# Patient Record
Sex: FEMALE | Race: WHITE | NOT HISPANIC OR LATINO | Employment: OTHER | URBAN - METROPOLITAN AREA
[De-identification: names, ages, dates, MRNs, and addresses within clinical notes are randomized per-mention and may not be internally consistent; named-entity substitution may affect disease eponyms.]

---

## 2017-01-11 ENCOUNTER — TELEPHONE (OUTPATIENT)
Dept: MEDICAL GROUP | Facility: MEDICAL CENTER | Age: 57
End: 2017-01-11

## 2017-01-11 ENCOUNTER — OFFICE VISIT (OUTPATIENT)
Dept: MEDICAL GROUP | Facility: MEDICAL CENTER | Age: 57
End: 2017-01-11
Payer: COMMERCIAL

## 2017-01-11 VITALS
BODY MASS INDEX: 33.53 KG/M2 | HEART RATE: 70 BPM | OXYGEN SATURATION: 98 % | TEMPERATURE: 98.1 F | SYSTOLIC BLOOD PRESSURE: 106 MMHG | WEIGHT: 196.4 LBS | HEIGHT: 64 IN | DIASTOLIC BLOOD PRESSURE: 60 MMHG

## 2017-01-11 DIAGNOSIS — E66.9 OBESITY (BMI 30-39.9): ICD-10-CM

## 2017-01-11 DIAGNOSIS — Z79.01 CHRONIC ANTICOAGULATION: ICD-10-CM

## 2017-01-11 DIAGNOSIS — J06.9 ACUTE URI: ICD-10-CM

## 2017-01-11 DIAGNOSIS — I48.0 PAROXYSMAL ATRIAL FIBRILLATION (HCC): ICD-10-CM

## 2017-01-11 PROCEDURE — 99214 OFFICE O/P EST MOD 30 MIN: CPT | Performed by: NURSE PRACTITIONER

## 2017-01-11 RX ORDER — AMOXICILLIN AND CLAVULANATE POTASSIUM 875; 125 MG/1; MG/1
1 TABLET, FILM COATED ORAL 2 TIMES DAILY
Qty: 20 TAB | Refills: 0 | Status: SHIPPED
Start: 2017-01-11 | End: 2017-02-03

## 2017-01-11 NOTE — PROGRESS NOTES
Subjective:     Chief Complaint   Patient presents with   • Sinus Problem     x6 days/nasal & chest congestion/green mucus & cough     Seda Cagle is a 56 y.o. female established patient of Dr. Stoddard here for evaluation of acute congestion and cough. Approximately 6 days ago she developed sore throat, nasal congestion, rhinitis. In the last 2 days she's also developed a cough which is wet. Cough is infrequent, kept her awake last night. She is using Coricidin with minimal relief.  She does have history of A. fib, rate controlled on current medications. She has a pacer and is on xarelto  No nausea, shortness of breath, fever, focal facial pain, chest pain. No asthma, COPD, tobacco use. No sick contacts    Current medicines (including changes today)  Current Outpatient Prescriptions   Medication Sig Dispense Refill   • amoxicillin-clavulanate (AUGMENTIN) 875-125 MG Tab Take 1 Tab by mouth 2 times a day. 20 Tab 0   • Guaifenesin-Codeine 200-20 MG/5ML Liquid Take 5 mL by mouth every 6 hours as needed. 240 mL 0   • rivaroxaban (XARELTO) 20 MG Tab tablet Take 1 Tab by mouth with dinner. 90 Tab 3   • metoprolol (LOPRESSOR) 25 MG Tab Take 1 Tab by mouth 2 times a day. 180 Tab 3   • disopyramide (NORPACE) 100 MG Cap Take 1 Cap by mouth every 8 hours. 270 Cap 3   • Diclofenac Sodium (VOLTAREN) 1 % Gel Apply 2 g to skin as directed 4 times a day as needed (apply to affected area). 300 g 5   • Calcium-Vitamin D-Vitamin K (CALCIUM FOR WOMEN PO) Take  by mouth.     • cyclobenzaprine (FLEXERIL) 10 MG Tab Take 1 Tab by mouth 3 times a day as needed. 30 Tab 0   • FLUTICASONE FUROATE INH Inhale  by mouth.     • ketotifen (ZADITOR) 0.025 % ophthalmic solution Place 1 Drop in both eyes 2 times a day.     • Lansoprazole (PREVACID PO) Take  by mouth.     • Famotidine (PEPCID PO) Take  by mouth.     • Pantothenic Acid 500 MG TABS Take  by mouth every day.     • Magnesium 200 MG TABS Take 1 Tab by mouth every day.     •  "Multiple Vitamins-Minerals (ALIVE ONCE DAILY WOMENS 50+ PO) Take  by mouth.       No current facility-administered medications for this visit.     She  has a past medical history of Heart murmur; Hypertrophic obstructive cardiomyopathy (1991); Depression; GERD (gastroesophageal reflux disease); Arrhythmia, ventricular (2008); Hypertrophic cardiomyopathy (HCC); Aortic regurgitation; CTS (carpal tunnel syndrome) (2/28/2011); Indigestion; Asthma; Snoring; Anesthesia; Renal disorder; Pain; Breath shortness (12/14); Cervical arthritis (HCC) (2/8/2016); and Chronic kidney disease, stage III (moderate) (2/9/2016). She also has no past medical history of CHF (congestive heart failure) ([facility]), Clotting disorder ([facility]), COPD, Cushings syndrome ([facility]), HIV (human immunodeficiency virus infection), Heart attack ([facility]), Addisons disease ([facility]), Adrenal disorder ([facility]), Parathyroid disorder ([facility]), Pituitary disease ([facility]), Thyroid disease, Stroke ([facility]), Substance abuse, Seizure ([facility]), OSTEOPOROSIS, Anemia, Anxiety, Diabetes, EMPHYSEMA, Glaucoma, ASTHMA, Blood transfusion, Cancer ([facility]), IBD (inflammatory bowel disease), Hypertension, Headache(784.0), Migraine, Tuberculosis, Ulcer ([facility]), CATARACT, Goiter, or Infectious disease.    ROS included above     Objective:     Blood pressure 106/60, pulse 70, temperature 36.7 °C (98.1 °F), height 1.626 m (5' 4\"), weight 89.086 kg (196 lb 6.4 oz), SpO2 98 %. Body mass index is 33.7 kg/(m^2).     Physical Exam:  General: Alert, oriented in no acute distress.  Eye contact is good, speech is normal, affect calm  HEENT: Posterior oropharynx mildly red, no lesions or exudate. TMs gray with good landmarks bilaterally. Nares with erythema, edema, clear mucus. No cervical or supraclavicular lymphadenopathy.  Lungs: Coarse throughout, good aeration, normal effort, no wheeze/ rhonchi/ rales.  CV: regular rate and rhythm, S1, S2, murmur present  Ext: no edema, color normal, vascularity normal, temperature " normal    Assessment and Plan:   The following treatment plan was discussed   1. Acute URI   6 days of illness. Discussed with patient likelihood of viral etiology, reassured that she should begin to improve in the next few days. She is, however, going out of town and will be in a remote area in [place] for the next few weeks. I will provide her with a prescription with a watch and wait approach. For now advised to continue with Coricidin, add Afrin for the next 3-4 days. Cough syrup provided for nighttime use. Advised not to drink alcohol and not to drive when using medication, follow-up for worsening cough, shortness of breath, fever, or other concern  amoxicillin-clavulanate (AUGMENTIN) 875-125 MG Tab    Guaifenesin-Codeine 200-20 MG/5ML Liquid   2. Paroxysmal atrial fibrillation (HCC)   stable on current medications    3. Chronic anticoagulation   doing well on Xarelto        Followup: As needed         Please note that this dictation was created using voice recognition software. I have worked with consultants from the vendor as well as technical experts from [facility] [employer] to optimize the interface. I have made every reasonable attempt to correct obvious errors, but I expect that there are errors of grammar and possibly content that I did not discover before finalizing the note.

## 2017-01-11 NOTE — MR AVS SNAPSHOT
"        Seda Cagle   2017 1:00 PM   Office Visit   MRN: [id number]    Department:  [facility]   Dept Phone:  [phone]    Description:  Female : 1960   Provider:  ROSLYN Allison           Reason for Visit     Sinus Problem x6 days/nasal & chest congestion/green mucus & cough      Allergies as of 2017     No Known Allergies      You were diagnosed with     Acute URI   [640689]       Paroxysmal atrial fibrillation (HCC)   [258534]       Chronic anticoagulation   [572230]         Vital Signs     Blood Pressure Pulse Temperature Height Weight Body Mass Index    106/60 mmHg 70 36.7 °C (98.1 °F) 1.626 m (5' 4\") 89.086 kg (196 lb 6.4 oz) 33.70 kg/m2    Oxygen Saturation Smoking Status                98% Never Smoker           Basic Information     Date Of Birth Sex Race Ethnicity Preferred Language    1960 Female White Non- English      Your appointments     2017  9:20 AM   FOLLOW UP with Khris Pitts M.D.   [facility] (--)    [address], [address]  [place] [address]   [phone]            2017  8:40 AM   FOLLOW UP with Khris Pitts M.D.   [facility] (--)    [address], [address]  [place] [address]   [phone]              Problem List              ICD-10-CM Priority Class Noted - Resolved    S/P colonoscopy Z98.890   2010 - Present    Hypertrophic obstructive cardiomyopathy (HCC) I42.1   Unknown - Present    Cardiac pacemaker in situ Z95.0   2015 - Present    Preventative health care Z00.00   2016 - Present    Gastroesophageal reflux disease without esophagitis K21.9   2016 - Present    Obesity E66.9   2016 - Present    Cervical arthritis (HCC) M46.92   2016 - Present    Mitral regurgitation I34.0   2016 - Present    Chronic kidney disease, stage III (moderate) (Chronic) N18.3   2016 - Present    Dyslipidemia " E78.5   12/5/2016 - Present    Paroxysmal atrial fibrillation (HCC) I48.0   12/5/2016 - Present      Health Maintenance        Date Due Completion Dates    IMM PNEUMOCOCCAL 19-64 (ADULT) MEDIUM RISK SERIES (1 of 1 - PPSV23) 5/2/1979 ---    PAP SMEAR 9/13/2013 9/13/2010    COLONOSCOPY 12/7/2016 12/7/2009    MAMMOGRAM 3/23/2017 3/23/2016, 2/25/2015, 2/23/2015    IMM DTaP/Tdap/Td Vaccine (2 - Td) 12/2/2026 12/2/2016            Current Immunizations     Influenza TIV (IM) 9/15/2014    Influenza Vaccine Quad Inj (Preserved) 11/3/2016  9:51 AM    Tdap Vaccine 12/2/2016 11:07 AM      Below and/or attached are the medications your provider expects you to take. Review all of your home medications and newly ordered medications with your provider and/or pharmacist. Follow medication instructions as directed by your provider and/or pharmacist. Please keep your medication list with you and share with your provider. Update the information when medications are discontinued, doses are changed, or new medications (including over-the-counter products) are added; and carry medication information at all times in the event of emergency situations     Allergies:  No Known Allergies          Medications  Valid as of: January 11, 2017 -  2:26 PM    Generic Name Brand Name Tablet Size Instructions for use    Amoxicillin-Pot Clavulanate (Tab) AUGMENTIN 875-125 MG Take 1 Tab by mouth 2 times a day.        Calcium-Vitamin D-Vitamin K   Take  by mouth.        Cyclobenzaprine HCl (Tab) FLEXERIL 10 MG Take 1 Tab by mouth 3 times a day as needed.        Diclofenac Sodium (Gel) Diclofenac Sodium 1 % Apply 2 g to skin as directed 4 times a day as needed (apply to affected area).        Disopyramide Phosphate (Cap) NORPACE 100 MG Take 1 Cap by mouth every 8 hours.        Famotidine   Take  by mouth.        Fluticasone Furoate   Inhale  by mouth.        Guaifenesin-Codeine (Liquid) Guaifenesin-Codeine 200-20 MG/5ML Take 5 mL by mouth every 6 hours as  needed.        Ketotifen Fumarate (Solution) ZADITOR 0.025 % Place 1 Drop in both eyes 2 times a day.        Lansoprazole   Take  by mouth.        Magnesium (Tab) Magnesium 200 MG Take 1 Tab by mouth every day.        Metoprolol Tartrate (Tab) LOPRESSOR 25 MG Take 1 Tab by mouth 2 times a day.        Multiple Vitamins-Minerals   Take  by mouth.        Pantothenic Acid (Tab) Pantothenic Acid 500 MG Take  by mouth every day.        Rivaroxaban (Tab) XARELTO 20 MG Take 1 Tab by mouth with dinner.        .                 Medicines prescribed today were sent to:     [facility] [address] [facility] ([place] [place] - [address]    [address] [facility] [facility] ([place]) [place] [address]    Phone: [phone] Fax: [phone]    Open 24 Hours?: No    [facility] [address] [facility], [place] - [address] [facility] [address]    [address] [facility] [address]    Phone: [phone] Fax: [phone]    Open 24 Hours?: No    [facility] #[id number] - [place], [place] - [address]    [address] [facility] [address]    Phone: [phone] Fax: [phone]    Open 24 Hours?: No      Medication refill instructions:       If your prescription bottle indicates you have medication refills left, it is not necessary to call your provider’s office. Please contact your pharmacy and they will refill your medication.    If your prescription bottle indicates you do not have any refills left, you may request refills at any time through one of the following ways: The online [employer] system (except Urgent Care), by calling your provider’s office, or by asking your pharmacy to contact your provider’s office with a refill request. Medication refills are processed only during regular business hours and may not be available until the next business day. Your provider may request additional information or to have a follow-up visit with you prior to refilling your medication.   *Please Note: Medication refills are assigned a new Rx number when  refilled electronically. Your pharmacy may indicate that no refills were authorized even though a new prescription for the same medication is available at the pharmacy. Please request the medicine by name with the pharmacy before contacting your provider for a refill.        Other Notes About Your Plan     12/5/16 tsh 4.3  12/2/16 right plantar fasciitis, trial of topical Voltaren, stretching exercises, referral podiatry  3/17 repeat tsh           MyChart Access Code: Activation code not generated  Current MyChart Status: Active

## 2017-01-11 NOTE — TELEPHONE ENCOUNTER
Pharmacy states rx for Guafinesen only comes as 100-10/5ml. Do you want to switch to this strength and double the dose?    Call back number: [phone]

## 2017-01-11 NOTE — TELEPHONE ENCOUNTER
Ila prescribed this for her, I did not.    You could call the pharmacy, and change to the 200-20 dose instead

## 2017-02-03 ENCOUNTER — OFFICE VISIT (OUTPATIENT)
Dept: MEDICAL GROUP | Facility: MEDICAL CENTER | Age: 57
End: 2017-02-03
Payer: COMMERCIAL

## 2017-02-03 VITALS
DIASTOLIC BLOOD PRESSURE: 72 MMHG | HEART RATE: 60 BPM | TEMPERATURE: 98.1 F | SYSTOLIC BLOOD PRESSURE: 128 MMHG | HEIGHT: 64 IN | BODY MASS INDEX: 34.31 KG/M2 | WEIGHT: 201 LBS | OXYGEN SATURATION: 94 %

## 2017-02-03 DIAGNOSIS — Z95.0 CARDIAC PACEMAKER IN SITU: ICD-10-CM

## 2017-02-03 DIAGNOSIS — I42.1 HYPERTROPHIC OBSTRUCTIVE CARDIOMYOPATHY (HCC): Chronic | ICD-10-CM

## 2017-02-03 DIAGNOSIS — R06.83 SNORING: ICD-10-CM

## 2017-02-03 DIAGNOSIS — N18.30 CHRONIC KIDNEY DISEASE, STAGE III (MODERATE) (HCC): Chronic | ICD-10-CM

## 2017-02-03 PROBLEM — E66.9 OBESITY (BMI 30-39.9): Status: RESOLVED | Noted: 2017-01-11 | Resolved: 2017-02-03

## 2017-02-03 PROCEDURE — 99213 OFFICE O/P EST LOW 20 MIN: CPT | Performed by: INTERNAL MEDICINE

## 2017-02-03 ASSESSMENT — PATIENT HEALTH QUESTIONNAIRE - PHQ9: CLINICAL INTERPRETATION OF PHQ2 SCORE: 0

## 2017-02-03 NOTE — MR AVS SNAPSHOT
"        Seda Cagle   2/3/2017 2:00 PM   Office Visit   MRN: [id number]    Department:  [facility]   Dept Phone:  [phone]    Description:  Female : 1960   Provider:  Juve Stoddard M.D.           Allergies as of 2/3/2017     No Known Allergies      You were diagnosed with     Hypertrophic obstructive cardiomyopathy (CMS-HCC)   [7032370]       Cardiac pacemaker in situ   [V45.01.ICD-9-CM]       Snoring   [694426]       Chronic kidney disease, stage III (moderate)   [585.3.ICD-9-CM]         Vital Signs     Blood Pressure Pulse Temperature Height Weight Body Mass Index    128/72 mmHg 60 36.7 °C (98.1 °F) 1.626 m (5' 4\") 91.173 kg (201 lb) 34.48 kg/m2    Oxygen Saturation Smoking Status                94% Never Smoker           Basic Information     Date Of Birth Sex Race Ethnicity Preferred Language    1960 Female White Non- English      Your appointments     2017  9:20 AM   FOLLOW UP with Khris Pitts M.D.   [facility] (--)    [address], [address]  [place] [address]   [phone]            2017  8:40 AM   FOLLOW UP with Khris Pitts M.D.   [facility] (--)    [address], [address]  [place] [address]   [phone]              Problem List              ICD-10-CM Priority Class Noted - Resolved    S/P colonoscopy Z98.890   2010 - Present    Hypertrophic obstructive cardiomyopathy (HCC) (Chronic) I42.1   Unknown - Present    Cardiac pacemaker in situ Z95.0   2015 - Present    Preventative health care Z00.00   2016 - Present    Gastroesophageal reflux disease without esophagitis K21.9   2016 - Present    Obesity E66.9   2016 - Present    Cervical arthritis (CMS-HCC) M46.92   2016 - Present    Mitral regurgitation I34.0   2016 - Present    Chronic kidney disease, stage III (moderate) (Chronic) N18.3   2016 - Present    Dyslipidemia " E78.5   12/5/2016 - Present    Paroxysmal atrial fibrillation (CMS-HCC) (Chronic) I48.0   12/5/2016 - Present      Health Maintenance        Date Due Completion Dates    IMM PNEUMOCOCCAL 19-64 (ADULT) MEDIUM RISK SERIES (1 of 1 - PPSV23) 5/2/1979 ---    PAP SMEAR 9/13/2013 9/13/2010    COLONOSCOPY 12/7/2016 12/7/2009    MAMMOGRAM 3/23/2017 3/23/2016, 2/25/2015, 2/23/2015    IMM DTaP/Tdap/Td Vaccine (2 - Td) 12/2/2026 12/2/2016            Current Immunizations     Influenza TIV (IM) 9/15/2014    Influenza Vaccine Quad Inj (Preserved) 11/3/2016  9:51 AM    Tdap Vaccine 12/2/2016 11:07 AM      Below and/or attached are the medications your provider expects you to take. Review all of your home medications and newly ordered medications with your provider and/or pharmacist. Follow medication instructions as directed by your provider and/or pharmacist. Please keep your medication list with you and share with your provider. Update the information when medications are discontinued, doses are changed, or new medications (including over-the-counter products) are added; and carry medication information at all times in the event of emergency situations     Allergies:  No Known Allergies          Medications  Valid as of: February 03, 2017 -  2:28 PM    Generic Name Brand Name Tablet Size Instructions for use    Calcium-Vitamin D-Vitamin K   Take  by mouth.        Diclofenac Sodium (Gel) Diclofenac Sodium 1 % Apply 2 g to skin as directed 4 times a day as needed (apply to affected area).        Disopyramide Phosphate (Cap) NORPACE 100 MG Take 1 Cap by mouth every 8 hours.        Lansoprazole   Take  by mouth.        Magnesium (Tab) Magnesium 200 MG Take 1 Tab by mouth every day.        Metoprolol Tartrate (Tab) LOPRESSOR 25 MG Take 1 Tab by mouth 2 times a day.        Multiple Vitamins-Minerals   Take  by mouth.        Pantothenic Acid (Tab) Pantothenic Acid 500 MG Take  by mouth every day.        Rivaroxaban (Tab) XARELTO 20 MG  Take 1 Tab by mouth with dinner.        .                 Medicines prescribed today were sent to:     [facility] - [place]), [place] - [address]    [address] [place] [address]    Phone: [phone] Fax: [phone]    Open 24 Hours?: No    [facility] - [place], [place] - [address]    [address] [place] [address]    Phone: [phone] Fax: [phone]    Open 24 Hours?: No    [facility] #[id number] - [facility] [place] - [address]    [address] [facility] [address]    Phone: [phone] Fax: [phone]    Open 24 Hours?: No      Medication refill instructions:       If your prescription bottle indicates you have medication refills left, it is not necessary to call your provider’s office. Please contact your pharmacy and they will refill your medication.    If your prescription bottle indicates you do not have any refills left, you may request refills at any time through one of the following ways: The online [employer] system (except Urgent Care), by calling your provider’s office, or by asking your pharmacy to contact your provider’s office with a refill request. Medication refills are processed only during regular business hours and may not be available until the next business day. Your provider may request additional information or to have a follow-up visit with you prior to refilling your medication.   *Please Note: Medication refills are assigned a new Rx number when refilled electronically. Your pharmacy may indicate that no refills were authorized even though a new prescription for the same medication is available at the pharmacy. Please request the medicine by name with the pharmacy before contacting your provider for a refill.        Referral     A referral request has been sent to our patient care coordination department. Please allow 3-5 business days for us to process this request and contact you either by phone or mail.  If you do not hear from us by the 5th business day, please call us at [phone].        Other Notes About Your Plan     12/5/16 tsh 4.3  12/2/16 right plantar fasciitis, trial of topical voltaren, stretching exercises, referral podiatry  3/17 repeat tsh  2/3/17 sleep apnea referral pending, OPO ordered                      MyChart Access Code: Activation code not generated  Current [facility] [employer] Status: Active

## 2017-02-03 NOTE — PROGRESS NOTES
Subjective:      Seda Cagle is a 56 y.o. female who presents with snoring        HPI      has mentioned that she has been snoring more the last year, patient has had no previous snoring by history, will go to bed at 11 and get up at 7 am, no history of sleep apnea, no insomnia, still feels tired after sleeping 8 hours upon awakening, no naps during the day, energy somewhat decreased during the day, has treadmill at home and will try to get 97082 steps per day.  Status post pacemaker followed by cardiology, paroxysmal atrial fibrillation, on xarelto and metoprolol, no chest pain, palpitations, shortness of breath.  No aspirin, NSAIDs, no bruising or bleeding  No tobacco, limiting alcohol intake      Current Outpatient Prescriptions   Medication Sig Dispense Refill   • Calcium-Vitamin D-Vitamin K (CALCIUM FOR WOMEN PO) Take  by mouth.     • rivaroxaban (XARELTO) 20 MG Tab tablet Take 1 Tab by mouth with dinner. 90 Tab 3   • metoprolol (LOPRESSOR) 25 MG Tab Take 1 Tab by mouth 2 times a day. 180 Tab 3   • disopyramide (NORPACE) 100 MG Cap Take 1 Cap by mouth every 8 hours. 270 Cap 3   • Lansoprazole (PREVACID PO) Take  by mouth.     • Pantothenic Acid 500 MG TABS Take  by mouth every day.     • Magnesium 200 MG TABS Take 1 Tab by mouth every day.     • Multiple Vitamins-Minerals (ALIVE ONCE DAILY WOMENS 50+ PO) Take  by mouth.     • amoxicillin-clavulanate (AUGMENTIN) 875-125 MG Tab Take 1 Tab by mouth 2 times a day. 20 Tab 0   • Guaifenesin-Codeine 200-20 MG/5ML Liquid Take 5 mL by mouth every 6 hours as needed. 240 mL 0   • Diclofenac Sodium (VOLTAREN) 1 % Gel Apply 2 g to skin as directed 4 times a day as needed (apply to affected area). 300 g 5   • cyclobenzaprine (FLEXERIL) 10 MG Tab Take 1 Tab by mouth 3 times a day as needed. 30 Tab 0   • FLUTICASONE FUROATE INH Inhale  by mouth.     • ketotifen (ZADITOR) 0.025 % ophthalmic solution Place 1 Drop in both eyes 2 times a day.     •  Famotidine (PEPCID PO) Take  by mouth.       No current facility-administered medications for this visit.     Patient Active Problem List    Diagnosis Date Noted   • Dyslipidemia 12/05/2016   • Paroxysmal atrial fibrillation (CMS-HCC) 12/05/2016   • Chronic kidney disease, stage III (moderate) 02/09/2016   • Preventative health care 02/08/2016   • Gastroesophageal reflux disease without esophagitis 02/08/2016   • Obesity 02/08/2016   • Cervical arthritis (CMS-HCC) 02/08/2016   • Mitral regurgitation 02/08/2016   • Cardiac pacemaker in situ 01/21/2015   • Hypertrophic obstructive cardiomyopathy ([facility])    • S/P colonoscopy 09/13/2010       ckd  2/19/11 bun 15,creat 1.0,GFR 56  5/14/14 bun 24,creat 1.23,GFR 45  8/2/14 ultrasound renal negative  2/19/15 bun 19,creat 1.1,GFR 50  8/3/15 bun 17,creat 1.2, GFR 43,urine mac<0.5,PTH 27,vit d 37  10/16/15 bun 17,creat 1.1,GFR 47  1/27/16 bun 14,creat 1.2,GFR 45  12/5/16 bun 16,creat 1.0,GFR 52    Dyslipidemia  12/5/16 chol 186,trig 143,hdl 52,ldl 105    Hypertrophic cardiomyopathy  8/14/00 alcohol septal reduction, normal coronaries  4/15/10 echo very small area proximal septal hypertrophy, no significant obstruction  4/16/10 cardiology note [facility] hypertrophic cardiomyopathy on metoprolol 25 mg bid, norpace  mg bid, asa; remains asymptomatic with regards to hypertrophic cardio myopathy  3/18/11 echo normal LV thickness with very focal area localized hypertrophy most basal septum without evidence of obstruction, EF 64%  11/16/14 persantine thallium fixed defect mid and basilar anteroseptal wall and apical septum consistent with scar, hypokinesis septum EF 74%  11/18/14 echo normal LV size and function, grade 2 diastolic dysfunction, EF 60-65%, mild aortic stenosis, mild aortic insufficiency, RVSP 20-25  11/19/14 holter sinus with left bundle branch block, average rate 57, minimum heart rate 44, maximal heart rate 85, PVCs, PACs    12/12/14 ALLEN known  post-ablation for hypertrophic cardiomyopathy without obstruction, small amount of remaining myocardium of lower tract does not cause significant outflow gradient, mild aortic insufficiency, moderate central mitral regurgitation, remnant myocardium in the LVOT with mild obstruction, peak outflow gradient 11 (previously 16-18)  4/16/15  cardiology procedure note, dual-chamber pacemaker implantation  10/24/16  of cardiology, atrial fibrillation and hypertrophic cardiomyopathy, controlled on norpace,metoprolol, xarelto    Mitral regurgitation  11/18/14 echo normal LV size and function, grade 2 diastolic dysfunction, EF 60-65%, mild aortic stenosis, mild aortic insufficiency, RVSP 20-25  11/19/14 holter sinus with left bundle branch block, average rate 57, minimum heart rate 44, maximal heart rate 85, PVCs, PACs    12/12/14 ALLEN known post-ablation for hypertrophic cardiomyopathy without obstruction, small amount of remaining myocardium of lower tract does not cause significant outflow gradient, mild aortic insufficiency, moderate central mitral regurgitation, remnant myocardium in the LVOT with mild obstruction, peak outflow gradient 11 (previously 16-18)  10/24/16  of cardiology, atrial fibrillation and hypertrophic cardiomyopathy, controlled on norpace,metoprolol, xarelto    Paroxysmal atrial fibrillation  11/30/15 cardiology note paroxysmal atrial fibrillation, short atrial fibrillation under 4 minutes  2/22/16 cardiology note minimal atrial fibrillation on dual-chamber pacemaker check  3/24/16 cardiology note minimal atrial fibrillation   10/24/16 cardiology note longer episodes of atrial fibrillation, still at 2.6 hours total on dual-chamber pacemaker review, continues on xarelto    Preventative health  12/7/09 colon per [facility] negative, repeat 10 years  2/15 pap per gyn  3/23/16 mammogram heterogeneously dense breast tissue, declines sonocine    Depression Screening    Little interest or  "pleasure in doing things?  0 - not at all  Feeling down, depressed , or hopeless? 0 - not at all  Patient Health Questionnaire Score: 0     If depressive symptoms identified deferred to follow up visit unless specifically addressed in assesment and plan.        ROS       Objective:     /72 mmHg  Pulse 60  Temp(Src) 36.7 °C (98.1 °F)  Ht 1.626 m (5' 4\")  Wt 91.173 kg (201 lb)  BMI 34.48 kg/m2  SpO2 94%     Physical Exam   Constitutional: She appears well-developed and well-nourished. No distress.   HENT:   Head: Normocephalic and atraumatic.   Eyes: Right eye exhibits no discharge. Left eye exhibits no discharge. No scleral icterus.   Neck: No JVD present. No thyromegaly present.   Cardiovascular: Normal rate and regular rhythm.    No murmur heard.  Abdominal: She exhibits no distension.   Musculoskeletal: She exhibits no edema.   Neurological: She is alert.   Skin: Skin is warm. She is not diaphoretic.   Psychiatric: She has a normal mood and affect. Her behavior is normal.   Nursing note and vitals reviewed.              Assessment/Plan:     Assessment  #!  Snoring question sleep apnea, daytime fatigue, feels tired upon awakening, no narcolepsy    #2 obesity BMI 34.5    #3 status post pacemaker    #4 paroxysmal atrial fibrillation on xarelto and metoprolol stable, sinus on exam today          Plan  #1 sleep apnea referral    #2 overnight oximetry by key    #3 lifestyle modification, diet, exercise discussed    #4 follow-up cardiology      "

## 2017-02-06 ENCOUNTER — OFFICE VISIT (OUTPATIENT)
Dept: CARDIOLOGY | Facility: MEDICAL CENTER | Age: 57
End: 2017-02-06
Payer: COMMERCIAL

## 2017-02-06 VITALS
SYSTOLIC BLOOD PRESSURE: 122 MMHG | HEIGHT: 64 IN | HEART RATE: 60 BPM | OXYGEN SATURATION: 96 % | WEIGHT: 201 LBS | DIASTOLIC BLOOD PRESSURE: 74 MMHG | BODY MASS INDEX: 34.31 KG/M2

## 2017-02-06 DIAGNOSIS — I42.1 HYPERTROPHIC OBSTRUCTIVE CARDIOMYOPATHY (HCC): Chronic | ICD-10-CM

## 2017-02-06 DIAGNOSIS — I48.91 ATRIAL FIBRILLATION, UNSPECIFIED TYPE (HCC): ICD-10-CM

## 2017-02-06 LAB — EKG IMPRESSION: NORMAL

## 2017-02-06 PROCEDURE — 93000 ELECTROCARDIOGRAM COMPLETE: CPT | Performed by: INTERNAL MEDICINE

## 2017-02-06 PROCEDURE — 99213 OFFICE O/P EST LOW 20 MIN: CPT | Performed by: INTERNAL MEDICINE

## 2017-02-06 ASSESSMENT — ENCOUNTER SYMPTOMS: PALPITATIONS: 0

## 2017-02-06 NOTE — MR AVS SNAPSHOT
"Seda Cagle   2017 9:20 AM   Office Visit   MRN: [id number]    Department:  [facility]   Dept Phone:  [phone]    Description:  Female : 1960   Provider:  Khris Pitts M.D.           Reason for Visit     Follow-Up           Allergies as of 2017     No Known Allergies      You were diagnosed with     Atrial fibrillation, unspecified type (CMS-HCC)   [6828553]       Hypertrophic obstructive cardiomyopathy (CMS-HCC)   [2179324]         Vital Signs     Blood Pressure Pulse Height Weight Body Mass Index Oxygen Saturation    122/74 mmHg 60 1.626 m (5' 4.02\") 91.173 kg (201 lb) 34.48 kg/m2 96%    Smoking Status                   Never Smoker            Basic Information     Date Of Birth Sex Race Ethnicity Preferred Language    1960 Female White Non- English      Your appointments     2017  8:40 AM   FOLLOW UP with Khris Pitts M.D.   [facility] (--)    [address], [address]  [facility] [id number] [phone]              Problem List              ICD-10-CM Priority Class Noted - Resolved    S/P colonoscopy Z98.890   2010 - Present    Hypertrophic obstructive cardiomyopathy (HCC) (Chronic) I42.1   Unknown - Present    Cardiac pacemaker in situ Z95.0   2015 - Present    Preventative health care Z00.00   2016 - Present    Gastroesophageal reflux disease without esophagitis K21.9   2016 - Present    Obesity E66.9   2016 - Present    Cervical arthritis (CMS-HCC) M46.92   2016 - Present    Mitral regurgitation I34.0   2016 - Present    Chronic kidney disease, stage III (moderate) (Chronic) N18.3   2016 - Present    Dyslipidemia E78.5   2016 - Present    Paroxysmal atrial fibrillation (CMS-HCC) (Chronic) I48.0   2016 - Present      Health Maintenance        Date Due Completion Dates    IMM PNEUMOCOCCAL 19-64 (ADULT) MEDIUM RISK SERIES (1 of 1 - PPSV23) 1979 ---    PAP " SMEAR 9/13/2013 9/13/2010    COLONOSCOPY 12/7/2016 12/7/2009    MAMMOGRAM 3/23/2017 3/23/2016, 2/25/2015, 2/23/2015    IMM DTaP/Tdap/Td Vaccine (2 - Td) 12/2/2026 12/2/2016            Results       Current Immunizations     Influenza TIV (IM) 9/15/2014    Influenza Vaccine Quad Inj (Preserved) 11/3/2016  9:51 AM    Tdap Vaccine 12/2/2016 11:07 AM      Below and/or attached are the medications your provider expects you to take. Review all of your home medications and newly ordered medications with your provider and/or pharmacist. Follow medication instructions as directed by your provider and/or pharmacist. Please keep your medication list with you and share with your provider. Update the information when medications are discontinued, doses are changed, or new medications (including over-the-counter products) are added; and carry medication information at all times in the event of emergency situations     Allergies:  No Known Allergies          Medications  Valid as of: February 06, 2017 - 10:02 AM    Generic Name Brand Name Tablet Size Instructions for use    Calcium-Vitamin D-Vitamin K   Take  by mouth.        Diclofenac Sodium (Gel) Diclofenac Sodium 1 % Apply 2 g to skin as directed 4 times a day as needed (apply to affected area).        Disopyramide Phosphate (Cap) NORPACE 100 MG Take 1 Cap by mouth every 8 hours.        Lansoprazole   Take  by mouth.        Magnesium (Tab) Magnesium 200 MG Take 1 Tab by mouth every day.        Metoprolol Tartrate (Tab) LOPRESSOR 25 MG Take 1 Tab by mouth 2 times a day.        Multiple Vitamins-Minerals   Take  by mouth.        Pantothenic Acid (Tab) Pantothenic Acid 500 MG Take  by mouth every day.        Rivaroxaban (Tab) XARELTO 20 MG Take 1 Tab by mouth with dinner.        .                 Medicines prescribed today were sent to:     [facility] [place] - [address]    [facility] [place] [address]    Phone: [phone] Fax: [phone]     Open 24 Hours?: No    [id number] - [place], [place] - [address]    [address] [place] [address]    Phone: [phone] Fax: [phone]    Open 24 Hours?: No    [facility] #[id number] - [place], [place] - [address]    [address] [facility] [address]    Phone: [phone] Fax: [phone]    Open 24 Hours?: No      Medication refill instructions:       If your prescription bottle indicates you have medication refills left, it is not necessary to call your provider’s office. Please contact your pharmacy and they will refill your medication.    If your prescription bottle indicates you do not have any refills left, you may request refills at any time through one of the following ways: The online [employer] system (except Urgent Care), by calling your provider’s office, or by asking your pharmacy to contact your provider’s office with a refill request. Medication refills are processed only during regular business hours and may not be available until the next business day. Your provider may request additional information or to have a follow-up visit with you prior to refilling your medication.   *Please Note: Medication refills are assigned a new Rx number when refilled electronically. Your pharmacy may indicate that no refills were authorized even though a new prescription for the same medication is available at the pharmacy. Please request the medicine by name with the pharmacy before contacting your provider for a refill.        Other Notes About Your Plan     12/5/16 tsh 4.3  12/2/16 right plantar fasciitis, trial of topical voltaren, stretching exercises, referral podiatry  3/17 repeat tsh  2/3/17 sleep apnea referral pending, OPO ordered                      MyChart Access Code: Activation code not generated  Current KUBOOt Status: Active

## 2017-02-06 NOTE — PROGRESS NOTES
Subjective:   Seda Cagle is a 56 y.o. female who presents today for follow up of a fib and hocm    Something is different.  Sometimes when she gets up she feels a little dizzy when she gets up quickly.  Not so bad that she gets close to passing out.  She is not feeling at 100%.  This is totally new for her.  Getting up is when it happens the most often.   Past Medical History   Diagnosis Date   • Heart murmur    • Hypertrophic obstructive cardiomyopathy      Dr. Bert Vazquez, [address] [facility], [facility] [address] [address] [phone] fax [phone].  [facility] [phone].  Alchol Septal Reduction , Coronaries normal.   • Depression    • GERD (gastroesophageal reflux disease)    • Arrhythmia, ventricular 2008     Symptomatic PVCs, controlled with medication.   • Hypertrophic cardiomyopathy (CMS-HCC)    • Aortic regurgitation    • CTS (carpal tunnel syndrome) 2011   • Indigestion    • Asthma    • Snoring    • Anesthesia      wakes up during procedure   • Renal disorder      chronic kidney disease stage 3   • Pain      right arm /shoulder   • Breath shortness    • Cervical arthritis (CMS-HCC) 2016   • Chronic kidney disease, stage III (moderate) 2016     Past Surgical History   Procedure Laterality Date   • Primary c section     • Tubal coagulation laparoscopic bilateral     • Recovery  2014     Performed by [facility] at [facility]   • Recovery  4/15/2015     Performed by [facility] at SURGERY PRE-POST PROC [facility]IT [facility]     Family History   Problem Relation Age of Onset   • Heart Disease Mother      CHF   • Hypertension Mother    • Stroke Mother    • Heart Failure Mother    • Other Mother      carotid artery disease,gout   • Diabetes Maternal Grandmother    • Cancer Maternal Grandfather      Prostate   • Heart Disease Paternal Grandfather 52      MI    • Cancer Father      AML     History   Smoking status   • Never  "Smoker    Smokeless tobacco   • Never Used     No Known Allergies  Outpatient Encounter Prescriptions as of 2/6/2017   Medication Sig Dispense Refill   • Calcium-Vitamin D-Vitamin K (CALCIUM FOR WOMEN PO) Take  by mouth.     • rivaroxaban (XARELTO) 20 MG Tab tablet Take 1 Tab by mouth with dinner. 90 Tab 3   • metoprolol (LOPRESSOR) 25 MG Tab Take 1 Tab by mouth 2 times a day. 180 Tab 3   • disopyramide (NORPACE) 100 MG Cap Take 1 Cap by mouth every 8 hours. 270 Cap 3   • Lansoprazole (PREVACID PO) Take  by mouth.     • Pantothenic Acid 500 MG TABS Take  by mouth every day.     • Magnesium 200 MG TABS Take 1 Tab by mouth every day.     • Multiple Vitamins-Minerals (ALIVE ONCE DAILY WOMENS 50+ PO) Take  by mouth.     • Diclofenac Sodium (VOLTAREN) 1 % Gel Apply 2 g to skin as directed 4 times a day as needed (apply to affected area). (Patient not taking: Reported on 2/6/2017) 300 g 5     No facility-administered encounter medications on file as of 2/6/2017.     Review of Systems   Cardiovascular: Negative for palpitations.        Objective:   /74 mmHg  Pulse 60  Ht 1.626 m (5' 4.02\")  Wt 91.173 kg (201 lb)  BMI 34.48 kg/m2  SpO2 96%    Physical Exam   Constitutional: She is oriented to person, place, and time. She appears well-developed and well-nourished. No distress.   HENT:   Head: Normocephalic and atraumatic.   Right Ear: External ear normal.   Left Ear: External ear normal.   Mouth/Throat: Oropharynx is clear and moist.   Eyes: Conjunctivae and EOM are normal. Right eye exhibits no discharge. Left eye exhibits no discharge. No scleral icterus.   Neck: Normal range of motion. Neck supple. No JVD present. No tracheal deviation present. No thyromegaly present.   Cardiovascular: Normal rate, regular rhythm, normal heart sounds and intact distal pulses.  Exam reveals no gallop and no friction rub.    No murmur heard.  Pulmonary/Chest: Effort normal and breath sounds normal. No stridor. No respiratory " distress. She has no wheezes. She has no rales.   Abdominal: Soft. She exhibits no distension.   Musculoskeletal: She exhibits no edema or tenderness.   Neurological: She is alert and oriented to person, place, and time. No cranial nerve deficit. Coordination normal.   Skin: Skin is warm and dry. No rash noted. She is not diaphoretic. No erythema. No pallor.   Psychiatric: She has a normal mood and affect. Her behavior is normal. Judgment and thought content normal.   Vitals reviewed.      Assessment:     1. Atrial fibrillation, unspecified type (CMS-HCC)  EKG   2. Hypertrophic obstructive cardiomyopathy (HCC)         Medical Decision Making:  Today's Assessment / Status / Plan:   A fib with hocm- doing generally well.  Continue oac  Noting some orthostatic hypotension when standing.  Wonders if it is xarelto.  I think it is likely more related to underlying condintion and encouraged better fitness and flexing le muscles before rising and sitting again if gets lightheaded.  counselled on staying hydrated, rising slowly to avoid symptoms

## 2017-02-14 ENCOUNTER — TELEPHONE (OUTPATIENT)
Dept: MEDICAL GROUP | Facility: MEDICAL CENTER | Age: 57
End: 2017-02-14

## 2017-02-15 NOTE — TELEPHONE ENCOUNTER
I called the patient with the apnea link result, it is suggestive of sleep apnea.    She does have an appointment for an evaluation by the sleep doctors with [facility] on April 12.  However she is leaving town in April shortly after that visit and will be gone for 6 months.      Could you please call the sleep group scheduling at [phone] to see if at all possible if she could be seen sooner because after the initial visit by the sleep specialist, she will likely need to be set up for a sleep apnea study which may take a few weeks to arrange, and since she is living in [place], she would like to expedite this matter if at all possible.    Thank you.

## 2017-02-15 NOTE — TELEPHONE ENCOUNTER
please try calling [facility] at [phone] and check if they take [employer] insurance if not check with [facility] at [phone] if they take [employer] insurance.    if either take [employer] ask them the quickest way for the patient to be seen and evaluated.     Please let the patient know that the sleep Center has only one provider (they are down two providers) and she is on the wait list, but we are exploring other options outside of [facility] for her.

## 2017-02-15 NOTE — TELEPHONE ENCOUNTER
I called the patient with the apnea link result, it is suggestive of sleep apnea.    She does have an appointment for an evaluation by the sleep doctors with [facility] on April 12.  However she is leaving town in April shortly after that visit and will be gone for 6 months.      Could you please call the sleep group scheduling at [phone] to see if at all possible if she could be seen sooner because after the initial visit by the sleep specialist, she will likely need to be set up for a sleep apnea study which may take a few weeks to arrange, and since she is living in [place], she would like to expedite this matter if at all possible.    Thank you.

## 2017-02-15 NOTE — TELEPHONE ENCOUNTER
I called the patient with the apnea link result, it is suggestive of sleep apnea.    She does have an appointment for an evaluation by the sleep doctors with [facility] on April 12.  However she is leaving town in April shortly after that visit and will be gone for 6 months.      Could you please call the sleep group scheduling at [phone] to see if at all possible if she could be seen sooner because after the initial visit by the sleep specialist, she will likely need to be set up for a sleep apnea study which may take a few weeks to arrange, and since she is living in [place], she would like to expedite this matter if at all possible.    Thank you.

## 2017-02-15 NOTE — TELEPHONE ENCOUNTER
Checked with the  who spoke to sleep center supervisor. They are down two providers and have no openings. They will keep her name for cancellations and contact her if anything opens up.

## 2017-02-16 ENCOUNTER — SLEEP CENTER VISIT (OUTPATIENT)
Dept: SLEEP MEDICINE | Facility: MEDICAL CENTER | Age: 57
End: 2017-02-16
Payer: COMMERCIAL

## 2017-02-16 VITALS
SYSTOLIC BLOOD PRESSURE: 122 MMHG | TEMPERATURE: 97.9 F | HEART RATE: 60 BPM | RESPIRATION RATE: 16 BRPM | HEIGHT: 64 IN | DIASTOLIC BLOOD PRESSURE: 78 MMHG | BODY MASS INDEX: 34.31 KG/M2 | WEIGHT: 201 LBS | OXYGEN SATURATION: 94 %

## 2017-02-16 DIAGNOSIS — I42.1 HYPERTROPHIC OBSTRUCTIVE CARDIOMYOPATHY (HCC): Chronic | ICD-10-CM

## 2017-02-16 DIAGNOSIS — Z95.0 CARDIAC PACEMAKER IN SITU: ICD-10-CM

## 2017-02-16 DIAGNOSIS — I48.0 PAROXYSMAL ATRIAL FIBRILLATION (HCC): Chronic | ICD-10-CM

## 2017-02-16 DIAGNOSIS — R63.8 INCREASED BMI: ICD-10-CM

## 2017-02-16 DIAGNOSIS — K21.9 GASTROESOPHAGEAL REFLUX DISEASE WITHOUT ESOPHAGITIS: ICD-10-CM

## 2017-02-16 DIAGNOSIS — G47.33 OSA (OBSTRUCTIVE SLEEP APNEA): ICD-10-CM

## 2017-02-16 PROCEDURE — 99244 OFF/OP CNSLTJ NEW/EST MOD 40: CPT | Performed by: INTERNAL MEDICINE

## 2017-02-16 RX ORDER — ZOLPIDEM TARTRATE 5 MG/1
TABLET ORAL
Qty: 3 TAB | Refills: 0 | Status: SHIPPED | OUTPATIENT
Start: 2017-02-16 | End: 2017-11-30

## 2017-02-16 NOTE — MR AVS SNAPSHOT
"        Seda Cagle   2017 2:40 PM   Sleep Center Visit   MRN: [id number]    Department:  [facility]   Dept Phone:  [phone]    Description:  Female : 1960   Provider:  Khris Lopez M.D.           Reason for Visit     New Patient Evaluate GEORGINA      Allergies as of 2017     No Known Allergies      You were diagnosed with     GEORGINA (obstructive sleep apnea)   [096435]       Increased BMI   [294022]       Hypertrophic obstructive cardiomyopathy (CMS-HCC)   [3333048]       Paroxysmal atrial fibrillation (CMS-HCC)   [241871]       Cardiac pacemaker in situ   [V45.01.ICD-9-CM]       Gastroesophageal reflux disease without esophagitis   [393106]         Vital Signs     Blood Pressure Pulse Temperature Respirations Height Weight    122/78 mmHg 60 36.6 °C (97.9 °F) 16 1.626 m (5' 4.02\") 91.173 kg (201 lb)    Body Mass Index Oxygen Saturation Smoking Status             34.48 kg/m2 94% Never Smoker          Basic Information     Date Of Birth Sex Race Ethnicity Preferred Language    1960 Female White Non- English      Your appointments     Mar 26, 2017  8:10 PM   Sleep Study Diagnostic with SLEEP TECH   [facility] (--)    [address]  [facility] [address]  [place] [id number] [phone]            Mar 31, 2017  8:40 AM   Follow UP with ROSLYN Norris   [facility] (--)    [address]  [address]  [place] [address]   [phone]            2017  8:40 AM   FOLLOW UP with Khris Pitts M.D.   [facility] (--)    [address]  [place] [id number] [phone]              Problem List              ICD-10-CM Priority Class Noted - Resolved    S/P colonoscopy Z98.890   2010 - Present    Hypertrophic obstructive cardiomyopathy (HCC) (Chronic) I42.1   Unknown - Present    Cardiac pacemaker in situ Z95.0   2015 - Present    Preventative health " care Z00.00   2/8/2016 - Present    Gastroesophageal reflux disease without esophagitis K21.9   2/8/2016 - Present    Obesity E66.9   2/8/2016 - Present    Cervical arthritis (CMS-HCC) M46.92   2/8/2016 - Present    Mitral regurgitation I34.0   2/8/2016 - Present    Chronic kidney disease, stage III (moderate) (Chronic) N18.3   2/9/2016 - Present    Dyslipidemia E78.5   12/5/2016 - Present    Paroxysmal atrial fibrillation (CMS-HCC) (Chronic) I48.0   12/5/2016 - Present    GEORGINA (obstructive sleep apnea) G47.33   2/16/2017 - Present    Increased BMI R63.8   2/16/2017 - Present      Health Maintenance        Date Due Completion Dates    IMM PNEUMOCOCCAL 19-64 (ADULT) MEDIUM RISK SERIES (1 of 1 - PPSV23) 5/2/1979 ---    PAP SMEAR 9/13/2013 9/13/2010    COLONOSCOPY 12/7/2016 12/7/2009    MAMMOGRAM 3/23/2017 3/23/2016, 2/25/2015, 2/23/2015    IMM DTaP/Tdap/Td Vaccine (2 - Td) 12/2/2026 12/2/2016            Current Immunizations     Influenza TIV (IM) 9/15/2014    Influenza Vaccine Quad Inj (Preserved) 11/3/2016  9:51 AM    Tdap Vaccine 12/2/2016 11:07 AM      Below and/or attached are the medications your provider expects you to take. Review all of your home medications and newly ordered medications with your provider and/or pharmacist. Follow medication instructions as directed by your provider and/or pharmacist. Please keep your medication list with you and share with your provider. Update the information when medications are discontinued, doses are changed, or new medications (including over-the-counter products) are added; and carry medication information at all times in the event of emergency situations     Allergies:  No Known Allergies          Medications  Valid as of: February 16, 2017 -  3:19 PM    Generic Name Brand Name Tablet Size Instructions for use    Calcium-Vitamin D-Vitamin K   Take  by mouth.        Diclofenac Sodium (Gel) Diclofenac Sodium 1 % Apply 2 g to skin as directed 4 times a day as needed  (apply to affected area).        Disopyramide Phosphate (Cap) NORPACE 100 MG Take 1 Cap by mouth every 8 hours.        Lansoprazole   Take  by mouth.        Magnesium (Tab) Magnesium 200 MG Take 1 Tab by mouth every day.        Metoprolol Tartrate (Tab) LOPRESSOR 25 MG Take 1 Tab by mouth 2 times a day.        Multiple Vitamins-Minerals   Take  by mouth.        Pantothenic Acid (Tab) Pantothenic Acid 500 MG Take  by mouth every day.        Rivaroxaban (Tab) XARELTO 20 MG Take 1 Tab by mouth with dinner.        Zolpidem Tartrate (Tab) AMBIEN 5 MG Take 1-2 tablets by mouth every evening as needed for insomnia. Bring to sleep study.        .                 Medicines prescribed today were sent to:     [facility] [address] [place] [address] (S), [place] - [address] [place] [address] [facility] [place] ([place]) [place] [address]    Phone: [phone] Fax: [phone]    Open 24 Hours?: No      Medication refill instructions:       If your prescription bottle indicates you have medication refills left, it is not necessary to call your provider’s office. Please contact your pharmacy and they will refill your medication.    If your prescription bottle indicates you do not have any refills left, you may request refills at any time through one of the following ways: The online [employer] system (except Urgent Care), by calling your provider’s office, or by asking your pharmacy to contact your provider’s office with a refill request. Medication refills are processed only during regular business hours and may not be available until the next business day. Your provider may request additional information or to have a follow-up visit with you prior to refilling your medication.   *Please Note: Medication refills are assigned a new Rx number when refilled electronically. Your pharmacy may indicate that no refills were authorized even though a new prescription for the same medication is available at the pharmacy. Please request the medicine by name with the  pharmacy before contacting your provider for a refill.        Your To Do List     Future Labs/Procedures Complete By Expires    POLYSOMNOGRAPHY, 4 OR MORE  As directed 2/16/2018      Other Notes About Your Plan     12/5/16 tsh 4.3  12/2/16 right plantar fasciitis, trial of topical voltaren, stretching exercises, referral podiatry  3/17 repeat tsh  2/3/17 sleep apnea referral pending, OPO ordered  2/14/17 apnea link per [facility], AHI 19.7, low saturation 83, time<90% saturation 199 minutes, time less than 85% saturation 1 minute                      MyChart Access Code: Activation code not generated  Current [facility] Status: Active

## 2017-02-16 NOTE — PROGRESS NOTES
"CC: Sleep apnea evaluation - \"snoring, sleep apnea?, no restful sleep\"    HPI:  56-year-old woman kindly referred by Dr.Dean Stoddard for evaluation and management of obstructive sleep apnea syndrome.  The patient's symptoms include a long history of being uncomfortable sleeping, snoring, waking up periodically. Her symptoms seem to be getting worse and she is not getting good rest. She complains that her  sometimes sleeps in the other room. Generally goes to bed at regular 11 PM and gets up between 7 and 8 AM she estimates that it takes her 15-20 minutes to fall asleep. She may wake up 3-5 times each night and has nocturia ×1. She estimates that she sleeps 7-8 hours. She complains of eating too little sleep at night and feeling tired during the day she occasionally has difficulty with shortness of breath at night she may have restless legs 3 times a week lasting 15-20 minutes. Her father was a snorer but never received any treatment for this.    She had a recent APNEALINK done showing an AHI of 19.7, an RI of 23.9, and an HI of 16.2. Her lowest saturation was 83%, and she spent 199 minutes with a saturation less than or equal to 90%.    She is a lifelong nonsmoker. She has a history of hypertrophic cardiomyopathy. She had a pacemaker placed in 2015 at [facility]. She had an alcohol septal reduction performed in [facility] and in the year 2000.    Has seasonal allergies.         Patient Active Problem List    Diagnosis Date Noted   • GEORGINA (obstructive sleep apnea) 02/16/2017   • Increased BMI 02/16/2017   • Dyslipidemia 12/05/2016   • Paroxysmal atrial fibrillation (CMS-HCC) 12/05/2016   • Chronic kidney disease, stage III (moderate) 02/09/2016   • Preventative health care 02/08/2016   • Gastroesophageal reflux disease without esophagitis 02/08/2016   • Obesity 02/08/2016   • Cervical arthritis (CMS-HCC) 02/08/2016   • Mitral regurgitation 02/08/2016   • Cardiac pacemaker in situ 01/21/2015   • " Hypertrophic obstructive cardiomyopathy (HCC)    • S/P colonoscopy 2010       Past Medical History   Diagnosis Date   • Heart murmur    • Hypertrophic obstructive cardiomyopathy      Dr. Bert Vazquez, [address] [address] [phone] fax [phone].  [facility] [place] [phone].  Alchol Septal Reduction , Coronaries normal.   • Depression    • GERD (gastroesophageal reflux disease)    • Arrhythmia, ventricular 2008     Symptomatic PVCs, controlled with medication.   • Hypertrophic cardiomyopathy (CMS-HCC)    • Aortic regurgitation    • CTS (carpal tunnel syndrome) 2011   • Indigestion    • Asthma    • Snoring    • Anesthesia      wakes up during procedure   • Renal disorder      chronic kidney disease stage 3   • Pain      right arm /shoulder   • Breath shortness    • Cervical arthritis (CMS-HCC) 2016   • Chronic kidney disease, stage III (moderate) 2016   • Sleep apnea    • Cardiac arrhythmia    • Atrial fibrillation (CMS-HCC)    • Chickenpox    • Influenza         Past Surgical History   Procedure Laterality Date   • Primary c section     • Tubal coagulation laparoscopic bilateral     • Recovery  2014     Performed by [facility] at [facility]   • Recovery  4/15/2015     Performed by [facility] at [facility]   • Pacemaker insertion         Family History   Problem Relation Age of Onset   • Heart Disease Mother      CHF   • Hypertension Mother    • Stroke Mother    • Heart Failure Mother    • Other Mother      carotid artery disease,gout   • Diabetes Maternal Grandmother    • Cancer Maternal Grandfather      Prostate   • Heart Disease Paternal Grandfather 52      MI    • Cancer Father      AML   • Sleep Apnea Neg Hx        Social History     Social History   • Marital Status:      Spouse Name: N/A   • Number of Children: N/A   • Years of Education: N/A     Occupational  "History   • Not on file.     Social History Main Topics   • Smoking status: Never Smoker    • Smokeless tobacco: Never Used   • Alcohol Use: 0.6 oz/week     1 Standard drinks or equivalent per week      Comment: 4 per week   • Drug Use: No   • Sexual Activity: Yes     Birth Control/ Protection: Surgical     Other Topics Concern   • Not on file     Social History Narrative       Current Outpatient Prescriptions   Medication Sig Dispense Refill   • zolpidem (AMBIEN) 5 MG Tab Take 1-2 tablets by mouth every evening as needed for insomnia. Bring to sleep study. 3 Tab 0   • Calcium-Vitamin D-Vitamin K (CALCIUM FOR WOMEN PO) Take  by mouth.     • rivaroxaban (XARELTO) 20 MG Tab tablet Take 1 Tab by mouth with dinner. 90 Tab 3   • metoprolol (LOPRESSOR) 25 MG Tab Take 1 Tab by mouth 2 times a day. 180 Tab 3   • disopyramide (NORPACE) 100 MG Cap Take 1 Cap by mouth every 8 hours. 270 Cap 3   • Lansoprazole (PREVACID PO) Take  by mouth.     • Pantothenic Acid 500 MG TABS Take  by mouth every day.     • Magnesium 200 MG TABS Take 1 Tab by mouth every day.     • Multiple Vitamins-Minerals (ALIVE ONCE DAILY WOMENS 50+ PO) Take  by mouth.     • Diclofenac Sodium (VOLTAREN) 1 % Gel Apply 2 g to skin as directed 4 times a day as needed (apply to affected area). (Patient not taking: Reported on 2/6/2017) 300 g 5     No current facility-administered medications for this visit.    \"CURRENT RX\"    ALLERGIES: Review of patient's allergies indicates no known allergies.    ROS  Constitutional: Denies fever, chills, sweats, fatigue, weight loss.   Eyes: Denies glasses, vision loss, pain, drainage, double vision.   Ears/Nose/Mouth/Throat: Denies earache, difficulty hearing, ringing/buzzing in ears, rhinitis/nasal congestion, injury, recurrent sore throat, persistent hoarseness, decayed teeth/toothaches.   Cardiovascular: She has complaints of chest pain, chest tightness, palpitations, difficulty breathing when lying down but better when " "sitting up sometimes. She denies swelling in legs or feet or fainting  Respiratory: Denies shortness of breath, cough, sputum, wheezing, painful breathing, coughing up blood.   Sleep: See history of present illness   Gastrointestinal: Denies heartburn, difficulty swallowing, nausea, abdominal pain, diarrhea, constipation.   Genitourinary: Denies frequent urination, painful urination, blood in urine, discharge.   Musculoskeletal: Denies painful joints, sore muscles, back pain.   Integumentary: Denies rashes, lumps, color changes.   Neurological: Denies frequent headaches, dizziness, weakness    PHYSICAL EXAM  MSEW  /78 mmHg  Pulse 60  Temp(Src) 36.6 °C (97.9 °F)  Resp 16  Ht 1.626 m (5' 4.02\")  Wt 91.173 kg (201 lb)  BMI 34.48 kg/m2  SpO2 94%  Appearance: Well-nourished, well-developed, no acute distress  Eyes:  PERRLA, EOMI  Hearing:  Grossly intact  Nose:  Normal, no lesions or deformities, turbinates moist  Oropharynx:  Tongue normal, posterior pharynx without erythema or exudate  Mallampati classification: 3-4    Neck: Supple, trachea midline, no masses  Respiratory effort:  No intercostal retractions or use of accessory muscles  Lung auscultation:  No wheezes rhonchi rubs or rales  Cardiac auscultation:  No murmurs, rubs, or gallops, no regular rhythm, normal rate  Abdomen:  No tenderness, no organomegaly  Extremities:  No cyanosis, clubbing, edema  Gait and Station:  Normal  Digits and nails: No clubbing, cyanosis, petechiae, or nodes  Musculoskeletal:  Grossly normal  Skin:  No rashes  Orientation:  Oriented time, place, and person  Mood and affect:  No depression, anxiety, agitation  Judgment:  Intact    PROBLEMS:    1. GEORGINA (obstructive sleep apnea)    - POLYSOMNOGRAPHY, 4 OR MORE; Future  - zolpidem (AMBIEN) 5 MG Tab; Take 1-2 tablets by mouth every evening as needed for insomnia. Bring to sleep study.  Dispense: 3 Tab; Refill: 0  2. Increased BMI  3. Hypertrophic obstructive cardiomyopathy " (HCC)  4. Paroxysmal atrial fibrillation (CMS-HCC)  5. Cardiac pacemaker in situ  6. Gastroesophageal reflux disease without esophagitis    PLAN:   The patient has signs and symptoms consistent with obstructive sleep apnea hypopnea syndrome. Will schedule to have a nocturnal polysomnogram using zolpidem to assist with sleep onset and maintenance should the need arise. Will return after the results are available to determine further diagnostic needs and/or treatment options.    The risks of untreated sleep apnea were discussed with the patient at length. Patients with GEORGINA are at increased risk of cardiovascular disease including coronary artery disease, systemic arterial hypertension, pulmonary arterial hypertension, cardiac arrythmias, and stroke. GEORGINA patients have an increased risk of motor vehicle accidents, type 2 diabetes, chronic kidney disease, and non-alcoholic liver disease. The patient was advised to avoid driving a motor vehicle when drowsy.

## 2017-03-26 ENCOUNTER — SLEEP STUDY (OUTPATIENT)
Dept: SLEEP MEDICINE | Facility: MEDICAL CENTER | Age: 57
End: 2017-03-26
Attending: INTERNAL MEDICINE
Payer: COMMERCIAL

## 2017-03-26 DIAGNOSIS — G47.33 OSA (OBSTRUCTIVE SLEEP APNEA): ICD-10-CM

## 2017-03-26 PROCEDURE — 95810 POLYSOM 6/> YRS 4/> PARAM: CPT | Performed by: INTERNAL MEDICINE

## 2017-03-26 NOTE — MR AVS SNAPSHOT
Seda Cagle   3/26/2017 8:10 PM   Sleep Study   MRN: [id number]    Department:  [facility]   Dept Phone:  [phone]    Description:  Female : 1960   Provider:  Sweta Maloney M.D.           Allergies as of 3/26/2017     No Known Allergies      You were diagnosed with     GEORGINA (obstructive sleep apnea)   [158848]         Vital Signs     Smoking Status                   Never Smoker            Basic Information     Date Of Birth Sex Race Ethnicity Preferred Language    1960 Female White Non- English      Your appointments     Oct 23, 2017  9:00 AM   FOLLOW UP with Khris Pitts M.D.   [facility] (--)    [address]  [place] [id number] [phone]            2017  9:00 AM   Follow UP with ROSLYN Levy   [facility] (--)    [address]  [place] [id number] [phone]              Problem List              ICD-10-CM Priority Class Noted - Resolved    S/P colonoscopy Z98.890   2010 - Present    Hypertrophic obstructive cardiomyopathy (HCC) (Chronic) I42.1   Unknown - Present    Cardiac pacemaker in situ Z95.0   2015 - Present    Preventative health care (Chronic) Z00.00   2016 - Present    Gastroesophageal reflux disease without esophagitis K21.9   2016 - Present    Obesity E66.9   2016 - Present    Cervical arthritis M46.92   2016 - Present    Mitral regurgitation I34.0   2016 - Present    Chronic kidney disease, stage III (moderate) (Chronic) N18.3   2016 - Present    Dyslipidemia E78.5   2016 - Present    Paroxysmal atrial fibrillation (CMS-HCC) (Chronic) I48.0   2016 - Present    Sleep apnea (Chronic) G47.30   2017 - Present    Cardiomyopathy (CMS-HCC) I42.9   2017 - Present      Health Maintenance        Date Due Completion Dates    IMM PNEUMOCOCCAL 19-64 (ADULT) MEDIUM RISK SERIES (1 of 1  - PPSV23) 5/2/1979 ---    PAP SMEAR 9/13/2013 9/13/2010    COLONOSCOPY 12/7/2016 12/7/2009    IMM INFLUENZA (1) 9/1/2017 11/3/2016, 9/15/2014    MAMMOGRAM 4/17/2018 4/17/2017, 3/23/2016, 2/25/2015    IMM DTaP/Tdap/Td Vaccine (2 - Td) 12/2/2026 12/2/2016            Results     POLYSOMNOGRAPHY, 4 OR MORE                   Current Immunizations     Influenza TIV (IM) 9/15/2014    Influenza Vaccine Quad Inj (Preserved) 11/3/2016  9:51 AM    Tdap Vaccine 12/2/2016 11:07 AM      Below and/or attached are the medications your provider expects you to take. Review all of your home medications and newly ordered medications with your provider and/or pharmacist. Follow medication instructions as directed by your provider and/or pharmacist. Please keep your medication list with you and share with your provider. Update the information when medications are discontinued, doses are changed, or new medications (including over-the-counter products) are added; and carry medication information at all times in the event of emergency situations     Allergies:  No Known Allergies          Medications  Valid as of: August 15, 2017 -  1:08 PM    Generic Name Brand Name Tablet Size Instructions for use    Calcium-Vitamin D-Vitamin K   Take  by mouth.        Diclofenac Sodium (Gel) Diclofenac Sodium 1 % Apply 2 g to skin as directed 4 times a day as needed (apply to affected area).        Disopyramide Phosphate (Cap) NORPACE 100 MG Take 1 Cap by mouth every 8 hours.        Lansoprazole   Take  by mouth.        Magnesium (Tab) Magnesium 200 MG Take 1 Tab by mouth every day.        Metoprolol Tartrate (Tab) LOPRESSOR 25 MG Take 1 Tab by mouth 2 times a day.        Multiple Vitamins-Minerals   Take  by mouth.        Pantothenic Acid (Tab) Pantothenic Acid 500 MG Take  by mouth every day.        Rivaroxaban (Tab) XARELTO 20 MG Take 1 Tab by mouth with dinner.        Zolpidem Tartrate (Tab) AMBIEN 5 MG Take 1-2 tablets by mouth every evening as  needed for insomnia. Bring to sleep study.        .                 Medicines prescribed today were sent to:     [facility] [address] [facility] [address] [facility] ([place]), [place] - [address]    [address] [facility] [place] ([place]) [place] [address]    Phone: [phone] Fax: [phone]    Open 24 Hours?: No      Medication refill instructions:       If your prescription bottle indicates you have medication refills left, it is not necessary to call your provider’s office. Please contact your pharmacy and they will refill your medication.    If your prescription bottle indicates you do not have any refills left, you may request refills at any time through one of the following ways: The online [employer] system (except Urgent Care), by calling your provider’s office, or by asking your pharmacy to contact your provider’s office with a refill request. Medication refills are processed only during regular business hours and may not be available until the next business day. Your provider may request additional information or to have a follow-up visit with you prior to refilling your medication.   *Please Note: Medication refills are assigned a new Rx number when refilled electronically. Your pharmacy may indicate that no refills were authorized even though a new prescription for the same medication is available at the pharmacy. Please request the medicine by name with the pharmacy before contacting your provider for a refill.        Other Notes About Your Plan     12/5/16 tsh 4.3  12/2/16 right plantar fasciitis, trial of topical voltaren, stretching exercises, referral podiatry  3/17 repeat tsh                      MyChart Access Code: Activation code not generated  [facility]: Active

## 2017-03-27 NOTE — PROCEDURES
Clinical Comments:  The patient underwent a comprehensive polysomnogram using the standard montage for measurement of parameters of sleep, respiratory events, movement abnormalities, heart rate and rhythm. A microphone was used to monitor snoring.      INTERPRETATION:  The total recording time was 430.4 minutes with a sleep period of 415.4 minutes and the total sleep time was 298.0 minutes with a sleep efficiency of 69.2%.  The sleep latency was 15.0 minutes, and REM latency was 190.0 minutes.  The patient experienced 238 arousals in total, for an arousal index of 47.9    RESPIRATORY: The patient had 17 apneas in total.  Of these, 4 were obstructive apneas, and 13 were central apneas.  This resulted in an apnea index (AI) of 3.4.  The patient had 89 hypopneas, for a hypopnea index of 17.9.  The overall AHI was 21.3, while the AHI during Stage R sleep was 25.5.  AHI while supine was 24.9.    OXIMETRY: Oxygen saturation monitoring showed a mean SpO2 of 95.0%, with a minimum oxygen saturation of 88.0%.  Oxygen saturations were less than or = 89% for 0.1 minutes of sleep time.    CARDIAC: The highest heart rate during the recording was 77.0 beats per minute.  The average heart rate during sleep was 61.9 bpm.    LIMB MOVEMENTS: There were a total of 118 PLMs during sleep, of which 79 were PLMs arousals.  This resulted in a PLMS index of 23      Overnight sleep study, accomplished on March 26, 2017 included 5 hours of sleep time, all stages of sleep observed. Arousal index was elevated at 47.9, significant disruption of sleep continuity. Limb movements occurred, with arousal index of 15.9, could be related to sleep-disordered breathing or independent disorder, clinical correlation and follow-up required. Apnea-hypopnea index was elevated at 21.3, REM index was slightly higher at 25.5. Patient was studied right side and supine. No significant oxygen desaturation occurred. This represents mild-to-moderate  sleep-disordered breathing, with significant disruption of sleep continuity, limb movement disorder could be present as well. CPAP titration is suggested, airway pressures H and trial, additionally or alternatively, weight loss if appropriate, behavioral modification to include avoidance of sedatives and alcohol if warranted, and then reassess limb movements one specific treatment is initiated

## 2017-03-31 ENCOUNTER — SLEEP CENTER VISIT (OUTPATIENT)
Dept: SLEEP MEDICINE | Facility: MEDICAL CENTER | Age: 57
End: 2017-03-31
Payer: COMMERCIAL

## 2017-03-31 VITALS
HEART RATE: 63 BPM | TEMPERATURE: 97.9 F | SYSTOLIC BLOOD PRESSURE: 122 MMHG | WEIGHT: 196 LBS | HEIGHT: 64 IN | OXYGEN SATURATION: 95 % | BODY MASS INDEX: 33.46 KG/M2 | DIASTOLIC BLOOD PRESSURE: 76 MMHG | RESPIRATION RATE: 16 BRPM

## 2017-03-31 DIAGNOSIS — Z95.0 PACEMAKER: ICD-10-CM

## 2017-03-31 DIAGNOSIS — I42.1 HYPERTROPHIC OBSTRUCTIVE CARDIOMYOPATHY (HCC): Chronic | ICD-10-CM

## 2017-03-31 DIAGNOSIS — I48.0 PAROXYSMAL ATRIAL FIBRILLATION (HCC): Chronic | ICD-10-CM

## 2017-03-31 DIAGNOSIS — G47.33 OSA (OBSTRUCTIVE SLEEP APNEA): ICD-10-CM

## 2017-03-31 PROCEDURE — 99213 OFFICE O/P EST LOW 20 MIN: CPT | Performed by: NURSE PRACTITIONER

## 2017-03-31 NOTE — MR AVS SNAPSHOT
"        Seda Cagle   3/31/2017 8:40 AM   Sleep Center Visit   MRN: [id number]    Department:  [facility]   Dept Phone:  [phone]    Description:  Female : 1960   Provider:  ROSLYN Norris           Reason for Visit     Results SS      Allergies as of 3/31/2017     No Known Allergies      You were diagnosed with     GEORGINA (obstructive sleep apnea)   [386431]       BMI 33.0-33.9,adult   [177536]       Hypertrophic obstructive cardiomyopathy (CMS-HCC)   [9274152]       Pacemaker   [758048]       Paroxysmal atrial fibrillation (CMS-HCC)   [316462]         Vital Signs     Blood Pressure Pulse Temperature Respirations Height Weight    122/76 mmHg 63 36.6 °C (97.9 °F) 16 1.626 m (5' 4.02\") 88.905 kg (196 lb)    Body Mass Index Oxygen Saturation Smoking Status             33.63 kg/m2 95% Never Smoker          Basic Information     Date Of Birth Sex Race Ethnicity Preferred Language    1960 Female White Non- English      Your appointments     2017  8:30 AM   MA SCRN10 with [facility] MG 3   [facility] ([address])    [address]  [place] [id number] [phone]           No deodorant, powder, perfume or lotion under the arm or breast area.            2017  8:40 AM   FOLLOW UP with Khris Pitts M.D.   [facility] (--)    [address], [address]  [place] [id number] [phone]            May 18, 2017 11:40 AM   Follow UP with TORIN LevyRROCIO   [facility] (--)    [address]  [address]  [place] [id number] [phone]              Problem List              ICD-10-CM Priority Class Noted - Resolved    S/P colonoscopy Z98.890   2010 - Present    Hypertrophic obstructive cardiomyopathy (HCC) (Chronic) I42.1   Unknown - Present    Cardiac pacemaker in situ Z95.0   2015 - Present    Preventative health care Z00.00   2016 " - Present    Gastroesophageal reflux disease without esophagitis K21.9   2/8/2016 - Present    Obesity E66.9   2/8/2016 - Present    Cervical arthritis (CMS-HCC) M46.92   2/8/2016 - Present    Mitral regurgitation I34.0   2/8/2016 - Present    Chronic kidney disease, stage III (moderate) (Chronic) N18.3   2/9/2016 - Present    Dyslipidemia E78.5   12/5/2016 - Present    Paroxysmal atrial fibrillation (CMS-HCC) (Chronic) I48.0   12/5/2016 - Present    GEORGINA (obstructive sleep apnea) G47.33   2/16/2017 - Present    Increased BMI R63.8   2/16/2017 - Present    BMI 33.0-33.9,adult Z68.33   3/31/2017 - Present    Pacemaker Z95.0   3/31/2017 - Present      Health Maintenance        Date Due Completion Dates    IMM PNEUMOCOCCAL 19-64 (ADULT) MEDIUM RISK SERIES (1 of 1 - PPSV23) 5/2/1979 ---    PAP SMEAR 9/13/2013 9/13/2010    COLONOSCOPY 12/7/2016 12/7/2009    MAMMOGRAM 3/23/2017 3/23/2016, 2/25/2015, 2/23/2015    IMM DTaP/Tdap/Td Vaccine (2 - Td) 12/2/2026 12/2/2016            Current Immunizations     Influenza TIV (IM) 9/15/2014    Influenza Vaccine Quad Inj (Preserved) 11/3/2016  9:51 AM    Tdap Vaccine 12/2/2016 11:07 AM      Below and/or attached are the medications your provider expects you to take. Review all of your home medications and newly ordered medications with your provider and/or pharmacist. Follow medication instructions as directed by your provider and/or pharmacist. Please keep your medication list with you and share with your provider. Update the information when medications are discontinued, doses are changed, or new medications (including over-the-counter products) are added; and carry medication information at all times in the event of emergency situations     Allergies:  No Known Allergies          Medications  Valid as of: March 31, 2017 -  9:19 AM    Generic Name Brand Name Tablet Size Instructions for use    Calcium-Vitamin D-Vitamin K   Take  by mouth.        Diclofenac Sodium (Gel) Diclofenac  Sodium 1 % Apply 2 g to skin as directed 4 times a day as needed (apply to affected area).        Disopyramide Phosphate (Cap) NORPACE 100 MG Take 1 Cap by mouth every 8 hours.        Lansoprazole   Take  by mouth.        Magnesium (Tab) Magnesium 200 MG Take 1 Tab by mouth every day.        Metoprolol Tartrate (Tab) LOPRESSOR 25 MG Take 1 Tab by mouth 2 times a day.        Multiple Vitamins-Minerals   Take  by mouth.        Pantothenic Acid (Tab) Pantothenic Acid 500 MG Take  by mouth every day.        Rivaroxaban (Tab) XARELTO 20 MG Take 1 Tab by mouth with dinner.        Zolpidem Tartrate (Tab) AMBIEN 5 MG Take 1-2 tablets by mouth every evening as needed for insomnia. Bring to sleep study.        .                 Medicines prescribed today were sent to:     [facility] [address] [place] [address] (S), [place] - [address]    [facility] [place] ([place]) [place] [address]    Phone: [phone] Fax: [phone]    Open 24 Hours?: No      Medication refill instructions:       If your prescription bottle indicates you have medication refills left, it is not necessary to call your provider’s office. Please contact your pharmacy and they will refill your medication.    If your prescription bottle indicates you do not have any refills left, you may request refills at any time through one of the following ways: The online [employer] system (except Urgent Care), by calling your provider’s office, or by asking your pharmacy to contact your provider’s office with a refill request. Medication refills are processed only during regular business hours and may not be available until the next business day. Your provider may request additional information or to have a follow-up visit with you prior to refilling your medication.   *Please Note: Medication refills are assigned a new Rx number when refilled electronically. Your pharmacy may indicate that no refills were authorized even though a new prescription for the same medication is available at  the pharmacy. Please request the medicine by name with the pharmacy before contacting your provider for a refill.        Other Notes About Your Plan     12/5/16 tsh 4.3  12/2/16 right plantar fasciitis, trial of topical voltaren, stretching exercises, referral podiatry  3/17 repeat tsh  2/3/17 sleep apnea referral pending, OPO ordered  2/14/17 apnea link per [facility], AHI 19.7, low saturation 83, time<90% saturation 199 minutes, time less than 85% saturation 1 minute                      MyChart Access Code: Activation code not generated  Current MyChart Status: Active

## 2017-03-31 NOTE — PROGRESS NOTES
Chief Complaint   Patient presents with   • Results     SS       HPI:  Seda Cagle is a 56 y.o. year old female here today for follow-up on sleep study results. She was referred for evaluation and management of obstructive sleep apnea syndrome.  The patient's symptoms include a long history of being uncomfortable sleeping, snoring, waking up periodically. Her symptoms seem to be getting worse and she is not getting good rest. She complains that her  sometimes sleeps in the other room. Generally goes to bed at regular 11 PM and gets up between 7 and 8 AM she estimates that it takes her 15-20 minutes to fall asleep. She may wake up 3-5 times each night and has nocturia ×1. She estimates that she sleeps 7-8 hours. She complains of eating too little sleep at night and feeling tired during the day she occasionally has difficulty with shortness of breath at night she may have restless legs 3 times a week lasting 15-20 minutes. Her father was a snorer but never received any treatment for this.    She had a recent APNEALINK done showing an AHI of 19.7, an RI of 23.9, and an HI of 16.2. Her lowest saturation was 83%, and she spent 199 minutes with a saturation less than or equal to 90%.    She is a lifelong nonsmoker. She has a history of hypertrophic cardiomyopathy. She had a pacemaker placed in 2015 at [facility]. She had an alcohol septal reduction performed in [facility] and in the year 2000.    Has seasonal allergies.    PSG 3/26/2017 indicating overall AHI 21.3 and a minimum oxygen saturation of 80%. Patient spent 0.1 minutes less than 89% saturation. A total of 118 PLM's during sleep and 79 were PLM arousals. Unfortunately she was not titrated on CPAP. I reviewed testing with patient. We discussed treatment options including airway pressure in [place] versus auto CPAP, weight loss, UPPP and dental appliance. She would like to start therapy right away due to her heart history - we will order  autocpap. She denies any changes in health since last OV. No cardiac or respiratory symptoms today.      ROS: As per HPI and otherwise negative if not stated.    Past Medical History   Diagnosis Date   • Heart murmur    • Hypertrophic obstructive cardiomyopathy      Dr. Bert Vazquez, [address], [place] [address] [phone] fax [phone].  [facility] [phone].  Alchol Septal Reduction , Coronaries normal.   • Depression    • GERD (gastroesophageal reflux disease)    • Arrhythmia, ventricular 2008     Symptomatic PVCs, controlled with medication.   • Hypertrophic cardiomyopathy (CMS-HCC)    • Aortic regurgitation    • CTS (carpal tunnel syndrome) 2011   • Indigestion    • Asthma    • Snoring    • Anesthesia      wakes up during procedure   • Renal disorder      chronic kidney disease stage 3   • Pain      right arm /shoulder   • Breath shortness    • Cervical arthritis (CMS-HCC) 2016   • Chronic kidney disease, stage III (moderate) 2016   • Sleep apnea    • Cardiac arrhythmia    • Atrial fibrillation (CMS-HCC)    • Chickenpox    • Influenza        Past Surgical History   Procedure Laterality Date   • Primary c section     • Tubal coagulation laparoscopic bilateral     • Recovery  2014     Performed by [facility]   • Recovery  4/15/2015     Performed by [facility] at [facility]   • Pacemaker insertion         Family History   Problem Relation Age of Onset   • Heart Disease Mother      CHF   • Hypertension Mother    • Stroke Mother    • Heart Failure Mother    • Other Mother      carotid artery disease,gout   • Diabetes Maternal Grandmother    • Cancer Maternal Grandfather      Prostate   • Heart Disease Paternal Grandfather 52      MI    • Cancer Father      AML   • Sleep Apnea Neg Hx        Social History     Social History   • Marital Status:      Spouse Name: N/A  "  • Number of Children: N/A   • Years of Education: N/A     Occupational History   • Not on file.     Social History Main Topics   • Smoking status: Never Smoker    • Smokeless tobacco: Never Used   • Alcohol Use: 0.6 oz/week     1 Standard drinks or equivalent per week      Comment: 4 per week   • Drug Use: No   • Sexual Activity: Yes     Birth Control/ Protection: Surgical     Other Topics Concern   • Not on file     Social History Narrative       Allergies as of 03/31/2017   • (No Known Allergies)        @Vital signs for this encounter:  Filed Vitals:    03/31/17 0844   Height: 1.626 m (5' 4.02\")   Weight: 88.905 kg (196 lb)   Weight % change since last entry.: 0 %   BP: 122/76   Pulse: 63   BMI (Calculated): 33.63   Resp: 16   Temp: 36.6 °C (97.9 °F)   O2 sat % room air: 95 %       Current medications as of today   Current Outpatient Prescriptions   Medication Sig Dispense Refill   • Calcium-Vitamin D-Vitamin K (CALCIUM FOR WOMEN PO) Take  by mouth.     • rivaroxaban (XARELTO) 20 MG Tab tablet Take 1 Tab by mouth with dinner. 90 Tab 3   • metoprolol (LOPRESSOR) 25 MG Tab Take 1 Tab by mouth 2 times a day. 180 Tab 3   • disopyramide (NORPACE) 100 MG Cap Take 1 Cap by mouth every 8 hours. 270 Cap 3   • Lansoprazole (PREVACID PO) Take  by mouth.     • Pantothenic Acid 500 MG TABS Take  by mouth every day.     • Magnesium 200 MG TABS Take 1 Tab by mouth every day.     • Multiple Vitamins-Minerals (ALIVE ONCE DAILY WOMENS 50+ PO) Take  by mouth.     • zolpidem (AMBIEN) 5 MG Tab Take 1-2 tablets by mouth every evening as needed for insomnia. Bring to sleep study. (Patient not taking: Reported on 3/31/2017) 3 Tab 0   • Diclofenac Sodium (VOLTAREN) 1 % Gel Apply 2 g to skin as directed 4 times a day as needed (apply to affected area). (Patient not taking: Reported on 2/6/2017) 300 g 5     No current facility-administered medications for this visit.         Physical Exam:   Gen:           Alert and oriented, No apparent " distress. Mood and affect appropriate, normal interaction with examiner.  Eyes:          PERRL, EOM intact, sclere white, conjunctive moist.  Ears:          Not examined.   Hearing:     Grossly intact.  Nose:          Normal, no lesions or deformities.  Dentition:    Good dentition.  Oropharynx:   Tongue normal, posterior pharynx without erythema or exudate.  Mallampati Classification: 3  Neck:        Supple, trachea midline, no masses.  Respiratory Effort: No intercostal retractions or use of accessory muscles.   Lung Auscultation:      Clear to auscultation bilaterally; no rales, rhonchi or wheezing.  CV:            Regular rate and rhythm. No murmurs, rubs or gallops. Hx A. Fib.  Abd:           Not examined.   Lymphadenopathy: Not examined.  Gait and Station: Normal.  Digits and Nails: No clubbing, cyanosis, petechiae, or nodes.   Cranial Nerves: II-XII grossly intact.  Skin:        No rashes, lesions or ulcers noted.               Ext:           No cyanosis or edema.      Assessment:  1. GEORGINA (obstructive sleep apnea)     2. BMI 33.0-33.9,adult     3. Hypertrophic obstructive cardiomyopathy (HCC)     4. Pacemaker         Immunizations:    Flu:2016  Pneumovax 23:not given  Prevnar 13:not given    Plan:  1. DME order autocpap 5-15cm H20 nightly. Patient understands to have mask fit performed in first 30 days if any difficulties.  2. Discussed sleep hygiene.  3. Encouraged weight loss.  4. Follow up in 6-8 weeks with compliance card, sooner if needed. Patient has time constraint issues for f/u due to travel. Advise f/u with any provider in office at that time.

## 2017-04-17 ENCOUNTER — TELEPHONE (OUTPATIENT)
Dept: MEDICAL GROUP | Facility: MEDICAL CENTER | Age: 57
End: 2017-04-17

## 2017-04-17 ENCOUNTER — HOSPITAL ENCOUNTER (OUTPATIENT)
Dept: RADIOLOGY | Facility: MEDICAL CENTER | Age: 57
End: 2017-04-17
Attending: INTERNAL MEDICINE
Payer: COMMERCIAL

## 2017-04-17 DIAGNOSIS — Z13.9 SCREENING: ICD-10-CM

## 2017-04-17 PROBLEM — Z95.0 PACEMAKER: Status: RESOLVED | Noted: 2017-03-31 | Resolved: 2017-04-17

## 2017-04-17 PROBLEM — R63.8 INCREASED BMI: Status: RESOLVED | Noted: 2017-02-16 | Resolved: 2017-04-17

## 2017-04-17 PROBLEM — G47.30 SLEEP APNEA: Chronic | Status: ACTIVE | Noted: 2017-04-17

## 2017-04-17 PROBLEM — G47.33 OSA (OBSTRUCTIVE SLEEP APNEA): Status: RESOLVED | Noted: 2017-02-16 | Resolved: 2017-04-17

## 2017-04-17 PROCEDURE — 77063 BREAST TOMOSYNTHESIS BI: CPT

## 2017-04-18 ENCOUNTER — TELEPHONE (OUTPATIENT)
Dept: MEDICAL GROUP | Facility: MEDICAL CENTER | Age: 57
End: 2017-04-18

## 2017-04-18 ENCOUNTER — OFFICE VISIT (OUTPATIENT)
Dept: CARDIOLOGY | Facility: MEDICAL CENTER | Age: 57
End: 2017-04-18
Payer: COMMERCIAL

## 2017-04-18 VITALS
BODY MASS INDEX: 32.95 KG/M2 | SYSTOLIC BLOOD PRESSURE: 128 MMHG | HEART RATE: 61 BPM | WEIGHT: 193 LBS | DIASTOLIC BLOOD PRESSURE: 70 MMHG | OXYGEN SATURATION: 95 % | HEIGHT: 64 IN

## 2017-04-18 DIAGNOSIS — R92.2 DENSE BREAST: ICD-10-CM

## 2017-04-18 DIAGNOSIS — R92.30 DENSE BREAST: ICD-10-CM

## 2017-04-18 DIAGNOSIS — I48.91 ATRIAL FIBRILLATION, UNSPECIFIED TYPE (HCC): ICD-10-CM

## 2017-04-18 DIAGNOSIS — Z00.00 PREVENTATIVE HEALTH CARE: Chronic | ICD-10-CM

## 2017-04-18 LAB — EKG IMPRESSION: NORMAL

## 2017-04-18 PROCEDURE — 93280 PM DEVICE PROGR EVAL DUAL: CPT | Performed by: INTERNAL MEDICINE

## 2017-04-18 PROCEDURE — 99214 OFFICE O/P EST MOD 30 MIN: CPT | Mod: 25 | Performed by: INTERNAL MEDICINE

## 2017-04-18 PROCEDURE — 93000 ELECTROCARDIOGRAM COMPLETE: CPT | Performed by: INTERNAL MEDICINE

## 2017-04-18 ASSESSMENT — ENCOUNTER SYMPTOMS: PALPITATIONS: 0

## 2017-04-18 NOTE — TELEPHONE ENCOUNTER
----- Message from Juve Stoddard M.D. sent at 4/17/2017  6:25 PM PDT -----  Please notify the patient that:  (1) her mammogram is negative but does show dense breast tissue which may decrease the accuracy of the mammogram  (2) she can get a screening ultrasound of her breast which may provide further detail  (3) her insurance will not cover a screening breast ultrasound, she would have to pay out of pocket, the cost would be approximately $125  (4) please let me know if she is interested

## 2017-04-18 NOTE — TELEPHONE ENCOUNTER
Pt does want to do the US, however, she is leaving town for the summer. Please put in order, she will do in Sept.

## 2017-04-18 NOTE — PROGRESS NOTES
Subjective:   Seda Cagle is a 56 y.o. female who presents today for follow up of a fib and pvc with history of hcm    She was diagnosed with sleep apnea but has not gotten cpap yet.  Waiting on insurance issues.      Hiking 5 days a week.  She is not having a problem with that.  She does get worn out by the end of the day.    No issues with the xarelto    Past Medical History   Diagnosis Date   • Heart murmur    • Hypertrophic obstructive cardiomyopathy 1991     Dr. Bert Vazquez, [address], [place] [address] [phone] fax [phone].  [facility] [phone].  Alchol Septal Reduction 8/00, Coronaries normal.   • Depression    • GERD (gastroesophageal reflux disease)    • Arrhythmia, ventricular 2008     Symptomatic PVCs, controlled with medication.   • Hypertrophic cardiomyopathy (CMS-HCC)    • Aortic regurgitation    • CTS (carpal tunnel syndrome) 2/28/2011   • Indigestion    • Asthma    • Snoring    • Anesthesia      wakes up during procedure   • Renal disorder      chronic kidney disease stage 3   • Pain      right arm /shoulder   • Breath shortness 12/14   • Cervical arthritis (CMS-HCC) 2/8/2016   • Chronic kidney disease, stage III (moderate) 2/9/2016   • Sleep apnea    • Cardiac arrhythmia    • Atrial fibrillation (CMS-HCC)    • Chickenpox    • Influenza      Past Surgical History   Procedure Laterality Date   • Primary c section     • Tubal coagulation laparoscopic bilateral     • Recovery  12/12/2014     Performed by [facility]   • Recovery  4/15/2015     Performed by [facility] at [facility]   • Pacemaker insertion       Family History   Problem Relation Age of Onset   • Heart Disease Mother      CHF   • Hypertension Mother    • Stroke Mother    • Heart Failure Mother    • Other Mother      carotid artery disease,gout   • Diabetes Maternal Grandmother    • Cancer Maternal Grandfather   "    Prostate   • Heart Disease Paternal Grandfather 52      MI    • Cancer Father      AML   • Sleep Apnea Neg Hx      History   Smoking status   • Never Smoker    Smokeless tobacco   • Never Used     No Known Allergies  Outpatient Encounter Prescriptions as of 2017   Medication Sig Dispense Refill   • Calcium-Vitamin D-Vitamin K (CALCIUM FOR WOMEN PO) Take  by mouth.     • rivaroxaban (XARELTO) 20 MG Tab tablet Take 1 Tab by mouth with dinner. 90 Tab 3   • metoprolol (LOPRESSOR) 25 MG Tab Take 1 Tab by mouth 2 times a day. 180 Tab 3   • disopyramide (NORPACE) 100 MG Cap Take 1 Cap by mouth every 8 hours. 270 Cap 3   • Lansoprazole (PREVACID PO) Take  by mouth.     • Pantothenic Acid 500 MG TABS Take  by mouth every day.     • Magnesium 200 MG TABS Take 1 Tab by mouth every day.     • Multiple Vitamins-Minerals (ALIVE ONCE DAILY WOMENS 50+ PO) Take  by mouth.     • zolpidem (AMBIEN) 5 MG Tab Take 1-2 tablets by mouth every evening as needed for insomnia. Bring to sleep study. (Patient not taking: Reported on 3/31/2017) 3 Tab 0   • Diclofenac Sodium (VOLTAREN) 1 % Gel Apply 2 g to skin as directed 4 times a day as needed (apply to affected area). (Patient not taking: Reported on 2017) 300 g 5     No facility-administered encounter medications on file as of 2017.     Review of Systems   Cardiovascular: Negative for palpitations.        Objective:   /70 mmHg  Pulse 61  Ht 1.626 m (5' 4.02\")  Wt 87.544 kg (193 lb)  BMI 33.11 kg/m2  SpO2 95%    Physical Exam   Constitutional: She is oriented to person, place, and time. She appears well-developed and well-nourished. No distress.   HENT:   Head: Normocephalic and atraumatic.   Right Ear: External ear normal.   Left Ear: External ear normal.   Mouth/Throat: Oropharynx is clear and moist.   Eyes: Conjunctivae and EOM are normal. Right eye exhibits no discharge. Left eye exhibits no discharge. No scleral icterus.   Neck: Normal range of motion. " Neck supple. No JVD present. No tracheal deviation present. No thyromegaly present.   Cardiovascular: Normal rate, regular rhythm, normal heart sounds and intact distal pulses.  Exam reveals no gallop and no friction rub.    No murmur heard.  Pulmonary/Chest: Effort normal and breath sounds normal. No stridor. No respiratory distress. She has no wheezes. She has no rales.   Abdominal: Soft. She exhibits no distension.   Musculoskeletal: She exhibits no edema or tenderness.   Neurological: She is alert and oriented to person, place, and time. No cranial nerve deficit. Coordination normal.   Skin: Skin is warm and dry. No rash noted. She is not diaphoretic. No erythema. No pallor.   Psychiatric: She has a normal mood and affect. Her behavior is normal. Judgment and thought content normal.   Vitals reviewed.      Assessment:     1. Atrial fibrillation, unspecified type (CMS-HCC)  EKG       Medical Decision Making:  Today's Assessment / Status / Plan:     A fib with hcm- tolerating oac  Doing generally well.  A fib is not limiting her function    hcm- following symptoms .  No nsvt in [facility].  Would upgrade to icd if any change

## 2017-04-18 NOTE — MR AVS SNAPSHOT
"Seda Cagle   2017 8:40 AM   Office Visit   MRN: [id number]    Department:  [facility]   Dept Phone:  [phone]    Description:  Female : 1960   Provider:  Khris Pitts M.D.           Reason for Visit     Follow-Up           Allergies as of 2017     No Known Allergies      You were diagnosed with     Atrial fibrillation, unspecified type (CMS-HCC)   [7111566]         Vital Signs     Blood Pressure Pulse Height Weight Body Mass Index Oxygen Saturation    128/70 mmHg 61 1.626 m (5' 4.02\") 87.544 kg (193 lb) 33.11 kg/m2 95%    Smoking Status                   Never Smoker            Basic Information     Date Of Birth Sex Race Ethnicity Preferred Language    1960 Female White Non- English      Your appointments     May 18, 2017 11:40 AM   Follow UP with ROSLYN Levy   [facility] (--)    [address]  [place] [address] [phone]            Oct 23, 2017  9:00 AM   FOLLOW UP with Khris Pitts M.D.   [facility] (--)    [address], [facility] [address]  [place] [address] [phone]              Problem List              ICD-10-CM Priority Class Noted - Resolved    S/P colonoscopy Z98.890   2010 - Present    Hypertrophic obstructive cardiomyopathy (HCC) (Chronic) I42.1   Unknown - Present    Cardiac pacemaker in situ Z95.0   2015 - Present    Preventative health care (Chronic) Z00.00   2016 - Present    Gastroesophageal reflux disease without esophagitis K21.9   2016 - Present    Obesity E66.9   2016 - Present    Cervical arthritis (CMS-HCC) M46.92   2016 - Present    Mitral regurgitation I34.0   2016 - Present    Chronic kidney disease, stage III (moderate) (Chronic) N18.3   2016 - Present    Dyslipidemia E78.5   2016 - Present    Paroxysmal atrial fibrillation (CMS-HCC) (Chronic) I48.0   2016 - Present    Sleep apnea " (Chronic) G47.30   4/17/2017 - Present      Health Maintenance        Date Due Completion Dates    IMM PNEUMOCOCCAL 19-64 (ADULT) MEDIUM RISK SERIES (1 of 1 - PPSV23) 5/2/1979 ---    PAP SMEAR 9/13/2013 9/13/2010    COLONOSCOPY 12/7/2016 12/7/2009    MAMMOGRAM 4/17/2018 4/17/2017, 3/23/2016, 2/25/2015    IMM DTaP/Tdap/Td Vaccine (2 - Td) 12/2/2026 12/2/2016            Results       Current Immunizations     Influenza TIV (IM) 9/15/2014    Influenza Vaccine Quad Inj (Preserved) 11/3/2016  9:51 AM    Tdap Vaccine 12/2/2016 11:07 AM      Below and/or attached are the medications your provider expects you to take. Review all of your home medications and newly ordered medications with your provider and/or pharmacist. Follow medication instructions as directed by your provider and/or pharmacist. Please keep your medication list with you and share with your provider. Update the information when medications are discontinued, doses are changed, or new medications (including over-the-counter products) are added; and carry medication information at all times in the event of emergency situations     Allergies:  No Known Allergies          Medications  Valid as of: April 18, 2017 -  9:33 AM    Generic Name Brand Name Tablet Size Instructions for use    Calcium-Vitamin D-Vitamin K   Take  by mouth.        Diclofenac Sodium (Gel) Diclofenac Sodium 1 % Apply 2 g to skin as directed 4 times a day as needed (apply to affected area).        Disopyramide Phosphate (Cap) NORPACE 100 MG Take 1 Cap by mouth every 8 hours.        Lansoprazole   Take  by mouth.        Magnesium (Tab) Magnesium 200 MG Take 1 Tab by mouth every day.        Metoprolol Tartrate (Tab) LOPRESSOR 25 MG Take 1 Tab by mouth 2 times a day.        Multiple Vitamins-Minerals   Take  by mouth.        Pantothenic Acid (Tab) Pantothenic Acid 500 MG Take  by mouth every day.        Rivaroxaban (Tab) XARELTO 20 MG Take 1 Tab by mouth with dinner.        Zolpidem Tartrate  (Tab) AMBIEN 5 MG Take 1-2 tablets by mouth every evening as needed for insomnia. Bring to sleep study.        .                 Medicines prescribed today were sent to:     [facility]  [place] [facility] [address] [facility]    [address] [place] [address]    Phone: [phone] Fax: [phone]    Open 24 Hours?: No      Medication refill instructions:       If your prescription bottle indicates you have medication refills left, it is not necessary to call your provider’s office. Please contact your pharmacy and they will refill your medication.    If your prescription bottle indicates you do not have any refills left, you may request refills at any time through one of the following ways: The online [employer] system (except Urgent Care), by calling your provider’s office, or by asking your pharmacy to contact your provider’s office with a refill request. Medication refills are processed only during regular business hours and may not be available until the next business day. Your provider may request additional information or to have a follow-up visit with you prior to refilling your medication.   *Please Note: Medication refills are assigned a new Rx number when refilled electronically. Your pharmacy may indicate that no refills were authorized even though a new prescription for the same medication is available at the pharmacy. Please request the medicine by name with the pharmacy before contacting your provider for a refill.        Other Notes About Your Plan     12/5/16 tsh 4.3  12/2/16 right plantar fasciitis, trial of topical voltaren, stretching exercises, referral podiatry  3/17 repeat [facility]                      MyChart Access Code: Activation code not generated  Current [employer] Status: Active

## 2017-04-18 NOTE — TELEPHONE ENCOUNTER
Please notify the patient that:  (1) her mammogram is negative but does show dense breast tissue which may decrease the accuracy of the mammogram  (2) she can get a screening ultrasound of her breast which may provide further detail  (3) her insurance will not cover a screening breast ultrasound, she would have to pay out of pocket, the cost would be approximately $125  (4) please let me know if she is interested

## 2017-05-18 ENCOUNTER — SLEEP CENTER VISIT (OUTPATIENT)
Dept: SLEEP MEDICINE | Facility: MEDICAL CENTER | Age: 57
End: 2017-05-18
Payer: COMMERCIAL

## 2017-05-18 ENCOUNTER — HOME STUDY (OUTPATIENT)
Dept: SLEEP MEDICINE | Facility: MEDICAL CENTER | Age: 57
End: 2017-05-18
Attending: NURSE PRACTITIONER
Payer: COMMERCIAL

## 2017-05-18 VITALS
SYSTOLIC BLOOD PRESSURE: 120 MMHG | HEIGHT: 64 IN | DIASTOLIC BLOOD PRESSURE: 80 MMHG | HEART RATE: 70 BPM | OXYGEN SATURATION: 98 % | BODY MASS INDEX: 32.95 KG/M2 | WEIGHT: 193 LBS | RESPIRATION RATE: 14 BRPM

## 2017-05-18 DIAGNOSIS — I48.0 PAROXYSMAL ATRIAL FIBRILLATION (HCC): Chronic | ICD-10-CM

## 2017-05-18 DIAGNOSIS — G47.33 OBSTRUCTIVE SLEEP APNEA SYNDROME: Chronic | ICD-10-CM

## 2017-05-18 DIAGNOSIS — I34.0 MITRAL VALVE INSUFFICIENCY, UNSPECIFIED ETIOLOGY: ICD-10-CM

## 2017-05-18 DIAGNOSIS — Z95.0 CARDIAC PACEMAKER IN SITU: ICD-10-CM

## 2017-05-18 DIAGNOSIS — N18.30 CHRONIC KIDNEY DISEASE, STAGE III (MODERATE) (HCC): Chronic | ICD-10-CM

## 2017-05-18 DIAGNOSIS — I42.9 CARDIOMYOPATHY, UNSPECIFIED TYPE (HCC): ICD-10-CM

## 2017-05-18 DIAGNOSIS — E66.9 OBESITY, UNSPECIFIED OBESITY SEVERITY, UNSPECIFIED OBESITY TYPE: ICD-10-CM

## 2017-05-18 PROCEDURE — 99213 OFFICE O/P EST LOW 20 MIN: CPT | Performed by: NURSE PRACTITIONER

## 2017-05-18 PROCEDURE — 94762 N-INVAS EAR/PLS OXIMTRY CONT: CPT | Performed by: INTERNAL MEDICINE

## 2017-05-18 NOTE — MR AVS SNAPSHOT
"        Seda Cagle   2017 11:40 AM   Sleep Center Visit   MRN: [id number]    Department:  [facility]   Dept Phone:  [phone]    Description:  Female : 1960   Provider:  ROSLYN Levy           Reason for Visit     Follow-Up 6 Week DL       Allergies as of 2017     No Known Allergies      You were diagnosed with     Cardiac pacemaker in situ   [V45.01.ICD-9-CM]       Chronic kidney disease, stage III (moderate)   [585.3.ICD-9-CM]       Cardiomyopathy, unspecified type (CMS-HCC)   [9757455]       Mitral valve insufficiency, unspecified etiology   [1753761]       Obesity, unspecified obesity severity, unspecified obesity type   [5304210]       Paroxysmal atrial fibrillation (CMS-HCC)   [134703]       Obstructive sleep apnea syndrome   [322318]         Vital Signs     Blood Pressure Pulse Respirations Height Weight Body Mass Index    120/80 mmHg 70 14 1.626 m (5' 4\") 87.544 kg (193 lb) 33.11 kg/m2    Oxygen Saturation Smoking Status                98% Never Smoker           Basic Information     Date Of Birth Sex Race Ethnicity Preferred Language    1960 Female White Non- English      Your appointments     Oct 23, 2017  9:00 AM   FOLLOW UP with Khris Pitts M.D.   [facility] (--)    [address] [place] [address] [address]  [place] [address]   [phone]            2017  9:00 AM   Follow UP with ROSLYN Levy   [facility] (--)    [address]  [place] [address] [phone]              Problem List              ICD-10-CM Priority Class Noted - Resolved    S/P colonoscopy Z98.890   2010 - Present    Hypertrophic obstructive cardiomyopathy (HCC) (Chronic) I42.1   Unknown - Present    Cardiac pacemaker in situ Z95.0   2015 - Present    Preventative health care (Chronic) Z00.00   2016 - Present    Gastroesophageal reflux disease without " esophagitis K21.9   2/8/2016 - Present    Obesity E66.9   2/8/2016 - Present    Cervical arthritis M46.92   2/8/2016 - Present    Mitral regurgitation I34.0   2/8/2016 - Present    Chronic kidney disease, stage III (moderate) (Chronic) N18.3   2/9/2016 - Present    Dyslipidemia E78.5   12/5/2016 - Present    Paroxysmal atrial fibrillation (CMS-HCC) (Chronic) I48.0   12/5/2016 - Present    Sleep apnea (Chronic) G47.30   4/17/2017 - Present    Cardiomyopathy (CMS-HCC) I42.9   5/18/2017 - Present      Health Maintenance        Date Due Completion Dates    IMM PNEUMOCOCCAL 19-64 (ADULT) MEDIUM RISK SERIES (1 of 1 - PPSV23) 5/2/1979 ---    PAP SMEAR 9/13/2013 9/13/2010    COLONOSCOPY 12/7/2016 12/7/2009    IMM INFLUENZA (1) 9/1/2017 11/3/2016, 9/15/2014    MAMMOGRAM 4/17/2018 4/17/2017, 3/23/2016, 2/25/2015    IMM DTaP/Tdap/Td Vaccine (2 - Td) 12/2/2026 12/2/2016            Current Immunizations     Influenza TIV (IM) 9/15/2014    Influenza Vaccine Quad Inj (Preserved) 11/3/2016  9:51 AM    Tdap Vaccine 12/2/2016 11:07 AM      Below and/or attached are the medications your provider expects you to take. Review all of your home medications and newly ordered medications with your provider and/or pharmacist. Follow medication instructions as directed by your provider and/or pharmacist. Please keep your medication list with you and share with your provider. Update the information when medications are discontinued, doses are changed, or new medications (including over-the-counter products) are added; and carry medication information at all times in the event of emergency situations     Allergies:  No Known Allergies          Medications  Valid as of: August 15, 2017 -  1:14 PM    Generic Name Brand Name Tablet Size Instructions for use    Calcium-Vitamin D-Vitamin K   Take  by mouth.        Diclofenac Sodium (Gel) Diclofenac Sodium 1 % Apply 2 g to skin as directed 4 times a day as needed (apply to affected area).         Disopyramide Phosphate (Cap) NORPACE 100 MG Take 1 Cap by mouth every 8 hours.        Lansoprazole   Take  by mouth.        Magnesium (Tab) Magnesium 200 MG Take 1 Tab by mouth every day.        Metoprolol Tartrate (Tab) LOPRESSOR 25 MG Take 1 Tab by mouth 2 times a day.        Multiple Vitamins-Minerals   Take  by mouth.        Pantothenic Acid (Tab) Pantothenic Acid 500 MG Take  by mouth every day.        Rivaroxaban (Tab) XARELTO 20 MG Take 1 Tab by mouth with dinner.        Zolpidem Tartrate (Tab) AMBIEN 5 MG Take 1-2 tablets by mouth every evening as needed for insomnia. Bring to sleep study.        .                 Medicines prescribed today were sent to:     [address] ([place]), [place] - [address]    [facility] [place] [address]    Phone: [phone] Fax: [phone]    Open 24 Hours?: No      Medication refill instructions:       If your prescription bottle indicates you have medication refills left, it is not necessary to call your provider’s office. Please contact your pharmacy and they will refill your medication.    If your prescription bottle indicates you do not have any refills left, you may request refills at any time through one of the following ways: The online [employer] system (except Urgent Care), by calling your provider’s office, or by asking your pharmacy to contact your provider’s office with a refill request. Medication refills are processed only during regular business hours and may not be available until the next business day. Your provider may request additional information or to have a follow-up visit with you prior to refilling your medication.   *Please Note: Medication refills are assigned a new Rx number when refilled electronically. Your pharmacy may indicate that no refills were authorized even though a new prescription for the same medication is available at the pharmacy. Please request the medicine by name with the pharmacy before contacting your  provider for a refill.        Your To Do List     Future Labs/Procedures Complete By Expires    OVERNIGHT OXIMETRY  As directed 5/18/2018      Instructions    1. Continue CPAP nightly  2. Clean mask and tubing weekly  3. Verify nocturnal oxygen saturations, okay to call for results  4. Follow-up in 6 months, sooner if needed         Other Notes About Your Plan     12/5/16 tsh 4.3  12/2/16 right plantar fasciitis, trial of topical voltaren, stretching exercises, referral podiatry  3/17 repeat tsh                      MyChart Access Code: Activation code not generated  Current MyChart Status: Active

## 2017-05-18 NOTE — PATIENT INSTRUCTIONS
1. Continue CPAP nightly  2. Clean mask and tubing weekly  3. Verify nocturnal oxygen saturations, okay to call for results  4. Follow-up in 6 months, sooner if needed

## 2017-05-18 NOTE — MR AVS SNAPSHOT
Seda Cagle   2017 2:40 PM   Home Study   MRN: [id number]    Department:  [facility]   Dept Phone:  [phone]    Description:  Female : 1960   Provider:  Justin HASTINGS M.D.           Allergies as of 2017     No Known Allergies      You were diagnosed with     Obstructive sleep apnea syndrome   [515005]         Vital Signs     Smoking Status                   Never Smoker            Basic Information     Date Of Birth Sex Race Ethnicity Preferred Language    1960 Female White Non- English      Your appointments     Oct 23, 2017  9:00 AM   FOLLOW UP with Khris Pitts M.D.   [facility] (--)    [address], [address]  [place] [address] [phone]            2017  9:00 AM   Follow UP with ROSLYN Levy   [facility] (--)    [address] [facility] [address]  [place] [address] [phone]              Problem List              ICD-10-CM Priority Class Noted - Resolved    S/P colonoscopy Z98.890   2010 - Present    Hypertrophic obstructive cardiomyopathy (HCC) (Chronic) I42.1   Unknown - Present    Cardiac pacemaker in situ Z95.0   2015 - Present    Preventative health care (Chronic) Z00.00   2016 - Present    Gastroesophageal reflux disease without esophagitis K21.9   2016 - Present    Obesity E66.9   2016 - Present    Cervical arthritis M46.92   2016 - Present    Mitral regurgitation I34.0   2016 - Present    Chronic kidney disease, stage III (moderate) (Chronic) N18.3   2016 - Present    Dyslipidemia E78.5   2016 - Present    Paroxysmal atrial fibrillation (CMS-HCC) (Chronic) I48.0   2016 - Present    Sleep apnea (Chronic) G47.30   2017 - Present    Cardiomyopathy (CMS-HCC) I42.9   2017 - Present      Health Maintenance        Date Due Completion Dates    IMM PNEUMOCOCCAL 19-64 (ADULT) MEDIUM RISK SERIES (1  of 1 - PPSV23) 5/2/1979 ---    PAP SMEAR 9/13/2013 9/13/2010    COLONOSCOPY 12/7/2016 12/7/2009    IMM INFLUENZA (1) 9/1/2017 11/3/2016, 9/15/2014    MAMMOGRAM 4/17/2018 4/17/2017, 3/23/2016, 2/25/2015    IMM DTaP/Tdap/Td Vaccine (2 - Td) 12/2/2026 12/2/2016            Results     OVERNIGHT OXIMETRY                   Current Immunizations     Influenza TIV (IM) 9/15/2014    Influenza Vaccine Quad Inj (Preserved) 11/3/2016  9:51 AM    Tdap Vaccine 12/2/2016 11:07 AM      Below and/or attached are the medications your provider expects you to take. Review all of your home medications and newly ordered medications with your provider and/or pharmacist. Follow medication instructions as directed by your provider and/or pharmacist. Please keep your medication list with you and share with your provider. Update the information when medications are discontinued, doses are changed, or new medications (including over-the-counter products) are added; and carry medication information at all times in the event of emergency situations     Allergies:  No Known Allergies          Medications  Valid as of: August 15, 2017 -  1:16 PM    Generic Name Brand Name Tablet Size Instructions for use    Calcium-Vitamin D-Vitamin K   Take  by mouth.        Diclofenac Sodium (Gel) Diclofenac Sodium 1 % Apply 2 g to skin as directed 4 times a day as needed (apply to affected area).        Disopyramide Phosphate (Cap) NORPACE 100 MG Take 1 Cap by mouth every 8 hours.        Lansoprazole   Take  by mouth.        Magnesium (Tab) Magnesium 200 MG Take 1 Tab by mouth every day.        Metoprolol Tartrate (Tab) LOPRESSOR 25 MG Take 1 Tab by mouth 2 times a day.        Multiple Vitamins-Minerals   Take  by mouth.        Pantothenic Acid (Tab) Pantothenic Acid 500 MG Take  by mouth every day.        Rivaroxaban (Tab) XARELTO 20 MG Take 1 Tab by mouth with dinner.        Zolpidem Tartrate (Tab) AMBIEN 5 MG Take 1-2 tablets by mouth every evening as needed  for insomnia. Bring to sleep study.        .                 Medicines prescribed today were sent to:     [facility] [address] ([place]), [place] - [address]    [address] [facility] [place]) [place] [address]    Phone: [phone] Fax: [phone]    Open 24 Hours?: No      Medication refill instructions:       If your prescription bottle indicates you have medication refills left, it is not necessary to call your provider’s office. Please contact your pharmacy and they will refill your medication.    If your prescription bottle indicates you do not have any refills left, you may request refills at any time through one of the following ways: The online [employer] system (except Urgent Care), by calling your provider’s office, or by asking your pharmacy to contact your provider’s office with a refill request. Medication refills are processed only during regular business hours and may not be available until the next business day. Your provider may request additional information or to have a follow-up visit with you prior to refilling your medication.   *Please Note: Medication refills are assigned a new Rx number when refilled electronically. Your pharmacy may indicate that no refills were authorized even though a new prescription for the same medication is available at the pharmacy. Please request the medicine by name with the pharmacy before contacting your provider for a refill.        Other Notes About Your Plan     12/5/16 tsh 4.3  12/2/16 right plantar fasciitis, trial of topical voltaren, stretching exercises, referral podiatry  3/17 repeat tsh                      MyChart Access Code: Activation code not generated  [facility]

## 2017-05-18 NOTE — PROGRESS NOTES
Chief Complaint   Patient presents with   • Follow-Up     6 Week DL          HPI: This patient is a 57 y.o. female, who presents for 6 week follow-up of GEORGINA. She has a history of hypertrophic cardiomyopathy, pacemaker placement in 2015, atrial septal reduction performed [place] in 2000. PSG March 2017 indicates an AHI of 21.3, minimum saturation 83%. Events were primarily hypopneas, but did have 12% central events. She was initiated on auto CPAP 5-15 cm H2O. She returns today for download. Her new  are leaving town Saturnday and will not be back until October. Compliance report today shows 21 days of use, average use of 5 hours 35 minutes per night, AHI of 7.1. She feels improved sleep quality and energy during the day. She denies a.m. headaches. Given her heart history we will verify nocturnal oxygen saturation.     Past Medical History   Diagnosis Date   • Heart murmur    • Hypertrophic obstructive cardiomyopathy 1991     Dr. Bert Vazquez, [facility] [address] [address] [phone] fax [phone].  [place] office [phone].  Alchol Septal Reduction 8/00, Coronaries normal.   • Depression    • GERD (gastroesophageal reflux disease)    • Arrhythmia, ventricular 2008     Symptomatic PVCs, controlled with medication.   • Hypertrophic cardiomyopathy (CMS-HCC)    • Aortic regurgitation    • CTS (carpal tunnel syndrome) 2/28/2011   • Indigestion    • Asthma    • Snoring    • Anesthesia      wakes up during procedure   • Renal disorder      chronic kidney disease stage 3   • Pain      right arm /shoulder   • Breath shortness 12/14   • Cervical arthritis 2/8/2016   • Chronic kidney disease, stage III (moderate) 2/9/2016   • Sleep apnea    • Cardiac arrhythmia    • Atrial fibrillation (CMS-HCC)    • Chickenpox    • Influenza        Social History   Substance Use Topics   • Smoking status: Never Smoker    • Smokeless tobacco: Never Used   • Alcohol Use: 0.6 oz/week     1 Standard  "drinks or equivalent per week      Comment: 4 per week       Family History   Problem Relation Age of Onset   • Heart Disease Mother      CHF   • Hypertension Mother    • Stroke Mother    • Heart Failure Mother    • Other Mother      carotid artery disease,gout   • Diabetes Maternal Grandmother    • Cancer Maternal Grandfather      Prostate   • Heart Disease Paternal Grandfather 52      MI    • Cancer Father      AML   • Sleep Apnea Neg Hx        Current medications as of today   Current Outpatient Prescriptions   Medication Sig Dispense Refill   • Calcium-Vitamin D-Vitamin K (CALCIUM FOR WOMEN PO) Take  by mouth.     • rivaroxaban (XARELTO) 20 MG Tab tablet Take 1 Tab by mouth with dinner. 90 Tab 3   • metoprolol (LOPRESSOR) 25 MG Tab Take 1 Tab by mouth 2 times a day. 180 Tab 3   • disopyramide (NORPACE) 100 MG Cap Take 1 Cap by mouth every 8 hours. 270 Cap 3   • Lansoprazole (PREVACID PO) Take  by mouth.     • Pantothenic Acid 500 MG TABS Take  by mouth every day.     • Magnesium 200 MG TABS Take 1 Tab by mouth every day.     • Multiple Vitamins-Minerals (ALIVE ONCE DAILY WOMENS 50+ PO) Take  by mouth.     • zolpidem (AMBIEN) 5 MG Tab Take 1-2 tablets by mouth every evening as needed for insomnia. Bring to sleep study. (Patient not taking: Reported on 3/31/2017) 3 Tab 0   • Diclofenac Sodium (VOLTAREN) 1 % Gel Apply 2 g to skin as directed 4 times a day as needed (apply to affected area). (Patient not taking: Reported on 2017) 300 g 5     No current facility-administered medications for this visit.       Allergies: Review of patient's allergies indicates no known allergies.    Blood pressure 120/80, pulse 70, resp. rate 14, height 1.626 m (5' 4\"), weight 87.544 kg (193 lb), SpO2 98 %.      ROS:   Constitutional: Denies fevers, chills, night sweats, weight loss or fatigue  HEENT: Denies earache, difficulty hearing, tinnitus, nasal congestion, hoarseness  Cardiovascular: Denies chest pain, tightness, " palpitations, orthopnea or edema  Respiratory: Denies cough, wheeze, dyspnea, hemoptysis  Sleep: See HPI  GI: Denies heartburn, dysphagia, nausea, abdominal pain, diarrhea or constipation  : Denies frequent urination, hematuria, discharge or painful urination  Musculoskeletal: Denies back pain, painful joints, sore muscles  Neurological: Denies weakness or headaches  Skin: No rashes    Physical exam:   Appearance: Obese, in no acute distress  HEENT: Normocephalic, atraumatic, white sclera, PERRLA  Respiratory: no intercostal retractions or accessory muscle use   Lungs auscultation: Clear to auscultation bilaterally  Cardiovascular: Regular rate rhythm no murmurs, rubs or gallops  Gait: Normal  Digits: No clubbing, cyanosis  Motor: No focal deficits  Orientation: Oriented to time, person and place    Diagnosis:  1. Cardiac pacemaker in situ     2. Chronic kidney disease, stage III (moderate)     3. Cardiomyopathy, unspecified type (CMS-HCC)     4. Mitral valve insufficiency, unspecified etiology     5. Obesity, unspecified obesity severity, unspecified obesity type     6. Paroxysmal atrial fibrillation (CMS-HCC)     7. Obstructive sleep apnea syndrome  OVERNIGHT OXIMETRY       Plan:  1. Continue CPAP nightly  2. Clean mask and tubing weekly  3. Verify nocturnal oxygen saturations, okay to call for results. If desaturations, she will likely require in lab sleep study. Query central apneas. However since they are leaving town and will not be back until October she may need an order for O2 bleed in until she can have in lab study.  4. Follow-up in 6 months, sooner if needed

## 2017-05-19 NOTE — PROCEDURES
This study was done on auto titrating CPAP with a pressure range of 5-15 cm water and on room air.  4 hours and 57 minutes of data are available for review and the information appears to be of good quality for analysis.    The basal arterial oxygen saturation is 93%.  Saturation is well maintained throughout the night.  In the first hour of the study there are occasional drops to a saturation of about 87% but saturation is well maintained at 90% or above throughout the rest of the night.  Overall she spends less than a minute with a saturation below 88%.  The average heart rate is 61 bpm.    Assessment:   Preservation of arterial oxygen saturation on auto titrating CPAP and room air.

## 2017-06-05 ENCOUNTER — TELEPHONE (OUTPATIENT)
Dept: SLEEP MEDICINE | Facility: MEDICAL CENTER | Age: 57
End: 2017-06-05

## 2017-06-05 NOTE — TELEPHONE ENCOUNTER
Overnight oximetry completed 05/18/2017 on Auto CPAP @ 5-15cm.  Per Dr. Weiner - test results normal, no change in treatment needed at this time.    Procedures      Expand All Collapse All    This study was done on auto titrating CPAP with a pressure range of 5-15 cm water and on room air.  4 hours and 57 minutes of data are available for review and the information appears to be of good quality for analysis.    The basal arterial oxygen saturation is 93%.  Saturation is well maintained throughout the night.  In the first hour of the study there are occasional drops to a saturation of about 87% but saturation is well maintained at 90% or above throughout the rest of the night.  Overall she spends less than a minute with a saturation below 88%.  The average heart rate is 61 bpm.    Assessment:   Preservation of arterial oxygen saturation on auto titrating CPAP and room air.

## 2017-10-23 ENCOUNTER — OFFICE VISIT (OUTPATIENT)
Dept: CARDIOLOGY | Facility: MEDICAL CENTER | Age: 57
End: 2017-10-23
Payer: COMMERCIAL

## 2017-10-23 VITALS
HEART RATE: 71 BPM | HEIGHT: 64 IN | OXYGEN SATURATION: 96 % | DIASTOLIC BLOOD PRESSURE: 74 MMHG | BODY MASS INDEX: 32.61 KG/M2 | SYSTOLIC BLOOD PRESSURE: 118 MMHG | WEIGHT: 191 LBS

## 2017-10-23 DIAGNOSIS — I48.0 PAROXYSMAL ATRIAL FIBRILLATION (HCC): ICD-10-CM

## 2017-10-23 DIAGNOSIS — I49.3 PVC (PREMATURE VENTRICULAR CONTRACTION): ICD-10-CM

## 2017-10-23 DIAGNOSIS — I48.0 PAF (PAROXYSMAL ATRIAL FIBRILLATION) (HCC): ICD-10-CM

## 2017-10-23 DIAGNOSIS — R07.89 OTHER CHEST PAIN: ICD-10-CM

## 2017-10-23 DIAGNOSIS — I42.2 HYPERTROPHIC CARDIOMYOPATHY (HCC): ICD-10-CM

## 2017-10-23 LAB — EKG IMPRESSION: NORMAL

## 2017-10-23 PROCEDURE — 93000 ELECTROCARDIOGRAM COMPLETE: CPT | Mod: 59 | Performed by: INTERNAL MEDICINE

## 2017-10-23 PROCEDURE — 93280 PM DEVICE PROGR EVAL DUAL: CPT | Performed by: INTERNAL MEDICINE

## 2017-10-23 PROCEDURE — 99214 OFFICE O/P EST MOD 30 MIN: CPT | Mod: 25 | Performed by: INTERNAL MEDICINE

## 2017-10-23 RX ORDER — DISOPYRAMIDE PHOSPHATE 100 MG/1
100 CAPSULE ORAL EVERY 8 HOURS
Qty: 270 CAP | Refills: 3 | Status: SHIPPED | OUTPATIENT
Start: 2017-10-23 | End: 2018-11-06 | Stop reason: SDUPTHER

## 2017-10-23 ASSESSMENT — ENCOUNTER SYMPTOMS
WEIGHT LOSS: 1
PALPITATIONS: 1

## 2017-10-23 NOTE — PROGRESS NOTES
Subjective:   Seda Cagle is a 57 y.o. female who presents today for follow up of icd and a fib        7.9 hours a fib in [place] dual chamber device.    Sometimes wakes her up at night.  Rushing of the heart and feels really fast.    It makes her worry when it happens.       The severity seems to be noticing it more and more.      Using cpap and she hates it.      Past Medical History:   Diagnosis Date   • Anesthesia     wakes up during procedure   • Aortic regurgitation    • Arrhythmia, ventricular 2008    Symptomatic PVCs, controlled with medication.   • Asthma    • Atrial fibrillation (CMS-HCC)    • Breath shortness 12/14   • Cardiac arrhythmia    • Cervical arthritis (CMS-HCC) 2/8/2016   • Chickenpox    • Chronic kidney disease, stage III (moderate) 2/9/2016   • CTS (carpal tunnel syndrome) 2/28/2011   • Depression    • GERD (gastroesophageal reflux disease)    • Heart murmur    • Hypertr obst cardiomyop 1991    Dr. Bert Vazquez, [address] [place] [address] [phone] fax [phone].  [facility] [phone].  Alchol Septal Reduction 8/00, Coronaries normal.   • Hypertrophic cardiomyopathy (CMS-HCC)    • Indigestion    • Influenza    • Pain     right arm /shoulder   • Renal disorder     chronic kidney disease stage 3   • Sleep apnea    • Snoring      Past Surgical History:   Procedure Laterality Date   • RECOVERY  4/15/2015    Performed by [facility] at SURGERY PRE-POST PROC UNIT [facility]   • RECOVERY  12/12/2014    Performed by [facility] at SURGERY SAME DAY [facility] OR[facility]   • PACEMAKER INSERTION     • PRIMARY C SECTION     • TUBAL COAGULATION LAPAROSCOPIC BILATERAL       Family History   Problem Relation Age of Onset   • Heart Disease Mother      CHF   • Hypertension Mother    • Stroke Mother    • Heart Failure Mother    • Other Mother      carotid artery disease,gout   • Cancer Father      AML   • Diabetes Maternal Grandmother    • Cancer Maternal  "Grandfather      Prostate   • Heart Disease Paternal Grandfather 52      MI    • Sleep Apnea Neg Hx      History   Smoking Status   • Never Smoker   Smokeless Tobacco   • Never Used     No Known Allergies  Outpatient Encounter Prescriptions as of 10/23/2017   Medication Sig Dispense Refill   • Calcium-Vitamin D-Vitamin K (CALCIUM FOR WOMEN PO) Take  by mouth.     • rivaroxaban (XARELTO) 20 MG Tab tablet Take 1 Tab by mouth with dinner. 90 Tab 3   • metoprolol (LOPRESSOR) 25 MG Tab Take 1 Tab by mouth 2 times a day. 180 Tab 3   • disopyramide (NORPACE) 100 MG Cap Take 1 Cap by mouth every 8 hours. 270 Cap 3   • Lansoprazole (PREVACID PO) Take  by mouth.     • Pantothenic Acid 500 MG TABS Take  by mouth every day.     • Magnesium 200 MG TABS Take 1 Tab by mouth every day.     • Multiple Vitamins-Minerals (ALIVE ONCE DAILY WOMENS 50+ PO) Take  by mouth.     • zolpidem (AMBIEN) 5 MG Tab Take 1-2 tablets by mouth every evening as needed for insomnia. Bring to sleep study. (Patient not taking: Reported on 3/31/2017) 3 Tab 0   • Diclofenac Sodium (VOLTAREN) 1 % Gel Apply 2 g to skin as directed 4 times a day as needed (apply to affected area). (Patient not taking: Reported on 2017) 300 g 5     No facility-administered encounter medications on file as of 10/23/2017.      Review of Systems   Constitutional: Positive for weight loss.   Cardiovascular: Positive for palpitations.        Objective:   /74   Pulse 71   Ht 1.626 m (5' 4.02\")   Wt 86.6 kg (191 lb)   SpO2 96%   BMI 32.77 kg/m²     Physical Exam   Constitutional: She is oriented to person, place, and time. She appears well-developed and well-nourished. No distress.   HENT:   Head: Normocephalic and atraumatic.   Right Ear: External ear normal.   Left Ear: External ear normal.   Mouth/Throat: Oropharynx is clear and moist.   Eyes: Conjunctivae and EOM are normal. Right eye exhibits no discharge. Left eye exhibits no discharge. No scleral icterus. "   Neck: Normal range of motion. Neck supple. No JVD present. No tracheal deviation present. No thyromegaly present.   Cardiovascular: Normal rate, regular rhythm, normal heart sounds and intact distal pulses.  Exam reveals no gallop and no friction rub.    No murmur heard.  Pulmonary/Chest: Effort normal and breath sounds normal. No stridor. No respiratory distress. She has no wheezes. She has no rales.   Abdominal: Soft. She exhibits no distension.   Musculoskeletal: She exhibits no edema or tenderness.   Neurological: She is alert and oriented to person, place, and time. No cranial nerve deficit. Coordination normal.   Skin: Skin is warm and dry. No rash noted. She is not diaphoretic. No erythema. No pallor.   Psychiatric: She has a normal mood and affect. Her behavior is normal. Judgment and thought content normal.   Vitals reviewed.      Assessment:     1. PAF (paroxysmal atrial fibrillation) (CMS-HCC)  EKG     ecg with sinus and lbbb today  Medical Decision Making:  Today's Assessment / Status / Plan:   paf- device shows a stable.  She is uncomfortable but wants to try to seeing if she does better getting in better shape.  Continue current regimen for now but consdering ablation if she is not feeling better at follow up    She has some chest pressure and It has been 3 years since stress so will get again.

## 2017-10-28 ENCOUNTER — HOSPITAL ENCOUNTER (OUTPATIENT)
Dept: RADIOLOGY | Facility: MEDICAL CENTER | Age: 57
End: 2017-10-28
Attending: INTERNAL MEDICINE
Payer: COMMERCIAL

## 2017-10-28 DIAGNOSIS — I48.0 PAROXYSMAL ATRIAL FIBRILLATION (HCC): ICD-10-CM

## 2017-10-28 DIAGNOSIS — R07.89 OTHER CHEST PAIN: ICD-10-CM

## 2017-10-28 PROCEDURE — 700111 HCHG RX REV CODE 636 W/ 250 OVERRIDE (IP)

## 2017-10-28 PROCEDURE — A9502 TC99M TETROFOSMIN: HCPCS

## 2017-10-28 RX ORDER — REGADENOSON 0.08 MG/ML
INJECTION, SOLUTION INTRAVENOUS
Status: COMPLETED
Start: 2017-10-28 | End: 2017-10-28

## 2017-10-28 RX ADMIN — REGADENOSON 0.4 MG: 0.08 INJECTION, SOLUTION INTRAVENOUS at 12:23

## 2017-10-28 NOTE — PROGRESS NOTES
Nursing care plan includes knowledge deficit, potential for discomfort, potential for compromised cardiac output. POC includes teaching, comfort measures and reassurance, and access to code cart, cardiology stand by and availability of rapid response team. Pt verbalizes good understanding of benefits and risks of pharmacological cardiac stress test. Informed consent obtained. Lexiscan given, pt developed the following symptoms SOB, light headedness and abdominal discomfort. VS stable, major symptoms resolved. To waiting room, caffeinated fluids and/or snacks given, awaiting second scan. Nursing goals met.

## 2017-10-28 NOTE — LETTER
November 21, 2017         Seda Cagle  [id number]  [place] [address]        Dear Seda:      Below are the results from your recent stress test per Dr Pitts:   Your test is consistent with previous septal ablation.  Otherwise appears within normal limits despite labelled as non diagnostic. This is good news and ok for you. Your chest pressure that you get does not appear to be a concern based on these results. Please continue yourr current regimen.         Sincerely,      Leilani BRIONES    Electronically Signed

## 2017-11-30 ENCOUNTER — SLEEP CENTER VISIT (OUTPATIENT)
Dept: SLEEP MEDICINE | Facility: MEDICAL CENTER | Age: 57
End: 2017-11-30
Payer: COMMERCIAL

## 2017-11-30 VITALS
HEIGHT: 64 IN | BODY MASS INDEX: 32.1 KG/M2 | HEART RATE: 74 BPM | DIASTOLIC BLOOD PRESSURE: 70 MMHG | SYSTOLIC BLOOD PRESSURE: 118 MMHG | WEIGHT: 188 LBS | OXYGEN SATURATION: 96 %

## 2017-11-30 DIAGNOSIS — I42.1 HYPERTROPHIC OBSTRUCTIVE CARDIOMYOPATHY (HCC): Chronic | ICD-10-CM

## 2017-11-30 DIAGNOSIS — I48.0 PAROXYSMAL ATRIAL FIBRILLATION (HCC): Chronic | ICD-10-CM

## 2017-11-30 DIAGNOSIS — G47.33 OBSTRUCTIVE SLEEP APNEA SYNDROME: Chronic | ICD-10-CM

## 2017-11-30 PROCEDURE — 99213 OFFICE O/P EST LOW 20 MIN: CPT | Performed by: NURSE PRACTITIONER

## 2017-11-30 NOTE — PATIENT INSTRUCTIONS
1. Continue CPAP nightly  2. Clean mask and tubing weekly  3. Replace supplies as his insurance will allow  4. Follow up annually, sooner if needed

## 2017-11-30 NOTE — PROGRESS NOTES
Chief Complaint   Patient presents with   • Follow-Up     6M         HPI: This patient is a 57 y.o. female, who presents for six-month follow-up GEORGINA. She has a history of hypertrophic cardiomyopathy, pacemaker placement in 2015, atrial septal reduction performed [place] in 2000.     PSG March 2017 indicates an AHI of 21.3, minimum saturation 83%. Events were primarily hypopneas, but did have 12% central events. She was initiated on auto CPAP 5-15 cm H2O.  overnight oximetry on these pressure settings were excellent with a basal SPO2 of 92.7%. No significant time spent less than 88%. Compliance download indicates 76% compliance over the past 30 days, average use 5 hours per night, AHI of 5.2. Patient reports she went camping recently which accounts for break in therapy. Otherwise she uses her machine every night. She does feel she gets a better quality sleep. Mask and pressures are comfortable.     Past Medical History:   Diagnosis Date   • Anesthesia     wakes up during procedure   • Aortic regurgitation    • Arrhythmia, ventricular 2008    Symptomatic PVCs, controlled with medication.   • Asthma    • Atrial fibrillation (CMS-[facility])    • Breath shortness 12/14   • Cardiac arrhythmia    • Cervical arthritis (CMS-HCC) 2/8/2016   • Chickenpox    • Chronic kidney disease, stage III (moderate) 2/9/2016   • CTS (carpal tunnel syndrome) 2/28/2011   • Depression    • GERD (gastroesophageal reflux disease)    • Heart murmur    • Hypertr obst cardiomyop 1991    Dr. Bert Vazquez, [address] [place] [address] [phone] fax [phone].  [place] office [phone].  Alchol Septal Reduction 8/00, Coronaries normal.   • Hypertrophic cardiomyopathy (CMS-HCC)    • Indigestion    • Influenza    • Pain     right arm /shoulder   • Renal disorder     chronic kidney disease stage 3   • Sleep apnea    • Snoring        Social History   Substance Use Topics   • Smoking status: Never Smoker   • Smokeless  "tobacco: Never Used   • Alcohol use 0.6 oz/week     1 Standard drinks or equivalent per week      Comment: 4 per week       Family History   Problem Relation Age of Onset   • Heart Disease Mother      CHF   • Hypertension Mother    • Stroke Mother    • Heart Failure Mother    • Other Mother      carotid artery disease,gout   • Cancer Father      AML   • Diabetes Maternal Grandmother    • Cancer Maternal Grandfather      Prostate   • Heart Disease Paternal Grandfather 52      MI    • Sleep Apnea Neg Hx        Current medications as of today   Current Outpatient Prescriptions   Medication Sig Dispense Refill   • rivaroxaban (XARELTO) 20 MG Tab tablet Take 1 Tab by mouth with dinner. 90 Tab 3   • metoprolol (LOPRESSOR) 25 MG Tab Take 1 Tab by mouth 2 times a day. 180 Tab 3   • disopyramide (NORPACE) 100 MG Cap Take 1 Cap by mouth every 8 hours. 270 Cap 3   • Calcium-Vitamin D-Vitamin K (CALCIUM FOR WOMEN PO) Take  by mouth.     • Lansoprazole (PREVACID PO) Take  by mouth.     • Pantothenic Acid 500 MG TABS Take  by mouth every day.     • Magnesium 200 MG TABS Take 1 Tab by mouth every day.     • Multiple Vitamins-Minerals (ALIVE ONCE DAILY WOMENS 50+ PO) Take  by mouth.       No current facility-administered medications for this visit.        Allergies: Patient has no known allergies.    Blood pressure 118/70, pulse 74, height 1.626 m (5' 4\"), weight 85.3 kg (188 lb), SpO2 96 %.      ROS:   Constitutional: Denies fevers, chills, night sweats, weight loss or fatigue  Eyes: Denies pain, discharge/drainage  ENT: Denies tinnitus, hearing loss, sinusitis, hoarseness, epistaxis. Some nasal congestion.  Allergic: Denies Allergic rhinitis or hayfever  Respiratory: Denies cough, wheeze, dyspnea, hemoptysis  Cardiovascular: Denies chest pain, tightness, palpitations, orthopnea or edema  Sleep: See HPI  GI/: Denies heartburn, nausea, vomiting, urinary incontinence, hematuria  Musculoskeletal: Denies back pain, painful " joints, sore muscles  Neurological: Denies vertigo or headaches  Skin: Denies rashes, lesions  Psychiatric: Denies depression or anxiety    Physical exam:   Constitutional: Well-nourished, well-developed, in no acute distress  Eyes: PERRL  Mouth/Throat: Oropharynx is moist, clear, no lesions, Mallampati 4  Neck: supple, no masses  Respiratory: no intercostal retractions or accessory muscle use   Musculoskeletal: Normal gait, no clubbing or cyanosis  Skin: No rashes or lesions noted on exposed skin  Neuro: No focal deficits noted  Psychiatric: Oriented to time, person and place.     Diagnosis:  1. Hypertrophic obstructive cardiomyopathy (HCC)     2. Paroxysmal atrial fibrillation (CMS-HCC)     3. Obstructive sleep apnea syndrome         Plan:  She is actively working on weight loss and hopes to discontinue CPAP. She does have a very narrow airway and complains of nasal congestion/blockage. We discussed ENT referral to discuss surgical options. She may consider this in the future. She understands if she loses a significant amount of weight she will require repeat sleep study to confirm continued presence of GEORGINA. If AHI improved with weight loss she may be a candidate for dental appliance. Although certainly with her cardiac history CPAP is preferred.    1. Continue CPAP nightly  2. Clean mask and tubing weekly  3. Replace supplies as his insurance will allow  4. Follow up annually, sooner if needed  5. Continued weight loss efforts and encouraged

## 2017-12-06 ENCOUNTER — TELEPHONE (OUTPATIENT)
Dept: RADIOLOGY | Facility: MEDICAL CENTER | Age: 57
End: 2017-12-06

## 2017-12-06 NOTE — TELEPHONE ENCOUNTER
LM to conf apt @ [facility] on 12/7 @ 8:00 check in @ 7:45, reviewed prep and location

## 2017-12-07 ENCOUNTER — TELEPHONE (OUTPATIENT)
Dept: MEDICAL GROUP | Facility: MEDICAL CENTER | Age: 57
End: 2017-12-07

## 2017-12-07 ENCOUNTER — HOSPITAL ENCOUNTER (OUTPATIENT)
Dept: RADIOLOGY | Facility: MEDICAL CENTER | Age: 57
End: 2017-12-07
Attending: INTERNAL MEDICINE
Payer: COMMERCIAL

## 2017-12-07 DIAGNOSIS — R92.8 ABNORMAL MAGNETIC RESONANCE IMAGING OF LEFT BREAST: ICD-10-CM

## 2017-12-07 DIAGNOSIS — R92.30 DENSE BREAST TISSUE: ICD-10-CM

## 2017-12-07 PROCEDURE — 76641 ULTRASOUND BREAST COMPLETE: CPT

## 2017-12-07 NOTE — TELEPHONE ENCOUNTER
Please notify patient that the ultrasound did show a possible left breast cyst, the radiologist did recommend a diagnostic ultrasound for further detail, the insurance will cover this test, I will place that order for the diagnostic left breast ultrasound in the computer system, she can call [phone] to schedule that test

## 2017-12-08 NOTE — TELEPHONE ENCOUNTER
Spoke to Pt, notified of these results as well as further testing. Pt notified imaging should be contacting her to schedule and provided the number to imaging.

## 2017-12-08 NOTE — TELEPHONE ENCOUNTER
----- Message from Juve Stoddard M.D. sent at 12/7/2017  2:40 PM PST -----  Please notify patient that the ultrasound did show a possible left breast cyst, the radiologist did recommend a diagnostic ultrasound for further detail, the insurance will cover this test, I will place that order for the diagnostic left breast ultrasound in the computer system, she can call [phone] to schedule that test

## 2017-12-13 ENCOUNTER — TELEPHONE (OUTPATIENT)
Dept: RADIOLOGY | Facility: MEDICAL CENTER | Age: 57
End: 2017-12-13

## 2017-12-13 NOTE — TELEPHONE ENCOUNTER
LM to conf apt @ [facility] on 12/14 @ 8:30 check in @ 8:15, reviewed prep and location

## 2017-12-14 ENCOUNTER — HOSPITAL ENCOUNTER (OUTPATIENT)
Dept: RADIOLOGY | Facility: MEDICAL CENTER | Age: 57
End: 2017-12-14
Attending: INTERNAL MEDICINE
Payer: COMMERCIAL

## 2017-12-14 ENCOUNTER — TELEPHONE (OUTPATIENT)
Dept: MEDICAL GROUP | Facility: MEDICAL CENTER | Age: 57
End: 2017-12-14

## 2017-12-14 DIAGNOSIS — R92.8 ABNORMAL FINDING ON BREAST IMAGING: ICD-10-CM

## 2017-12-14 DIAGNOSIS — N63.20 LEFT BREAST MASS: ICD-10-CM

## 2017-12-14 PROCEDURE — 76642 ULTRASOUND BREAST LIMITED: CPT | Mod: LT

## 2017-12-15 ENCOUNTER — TELEPHONE (OUTPATIENT)
Dept: MEDICAL GROUP | Facility: MEDICAL CENTER | Age: 57
End: 2017-12-15

## 2017-12-15 DIAGNOSIS — N64.9 BREAST LESION: ICD-10-CM

## 2017-12-15 DIAGNOSIS — R92.8 ABNORMAL MAMMOGRAM: ICD-10-CM

## 2017-12-15 NOTE — TELEPHONE ENCOUNTER
Called patient and left message  Please notify her that:  (1) I did review the ultrasound results, and reviewed the radiologist's recommendation for options of follow-up ultrasound in 6 months versus an ultrasound-guided biopsy of the left breast lesion  (2) I agree with the ultrasound-guided biopsy of the left breast lesion  (3) I will place an order in the computer and she can call imaging scheduling at [phone] to schedule that procedure

## 2017-12-15 NOTE — TELEPHONE ENCOUNTER
Spoke to Pt, she states she is on board with the biopsy but would also like to discuss having this removed due to being painful. Pt and her spouse have been discussing this and would like to just have this removed. Pt gave a cell number and states you can call her on this number at any time and also leave a detailed message. [phone]

## 2017-12-15 NOTE — TELEPHONE ENCOUNTER
----- Message from Juve Stoddard M.D. sent at 12/14/2017  7:58 PM PST -----  Called patient and left message  Please notify her that:  (1) I did review the ultrasound results, and reviewed the radiologist's recommendation for options of follow-up ultrasound in 6 months versus an ultrasound-guided biopsy of the left breast lesion  (2) I agree with the ultrasound-guided biopsy of the left breast lesion  (3) I will place an order in the computer and she can call imaging scheduling at [phone] to schedule that procedure

## 2017-12-18 ENCOUNTER — TELEPHONE (OUTPATIENT)
Dept: CARDIOLOGY | Facility: MEDICAL CENTER | Age: 57
End: 2017-12-18

## 2017-12-18 ENCOUNTER — TELEPHONE (OUTPATIENT)
Dept: VASCULAR LAB | Facility: MEDICAL CENTER | Age: 57
End: 2017-12-18

## 2017-12-18 ENCOUNTER — TELEPHONE (OUTPATIENT)
Dept: RADIOLOGY | Facility: MEDICAL CENTER | Age: 57
End: 2017-12-18

## 2017-12-18 DIAGNOSIS — I48.0 PAF (PAROXYSMAL ATRIAL FIBRILLATION) (HCC): ICD-10-CM

## 2017-12-18 NOTE — TELEPHONE ENCOUNTER
----- Message from Tosha Leija sent at 12/18/2017  8:03 AM PST -----  Regarding: xarelto hold advice  Contact: [phone]  CECILE/mel Ross from [facility] calling to ask for xarelto hold advice in preparation for breast biopsy on 12/22 to be done by radiologist Dr Alanis Snow, please call Vandana on x [phone]

## 2017-12-18 NOTE — TELEPHONE ENCOUNTER
Salvador's office called stating he would like us to call [facility] for advice on holding xeralto/jls

## 2017-12-18 NOTE — TELEPHONE ENCOUNTER
Stephan to verify patient is able to hold xeralto for 48 hours before breast biopsy which is scheduled for 12/22/17/helene

## 2017-12-18 NOTE — TELEPHONE ENCOUNTER
[facility]     Pt is undergoing breast biopsy.  Low Chads, therefore low risk of stroke.  Renal function as of 1 year ago >50 mL/min.  Pt denies any obvious signs of renal decline or dysfunction in the past year.  She is not able to get new renal indices prior to her procedure.  Very likely CrCL >50 ml/min.  Therefore given pt risks and likely current renal funciton, pt to hold Xarelto 48 hours prior to procedure per 2017 ACC con census , then resume anticoagulation at discretion of operating physician.      Rashid Ly, PharmD

## 2017-12-19 ENCOUNTER — TELEPHONE (OUTPATIENT)
Dept: VASCULAR LAB | Facility: MEDICAL CENTER | Age: 57
End: 2017-12-19

## 2017-12-19 NOTE — TELEPHONE ENCOUNTER
Left voicemail message for patient to HOLD Xarelto 12-20 &12- 21 for breast biopsy scheduled for 12-22  Pt will resume regular Xarelto dosing 12-22 AFTER procedure  Lindsay McdonaldD

## 2017-12-21 ENCOUNTER — TELEPHONE (OUTPATIENT)
Dept: RADIOLOGY | Facility: MEDICAL CENTER | Age: 57
End: 2017-12-21

## 2017-12-22 ENCOUNTER — HOSPITAL ENCOUNTER (OUTPATIENT)
Dept: RADIOLOGY | Facility: MEDICAL CENTER | Age: 57
End: 2017-12-22
Attending: INTERNAL MEDICINE
Payer: COMMERCIAL

## 2017-12-22 DIAGNOSIS — N63.20 LEFT BREAST MASS: ICD-10-CM

## 2017-12-22 PROCEDURE — 88305 TISSUE EXAM BY PATHOLOGIST: CPT

## 2017-12-22 PROCEDURE — 19083 BX BREAST 1ST LESION US IMAG: CPT

## 2017-12-26 ENCOUNTER — TELEPHONE (OUTPATIENT)
Dept: MEDICAL GROUP | Facility: MEDICAL CENTER | Age: 57
End: 2017-12-26

## 2017-12-26 PROBLEM — I42.9 CARDIOMYOPATHY (HCC): Status: RESOLVED | Noted: 2017-05-18 | Resolved: 2017-12-26

## 2017-12-26 NOTE — TELEPHONE ENCOUNTER
Please notify patient that the breast biopsy result is negative, the biopsy showed benign breast tissue with no abnormalities  We will fax the results to her surgical consultant

## 2018-01-24 ENCOUNTER — TELEPHONE (OUTPATIENT)
Dept: VASCULAR LAB | Facility: MEDICAL CENTER | Age: 58
End: 2018-01-24

## 2018-01-24 ENCOUNTER — TELEPHONE (OUTPATIENT)
Dept: CARDIOLOGY | Facility: MEDICAL CENTER | Age: 58
End: 2018-01-24

## 2018-01-24 NOTE — TELEPHONE ENCOUNTER
The advice on xarelto needs to be given by the coagulation clinic. Please call them at [phone]. They manage all the patients on this medication. A referral has been placed for you so they are aware.       ===View-only below this line===      ----- Message -----     From: Seda Cagle     Sent: 1/24/2018  2:43 PM PST       To: Khris Pitts M.D.  Subject: Procedure Question    Hello, I'm having surgery on Jan. 30, at [facility] to remove a lump in my breast. (Benign) And it's suggested I quit taking my Xarelto two full days before. I didn't have any bleeding problems when I had the biopsy done, (in which I stopped the med for the same amount of time) but I thought I'd just run it by you in case you have any different precautions in mind.   Thank you, Seda Cagle [birth year] [phone]

## 2018-01-25 NOTE — TELEPHONE ENCOUNTER
[facility]     Received message pt is having a lump removed from her breast.  This would be an intermediate or high risk of bleeding.  Given pt's renal function (>50 mL/min) without hx of stroke, suggest holding Xarelto for 48 hours prior to procedure per 2017 ACC expert consensus.   Resume at discretion of operating physician.  Pt was able to repeat these directions back to me.    Rashid Ly, LindsayD

## 2018-01-25 NOTE — TELEPHONE ENCOUNTER
Message   Received: Today   Message Contents   Rashid Ly, PharmD  Leilani Kemp R.N.   Caller: Unspecified (Today,  2:50 PM)             Pt was given instructions for holding [employer].  Thanks!

## 2018-01-26 DIAGNOSIS — Z01.812 PRE-OPERATIVE LABORATORY EXAMINATION: ICD-10-CM

## 2018-01-26 LAB
ANION GAP SERPL CALC-SCNC: 4 MMOL/L (ref 0–11.9)
BUN SERPL-MCNC: 18 MG/DL (ref 8–22)
CALCIUM SERPL-MCNC: 9.7 MG/DL (ref 8.5–10.5)
CHLORIDE SERPL-SCNC: 108 MMOL/L (ref 96–112)
CO2 SERPL-SCNC: 27 MMOL/L (ref 20–33)
CREAT SERPL-MCNC: 0.98 MG/DL (ref 0.5–1.4)
ERYTHROCYTE [DISTWIDTH] IN BLOOD BY AUTOMATED COUNT: 39.3 FL (ref 35.9–50)
GLUCOSE SERPL-MCNC: 97 MG/DL (ref 65–99)
HCT VFR BLD AUTO: 44.2 % (ref 37–47)
HGB BLD-MCNC: 14.5 G/DL (ref 12–16)
MCH RBC QN AUTO: 29.2 PG (ref 27–33)
MCHC RBC AUTO-ENTMCNC: 32.8 G/DL (ref 33.6–35)
MCV RBC AUTO: 89.1 FL (ref 81.4–97.8)
PLATELET # BLD AUTO: 191 K/UL (ref 164–446)
PMV BLD AUTO: 10.5 FL (ref 9–12.9)
POTASSIUM SERPL-SCNC: 4.4 MMOL/L (ref 3.6–5.5)
RBC # BLD AUTO: 4.96 M/UL (ref 4.2–5.4)
SODIUM SERPL-SCNC: 139 MMOL/L (ref 135–145)
WBC # BLD AUTO: 5 K/UL (ref 4.8–10.8)

## 2018-01-26 PROCEDURE — 36415 COLL VENOUS BLD VENIPUNCTURE: CPT

## 2018-01-26 PROCEDURE — 85027 COMPLETE CBC AUTOMATED: CPT

## 2018-01-26 PROCEDURE — 80048 BASIC METABOLIC PNL TOTAL CA: CPT

## 2018-01-30 ENCOUNTER — APPOINTMENT (OUTPATIENT)
Dept: RADIOLOGY | Facility: MEDICAL CENTER | Age: 58
End: 2018-01-30
Attending: SURGERY
Payer: COMMERCIAL

## 2018-01-30 ENCOUNTER — HOSPITAL ENCOUNTER (OUTPATIENT)
Facility: MEDICAL CENTER | Age: 58
End: 2018-01-30
Attending: SURGERY | Admitting: SURGERY
Payer: COMMERCIAL

## 2018-01-30 VITALS
WEIGHT: 187.39 LBS | SYSTOLIC BLOOD PRESSURE: 143 MMHG | OXYGEN SATURATION: 90 % | RESPIRATION RATE: 20 BRPM | HEART RATE: 65 BPM | TEMPERATURE: 97.1 F | HEIGHT: 64 IN | DIASTOLIC BLOOD PRESSURE: 81 MMHG | BODY MASS INDEX: 31.99 KG/M2

## 2018-01-30 DIAGNOSIS — D48.62 NEOPLASM OF UNCERTAIN BEHAVIOR OF LEFT BREAST: ICD-10-CM

## 2018-01-30 PROCEDURE — 19285 PERQ DEV BREAST 1ST US IMAG: CPT | Mod: LT

## 2018-01-30 PROCEDURE — 76098 X-RAY EXAM SURGICAL SPECIMEN: CPT | Mod: LT

## 2018-01-30 PROCEDURE — 160009 HCHG ANES TIME/MIN: Performed by: SURGERY

## 2018-01-30 PROCEDURE — 501838 HCHG SUTURE GENERAL: Performed by: SURGERY

## 2018-01-30 PROCEDURE — A6402 STERILE GAUZE <= 16 SQ IN: HCPCS | Performed by: SURGERY

## 2018-01-30 PROCEDURE — 700101 HCHG RX REV CODE 250

## 2018-01-30 PROCEDURE — 500122 HCHG BOVIE, BLADE: Performed by: SURGERY

## 2018-01-30 PROCEDURE — 700102 HCHG RX REV CODE 250 W/ 637 OVERRIDE(OP)

## 2018-01-30 PROCEDURE — 700111 HCHG RX REV CODE 636 W/ 250 OVERRIDE (IP)

## 2018-01-30 PROCEDURE — A9270 NON-COVERED ITEM OR SERVICE: HCPCS

## 2018-01-30 PROCEDURE — 160041 HCHG SURGERY MINUTES - EA ADDL 1 MIN LEVEL 4: Performed by: SURGERY

## 2018-01-30 PROCEDURE — 160029 HCHG SURGERY MINUTES - 1ST 30 MINS LEVEL 4: Performed by: SURGERY

## 2018-01-30 PROCEDURE — 160035 HCHG PACU - 1ST 60 MINS PHASE I: Performed by: SURGERY

## 2018-01-30 PROCEDURE — 160002 HCHG RECOVERY MINUTES (STAT): Performed by: SURGERY

## 2018-01-30 PROCEDURE — 160048 HCHG OR STATISTICAL LEVEL 1-5: Performed by: SURGERY

## 2018-01-30 PROCEDURE — 88307 TISSUE EXAM BY PATHOLOGIST: CPT

## 2018-01-30 PROCEDURE — 160036 HCHG PACU - EA ADDL 30 MINS PHASE I: Performed by: SURGERY

## 2018-01-30 RX ORDER — ALPRAZOLAM 0.25 MG/1
TABLET ORAL
Status: COMPLETED
Start: 2018-01-30 | End: 2018-01-30

## 2018-01-30 RX ORDER — BUPIVACAINE HYDROCHLORIDE AND EPINEPHRINE 5; 5 MG/ML; UG/ML
INJECTION, SOLUTION EPIDURAL; INTRACAUDAL; PERINEURAL
Status: DISCONTINUED | OUTPATIENT
Start: 2018-01-30 | End: 2018-01-30 | Stop reason: HOSPADM

## 2018-01-30 RX ORDER — SODIUM CHLORIDE, SODIUM LACTATE, POTASSIUM CHLORIDE, CALCIUM CHLORIDE 600; 310; 30; 20 MG/100ML; MG/100ML; MG/100ML; MG/100ML
INJECTION, SOLUTION INTRAVENOUS CONTINUOUS
Status: DISCONTINUED | OUTPATIENT
Start: 2018-01-30 | End: 2018-01-30 | Stop reason: HOSPADM

## 2018-01-30 RX ADMIN — ALPRAZOLAM 0.25 MG: 0.25 TABLET ORAL at 10:15

## 2018-01-30 RX ADMIN — SODIUM CHLORIDE, SODIUM LACTATE, POTASSIUM CHLORIDE, CALCIUM CHLORIDE: 600; 310; 30; 20 INJECTION, SOLUTION INTRAVENOUS at 10:20

## 2018-01-30 ASSESSMENT — PAIN SCALES - GENERAL
PAINLEVEL_OUTOF10: 0

## 2018-01-31 NOTE — OR NURSING
1720 Pt received to pacu, asleep with spontaneous respirations noted.  Vitals signs taken and stable.  No distress. Report received from Dr. Barrett. Left breast with gauze dressing intact covered by tegaderm, no drainage noted.     1745  Pt awake taking sips of water, and eating pop sickle.  at bedside. Pt denies pain and nausea.    1800 Handoff report to Molly BRIONES.

## 2018-01-31 NOTE — DISCHARGE INSTRUCTIONS
ACTIVITY: Rest and take it easy for the first 24 hours.  A responsible adult is recommended to remain with you during that time.  It is normal to feel sleepy.  We encourage you to not do anything that requires balance, judgment or coordination.    MILD FLU-LIKE SYMPTOMS ARE NORMAL. YOU MAY EXPERIENCE GENERALIZED MUSCLE ACHES, THROAT IRRITATION, HEADACHE AND/OR SOME NAUSEA.    FOR 24 HOURS DO NOT:  Drive, operate machinery or run household appliances.  Drink beer or alcoholic beverages.   Make important decisions or sign legal documents.    SPECIAL INSTRUCTIONS: *FOLLOW INSTRUCTIONS FROM DR. HORNE. NO HEAVY LIFTING.**    DIET: To avoid nausea, slowly advance diet as tolerated, avoiding spicy or greasy foods for the first day.  Add more substantial food to your diet according to your physician's instructions.  Babies can be fed formula or breast milk as soon as they are hungry.  INCREASE FLUIDS AND FIBER TO AVOID CONSTIPATION.    SURGICAL DRESSING/BATHING: *MAY SHOWER AND REMOVE DRESSING PER DR. HORNE'S INSTRUCTIONS.**    FOLLOW-UP APPOINTMENT:  A follow-up appointment should be arranged with your doctor in *CALL TO SCHEDULE FOLLOW UP APPOINTMENT.**    You should CALL YOUR PHYSICIAN if you develop:  Fever greater than 101 degrees F.  Pain not relieved by medication, or persistent nausea or vomiting.  Excessive bleeding (blood soaking through dressing) or unexpected drainage from the wound.  Extreme redness or swelling around the incision site, drainage of pus or foul smelling drainage.  Inability to urinate or empty your bladder within 8 hours.  Problems with breathing or chest pain.    You should call 911 if you develop problems with breathing or chest pain.  If you are unable to contact your doctor or surgical center, you should go to the nearest emergency room or urgent care center.  Physician's telephone #: *[phone]**    If any questions arise, call your doctor.  If your doctor is not available, please feel free to  call the Surgical Center at [phone].  The Center is open Monday through Friday from 7AM to 7PM.  You can also call the HEALTH HOTLINE open 24 hours/day, 7 days/week and speak to a nurse at [phone], or toll free at [phone].    A registered nurse may call you a few days after your surgery to see how you are doing after your procedure.    MEDICATIONS: Resume taking daily medication.  Take prescribed pain medication with food.  If no medication is prescribed, you may take non-aspirin pain medication if needed.  PAIN MEDICATION CAN BE VERY CONSTIPATING.  Take a stool softener or laxative such as senokot, pericolace, or milk of magnesia if needed.    Prescription given for *PERCOCET (PAIN MEDICINE), ZOFRAN (NAUSEA MEDICINE)**.  Last pain medication given at *NONE GIVEN**.    If your physician has prescribed pain medication that includes Acetaminophen (Tylenol), do not take additional Acetaminophen (Tylenol) while taking the prescribed medication.    Depression / Suicide Risk    As you are discharged from this [facility] facility, it is important to learn how to keep safe from harming yourself.    Recognize the warning signs:  · Abrupt changes in personality, positive or negative- including increase in energy   · Giving away possessions  · Change in eating patterns- significant weight changes-  positive or negative  · Change in sleeping patterns- unable to sleep or sleeping all the time   · Unwillingness or inability to communicate  · Depression  · Unusual sadness, discouragement and loneliness  · Talk of wanting to die  · Neglect of personal appearance   · Rebelliousness- reckless behavior  · Withdrawal from people/activities they love  · Confusion- inability to concentrate     If you or a loved one observes any of these behaviors or has concerns about self-harm, here's what you can do:  · Talk about it- your feelings and reasons for harming yourself  · Remove any means that you might use to hurt  yourself (examples: pills, rope, extension cords, firearm)  · Get professional help from the community (Mental Health, Substance Abuse, psychological counseling)  · Do not be alone:Call your Safe Contact- someone whom you trust who will be there for you.  · Call your local CRISIS HOTLINE 000-5582 or 846-920-8316  · Call your local Children's Mobile Crisis Response Team [place] (647) 801-7153 or www.[employer]  · Call the toll free National Suicide Prevention Hotlines   · National Suicide Prevention Lifeline 195-917-YYWJ (3734)  · National Hope Line Network 800-SUICIDE (536-6916)

## 2018-01-31 NOTE — OP REPORT
Pre op diagnosis:  Neoplasm uncertain behavior left breast  Post op diagnosis:  Same    Procedure:  Wire localized excisional biopsy left breast    Surgeon:  Vijaya Britt MD  Anesthesiologist:  Nitin Barrett MD    Anesthesia:  General  Pre op meds:  Ancef  ASA 2    Indications:  This is a 57 year old woman with a left breast mass.  Percutaneous biopsy showed benign fibroglandular tissue and ongoing FU was advised, but after a thorough discussion of risks and benefits of her different options, she strongly desired excisional biopsy.      Findings:  Clip in specimen.  Palpable nodule.  Medial tissue reapproximated with white sutures, marking sutures are black.      Summary:  The patient underwent wire localization by Radiology, then was taken to the operating room and anesthetized in a supine position.  She was prepped with betadine, then draped in sterile fashion.  Time out was confirmed.  Local anesthetic was injected prior to the incision.  The area of interest is far lateral and posterior, confirmed with US, and I did not feel an areolar incision would give us adequate access.  I therefore created a curvilinear incision in the lateral breast and dissected the area of interest with electrocautery.  There was excellent hemostasis.  After confirming that the specimen contained the clip, I irrigated and ensured hemostasis, then a 2-0 PDS was used to create a two layer lattice.  3-0 vicryls were used to close the superficial parenchyma and deep dermal layer.  4-0 monocryl was used to close the skin.  Dressings were applied and I was informed all counts were correct.      CC: MD Juve Velazquez MD

## 2018-01-31 NOTE — OR NURSING
1800 assumed care  1830 pt expressing readiness to go home & that she has had very good care today, instructions given, iv d/c'd, home via w/c

## 2018-02-07 NOTE — ADDENDUM NOTE
Encounter addended by: Molly Lazaro R.N. on: 2/7/2018  1:52 PM<BR>    Actions taken: Visit Navigator Flowsheet section accepted

## 2018-02-22 PROBLEM — R92.8 ABNORMAL MAMMOGRAM: Status: ACTIVE | Noted: 2018-02-22

## 2018-02-23 ENCOUNTER — OFFICE VISIT (OUTPATIENT)
Dept: CARDIOLOGY | Facility: MEDICAL CENTER | Age: 58
End: 2018-02-23
Payer: COMMERCIAL

## 2018-02-23 VITALS
BODY MASS INDEX: 33.29 KG/M2 | OXYGEN SATURATION: 97 % | DIASTOLIC BLOOD PRESSURE: 84 MMHG | SYSTOLIC BLOOD PRESSURE: 122 MMHG | HEART RATE: 60 BPM | HEIGHT: 64 IN | WEIGHT: 195 LBS

## 2018-02-23 DIAGNOSIS — I48.0 PAF (PAROXYSMAL ATRIAL FIBRILLATION) (HCC): ICD-10-CM

## 2018-02-23 LAB — EKG IMPRESSION: NORMAL

## 2018-02-23 PROCEDURE — 93000 ELECTROCARDIOGRAM COMPLETE: CPT | Mod: 59 | Performed by: INTERNAL MEDICINE

## 2018-02-23 PROCEDURE — 99214 OFFICE O/P EST MOD 30 MIN: CPT | Mod: 25 | Performed by: INTERNAL MEDICINE

## 2018-02-23 PROCEDURE — 93280 PM DEVICE PROGR EVAL DUAL: CPT | Performed by: INTERNAL MEDICINE

## 2018-02-23 ASSESSMENT — ENCOUNTER SYMPTOMS: PALPITATIONS: 1

## 2018-02-23 NOTE — PROGRESS NOTES
Subjective:   Seda Cagle is a 57 y.o. female who presents today for follow up of device and hcm and a fib    Chief Complaint: <1% a fib    [place] device with rare a fib.    fpr the first time in a long time she feels good.  They are hiking.  She is good      Past Medical History:   Diagnosis Date   • Anesthesia     wakes up during procedure (x2)   • Aortic regurgitation    • Arrhythmia, ventricular 2008    Symptomatic PVCs, controlled with medication.   • Asthma     inhaler PRN, only uses when sick    • Atrial fibrillation (CMS-HCC)    • Cardiac arrhythmia    • Cervical arthritis (CMS-HCC) 2/8/2016   • Chickenpox    • Chronic kidney disease, stage III (moderate) 2/9/2016   • CTS (carpal tunnel syndrome) 2/28/2011   • Depression    • GERD (gastroesophageal reflux disease)    • Heart murmur    • Hemorrhagic disorder (CMS-HCC)     on Xarelto    • Hypertr obst cardiomyop 1991    Dr. Bert Vazquez, [facility] [address] [address] [phone] fax [phone].  [facility] [phone].  Alchol Septal Reduction 8/00, Coronaries normal.   • Hypertrophic cardiomyopathy (CMS-HCC)    • Indigestion    • Influenza    • Pacemaker    • Renal disorder     chronic kidney disease stage 3-  **pt states currently no issue, numbers are stable   • Sleep apnea     uses CPAP   • Snoring      Past Surgical History:   Procedure Laterality Date   • BREAST BIOPSY Left 1/30/2018    Procedure: BREAST BIOPSY- WIRE LOCALIZED;  Surgeon: Vijaya Britt M.D.;  Location: SURGERY SAME DAY [facility];  Service: General   • RECOVERY  4/15/2015    Performed by [facility] at SURGERY PRE-POST PROC UNIT [facility]   • PACEMAKER INSERTION  2015   • RECOVERY  12/12/2014    Performed by [facility] at SURGERY SAME DAY [facility] ORS   • SEPTAL RECONSTRUCTION  2000   • ENDOSCOPY     • PRIMARY C SECTION     • TUBAL COAGULATION LAPAROSCOPIC BILATERAL       Family History   Problem Relation Age of Onset   •  "Heart Disease Mother      CHF   • Hypertension Mother    • Stroke Mother    • Heart Failure Mother    • Other Mother      carotid artery disease,gout   • Cancer Father      AML   • Diabetes Maternal Grandmother    • Cancer Maternal Grandfather      Prostate   • Heart Disease Paternal Grandfather 52      MI    • Sleep Apnea Neg Hx      History   Smoking Status   • Never Smoker   Smokeless Tobacco   • Never Used     No Known Allergies  Outpatient Encounter Prescriptions as of 2018   Medication Sig Dispense Refill   • rivaroxaban (XARELTO) 20 MG Tab tablet      • metoprolol (LOPRESSOR) 25 MG Tab Take 1 Tab by mouth 2 times a day. 180 Tab 3   • disopyramide (NORPACE) 100 MG Cap Take 1 Cap by mouth every 8 hours. 270 Cap 3   • Calcium-Vitamin D-Vitamin K (CALCIUM FOR WOMEN PO) Take  by mouth.     • Lansoprazole (PREVACID PO) Take  by mouth.     • Magnesium 200 MG TABS Take 1 Tab by mouth every day.     • Multiple Vitamins-Minerals (ALIVE ONCE DAILY WOMENS 50+ PO) Take  by mouth.       No facility-administered encounter medications on file as of 2018.      Review of Systems   Cardiovascular: Positive for palpitations.        Objective:   /84   Pulse 60   Ht 1.626 m (5' 4.02\")   Wt 88.5 kg (195 lb)   LMP 2012   SpO2 97%   BMI 33.45 kg/m²     Physical Exam   Constitutional: She is oriented to person, place, and time. She appears well-developed and well-nourished. No distress.   HENT:   Head: Normocephalic and atraumatic.   Right Ear: External ear normal.   Left Ear: External ear normal.   Mouth/Throat: Oropharynx is clear and moist.   Eyes: Conjunctivae and EOM are normal. Right eye exhibits no discharge. Left eye exhibits no discharge. No scleral icterus.   Neck: Normal range of motion. Neck supple. No JVD present. No tracheal deviation present. No thyromegaly present.   Cardiovascular: Normal rate, regular rhythm, normal heart sounds and intact distal pulses.  Exam reveals no gallop and no " friction rub.    No murmur heard.  Pocket well healed   Pulmonary/Chest: Effort normal and breath sounds normal. No stridor. No respiratory distress. She has no wheezes. She has no rales.   Abdominal: Soft. She exhibits no distension.   Musculoskeletal: She exhibits no edema or tenderness.   Neurological: She is alert and oriented to person, place, and time. No cranial nerve deficit. Coordination normal.   Skin: Skin is warm and dry. No rash noted. She is not diaphoretic. No erythema. No pallor.   Psychiatric: She has a normal mood and affect. Her behavior is normal. Judgment and thought content normal.   Vitals reviewed.      Assessment:     1. PAF (paroxysmal atrial fibrillation) (CMS-[facility])  EKG       Medical Decision Making:  Today's Assessment / Status / Plan:   Doing very well on current regimen.  Continue and follow closely on potentially high risk aad.  ecg stable currently and doing well.  Using norpace with her hx of hcm

## 2018-03-07 ENCOUNTER — HOSPITAL ENCOUNTER (OUTPATIENT)
Dept: RADIOLOGY | Facility: MEDICAL CENTER | Age: 58
End: 2018-03-07
Attending: OBSTETRICS & GYNECOLOGY
Payer: COMMERCIAL

## 2018-03-07 DIAGNOSIS — R87.618 NON-ATYPICAL ENDOMETRIAL CELLS ON CERVICAL PAP SMEAR: ICD-10-CM

## 2018-03-07 PROCEDURE — 76830 TRANSVAGINAL US NON-OB: CPT

## 2018-04-10 ENCOUNTER — OFFICE VISIT (OUTPATIENT)
Dept: MEDICAL GROUP | Facility: MEDICAL CENTER | Age: 58
End: 2018-04-10
Payer: COMMERCIAL

## 2018-04-10 VITALS
BODY MASS INDEX: 33.12 KG/M2 | DIASTOLIC BLOOD PRESSURE: 78 MMHG | RESPIRATION RATE: 20 BRPM | HEART RATE: 60 BPM | TEMPERATURE: 98.4 F | OXYGEN SATURATION: 99 % | SYSTOLIC BLOOD PRESSURE: 122 MMHG | HEIGHT: 64 IN | WEIGHT: 194 LBS

## 2018-04-10 DIAGNOSIS — J30.1 CHRONIC SEASONAL ALLERGIC RHINITIS DUE TO POLLEN: ICD-10-CM

## 2018-04-10 PROCEDURE — 99214 OFFICE O/P EST MOD 30 MIN: CPT | Performed by: NURSE PRACTITIONER

## 2018-04-10 RX ORDER — TRIAMCINOLONE ACETONIDE 40 MG/ML
60 INJECTION, SUSPENSION INTRA-ARTICULAR; INTRAMUSCULAR ONCE
Status: COMPLETED | OUTPATIENT
Start: 2018-04-10 | End: 2018-04-10

## 2018-04-10 RX ADMIN — TRIAMCINOLONE ACETONIDE 60 MG: 40 INJECTION, SUSPENSION INTRA-ARTICULAR; INTRAMUSCULAR at 16:11

## 2018-04-10 ASSESSMENT — PATIENT HEALTH QUESTIONNAIRE - PHQ9: CLINICAL INTERPRETATION OF PHQ2 SCORE: 0

## 2018-04-10 NOTE — LETTER
Critical access hospital  Juve Stoddard M.D.  [address]  [facility] [phone]  Fax: [phone]   Authorization for Release/Disclosure of   Protected Health Information   Name: SEDA UREÑA : 1960 SSN: [id number]   Address: [address] [place] [address] [address]  [facility] [address] Phone:    [phone] (home)    I authorize the entity listed below to release/disclose the PHI below to:   Critical access hospital/Juve Stoddard M.D. and ROSLYN Allison   Provider or Entity Name:  [facility] - Michelle Trevino   [address] [place] [id number] Phone:      Fax:     Reason for request: continuity of care   Information to be released:    [  ] LAST COLONOSCOPY,  including any PATH REPORT and follow-up  [  ] LAST FIT/COLOGUARD RESULT [  ] LAST DEXA  [  ] LAST MAMMOGRAM  [xx] LAST PAP  [  ] LAST LABS [  ] RETINA EXAM REPORT  [  ] IMMUNIZATION RECORDS  [  ] Release all info      [  ] Check here and initial the line next to each item to release ALL health information INCLUDING  _____ Care and treatment for drug and / or alcohol abuse  _____ HIV testing, infection status, or AIDS  _____ Genetic Testing    DATES OF SERVICE OR TIME PERIOD TO BE DISCLOSED: _____________  I understand and acknowledge that:  * This Authorization may be revoked at any time by you in writing, except if your health information has already been used or disclosed.  * Your health information that will be used or disclosed as a result of you signing this authorization could be re-disclosed by the recipient. If this occurs, your re-disclosed health information may no longer be protected by State or Federal laws.  * You may refuse to sign this Authorization. Your refusal will not affect your ability to obtain treatment.  * This Authorization becomes effective upon signing and will  on (date) __________.      If no date is indicated, this Authorization will  one (1) year from the signature date.    Name: Seda Ma  Gurjit    Signature:   Date:     4/10/2018       PLEASE FAX REQUESTED RECORDS BACK TO: [phone]

## 2018-04-10 NOTE — ASSESSMENT & PLAN NOTE
Typically has difficulty in the spring  Last month her symptoms have began to flare up. She is experiencing persistently itchy and watery eyes, nasal congestion and sinus pressure, rhinitis and postnasal drainage.  She typically enjoys hiking and doing yard work but is having difficulty because of symptoms.  Using flonase consistently without benefit. She has used nasal irrigation the past but not consistently. Prefers to avoid oral medications  She is requesting Kenalog injection today, has received this in the past for similar issues and it worked well. Last dose in 2016  No history of CHF, recent illness, immunodeficiency

## 2018-04-11 NOTE — PROGRESS NOTES
Subjective:     Chief Complaint   Patient presents with   • Allergic Rhinitis     watering eyes, redness, nasal drainage, headache later in the day x 1 month; flonase not providing any relief       Seda Cagle is a 57 y.o. female here today to follow up on:    Chronic seasonal allergic rhinitis due to pollen  Typically has difficulty in the spring  Last month her symptoms have began to flare up. She is experiencing persistently itchy and watery eyes, nasal congestion and sinus pressure, rhinitis and postnasal drainage.  She typically enjoys hiking and doing yard work but is having difficulty because of symptoms.  Using flonase consistently without benefit. She has used nasal irrigation the past but not consistently. Prefers to avoid oral medications  She is requesting Kenalog injection today, has received this in the past for similar issues and it worked well. Last dose in 2016  No history of CHF, recent illness, immunodeficiency       Current medicines (including changes today)  Current Outpatient Prescriptions   Medication Sig Dispense Refill   • rivaroxaban (XARELTO) 20 MG Tab tablet      • metoprolol (LOPRESSOR) 25 MG Tab Take 1 Tab by mouth 2 times a day. 180 Tab 3   • disopyramide (NORPACE) 100 MG Cap Take 1 Cap by mouth every 8 hours. 270 Cap 3   • Calcium-Vitamin D-Vitamin K (CALCIUM FOR WOMEN PO) Take  by mouth.     • Lansoprazole (PREVACID PO) Take  by mouth.     • Magnesium 200 MG TABS Take 1 Tab by mouth every day.     • Multiple Vitamins-Minerals (ALIVE ONCE DAILY WOMENS 50+ PO) Take  by mouth.       No current facility-administered medications for this visit.      She  has a past medical history of Anesthesia; Aortic regurgitation; Arrhythmia, ventricular (2008); Asthma; Atrial fibrillation (CMS-HCC); Cardiac arrhythmia; Cervical arthritis (CMS-[facility]) (2/8/2016); Chickenpox; Chronic kidney disease, stage III (moderate) (2/9/2016); CTS (carpal tunnel syndrome) (2/28/2011); Depression; GERD  "(gastroesophageal reflux disease); Heart murmur; Hemorrhagic disorder (CMS-HCC); Hypertr obst cardiomyop (1991); Hypertrophic cardiomyopathy (CMS-HCC); Indigestion; Influenza; Pacemaker; Renal disorder; Sleep apnea; and Snoring. She also has no past medical history of Addisons disease (CMS-HCC); Adrenal disorder (CMS-HCC); Anemia; Anxiety; ASTHMA; Blood transfusion; Clotting disorder (CMS-HCC); COPD; Cushings syndrome (CMS-HCC); Goiter; Headache(784.0); HIV (human immunodeficiency virus infection); IBD (inflammatory bowel disease); Infectious disease; Migraine; OSTEOPOROSIS; Parathyroid disorder (CMS-HCC); Pituitary disease (CMS-HCC); Substance abuse; or Ulcer (CMS-HCC).    ROS included above     Objective:     Blood pressure 122/78, pulse 60, temperature 36.9 °C (98.4 °F), resp. rate 20, height 1.626 m (5' 4\"), weight 88 kg (194 lb), last menstrual period 01/31/2012, SpO2 99 %. Body mass index is 33.3 kg/m².     Physical Exam:  General: Alert, oriented in no acute distress.  Eye contact is good, speech is normal, affect calm  HEENT: Bilateral Sclera mildly injected, no drainage. Oral mucosa pink moist, no lesions. Bilateral nares with swollen turbinates, clear mucus. TMs gray with good landmarks bilaterally. No lymphadenopathy.  Lungs: clear to auscultation bilaterally, normal effort, no wheeze/ rhonchi/ rales.  CV: regular rate and rhythm, S1, S2  Ext: no edema, color normal, vascularity normal, temperature normal    Assessment and Plan:   The following treatment plan was discussed   1. Chronic seasonal allergic rhinitis due to pollen  Significant issues over the last few weeks, not improving with Flonase. She has benefited in the past from Kenalog injection and requests dose today. Risks benefits and side effects discussed. Will give:  triamcinolone acetonide (KENALOG-40) injection 60 mg  Advised to continue with Flonase, do nasal irrigation daily. May also add oral Zyrtec if needed. Follow-up if not resolving  "       Followup: As needed         Please note that this dictation was created using voice recognition software. I have worked with consultants from the vendor as well as technical experts from [facility] to optimize the interface. I have made every reasonable attempt to correct obvious errors, but I expect that there are errors of grammar and possibly content that I did not discover before finalizing the note.

## 2018-04-21 ENCOUNTER — HOSPITAL ENCOUNTER (OUTPATIENT)
Dept: RADIOLOGY | Facility: MEDICAL CENTER | Age: 58
End: 2018-04-21
Attending: OBSTETRICS & GYNECOLOGY
Payer: COMMERCIAL

## 2018-04-21 DIAGNOSIS — Z12.31 SCREENING MAMMOGRAM, ENCOUNTER FOR: ICD-10-CM

## 2018-04-23 ENCOUNTER — HOSPITAL ENCOUNTER (OUTPATIENT)
Dept: RADIOLOGY | Facility: MEDICAL CENTER | Age: 58
End: 2018-04-23
Attending: OBSTETRICS & GYNECOLOGY | Admitting: OBSTETRICS & GYNECOLOGY
Payer: COMMERCIAL

## 2018-04-23 DIAGNOSIS — Z01.812 PRE-OPERATIVE LABORATORY EXAMINATION: ICD-10-CM

## 2018-04-23 DIAGNOSIS — Z01.811 PRE-OPERATIVE RESPIRATORY EXAMINATION: ICD-10-CM

## 2018-04-23 LAB
ANION GAP SERPL CALC-SCNC: 6 MMOL/L (ref 0–11.9)
BASOPHILS # BLD AUTO: 0.6 % (ref 0–1.8)
BASOPHILS # BLD: 0.04 K/UL (ref 0–0.12)
BUN SERPL-MCNC: 18 MG/DL (ref 8–22)
CALCIUM SERPL-MCNC: 9.5 MG/DL (ref 8.5–10.5)
CHLORIDE SERPL-SCNC: 105 MMOL/L (ref 96–112)
CO2 SERPL-SCNC: 26 MMOL/L (ref 20–33)
CREAT SERPL-MCNC: 1.08 MG/DL (ref 0.5–1.4)
EOSINOPHIL # BLD AUTO: 0.12 K/UL (ref 0–0.51)
EOSINOPHIL NFR BLD: 1.9 % (ref 0–6.9)
ERYTHROCYTE [DISTWIDTH] IN BLOOD BY AUTOMATED COUNT: 42.1 FL (ref 35.9–50)
GLUCOSE SERPL-MCNC: 89 MG/DL (ref 65–99)
HCT VFR BLD AUTO: 44.1 % (ref 37–47)
HGB BLD-MCNC: 14.6 G/DL (ref 12–16)
IMM GRANULOCYTES # BLD AUTO: 0.02 K/UL (ref 0–0.11)
IMM GRANULOCYTES NFR BLD AUTO: 0.3 % (ref 0–0.9)
LYMPHOCYTES # BLD AUTO: 1.88 K/UL (ref 1–4.8)
LYMPHOCYTES NFR BLD: 29.9 % (ref 22–41)
MCH RBC QN AUTO: 29.9 PG (ref 27–33)
MCHC RBC AUTO-ENTMCNC: 33.1 G/DL (ref 33.6–35)
MCV RBC AUTO: 90.2 FL (ref 81.4–97.8)
MONOCYTES # BLD AUTO: 0.49 K/UL (ref 0–0.85)
MONOCYTES NFR BLD AUTO: 7.8 % (ref 0–13.4)
NEUTROPHILS # BLD AUTO: 3.73 K/UL (ref 2–7.15)
NEUTROPHILS NFR BLD: 59.5 % (ref 44–72)
NRBC # BLD AUTO: 0 K/UL
NRBC BLD-RTO: 0 /100 WBC
PLATELET # BLD AUTO: 184 K/UL (ref 164–446)
PMV BLD AUTO: 10.2 FL (ref 9–12.9)
POTASSIUM SERPL-SCNC: 4.3 MMOL/L (ref 3.6–5.5)
RBC # BLD AUTO: 4.89 M/UL (ref 4.2–5.4)
SODIUM SERPL-SCNC: 137 MMOL/L (ref 135–145)
WBC # BLD AUTO: 6.3 K/UL (ref 4.8–10.8)

## 2018-04-23 PROCEDURE — 80048 BASIC METABOLIC PNL TOTAL CA: CPT

## 2018-04-23 PROCEDURE — 71046 X-RAY EXAM CHEST 2 VIEWS: CPT

## 2018-04-23 PROCEDURE — 85025 COMPLETE CBC W/AUTO DIFF WBC: CPT

## 2018-04-23 PROCEDURE — 36415 COLL VENOUS BLD VENIPUNCTURE: CPT

## 2018-04-30 ENCOUNTER — HOSPITAL ENCOUNTER (OUTPATIENT)
Facility: MEDICAL CENTER | Age: 58
End: 2018-04-30
Attending: OBSTETRICS & GYNECOLOGY | Admitting: OBSTETRICS & GYNECOLOGY
Payer: COMMERCIAL

## 2018-04-30 VITALS
HEIGHT: 64 IN | RESPIRATION RATE: 16 BRPM | HEART RATE: 58 BPM | OXYGEN SATURATION: 99 % | BODY MASS INDEX: 32.52 KG/M2 | WEIGHT: 190.48 LBS | TEMPERATURE: 97 F

## 2018-04-30 PROCEDURE — 700101 HCHG RX REV CODE 250

## 2018-04-30 PROCEDURE — 160048 HCHG OR STATISTICAL LEVEL 1-5: Performed by: OBSTETRICS & GYNECOLOGY

## 2018-04-30 PROCEDURE — 501394 HCHG SOLUTION SORBITOL 3% 3000ML BAG: Performed by: OBSTETRICS & GYNECOLOGY

## 2018-04-30 PROCEDURE — 160002 HCHG RECOVERY MINUTES (STAT): Performed by: OBSTETRICS & GYNECOLOGY

## 2018-04-30 PROCEDURE — 160029 HCHG SURGERY MINUTES - 1ST 30 MINS LEVEL 4: Performed by: OBSTETRICS & GYNECOLOGY

## 2018-04-30 PROCEDURE — 500448 HCHG DRESSING, TELFA 3X4: Performed by: OBSTETRICS & GYNECOLOGY

## 2018-04-30 PROCEDURE — 160041 HCHG SURGERY MINUTES - EA ADDL 1 MIN LEVEL 4: Performed by: OBSTETRICS & GYNECOLOGY

## 2018-04-30 PROCEDURE — 88305 TISSUE EXAM BY PATHOLOGIST: CPT | Mod: 59

## 2018-04-30 PROCEDURE — 502240 HCHG MISC OR SUPPLY RC 0272: Performed by: OBSTETRICS & GYNECOLOGY

## 2018-04-30 PROCEDURE — 160035 HCHG PACU - 1ST 60 MINS PHASE I: Performed by: OBSTETRICS & GYNECOLOGY

## 2018-04-30 PROCEDURE — 700111 HCHG RX REV CODE 636 W/ 250 OVERRIDE (IP)

## 2018-04-30 PROCEDURE — 502587 HCHG PACK, D&C: Performed by: OBSTETRICS & GYNECOLOGY

## 2018-04-30 PROCEDURE — 160009 HCHG ANES TIME/MIN: Performed by: OBSTETRICS & GYNECOLOGY

## 2018-04-30 PROCEDURE — 160036 HCHG PACU - EA ADDL 30 MINS PHASE I: Performed by: OBSTETRICS & GYNECOLOGY

## 2018-04-30 RX ORDER — EPINEPHRINE 1 MG/ML
INJECTION INTRAMUSCULAR; INTRAVENOUS; SUBCUTANEOUS
Status: DISCONTINUED
Start: 2018-04-30 | End: 2018-04-30 | Stop reason: HOSPADM

## 2018-04-30 RX ORDER — ONDANSETRON 2 MG/ML
4 INJECTION INTRAMUSCULAR; INTRAVENOUS EVERY 6 HOURS PRN
Status: DISCONTINUED | OUTPATIENT
Start: 2018-04-30 | End: 2018-04-30 | Stop reason: HOSPADM

## 2018-04-30 RX ORDER — HYDROCODONE BITARTRATE AND ACETAMINOPHEN 5; 325 MG/1; MG/1
1 TABLET ORAL EVERY 4 HOURS PRN
Status: DISCONTINUED | OUTPATIENT
Start: 2018-04-30 | End: 2018-04-30 | Stop reason: HOSPADM

## 2018-04-30 RX ORDER — BUPIVACAINE HYDROCHLORIDE AND EPINEPHRINE 2.5; 5 MG/ML; UG/ML
INJECTION, SOLUTION EPIDURAL; INFILTRATION; INTRACAUDAL; PERINEURAL
Status: DISCONTINUED | OUTPATIENT
Start: 2018-04-30 | End: 2018-04-30 | Stop reason: HOSPADM

## 2018-04-30 RX ORDER — BUPIVACAINE HYDROCHLORIDE 2.5 MG/ML
INJECTION, SOLUTION EPIDURAL; INFILTRATION; INTRACAUDAL
Status: DISCONTINUED
Start: 2018-04-30 | End: 2018-04-30 | Stop reason: HOSPADM

## 2018-04-30 RX ORDER — IBUPROFEN 600 MG/1
600 TABLET ORAL EVERY 6 HOURS PRN
Status: DISCONTINUED | OUTPATIENT
Start: 2018-04-30 | End: 2018-04-30 | Stop reason: HOSPADM

## 2018-04-30 RX ORDER — SODIUM CHLORIDE, SODIUM LACTATE, POTASSIUM CHLORIDE, CALCIUM CHLORIDE 600; 310; 30; 20 MG/100ML; MG/100ML; MG/100ML; MG/100ML
1000 INJECTION, SOLUTION INTRAVENOUS
Status: COMPLETED | OUTPATIENT
Start: 2018-04-30 | End: 2018-04-30

## 2018-04-30 RX ORDER — SODIUM CHLORIDE, SODIUM LACTATE, POTASSIUM CHLORIDE, CALCIUM CHLORIDE 600; 310; 30; 20 MG/100ML; MG/100ML; MG/100ML; MG/100ML
INJECTION, SOLUTION INTRAVENOUS CONTINUOUS
Status: DISCONTINUED | OUTPATIENT
Start: 2018-04-30 | End: 2018-04-30 | Stop reason: HOSPADM

## 2018-04-30 RX ORDER — PROMETHAZINE HYDROCHLORIDE 25 MG/1
12.5 SUPPOSITORY RECTAL EVERY 4 HOURS PRN
Status: DISCONTINUED | OUTPATIENT
Start: 2018-04-30 | End: 2018-04-30 | Stop reason: HOSPADM

## 2018-04-30 RX ADMIN — SODIUM CHLORIDE, SODIUM LACTATE, POTASSIUM CHLORIDE, CALCIUM CHLORIDE 1000 ML: 600; 310; 30; 20 INJECTION, SOLUTION INTRAVENOUS at 11:00

## 2018-04-30 RX ADMIN — SODIUM CHLORIDE, SODIUM LACTATE, POTASSIUM CHLORIDE, CALCIUM CHLORIDE: 600; 310; 30; 20 INJECTION, SOLUTION INTRAVENOUS at 07:55

## 2018-04-30 ASSESSMENT — PAIN SCALES - GENERAL
PAINLEVEL_OUTOF10: 2
PAINLEVEL_OUTOF10: 2
PAINLEVEL_OUTOF10: 0
PAINLEVEL_OUTOF10: 2
PAINLEVEL_OUTOF10: 0

## 2018-04-30 NOTE — OP REPORT
DATE OF SERVICE:  04/30/2018    PREOPERATIVE DIAGNOSES:  Endometrial cells on Pap smear, status post   endometrial ablation in 2000; thickened endometrial lining on ultrasound, 0.93   cm.    POSTOPERATIVE DIAGNOSES:  Endometrial cells on Pap smear, status post   endometrial ablation in 2000; thickened endometrial lining on ultrasound, 0.93   cm with a scarred endometrium.    PROCEDURE:  Dilation and curettage with hysteroscopy.    SURGEON:  Noemi Liu MD    ASSISTANT:  None.    ANESTHESIOLOGIST:  Christian Brito MD    ANESTHESIA:  General LMA.    ESTIMATED BLOOD LOSS:  Minimal.    SPECIMEN:  ECC and endometrial curettings, but uterine cavity appeared   scarred.    FINDINGS AT THE TIME OF SURGERY:  Exam under anesthesia reveals normal sized   and positioned uterus, which is smooth in contour and there are no adnexal   masses.  Cervix is normal, but endometrium appears scarred on hysteroscopic   findings.  I did dilate through the scarred area and looked again.  There was   one area on the right side that appeared to have some atrophic lining, but the   rest of the cavity appeared to be just  or scarred.  No tubal ostia   were seen on either side.  Scant tissue was obtained.    COMPLICATIONS:  None.    TECHNIQUE:  The patient was taken to the operating room where general   anesthesia was placed.  She was then placed in the [facility] with   excellent positioning, prepped and draped in the normal sterile fashion.  A   Graves speculum was then used to view the cervix.  The anterior lip of the   cervix was then grasped with a single tooth tenaculum and 0.25% Marcaine with   epinephrine was then injected into this area and at the 3 and 9 o'clock   position bilaterally.  The cervix was then dilated to 8 mm, but I was unable   to get up into the cavity initially, so then I looked with the hysteroscope to   see if I could see the opening to the endometrial cavity and I could not, so   this looked  consistent with a person that had been previously ablated and then   scarred.  I then pulled up the hysteroscope and used the dilators to push   through that scarring and in the plane of where I thought the uterus would be,   I was able to push through and dilate this area up to 8 mm.  I then looked   with the hysteroscope and there was one area on the right that looked like   endometrial tissue was atrophic and this area was about a 2 cm area on the   right, but all of the rest of the cavity looked as if I just tunneled through   that scar tissue.  I did go ahead and do a fractional dilation and curettage   using a Kevorkian curette for the cervix and the small endometrial curette for   the endometrium.  Once this was complete, the biopsies were sent off to   pathology separately.  I did one additional look with the hysteroscope and   then went ahead and removed the instruments from the vagina after placing   silver nitrate at the tenaculum site and the ectocervix.  The patient   tolerated the procedure very well and was brought to recovery room in stable   condition.       ____________________________________     Noemi Liu MD EAH / NTS    DD:  04/30/2018 09:51:13  DT:  04/30/2018 11:18:17    D#:  [id number]  Job#:  [id number]

## 2018-04-30 NOTE — DISCHARGE INSTRUCTIONS
ACTIVITY: Rest and take it easy for the first 24 hours.  A responsible adult is recommended to remain with you during that time.  It is normal to feel sleepy.  We encourage you to not do anything that requires balance, judgment or coordination.    MILD FLU-LIKE SYMPTOMS ARE NORMAL. YOU MAY EXPERIENCE GENERALIZED MUSCLE ACHES, THROAT IRRITATION, HEADACHE AND/OR SOME NAUSEA.    FOR 24 HOURS DO NOT:  Drive, operate machinery or run household appliances.  Drink beer or alcoholic beverages.   Make important decisions or sign legal documents.    SPECIAL INSTRUCTIONS: *FOLLOW INSTRUCTIONS FROM DR. SEGURA.**    DIET: To avoid nausea, slowly advance diet as tolerated, avoiding spicy or greasy foods for the first day.  Add more substantial food to your diet according to your physician's instructions.  Babies can be fed formula or breast milk as soon as they are hungry.  INCREASE FLUIDS AND FIBER TO AVOID CONSTIPATION.    SURGICAL DRESSING/BATHING: *MAY TAKE SHOWER OR BATH.**    FOLLOW-UP APPOINTMENT:  A follow-up appointment should be arranged with your doctor in *CALL TO SCHEDULE FOLLOW UP APPOINTMENT.**    You should CALL YOUR PHYSICIAN if you develop:  Fever greater than 101 degrees F.  Pain not relieved by medication, or persistent nausea or vomiting.  Excessive bleeding (blood soaking through dressing) or unexpected drainage from the wound.  Extreme redness or swelling around the incision site, drainage of pus or foul smelling drainage.  Inability to urinate or empty your bladder within 8 hours.  Problems with breathing or chest pain.    You should call 911 if you develop problems with breathing or chest pain.  If you are unable to contact your doctor or surgical center, you should go to the nearest emergency room or urgent care center.  Physician's telephone #: *[phone]**    If any questions arise, call your doctor.  If your doctor is not available, please feel free to call the Surgical Center at [phone].  The  Center is open Monday through Friday from 7AM to 7PM.  You can also call the HEALTH HOTLINE open 24 hours/day, 7 days/week and speak to a nurse at [phone], or toll free at [phone].    A registered nurse may call you a few days after your surgery to see how you are doing after your procedure.    MEDICATIONS: Resume taking daily medication.  Take prescribed pain medication with food.  If no medication is prescribed, you may take non-aspirin pain medication if needed.  PAIN MEDICATION CAN BE VERY CONSTIPATING.  Take a stool softener or laxative such as senokot, pericolace, or milk of magnesia if needed.    Prescription given for *NONE**.  Last pain medication given at *NONE GIVEN**.    If your physician has prescribed pain medication that includes Acetaminophen (Tylenol), do not take additional Acetaminophen (Tylenol) while taking the prescribed medication.    Depression / Suicide Risk    As you are discharged from this [facility] facility, it is important to learn how to keep safe from harming yourself.    Recognize the warning signs:  · Abrupt changes in personality, positive or negative- including increase in energy   · Giving away possessions  · Change in eating patterns- significant weight changes-  positive or negative  · Change in sleeping patterns- unable to sleep or sleeping all the time   · Unwillingness or inability to communicate  · Depression  · Unusual sadness, discouragement and loneliness  · Talk of wanting to die  · Neglect of personal appearance   · Rebelliousness- reckless behavior  · Withdrawal from people/activities they love  · Confusion- inability to concentrate     If you or a loved one observes any of these behaviors or has concerns about self-harm, here's what you can do:  · Talk about it- your feelings and reasons for harming yourself  · Remove any means that you might use to hurt yourself (examples: pills, rope, extension cords, firearm)  · Get professional help from the  community (Mental Health, Substance Abuse, psychological counseling)  · Do not be alone:Call your Safe Contact- someone whom you trust who will be there for you.  · Call your local CRISIS HOTLINE 492-9054 or 616-064-3175  · Call your local Children's Mobile Crisis Response Team [place] (177) 963-4972 or www.[employer]  · Call the toll free National Suicide Prevention Hotlines   · National Suicide Prevention Lifeline 315-198-BXOO (6131)  · National Hope Line Network 800-SUICIDE (121-2061)

## 2018-04-30 NOTE — H&P
DATE OF OPERATION:  2018    CHIEF COMPLAINT:  Endometrial cells seen on Pap smear, postmenopausal,   thickened endometrial lining, history of atrial fibrillation, on Xarelto, has   a pacemaker.    HISTORY OF PRESENT ILLNESS:  Patient is a 57-year-old G2, P2, postmenopausal   female, not on hormone replacement therapy, who presented to my office   initially for annual exam.  On the Pap smear it was noted that she had some   endometrial cells.  A pelvic ultrasound was performed, which showed the uterus   to measure 7x2.4x4 cm and endometrial stripe was noted be 0.92 cm.  The   patient had an endometrial ablation by  ____ and this slowed her period,   and then Dr. Fu in the past put her on HRT.  She had problems with her   heart, had a pacemaker placement, and at that time went off the HRT.  The   patient is currently on Xarelto for her atrial fibrillation and it was   recommended that the patient undergo a hysteroscopy, D and C as she had a   thickened endometrial lining and endometrial cells on Pap smear.  She agrees.    Risks, benefits, alternatives and procedure were discussed with patient in   detail and she agrees to proceed.    PAST MEDICAL HISTORY:  Heart problems, has atrial fibrillation and pacemaker.    PAST SURGICAL HISTORY:  Pacemaker in 2015 and breast surgery in  and    section in .    MENSTRUAL HISTORY:  The patient underwent menarche at age 12 and menopause at   age 53.    OBSTETRICAL HISTORY:  She has had 2 pregnancies, 1 by  section and 1   vaginal delivery.  Last delivery was in .    MEDICATIONS:  Include metoprolol, disopyramide, Xarelto.    FAMILY HISTORY:  Father  at age 64 from AML and mother  at age 65 from   congestive heart failure and stroke.    SOCIAL HISTORY:  The patient does not smoke.  Drinks 4 drinks a week.  Does   not do recreational drugs.    ALLERGIES:  No known drug allergies.    PHYSICAL EXAMINATION:  VITAL SIGNS:  The  patient's blood pressure is 124/80, her height is 5 feet 6   inches, weight 191.2.  Please see Epic vitals.  HEENT:  Within normal limits.  NECK:  Supple, without lymphadenopathy or thyromegaly.  CARDIOVASCULAR:  Regular rate and rhythm.  LUNGS:  Clear to auscultation.  BACK:  No CVA tenderness.  ABDOMEN:  Soft and nontender, nondistended.  No masses are palpable.  PELVIC:  EGBUS appears normal.  Vagina appears normal.  Cervix appears normal.    Bimanual exam reveals a normal sized and positioned uterus, which is smooth   in contour and there are no adnexal masses.  EXTREMITIES:  Benign.    ASSESSMENT:  1.  A 57-year-old G2. P2, postmenopausal female, not on hormone replacement   therapy.  2.  Past medical history significant for atrial fibrillation, has pacemaker,   is on Xarelto.  3.  Thickened endometrial lining seen on ultrasound and endometrial cells seen   on Pap smear, history of endometrial ablation.    PLAN:  The patient is scheduled for a hysteroscopy, dilation and curettage.    Risks, benefits, alternatives and procedure were discussed with the patient in   detail and she agrees to proceed.  Preoperative labs will be obtained.    Preoperative antibiotics will be administered.  She was told that she could   stop her Xarelto 3 days prior to surgery, but would check with her   cardiologist.       ____________________________________     MD HALI Amezcua / MOMO    DD:  04/29/2018 21:21:54  DT:  04/29/2018 22:17:54    D#:  [id number]  Job#:  [id number]

## 2018-04-30 NOTE — H&P
DATE OF OPERATION:  2018    CHIEF COMPLAINT:  Endometrial cells seen on Pap smear, postmenopausal,   thickened endometrial lining, history of atrial fibrillation, on Xarelto, has   a pacemaker.    HISTORY OF PRESENT ILLNESS:  Patient is a 57-year-old G2, P2, postmenopausal   female, not on hormone replacement therapy, who presented to my office   initially for annual exam.  On the Pap smear it was noted that she had some   endometrial cells.  A pelvic ultrasound was performed, which showed the uterus   to measure 7x2.4x4 cm and endometrial stripe was noted be 0.92 cm.  The   patient had an endometrial ablation by  ____ and then Dr. Fu in the   past put her on HRT.  She had problems with her heart, had a pacemaker   placement, and at that time went off the HRT.  The patient is currently on   Xarelto for her atrial fibrillation and it was recommended that the patient   undergo a hysteroscopy, D and C as she had a thickened endometrial lining and   endometrial cells on Pap smear.  She agrees.  Risks, benefits, alternatives   and procedure were discussed with patient in detail and she agrees to proceed.    PAST MEDICAL HISTORY:  Heart problems, has atrial fibrillation and pacemaker.    PAST SURGICAL HISTORY:  Pacemaker in 2015 and breast surgery in  and    section in .    MENSTRUAL HISTORY:  The patient underwent menarche at age 12 and menopause at   age 53.    OBSTETRICAL HISTORY:  She has had 2 pregnancies, 1 by  section and 1   vaginal delivery.  Last delivery was in .    MEDICATIONS:  Include metoprolol, disopyramide, Xarelto.    FAMILY HISTORY:  Father  at age 64 from AML and mother  at age 65 from   congestive heart failure and stroke.    SOCIAL HISTORY:  The patient does not smoke.  Drinks 4 drinks a week.  Does   not do recreational drugs.    ALLERGIES:  No known drug allergies.    PHYSICAL EXAMINATION:  VITAL SIGNS:  The patient's blood pressure is  124/80, her height is 5 feet 6   inches, weight 191.2.  Please see Epic vitals.  HEENT:  Within normal limits.  NECK:  Supple, without lymphadenopathy or thyromegaly.  CARDIOVASCULAR:  Regular rate and rhythm.  LUNGS:  Clear to auscultation.  BACK:  No CVA tenderness.  ABDOMEN:  Soft and nontender, nondistended.  No masses are palpable.  PELVIC:  EGBUS appears normal.  Vagina appears normal.  Cervix appears normal.    Bimanual exam reveals a normal sized and positioned uterus, which is smooth   in contour and there are no adnexal masses.  EXTREMITIES:  Benign.    ASSESSMENT:  1.  A 57-year-old G2. P2, postmenopausal female, not on hormone replacement   therapy.  2.  Past medical history significant for atrial fibrillation, has pacemaker,   is on Xarelto.  3.  Thickened endometrial lining seen on ultrasound and endometrial cells seen   on Pap smear, history of endometrial ablation.    PLAN:  The patient is scheduled for a hysteroscopy, dilation and curettage.    Risks, benefits, alternatives and procedure were discussed with the patient in   detail and she agrees to proceed.  Preoperative labs will be obtained.    Preoperative antibiotics will be administered.  She was told that she could   stop her Xarelto 3 days prior to surgery, but would check with her   cardiologist.       ____________________________________     MD HALI Amezcua / MOMO    DD:  04/29/2018 21:21:54  DT:  04/29/2018 22:17:54    D#:  [id number]  Job#:  [id number]

## 2018-04-30 NOTE — OR SURGEON
Immediate Post OP Note    PreOp Diagnosis: Endometrial cells on PAP, s/p endometrial ablation, thickened endometrial lining on U/S    PostOp Diagnosis: same    Procedure(s):  HYSTEROSCOPY WITH VIDEO OPERATIVE - Wound Class: Clean Contaminated  DILATION AND CURETTAGE - Wound Class: Clean Contaminated    Surgeon(s):  Noemi Liu M.D.    Anesthesiologist/Type of Anesthesia:  Anesthesiologist: Fay Proctor M.D./General    Surgical Staff:  Circulator: Tiana Nicole R.N.; Jeanine Gaines R.N.  Scrub Person: Shanika Cotter    Specimens removed if any:  ECC, EMC (scarred)    Estimated Blood Loss: Min    Findings: Uterus is normal size and position, no adnexal masses, cervix is normal but endometrium appears scarred, dilated through this and there was one area on the right side that appeared to be atrophic lining but the rest was scarred, no tubal ostia was seen    Complications: None        4/30/2018 9:43 AM Noemi Liu M.D.

## 2018-04-30 NOTE — OR NURSING
0945  Pt sleeping with oral LMA in place, vital signs stable. Report received from Phill BRIONES. Abdomen soft and flat, with carols pad intact to perineum, small amount bloody vaginal bleeding noted.     1000 Pt awake briefly, denied pain, took few sips of water then returned to sleep.     1030 Pt remains asleep, vitals stable.    1130 Pt more awake now. Vaginal bleeding minimal.  here at bedside. Pt up to bathroom, dressed.  Discharge instructions given with  here, all questions answered.     1200 Pt discharged to home with .

## 2018-05-29 ENCOUNTER — OFFICE VISIT (OUTPATIENT)
Dept: CARDIOLOGY | Facility: MEDICAL CENTER | Age: 58
End: 2018-05-29
Payer: COMMERCIAL

## 2018-05-29 VITALS
DIASTOLIC BLOOD PRESSURE: 78 MMHG | BODY MASS INDEX: 32.95 KG/M2 | OXYGEN SATURATION: 96 % | HEIGHT: 64 IN | RESPIRATION RATE: 14 BRPM | SYSTOLIC BLOOD PRESSURE: 138 MMHG | WEIGHT: 193 LBS | HEART RATE: 60 BPM

## 2018-05-29 DIAGNOSIS — Z95.0 CARDIAC PACEMAKER IN SITU: ICD-10-CM

## 2018-05-29 DIAGNOSIS — N18.30 CHRONIC KIDNEY DISEASE, STAGE III (MODERATE) (HCC): Chronic | ICD-10-CM

## 2018-05-29 DIAGNOSIS — I48.0 PAROXYSMAL ATRIAL FIBRILLATION (HCC): Chronic | ICD-10-CM

## 2018-05-29 DIAGNOSIS — I42.1 HYPERTROPHIC OBSTRUCTIVE CARDIOMYOPATHY (HCC): Chronic | ICD-10-CM

## 2018-05-29 PROCEDURE — 93000 ELECTROCARDIOGRAM COMPLETE: CPT | Performed by: INTERNAL MEDICINE

## 2018-05-29 PROCEDURE — 93280 PM DEVICE PROGR EVAL DUAL: CPT | Performed by: NURSE PRACTITIONER

## 2018-05-29 PROCEDURE — 99214 OFFICE O/P EST MOD 30 MIN: CPT | Mod: 25 | Performed by: NURSE PRACTITIONER

## 2018-05-29 ASSESSMENT — ENCOUNTER SYMPTOMS
NAUSEA: 0
PSYCHIATRIC NEGATIVE: 1
SHORTNESS OF BREATH: 0
SPEECH CHANGE: 0
CLAUDICATION: 0
BLURRED VISION: 0
PALPITATIONS: 0
COUGH: 0
SORE THROAT: 0
HEADACHES: 0
PND: 0
DIZZINESS: 0
WHEEZING: 0
ORTHOPNEA: 0
DOUBLE VISION: 0
FOCAL WEAKNESS: 0
HEARTBURN: 0
BRUISES/BLEEDS EASILY: 0
VOMITING: 0
FEVER: 0
LOSS OF CONSCIOUSNESS: 0
MYALGIAS: 0
CHILLS: 0
BLOOD IN STOOL: 0
ABDOMINAL PAIN: 0

## 2018-05-29 NOTE — LETTER
[facility]   [address]  [place] [address]  Phone: [phone]  Fax: [phone]              Seda Lighthleen Gurjit  1960    Encounter Date: 5/29/2018    MARK Christianson          PROGRESS NOTE:  No notes on file      Juve Stoddard M.D.  [address]  [address]  [facility] [address]  VIA In Basket

## 2018-05-29 NOTE — PROGRESS NOTES
"Chief Complaint   Patient presents with   • Atrial Fibrillation     PAF.       Subjective:   Seda Cagle is a 58 y.o. female who presents today for follow up for her BSI pacemaker, Hocm , AF and VPC's.  She was originally seen by Dr Pitts on 1/21/15 for VPC's.      The extra beats started in September 2014.  It was barely there but once in a while.  She came back in October 2014 where it was much worse.     She had etoh septal ablation with Dr. Torre in 2000 (done for symptoms of cp and dizzy episodes).  She has been fine.  She stays active.  Starting in October she has not felt as well \"felt like crap\". Heart would beat hard.  She would get dizzy and nauseated.  She did not have the same stamina.  She just wanted to sleep.  She had to hang on to cart to do her [place] shopping.  She did not want to go to the mall.   She has had extensive workup.  She is suspicious that she had some sort of event.    With holter she had numerous ectopic beats   She had been on norpace since the ablation.  I suspect that pvc's are coming from the midst of septal scar given their morphology- so we could try ablation but may be very difficult to reach the focus if in the middle of thick septal scar.  BB was reduced and eventually a pacemaker was implanted on 4/15/15  A BSI pacemaker was then implanted 3 years ago and the patient has felt much better since that time. She has much more energy and does not tire as easily.  Past Medical History:   Diagnosis Date   • Anesthesia     wakes up during procedure (x2)   • Aortic regurgitation    • Arrhythmia, ventricular 2008    Symptomatic PVCs, controlled with medication.   • Asthma     inhaler PRN, only uses when sick    • Atrial fibrillation (HCC)    • Cardiac arrhythmia    • Cervical arthritis (HCC) 2/8/2016   • Chickenpox    • Chronic kidney disease, stage III (moderate) 2/9/2016   • CTS (carpal tunnel syndrome) 2/28/2011   • Depression    • GERD (gastroesophageal " reflux disease)    • Gynecological disorder    • Heart burn    • Heart murmur    • Hemorrhagic disorder (HCC)     on Xarelto    • Hypertr obst cardiomyop     Dr. Bert Vazquez, [address] [address] [phone] fax [phone].  [facility] [phone].  Alchol Septal Reduction , Coronaries normal.   • Hypertrophic cardiomyopathy (HCC)    • Indigestion    • Influenza    • Pacemaker    • Renal disorder     chronic kidney disease stage 3-  **pt states currently no issue, numbers are stable   • Sleep apnea     uses CPAP   • Snoring      Past Surgical History:   Procedure Laterality Date   • HYSTEROSCOPY WITH VIDEO OPERATIVE  2018    Procedure: HYSTEROSCOPY WITH VIDEO OPERATIVE;  Surgeon: Noemi Liu M.D.;  Location: [facility]ERY SAME DAY [facility];  Service: Labor and Delivery   • DILATION AND CURETTAGE  2018    Procedure: DILATION AND CURETTAGE;  Surgeon: Noemi Liu M.D.;  Location: [facility]GERY SAME DAY [facility];  Service: Labor and Delivery   • BREAST BIOPSY Left 2018    Procedure: BREAST BIOPSY- WIRE LOCALIZED;  Surgeon: Vijaya Britt M.D.;  Location: SURGERY SAME DAY [facility];  Service: General   • RECOVERY  4/15/2015    Performed by [facility] at SURGERY PRE-POST PROC UNIT [facility] [place]   • PACEMAKER INSERTION     • RECOVERY  2014    Performed by [facility] at [facility]GERY SAME DAY [facility]   • SEPTAL RECONSTRUCTION     • ENDOSCOPY     • PRIMARY C SECTION     • TUBAL COAGULATION LAPAROSCOPIC BILATERAL       Family History   Problem Relation Age of Onset   • Heart Disease Mother      CHF   • Hypertension Mother    • Stroke Mother    • Heart Failure Mother    • Other Mother      carotid artery disease,gout   • Cancer Father      AML   • Diabetes Maternal Grandmother    • Cancer Maternal Grandfather      Prostate   • Heart Disease Paternal Grandfather 52      MI    • Sleep Apnea Neg Hx      Social History      Social History   • Marital status:      Spouse name: N/A   • Number of children: N/A   • Years of education: N/A     Occupational History   • Not on file.     Social History Main Topics   • Smoking status: Never Smoker   • Smokeless tobacco: Never Used   • Alcohol use 0.6 oz/week     1 Standard drinks or equivalent per week      Comment: 3-4 per week   • Drug use: No   • Sexual activity: Yes     Birth control/ protection: Surgical     Other Topics Concern   • Not on file     Social History Narrative   • No narrative on file     No Known Allergies  Outpatient Encounter Prescriptions as of 5/29/2018   Medication Sig Dispense Refill   • rivaroxaban (XARELTO) 20 MG Tab tablet Take 20 mg by mouth every day at 6 PM.     • metoprolol (LOPRESSOR) 25 MG Tab Take 1 Tab by mouth 2 times a day. 180 Tab 3   • disopyramide (NORPACE) 100 MG Cap Take 1 Cap by mouth every 8 hours. 270 Cap 3   • Calcium-Vitamin D-Vitamin K (CALCIUM FOR WOMEN PO) Take 1 Each by mouth every day at 6 PM.     • Lansoprazole (PREVACID PO) Take 1 Each by mouth 1 time daily as needed.     • Magnesium 200 MG TABS Take 1 Tab by mouth every day.     • Multiple Vitamins-Minerals (ALIVE ONCE DAILY WOMENS 50+ PO) Take 1 Each by mouth every day at 6 PM.       No facility-administered encounter medications on file as of 5/29/2018.      Review of Systems   Constitutional: Negative for chills and fever.   HENT: Negative for sore throat.         No difficulty swallowing   Eyes: Negative for blurred vision and double vision.   Respiratory: Negative for cough, shortness of breath and wheezing.    Cardiovascular: Negative for chest pain, palpitations, orthopnea, claudication, leg swelling and PND.   Gastrointestinal: Negative for abdominal pain, blood in stool, heartburn, nausea and vomiting.   Genitourinary: Negative for dysuria, frequency, hematuria and urgency.   Musculoskeletal: Negative for myalgias.   Skin: Negative.    Neurological: Negative for  "dizziness, speech change, focal weakness, loss of consciousness and headaches.   Endo/Heme/Allergies: Does not bruise/bleed easily.   Psychiatric/Behavioral: Negative.         Objective:   /78   Pulse 60   Resp 14   Ht 1.626 m (5' 4\")   Wt 87.5 kg (193 lb)   LMP 01/31/2012   SpO2 96%   BMI 33.13 kg/m²     Physical Exam   Constitutional: She is oriented to person, place, and time. She appears well-developed and well-nourished.   HENT:   Head: Normocephalic and atraumatic.   Eyes: Pupils are equal, round, and reactive to light.   Neck: Normal range of motion. Neck supple.   Cardiovascular: Normal rate and regular rhythm.    Pulmonary/Chest: Effort normal and breath sounds normal.   Device site is uncomplicated.   Abdominal: Soft. Bowel sounds are normal.   Musculoskeletal: Normal range of motion.   Neurological: She is alert and oriented to person, place, and time.   Skin: Skin is warm and dry.   Psychiatric: She has a normal mood and affect.   Device function intact see flow sheet.    Assessment:     1. Hypertrophic obstructive cardiomyopathy (HCC)     2. Paroxysmal atrial fibrillation (HCC)     3. Cardiac pacemaker in situ     4. Chronic kidney disease, stage III (moderate)         Medical Decision Making:  Today's Assessment / Status / Plan:     1. PAF < 1% she on OAC.  2. HOCM on Norpace EKG stable. Feels well exercise tolerance unchanged over last 6 months.   S/P alcohol ablation in 2000/  3. PPM demonstrating stable function with BV 5 years.  4. CKD stable followed by PCP.  RTc 4 months to see Dr Witt new patient.    Collaborating MD Dr colón    "

## 2018-05-30 LAB — EKG IMPRESSION: NORMAL

## 2018-06-22 ENCOUNTER — HOSPITAL ENCOUNTER (OUTPATIENT)
Dept: RADIOLOGY | Facility: MEDICAL CENTER | Age: 58
End: 2018-06-22
Attending: OBSTETRICS & GYNECOLOGY
Payer: COMMERCIAL

## 2018-06-22 DIAGNOSIS — N64.4 MASTODYNIA: ICD-10-CM

## 2018-06-22 PROCEDURE — G0279 TOMOSYNTHESIS, MAMMO: HCPCS

## 2018-07-17 ENCOUNTER — OFFICE VISIT (OUTPATIENT)
Dept: MEDICAL GROUP | Facility: MEDICAL CENTER | Age: 58
End: 2018-07-17
Payer: COMMERCIAL

## 2018-07-17 VITALS
OXYGEN SATURATION: 96 % | WEIGHT: 195 LBS | DIASTOLIC BLOOD PRESSURE: 70 MMHG | BODY MASS INDEX: 33.29 KG/M2 | RESPIRATION RATE: 16 BRPM | TEMPERATURE: 97.1 F | SYSTOLIC BLOOD PRESSURE: 124 MMHG | HEART RATE: 60 BPM | HEIGHT: 64 IN

## 2018-07-17 DIAGNOSIS — J04.0 LARYNGITIS, ACUTE: ICD-10-CM

## 2018-07-17 DIAGNOSIS — E66.9 OBESITY (BMI 30-39.9): ICD-10-CM

## 2018-07-17 PROCEDURE — 99214 OFFICE O/P EST MOD 30 MIN: CPT | Performed by: PHYSICIAN ASSISTANT

## 2018-07-17 NOTE — ASSESSMENT & PLAN NOTE
This is a 58-year-old female who complains of an approximate 10 day history of a soreness on the right side of her neck. Not a sore throat. She states when she woke up never swollen in that area. The swelling has improved. Still complains of a soreness in that spot as well as having a hoarse voice. His history of allergies. She is an avid hiker. She takes Flonase once a day. Denies any fevers. No difficulty swallowing. No swollen lymph nodes noticed since the initial onset. She is concerned about possible infection sinus or otherwise. No known sick contacts. No coughing. No chest pain or shortness of breath.

## 2018-07-17 NOTE — PROGRESS NOTES
Subjective:   Seda Cagle is a 58 y.o. female here today for soreness on the right side of her neck and laryngitis for 10 days.    Laryngitis, acute  This is a 58-year-old female who complains of an approximate 10 day history of a soreness on the right side of her neck. Not a sore throat. She states when she woke up never swollen in that area. The swelling has improved. Still complains of a soreness in that spot as well as having a hoarse voice. His history of allergies. She is an avid hiker. She takes Flonase once a day. Denies any fevers. No difficulty swallowing. No swollen lymph nodes noticed since the initial onset. She is concerned about possible infection sinus or otherwise. No known sick contacts. No coughing. No chest pain or shortness of breath.       Current medicines (including changes today)  Current Outpatient Prescriptions   Medication Sig Dispense Refill   • Fluticasone Propionate (FLONASE NA) Spray  in nose.     • rivaroxaban (XARELTO) 20 MG Tab tablet Take 20 mg by mouth every day at 6 PM.     • metoprolol (LOPRESSOR) 25 MG Tab Take 1 Tab by mouth 2 times a day. 180 Tab 3   • disopyramide (NORPACE) 100 MG Cap Take 1 Cap by mouth every 8 hours. 270 Cap 3   • Calcium-Vitamin D-Vitamin K (CALCIUM FOR WOMEN PO) Take 1 Each by mouth every day at 6 PM.     • Lansoprazole (PREVACID PO) Take 1 Each by mouth 1 time daily as needed.     • Magnesium 200 MG TABS Take 1 Tab by mouth every day.     • Multiple Vitamins-Minerals (ALIVE ONCE DAILY WOMENS 50+ PO) Take 1 Each by mouth every day at 6 PM.       No current facility-administered medications for this visit.      She  has a past medical history of Anesthesia; Aortic regurgitation; Arrhythmia, ventricular (2008); Asthma; Atrial fibrillation (HCC); Cardiac arrhythmia; Cervical arthritis (HCC) (2/8/2016); Chickenpox; Chronic kidney disease, stage III (moderate) (2/9/2016); CTS (carpal tunnel syndrome) (2/28/2011); Depression; GERD  "(gastroesophageal reflux disease); Gynecological disorder; Heart burn; Heart murmur; Hemorrhagic disorder (HCC); Hypertr obst cardiomyop (1991); Hypertrophic cardiomyopathy (HCC); Indigestion; Influenza; Pacemaker (2014); Renal disorder; Sleep apnea; and Snoring. She also has no past medical history of Addisons disease (HCC); Adrenal disorder (HCC); Anemia; Anxiety; ASTHMA; Blood transfusion; Clotting disorder (HCC); COPD; Cushings syndrome (HCC); Goiter; Headache(784.0); HIV (human immunodeficiency virus infection); IBD (inflammatory bowel disease); Infectious disease; Migraine; OSTEOPOROSIS; Parathyroid disorder (HCC); Pituitary disease (HCC); Substance abuse; or Ulcer.    Social History and Family History were reviewed and updated.    ROS   No chest pain, no shortness of breath, no abdominal pain and all other systems were reviewed and are negative.       Objective:     Blood pressure 124/70, pulse 60, temperature 36.2 °C (97.1 °F), resp. rate 16, height 1.626 m (5' 4\"), weight 88.5 kg (195 lb), last menstrual period 01/31/2012, SpO2 96 %, unknown if currently breastfeeding. Body mass index is 33.47 kg/m².   Physical Exam:  Constitutional: Alert, no distress.  Skin: Warm, dry, good turgor, no rashes in visible areas.  Eye: Equal, round and reactive, conjunctiva clear, lids normal.  ENMT: Lips without lesions, good dentition, oropharynx clear. TMs bilaterally are clear.  Neck: Trachea midline, no masses.   Lymph: No cervical or supraclavicular lymphadenopathy  Respiratory: Unlabored respiratory effort, lungs clear to auscultation, no wheezes, no ronchi.  Cardiovascular: Normal S1, S2, no murmur, no edema.  Abdomen: Soft, non-tender, no masses.  Psych: Alert and oriented x3, normal affect and mood.        Assessment and Plan:   The following treatment plan was discussed    1. Laryngitis, acute  Acute, new onset condition. Physical exam within normal limits. Suggested that symptoms may pass with time as it may be " related to a viral cause. He also may be secondary to postnasal drainage although her throat looks good. We'll continue Flonase as directed. Consider ibuprofen as directed for pain. Possibly add an antihistamine such as Claritin. If she develops any worsening symptoms such as fevers or worsening swelling on the right side of the neck she may contact me through my chart with any concerns. Do expect symptoms to resolve in time.    2. Obesity (BMI 30-39.9)  Chronic condition. Continue to exercise routinely. We'll monitor.  - Patient identified as having weight management issue.  Appropriate orders and counseling given.      Followup: Return if symptoms worsen or fail to improve.    Please note that this dictation was created using voice recognition software. I have made every reasonable attempt to correct obvious errors, but I expect that there are errors of grammar and possibly content that I did not discover before finalizing the note.

## 2018-09-26 ENCOUNTER — HOSPITAL ENCOUNTER (OUTPATIENT)
Dept: CARDIOLOGY | Facility: MEDICAL CENTER | Age: 58
End: 2018-09-26
Attending: NURSE PRACTITIONER
Payer: COMMERCIAL

## 2018-09-26 DIAGNOSIS — I42.2 HYPERTROPHIC CARDIOMYOPATHY (HCC): ICD-10-CM

## 2018-09-26 LAB
LV EJECT FRACT  99904: 55
LV EJECT FRACT MOD 2C 99903: 62.87
LV EJECT FRACT MOD 4C 99902: 63.42
LV EJECT FRACT MOD BP 99901: 63.28

## 2018-09-26 PROCEDURE — 93306 TTE W/DOPPLER COMPLETE: CPT | Mod: 26 | Performed by: INTERNAL MEDICINE

## 2018-09-26 PROCEDURE — 93306 TTE W/DOPPLER COMPLETE: CPT

## 2018-09-28 ENCOUNTER — TELEPHONE (OUTPATIENT)
Dept: CARDIOLOGY | Facility: MEDICAL CENTER | Age: 58
End: 2018-09-28

## 2018-09-28 NOTE — TELEPHONE ENCOUNTER
----- Message from MARK Christianson sent at 9/27/2018  5:42 PM PDT -----  Let patient know that Dr Witt will be reviewing the echo himself. Echo appreciably the same.  An area adjacent to the valve is what he will be reviewing and will discuss the findings at her apptl.

## 2018-10-08 ENCOUNTER — OFFICE VISIT (OUTPATIENT)
Dept: CARDIOLOGY | Facility: MEDICAL CENTER | Age: 58
End: 2018-10-08
Payer: COMMERCIAL

## 2018-10-08 VITALS
SYSTOLIC BLOOD PRESSURE: 126 MMHG | HEART RATE: 67 BPM | OXYGEN SATURATION: 94 % | DIASTOLIC BLOOD PRESSURE: 74 MMHG | HEIGHT: 64 IN | WEIGHT: 194 LBS | BODY MASS INDEX: 33.12 KG/M2

## 2018-10-08 DIAGNOSIS — Z95.0 CARDIAC PACEMAKER IN SITU: ICD-10-CM

## 2018-10-08 DIAGNOSIS — I42.1 HYPERTROPHIC OBSTRUCTIVE CARDIOMYOPATHY (HCC): Primary | Chronic | ICD-10-CM

## 2018-10-08 DIAGNOSIS — I48.0 PAROXYSMAL ATRIAL FIBRILLATION (HCC): Chronic | ICD-10-CM

## 2018-10-08 DIAGNOSIS — Q24.4 SUBAORTIC MEMBRANE: ICD-10-CM

## 2018-10-08 PROCEDURE — 93280 PM DEVICE PROGR EVAL DUAL: CPT | Performed by: INTERNAL MEDICINE

## 2018-10-08 PROCEDURE — 99214 OFFICE O/P EST MOD 30 MIN: CPT | Mod: 25 | Performed by: INTERNAL MEDICINE

## 2018-10-08 RX ORDER — MECOBALAMIN 5000 MCG
TABLET,DISINTEGRATING ORAL NIGHTLY PRN
COMMUNITY
End: 2022-01-20

## 2018-10-08 NOTE — PROGRESS NOTES
Arrhythmia Clinic Note (Established patient)    DOS: 10/8/2018    Chief complaint/Reason for consult: F/u HCM    Interval History:  Patient is a 57 yo F. History of HCM s/p alcohol septal ablation. Has AF and history of symptomatic PVCs. Maintained on Norpace and is s/p PPM. Prior patient of [facility]. No complaints today other than some changes of venostasis on her ankles. Taking and tolerating OAC.    ROS (+ highlighted in red):  Constitutional: Fevers/chills/fatigue/weightloss  Respiratory: Shortness of breath/cough  Cardiovascular: Chest pain/palpitations/edema/orthopnea/syncope    Past Medical History:   Diagnosis Date   • Anesthesia     wakes up during procedure (x2)   • Aortic regurgitation    • Arrhythmia, ventricular 2008    Symptomatic PVCs, controlled with medication.   • Asthma     inhaler PRN, only uses when sick    • Atrial fibrillation (HCC)    • Cardiac arrhythmia    • Cervical arthritis 2/8/2016   • Chickenpox    • Chronic kidney disease, stage III (moderate) (HCC) 2/9/2016   • CTS (carpal tunnel syndrome) 2/28/2011   • Depression    • GERD (gastroesophageal reflux disease)    • Gynecological disorder    • Heart burn    • Heart murmur    • Hemorrhagic disorder (HCC)     on Xarelto    • Hypertr obst cardiomyop 1991    Dr. Bert Vazquez, [facility] [address] [address] [phone] fax [phone].  [facility] [phone].  Alchol Septal Reduction 8/00, Coronaries normal.   • Hypertrophic cardiomyopathy (HCC)    • Indigestion    • Influenza    • Pacemaker 2014   • Renal disorder     chronic kidney disease stage 3-  **pt states currently no issue, numbers are stable   • Sleep apnea     uses CPAP   • Snoring        No Known Allergies    Current Outpatient Prescriptions   Medication Sig Dispense Refill   • Melatonin 2.5 MG Cap Take  by mouth.     • Fluticasone Propionate (FLONASE NA) Spray  in nose.     • rivaroxaban (XARELTO) 20 MG Tab tablet Take 20 mg by mouth every day at 6 PM.  "    • metoprolol (LOPRESSOR) 25 MG Tab Take 1 Tab by mouth 2 times a day. 180 Tab 3   • disopyramide (NORPACE) 100 MG Cap Take 1 Cap by mouth every 8 hours. 270 Cap 3   • Calcium-Vitamin D-Vitamin K (CALCIUM FOR WOMEN PO) Take 1 Each by mouth every day at 6 PM.     • Lansoprazole (PREVACID PO) Take 1 Each by mouth 1 time daily as needed.     • Magnesium 200 MG TABS Take 1 Tab by mouth every day.     • Multiple Vitamins-Minerals (ALIVE ONCE DAILY WOMENS 50+ PO) Take 1 Each by mouth every day at 6 PM.       No current facility-administered medications for this visit.        Physical Exam:  Vitals:    10/08/18 0834   BP: 126/74   BP Location: Left arm   Patient Position: Sitting   BP Cuff Size: Adult   Pulse: 67   SpO2: 94%   Weight: 88 kg (194 lb)   Height: 1.626 m (5' 4\")     General appearance: NAD, conversant   Eyes: anicteric sclerae, moist conjunctivae; no lid-lag; PERRLA  HENT: Atraumatic; oropharynx clear with moist mucous membranes and no mucosal ulcerations; normal hard and soft palate  Neck: Trachea midline; FROM, supple, no thyromegaly or lymphadenopathy  Lungs: CTA, with normal respiratory effort and no intercostal retractions  CV: RRR, 2/6 systolic murmur, no JVD  Abdomen: Soft, non-tender; no masses or HSM  Extremities: No peripheral edema or extremity lymphadenopathy  Skin: Normal temperature, turgor and texture; no rash, ulcers or subcutaneous nodules  Psych: Appropriate affect, alert and oriented to person, place and time    Data:  Labs reviewed    Prior echo/stress reviewed:  Echo images reviewed, ? Subaortic membrane but no significant gradient    Device interrogation reviewed    Impression/Plan:  1. Hypertrophic obstructive cardiomyopathy (HCC)     2. Paroxysmal atrial fibrillation (HCC)     3. Cardiac pacemaker in situ     4. Subaortic membrane       -Recent echo reviewed, there does appear to be a subaortic membrane and perhaps this was her issue was a subaortic thickening   -In either case, " after he septal ablation her gradient is low and she is asymptomatic from this standpoint  -I think yearly echo to monitor her outflow gradient for recurrence may be appropriate  -She had some bouts of AF in August (2% burden), sounds like some medication non-compliance with Norpace, but none in the last 6 weeks  -No changes to her regimen  -Continue OAC  -F/u 6 mo    Solitario Witt MD

## 2018-10-16 PROBLEM — E66.9 OBESITY (BMI 30-39.9): Status: RESOLVED | Noted: 2018-07-17 | Resolved: 2018-10-16

## 2018-10-16 PROBLEM — J30.1 CHRONIC SEASONAL ALLERGIC RHINITIS DUE TO POLLEN: Status: RESOLVED | Noted: 2018-04-10 | Resolved: 2018-10-16

## 2018-10-16 PROBLEM — J04.0 LARYNGITIS, ACUTE: Status: RESOLVED | Noted: 2018-07-17 | Resolved: 2018-10-16

## 2018-10-25 ENCOUNTER — TELEPHONE (OUTPATIENT)
Dept: MEDICAL GROUP | Facility: MEDICAL CENTER | Age: 58
End: 2018-10-25

## 2018-10-25 NOTE — TELEPHONE ENCOUNTER
ESTABLISHED PATIENT PRE-VISIT PLANNING     Note: Patient will not be contacted if there is no indication to call.     1.  Reviewed notes from the last few office visits within the medical group: Yes  07/17/2018  2.  If any orders were placed at last visit or intended to be done for this visit (i.e. 6 mos follow-up), do we have Results/Consult Notes?        •  Labs - Labs were not ordered at last office visit.   Note: If patient appointment is for lab review and patient did not complete labs, check with provider if OK to reschedule patient until labs completed.       •  Imaging - Imaging was not ordered at last office visit.       •  Referrals - No referrals were ordered at last office visit.    3. Is this appointment scheduled as a Hospital Follow-Up? No    4.  Immunizations were updated in [facility] using [employer]?: Yes       •  [employer] Recommendations: MMR , TD and ZOSTAVAX (Shingles)    5.  Patient is due for the following Health Maintenance Topics:   Health Maintenance Due   Topic Date Due   • IMM PNEUMOCOCCAL 19-64 (ADULT) MEDIUM RISK SERIES (1 of 1 - PPSV23) 05/02/1979   • IMM ZOSTER VACCINES (1 of 2) 05/02/2010         6.  MDX printed for Provider? NO    7.  Patient was NOT informed to arrive 15 min prior to their scheduled appointment and bring in their medication bottles.

## 2018-11-01 ENCOUNTER — OFFICE VISIT (OUTPATIENT)
Dept: MEDICAL GROUP | Facility: MEDICAL CENTER | Age: 58
End: 2018-11-01
Payer: COMMERCIAL

## 2018-11-01 VITALS
TEMPERATURE: 97.8 F | WEIGHT: 196.8 LBS | HEIGHT: 64 IN | BODY MASS INDEX: 33.6 KG/M2 | SYSTOLIC BLOOD PRESSURE: 128 MMHG | HEART RATE: 60 BPM | DIASTOLIC BLOOD PRESSURE: 78 MMHG | OXYGEN SATURATION: 97 %

## 2018-11-01 DIAGNOSIS — E66.9 OBESITY (BMI 30-39.9): ICD-10-CM

## 2018-11-01 DIAGNOSIS — G47.33 OBSTRUCTIVE SLEEP APNEA SYNDROME: Chronic | ICD-10-CM

## 2018-11-01 DIAGNOSIS — H93.8X1 SENSATION OF FULLNESS IN RIGHT EAR: ICD-10-CM

## 2018-11-01 DIAGNOSIS — I87.2 CHRONIC VENOUS STASIS DERMATITIS: ICD-10-CM

## 2018-11-01 PROCEDURE — 99214 OFFICE O/P EST MOD 30 MIN: CPT | Performed by: PHYSICIAN ASSISTANT

## 2018-11-01 NOTE — ASSESSMENT & PLAN NOTE
Concerned that she cannot lose weight.  Exercises routinely.  Has greater than 10,000 steps daily.  No changes in her weight.

## 2018-11-01 NOTE — ASSESSMENT & PLAN NOTE
This is a 58-year-old female complains of an approximate 1 month history of a fullness in the right ear.  Decreased hearing.  Denies any pain.  No pain that radiates down the neck.  No significant nasal congestion but does have a history of sinus problems related to allergies.  Taking no medications for the ear particularly but does use Flonase as needed.  Not change in the course of the ear problems.

## 2018-11-01 NOTE — PROGRESS NOTES
Subjective:   Seda Cagle is a 58 y.o. female here today for sensation of the right ear fullness for approximately 1 month and skin changes of lower extremities for greater than 3 months.    Sensation of fullness in right ear  This is a 58-year-old female complains of an approximate 1 month history of a fullness in the right ear.  Decreased hearing.  Denies any pain.  No pain that radiates down the neck.  No significant nasal congestion but does have a history of sinus problems related to allergies.  Taking no medications for the ear particularly but does use Flonase as needed.  Not change in the course of the ear problems.    Chronic venous stasis dermatitis  She also complains of lower extremity skin changes that have occurred over the past 6 months or so.  Denies any pain.  No significant itching.  Has a significant cardiac history.  Also has kidney concerns with chronic kidney disease.  She is followed by a new cardiologist Dr. porras.  He told her that she likely had chronic venous insufficiency.  At times she will have pain on the lower extremities when she has socks on.  Even loose socks will cause a tenderness.    Sleep apnea  Chronic history of sleep apnea.  Wears a CPAP machine.  Does not like to wear it.    Obesity (BMI 30-39.9)  Concerned that she cannot lose weight.  Exercises routinely.  Has greater than 10,000 steps daily.  No changes in her weight.       Current medicines (including changes today)  Current Outpatient Prescriptions   Medication Sig Dispense Refill   • Melatonin 5 MG TABLET DISPERSIBLE Take  by mouth.     • Fluticasone Propionate (FLONASE NA) Spray  in nose.     • rivaroxaban (XARELTO) 20 MG Tab tablet Take 20 mg by mouth every day at 6 PM.     • metoprolol (LOPRESSOR) 25 MG Tab Take 1 Tab by mouth 2 times a day. 180 Tab 3   • disopyramide (NORPACE) 100 MG Cap Take 1 Cap by mouth every 8 hours. 270 Cap 3   • Calcium-Vitamin D-Vitamin K (CALCIUM FOR WOMEN PO) Take 1 Each by  "mouth every day at 6 PM.     • Lansoprazole (PREVACID PO) Take 1 Each by mouth 1 time daily as needed.     • Magnesium 200 MG TABS Take 1 Tab by mouth every day.     • Multiple Vitamins-Minerals (ALIVE ONCE DAILY WOMENS 50+ PO) Take 1 Each by mouth every day at 6 PM.       No current facility-administered medications for this visit.      She  has a past medical history of Anesthesia; Aortic regurgitation; Arrhythmia, ventricular (2008); Asthma; Atrial fibrillation (HCC); Cardiac arrhythmia; Cervical arthritis (2/8/2016); Chickenpox; Chronic kidney disease, stage III (moderate) ([facility]) (2/9/2016); CTS (carpal tunnel syndrome) (2/28/2011); Depression; GERD (gastroesophageal reflux disease); Gynecological disorder; Heart burn; Heart murmur; Hemorrhagic disorder ([facility]); Hypertr obst cardiomyop (1991); Hypertrophic cardiomyopathy ([facility]); Indigestion; Influenza; Pacemaker (2014); Renal disorder; Sleep apnea; and Snoring. She also has no past medical history of Addisons disease ([facility]); Adrenal disorder ([facility]); Anemia; Anxiety; ASTHMA; Blood transfusion; Clotting disorder ([facility]); COPD; Cushings syndrome ([facility]); Goiter; Headache(784.0); HIV (human immunodeficiency virus infection); IBD (inflammatory bowel disease); Infectious disease; Migraine; OSTEOPOROSIS; Parathyroid disorder ([facility]); Pituitary disease ([facility]); Substance abuse ([facility]); or Ulcer.    Social History and Family History were reviewed and updated.    ROS   No chest pain, no shortness of breath, no abdominal pain and all other systems were reviewed and are negative.       Objective:     Blood pressure 128/78, pulse 60, temperature 36.6 °C (97.8 °F), height 1.626 m (5' 4.02\"), weight 89.3 kg (196 lb 12.8 oz), last menstrual period 01/31/2012, SpO2 97 %, not currently breastfeeding. Body mass index is 33.76 kg/m².   Physical Exam:  Constitutional: Alert, no distress.  Skin: Warm, dry, good turgor.  Bilateral lower extremity changes noted.  No petechia.  Appears to have " hyperpigmented lesions that do tammy.  No significant rash noted.  No edema.  Eye: Equal, round and reactive, conjunctiva clear, lids normal.  ENMT: Lips without lesions, good dentition, oropharynx clear.  Right TM is clear.  Neck: Trachea midline, no masses.   Lymph: No cervical or supraclavicular lymphadenopathy  Respiratory: Unlabored respiratory effort, lungs clear to auscultation, no wheezes, no ronchi.  Cardiovascular: Normal S1, S2, no murmur, no edema.  Abdomen: Soft, non-tender, no masses.  Psych: Alert and oriented x3, normal affect and mood.        Assessment and Plan:   The following treatment plan was discussed    1. Sensation of fullness in right ear  Acute, new onset condition.  Unknown etiology.  Physical exam unremarkable.  Advised to take Claritin for 1-2 weeks.  Continue nasal spray if needed.  We will continue to monitor if symptoms are not improving may order audiology referral versus ENT referral.    2. Chronic venous stasis dermatitis  New condition.  Continue exercise routinely.  Refer to vascular to discuss and evaluate.  Advised to use cream on a regular basis.  - REFERRAL TO VASCULAR MEDICINE Reason for Referral? Established Vascular Disease    3. Obstructive sleep apnea syndrome  Chronic condition.  Stable.  Continue CPAP use.    4.  Obesity  Chronic condition.  Continue to exercise routinely.  Offered referral to [employer] program but she declined today.  She may contact me through my chart if she would like to reconsider.    Followup: Return if symptoms worsen or fail to improve, for or with PCP.    Please note that this dictation was created using voice recognition software. I have made every reasonable attempt to correct obvious errors, but I expect that there are errors of grammar and possibly content that I did not discover before finalizing the note.

## 2018-11-06 DIAGNOSIS — I49.3 PVC (PREMATURE VENTRICULAR CONTRACTION): ICD-10-CM

## 2018-11-06 DIAGNOSIS — I42.2 HYPERTROPHIC CARDIOMYOPATHY (HCC): ICD-10-CM

## 2018-11-06 DIAGNOSIS — I48.0 PAROXYSMAL ATRIAL FIBRILLATION (HCC): ICD-10-CM

## 2018-11-06 RX ORDER — DISOPYRAMIDE PHOSPHATE 100 MG/1
100 CAPSULE ORAL EVERY 8 HOURS
Qty: 270 CAP | Refills: 3 | Status: SHIPPED | OUTPATIENT
Start: 2018-11-06 | End: 2019-10-23 | Stop reason: SDUPTHER

## 2018-11-26 ENCOUNTER — OFFICE VISIT (OUTPATIENT)
Dept: VASCULAR LAB | Facility: MEDICAL CENTER | Age: 58
End: 2018-11-26
Attending: INTERNAL MEDICINE
Payer: COMMERCIAL

## 2018-11-26 VITALS
WEIGHT: 189.3 LBS | HEIGHT: 64 IN | SYSTOLIC BLOOD PRESSURE: 135 MMHG | HEART RATE: 60 BPM | BODY MASS INDEX: 32.32 KG/M2 | DIASTOLIC BLOOD PRESSURE: 76 MMHG

## 2018-11-26 DIAGNOSIS — I48.0 PAROXYSMAL ATRIAL FIBRILLATION (HCC): Chronic | ICD-10-CM

## 2018-11-26 DIAGNOSIS — I87.2 CHRONIC VENOUS STASIS DERMATITIS: ICD-10-CM

## 2018-11-26 DIAGNOSIS — I42.1 HYPERTROPHIC OBSTRUCTIVE CARDIOMYOPATHY (HCC): Chronic | ICD-10-CM

## 2018-11-26 DIAGNOSIS — G47.33 OBSTRUCTIVE SLEEP APNEA SYNDROME: Chronic | ICD-10-CM

## 2018-11-26 DIAGNOSIS — E66.9 OBESITY (BMI 30-39.9): ICD-10-CM

## 2018-11-26 DIAGNOSIS — N18.31 CKD STAGE G3A/A1, GFR 45-59 AND ALBUMIN CREATININE RATIO <30 MG/G (HCC): ICD-10-CM

## 2018-11-26 DIAGNOSIS — Z95.0 CARDIAC PACEMAKER IN SITU: ICD-10-CM

## 2018-11-26 DIAGNOSIS — I34.0 MITRAL VALVE INSUFFICIENCY, UNSPECIFIED ETIOLOGY: ICD-10-CM

## 2018-11-26 PROCEDURE — 99212 OFFICE O/P EST SF 10 MIN: CPT | Performed by: FAMILY MEDICINE

## 2018-11-26 PROCEDURE — 99204 OFFICE O/P NEW MOD 45 MIN: CPT | Performed by: FAMILY MEDICINE

## 2018-11-26 ASSESSMENT — ENCOUNTER SYMPTOMS
NAUSEA: 0
MYALGIAS: 0
FEVER: 0
DOUBLE VISION: 0
VOMITING: 0
SHORTNESS OF BREATH: 0
CHILLS: 0
ORTHOPNEA: 0
NERVOUS/ANXIOUS: 0
SORE THROAT: 0
PALPITATIONS: 0
ABDOMINAL PAIN: 0
SEIZURES: 0
TREMORS: 0
WHEEZING: 0
WEAKNESS: 0
BLURRED VISION: 0
HEADACHES: 0
HEMOPTYSIS: 0
BRUISES/BLEEDS EASILY: 0
BLOOD IN STOOL: 0
DIZZINESS: 0
FOCAL WEAKNESS: 0
DIARRHEA: 0
COUGH: 0
INSOMNIA: 0
DEPRESSION: 0

## 2018-11-27 NOTE — PATIENT INSTRUCTIONS
1) compression stockings, elevated feet nightly   2) horse chestnut seed extract around 300mg 2 times daily   3) staying active, not smoking   4) follow-up in about 3 months, if not improving consider reflux ultrasound

## 2018-11-30 ENCOUNTER — SLEEP CENTER VISIT (OUTPATIENT)
Dept: SLEEP MEDICINE | Facility: MEDICAL CENTER | Age: 58
End: 2018-11-30
Payer: COMMERCIAL

## 2018-11-30 VITALS
HEART RATE: 60 BPM | WEIGHT: 188 LBS | DIASTOLIC BLOOD PRESSURE: 78 MMHG | RESPIRATION RATE: 16 BRPM | BODY MASS INDEX: 32.1 KG/M2 | SYSTOLIC BLOOD PRESSURE: 118 MMHG | HEIGHT: 64 IN | OXYGEN SATURATION: 95 %

## 2018-11-30 DIAGNOSIS — G47.33 OBSTRUCTIVE SLEEP APNEA SYNDROME: Chronic | ICD-10-CM

## 2018-11-30 DIAGNOSIS — F51.01 PRIMARY INSOMNIA: ICD-10-CM

## 2018-11-30 DIAGNOSIS — I48.0 PAROXYSMAL ATRIAL FIBRILLATION (HCC): Chronic | ICD-10-CM

## 2018-11-30 PROCEDURE — 99214 OFFICE O/P EST MOD 30 MIN: CPT | Performed by: NURSE PRACTITIONER

## 2018-11-30 RX ORDER — PANTOTHENIC ACID (VIT B5) 500 MG
500 TABLET ORAL DAILY
COMMUNITY

## 2018-11-30 ASSESSMENT — ENCOUNTER SYMPTOMS
GASTROINTESTINAL NEGATIVE: 1
RESPIRATORY NEGATIVE: 1
CONSTITUTIONAL NEGATIVE: 1
NEUROLOGICAL NEGATIVE: 1
EYE PAIN: 0
BRUISES/BLEEDS EASILY: 0
PSYCHIATRIC NEGATIVE: 1
CARDIOVASCULAR NEGATIVE: 1
MUSCULOSKELETAL NEGATIVE: 1
EYE DISCHARGE: 0

## 2018-11-30 NOTE — PROGRESS NOTES
Chief Complaint   Patient presents with   • Follow-Up     GEORGINA, annual         HPI: This patient is a 58 y.o. female, who presents for annual follow-up of obstructive sleep.     She has significant cardiac history including hypertrophic cardiomyopathy, S/P pacemaker 2015, atrial septal reduction performed at [place] in 2000, atrial fibrillation compliant with Xarelto. She follows with cardiology routinely.    PSG March 2017 indicates moderate obstructive sleep apnea with an AHI of 21.3, minimum oxygen saturation of 83 %. she is compliant with auto CPAP 5-15 cm H2O. Compliance download over the past 30 days indicates 86.7 % compliance, average use of 5 hours per night, AHI of 8.4. Patient reports benefiting from therapy.  Her  reports she is still snoring occasionally.  She is using a nasal mask.  She denies excessive fatigue or a.m. headache.  She reports sleeping well.  She uses melatonin for insomnia with subjective benefit.      Past Medical History:   Diagnosis Date   • Anesthesia     wakes up during procedure (x2)   • Aortic regurgitation    • Arrhythmia, ventricular 2008    Symptomatic PVCs, controlled with medication.   • Asthma     inhaler PRN, only uses when sick    • Atrial fibrillation (HCC)    • Cardiac arrhythmia    • Cervical arthritis 2/8/2016   • Chickenpox    • Chronic kidney disease, stage III (moderate) (HCC) 2/9/2016   • CTS (carpal tunnel syndrome) 2/28/2011   • Depression    • GERD (gastroesophageal reflux disease)    • Gynecological disorder    • Heart burn    • Heart murmur    • Hemorrhagic disorder (HCC)     on Xarelto    • Hypertr obst cardiomyop 1991    Dr. Bert Vazquez, [address] [address] [phone] fax [phone].  [place] office [phone].  Alchol Septal Reduction 8/00, Coronaries normal.   • Hypertrophic cardiomyopathy (HCC)    • Indigestion    • Influenza    • Pacemaker 2014   • Renal disorder     chronic kidney disease stage 3-  **pt  states currently no issue, numbers are stable   • Sleep apnea     uses CPAP   • Snoring        Social History   Substance Use Topics   • Smoking status: Never Smoker   • Smokeless tobacco: Never Used   • Alcohol use 0.6 oz/week     1 Standard drinks or equivalent per week      Comment: 3-4 per week       Family History   Problem Relation Age of Onset   • Heart Disease Mother         CHF   • Hypertension Mother    • Stroke Mother    • Heart Failure Mother    • Other Mother         carotid artery disease,gout   • Cancer Father         AML   • Diabetes Maternal Grandmother    • Cancer Maternal Grandfather         Prostate   • Heart Disease Paternal Grandfather 52         MI    • Sleep Apnea Neg Hx        Immunization History   Administered Date(s) Administered   • Influenza (IM) Preservative Free 2018   • Influenza Seasonal Injectable 10/24/2013, 09/15/2014, 10/29/2015   • Influenza TIV (IM) 09/15/2014   • Influenza Vac Subunit Quad Inj (Pf) 2018   • Influenza Vaccine H1N1 2009   • Influenza Vaccine Quad Inj (Preserved) 2016   • Influenza Vaccine Quad Recombinant 2018   • Tdap Vaccine 2016       Current medications as of today   Current Outpatient Prescriptions   Medication Sig Dispense Refill   • Pantothenic Acid 500 MG Tab Take 500 mg by mouth.     • metoprolol (LOPRESSOR) 25 MG Tab Take 1 Tab by mouth 2 times a day. 180 Tab 3   • rivaroxaban (XARELTO) 20 MG Tab tablet Take 1 Tab by mouth every day at 6 PM. 90 Tab 3   • disopyramide (NORPACE) 100 MG Cap Take 1 Cap by mouth every 8 hours. 270 Cap 3   • Melatonin 5 MG TABLET DISPERSIBLE Take  by mouth.     • Fluticasone Propionate (FLONASE NA) Spray  in nose.     • Calcium-Vitamin D-Vitamin K (CALCIUM FOR WOMEN PO) Take 1 Each by mouth every day at 6 PM.     • Lansoprazole (PREVACID PO) Take 1 Each by mouth 1 time daily as needed.     • Magnesium 200 MG TABS Take 1 Tab by mouth every day.     • Multiple Vitamins-Minerals (ALIVE  "ONCE DAILY WOMENS 50+ PO) Take 1 Each by mouth every day at 6 PM.       No current facility-administered medications for this visit.        Allergies: Patient has no known allergies.    Blood pressure 118/78, pulse 60, resp. rate 16, height 1.626 m (5' 4\"), weight 85.3 kg (188 lb), last menstrual period 01/31/2012, SpO2 95 %, not currently breastfeeding.      Review of Systems   Constitutional: Negative.    HENT: Negative.    Eyes: Negative for pain and discharge.   Respiratory: Negative.    Cardiovascular: Negative.    Gastrointestinal: Negative.    Musculoskeletal: Negative.    Skin: Negative.    Neurological: Negative.    Endo/Heme/Allergies: Negative for environmental allergies. Does not bruise/bleed easily.   Psychiatric/Behavioral: Negative.        Physical Exam   Constitutional: She is oriented to person, place, and time and well-developed, well-nourished, and in no distress.   HENT:   Head: Normocephalic and atraumatic.   Eyes: Pupils are equal, round, and reactive to light.   Neck: Normal range of motion. Neck supple. No tracheal deviation present.   Cardiovascular: Normal rate, regular rhythm and normal heart sounds.    Pulmonary/Chest: Effort normal and breath sounds normal.   Musculoskeletal: Normal range of motion.   Neurological: She is alert and oriented to person, place, and time. Gait normal.   Skin: Skin is warm and dry.   Psychiatric: Mood, memory, affect and judgment normal.   Vitals reviewed.      Diagnoses/Plan:    1. Obstructive sleep apnea syndrome  She has been receiving supplies online, ordering them through [employer].  She does not require a prescription.  She will continue auto CPAP nightly, clean mask and tubing weekly, replace supplies as insurance will allow.  I recommended that she try a chinstrap with nasal mask.  Her AHI is mildly elevated at 8, I suspect that she is breathing through her mouth.  If snoring does not resolve with a chinstrap she is recommended to get a full facemask.  " She will follow-up annually for compliance check, sooner if necessary or if symptoms are unresolving.    2. Paroxysmal atrial fibrillation (HCC)  Symptoms are stable, she denies palpitations, she remains compliant with anticoagulation on Xarelto.  She follows with cardiology routinely.    3. Primary insomnia  Continue melatonin OTC as needed          This dictation was created using voice recognition software. The accuracy of the dictation is limited to the abilities of the software. I expect there may be some errors of grammar and possibly content.

## 2019-01-18 ENCOUNTER — APPOINTMENT (OUTPATIENT)
Dept: RADIOLOGY | Facility: MEDICAL CENTER | Age: 59
End: 2019-01-18
Attending: EMERGENCY MEDICINE
Payer: COMMERCIAL

## 2019-01-18 ENCOUNTER — HOSPITAL ENCOUNTER (OUTPATIENT)
Facility: MEDICAL CENTER | Age: 59
End: 2019-01-19
Attending: EMERGENCY MEDICINE | Admitting: HOSPITALIST
Payer: COMMERCIAL

## 2019-01-18 DIAGNOSIS — G45.4 TRANSIENT GLOBAL AMNESIA: ICD-10-CM

## 2019-01-18 PROBLEM — G56.01 CARPAL TUNNEL SYNDROME OF RIGHT WRIST: Status: ACTIVE | Noted: 2019-01-18

## 2019-01-18 LAB
ALBUMIN SERPL BCP-MCNC: 4.7 G/DL (ref 3.2–4.9)
ALBUMIN/GLOB SERPL: 1.7 G/DL
ALP SERPL-CCNC: 81 U/L (ref 30–99)
ALT SERPL-CCNC: 22 U/L (ref 2–50)
ANION GAP SERPL CALC-SCNC: 12 MMOL/L (ref 0–11.9)
APPEARANCE UR: CLEAR
AST SERPL-CCNC: 25 U/L (ref 12–45)
BACTERIA #/AREA URNS HPF: NEGATIVE /HPF
BASOPHILS # BLD AUTO: 0.5 % (ref 0–1.8)
BASOPHILS # BLD: 0.04 K/UL (ref 0–0.12)
BILIRUB SERPL-MCNC: 0.5 MG/DL (ref 0.1–1.5)
BILIRUB UR QL STRIP.AUTO: NEGATIVE
BUN SERPL-MCNC: 15 MG/DL (ref 8–22)
CALCIUM SERPL-MCNC: 9.7 MG/DL (ref 8.5–10.5)
CHLORIDE SERPL-SCNC: 105 MMOL/L (ref 96–112)
CO2 SERPL-SCNC: 24 MMOL/L (ref 20–33)
COLOR UR: YELLOW
CREAT SERPL-MCNC: 1.08 MG/DL (ref 0.5–1.4)
EOSINOPHIL # BLD AUTO: 0.14 K/UL (ref 0–0.51)
EOSINOPHIL NFR BLD: 1.9 % (ref 0–6.9)
EPI CELLS #/AREA URNS HPF: NEGATIVE /HPF
ERYTHROCYTE [DISTWIDTH] IN BLOOD BY AUTOMATED COUNT: 39.8 FL (ref 35.9–50)
GLOBULIN SER CALC-MCNC: 2.8 G/DL (ref 1.9–3.5)
GLUCOSE BLD-MCNC: 91 MG/DL (ref 65–99)
GLUCOSE SERPL-MCNC: 101 MG/DL (ref 65–99)
GLUCOSE UR STRIP.AUTO-MCNC: NEGATIVE MG/DL
HCT VFR BLD AUTO: 46.7 % (ref 37–47)
HGB BLD-MCNC: 15.9 G/DL (ref 12–16)
HYALINE CASTS #/AREA URNS LPF: NORMAL /LPF
IMM GRANULOCYTES # BLD AUTO: 0.01 K/UL (ref 0–0.11)
IMM GRANULOCYTES NFR BLD AUTO: 0.1 % (ref 0–0.9)
INR PPP: 1.29 (ref 0.87–1.13)
KETONES UR STRIP.AUTO-MCNC: NEGATIVE MG/DL
LEUKOCYTE ESTERASE UR QL STRIP.AUTO: ABNORMAL
LYMPHOCYTES # BLD AUTO: 2.43 K/UL (ref 1–4.8)
LYMPHOCYTES NFR BLD: 32.7 % (ref 22–41)
MCH RBC QN AUTO: 29.6 PG (ref 27–33)
MCHC RBC AUTO-ENTMCNC: 34 G/DL (ref 33.6–35)
MCV RBC AUTO: 87 FL (ref 81.4–97.8)
MICRO URNS: ABNORMAL
MONOCYTES # BLD AUTO: 0.45 K/UL (ref 0–0.85)
MONOCYTES NFR BLD AUTO: 6 % (ref 0–13.4)
NEUTROPHILS # BLD AUTO: 4.37 K/UL (ref 2–7.15)
NEUTROPHILS NFR BLD: 58.8 % (ref 44–72)
NITRITE UR QL STRIP.AUTO: NEGATIVE
NRBC # BLD AUTO: 0 K/UL
NRBC BLD-RTO: 0 /100 WBC
PH UR STRIP.AUTO: 6.5 [PH]
PLATELET # BLD AUTO: 231 K/UL (ref 164–446)
PMV BLD AUTO: 10.2 FL (ref 9–12.9)
POTASSIUM SERPL-SCNC: 4.2 MMOL/L (ref 3.6–5.5)
PROT SERPL-MCNC: 7.5 G/DL (ref 6–8.2)
PROT UR QL STRIP: NEGATIVE MG/DL
PROTHROMBIN TIME: 16.2 SEC (ref 12–14.6)
RBC # BLD AUTO: 5.37 M/UL (ref 4.2–5.4)
RBC # URNS HPF: NORMAL /HPF
RBC UR QL AUTO: NEGATIVE
SODIUM SERPL-SCNC: 141 MMOL/L (ref 135–145)
SP GR UR STRIP.AUTO: 1
TROPONIN I SERPL-MCNC: <0.01 NG/ML (ref 0–0.04)
TSH SERPL DL<=0.005 MIU/L-ACNC: 2.55 UIU/ML (ref 0.38–5.33)
UROBILINOGEN UR STRIP.AUTO-MCNC: 0.2 MG/DL
WBC # BLD AUTO: 7.4 K/UL (ref 4.8–10.8)
WBC #/AREA URNS HPF: NORMAL /HPF

## 2019-01-18 PROCEDURE — 81001 URINALYSIS AUTO W/SCOPE: CPT

## 2019-01-18 PROCEDURE — 82962 GLUCOSE BLOOD TEST: CPT

## 2019-01-18 PROCEDURE — 83036 HEMOGLOBIN GLYCOSYLATED A1C: CPT

## 2019-01-18 PROCEDURE — G0378 HOSPITAL OBSERVATION PER HR: HCPCS

## 2019-01-18 PROCEDURE — 70450 CT HEAD/BRAIN W/O DYE: CPT

## 2019-01-18 PROCEDURE — 85610 PROTHROMBIN TIME: CPT

## 2019-01-18 PROCEDURE — 71045 X-RAY EXAM CHEST 1 VIEW: CPT

## 2019-01-18 PROCEDURE — 80053 COMPREHEN METABOLIC PANEL: CPT

## 2019-01-18 PROCEDURE — 700102 HCHG RX REV CODE 250 W/ 637 OVERRIDE(OP): Performed by: HOSPITALIST

## 2019-01-18 PROCEDURE — 84443 ASSAY THYROID STIM HORMONE: CPT

## 2019-01-18 PROCEDURE — 99220 PR INITIAL OBSERVATION CARE,LEVL III: CPT | Performed by: HOSPITALIST

## 2019-01-18 PROCEDURE — 99285 EMERGENCY DEPT VISIT HI MDM: CPT

## 2019-01-18 PROCEDURE — A9270 NON-COVERED ITEM OR SERVICE: HCPCS | Performed by: HOSPITALIST

## 2019-01-18 PROCEDURE — 85025 COMPLETE CBC W/AUTO DIFF WBC: CPT

## 2019-01-18 PROCEDURE — 84484 ASSAY OF TROPONIN QUANT: CPT

## 2019-01-18 RX ORDER — PROMETHAZINE HYDROCHLORIDE 25 MG/1
12.5-25 SUPPOSITORY RECTAL EVERY 4 HOURS PRN
Status: DISCONTINUED | OUTPATIENT
Start: 2019-01-18 | End: 2019-01-19 | Stop reason: HOSPADM

## 2019-01-18 RX ORDER — MECOBALAMIN 5000 MCG
15 TABLET,DISINTEGRATING ORAL DAILY
Status: DISCONTINUED | OUTPATIENT
Start: 2019-01-19 | End: 2019-01-18

## 2019-01-18 RX ORDER — PROMETHAZINE HYDROCHLORIDE 25 MG/1
12.5-25 TABLET ORAL EVERY 4 HOURS PRN
Status: DISCONTINUED | OUTPATIENT
Start: 2019-01-18 | End: 2019-01-19 | Stop reason: HOSPADM

## 2019-01-18 RX ORDER — DISOPYRAMIDE PHOSPHATE 100 MG/1
100 CAPSULE ORAL EVERY 8 HOURS
Status: DISCONTINUED | OUTPATIENT
Start: 2019-01-18 | End: 2019-01-19 | Stop reason: HOSPADM

## 2019-01-18 RX ORDER — SODIUM CHLORIDE 9 MG/ML
INJECTION, SOLUTION INTRAVENOUS CONTINUOUS
Status: DISCONTINUED | OUTPATIENT
Start: 2019-01-18 | End: 2019-01-18

## 2019-01-18 RX ORDER — BISACODYL 10 MG
10 SUPPOSITORY, RECTAL RECTAL
Status: DISCONTINUED | OUTPATIENT
Start: 2019-01-18 | End: 2019-01-19 | Stop reason: HOSPADM

## 2019-01-18 RX ORDER — ONDANSETRON 2 MG/ML
4 INJECTION INTRAMUSCULAR; INTRAVENOUS EVERY 4 HOURS PRN
Status: DISCONTINUED | OUTPATIENT
Start: 2019-01-18 | End: 2019-01-19 | Stop reason: HOSPADM

## 2019-01-18 RX ORDER — OMEPRAZOLE 20 MG/1
20 CAPSULE, DELAYED RELEASE ORAL DAILY
Status: DISCONTINUED | OUTPATIENT
Start: 2019-01-19 | End: 2019-01-19 | Stop reason: HOSPADM

## 2019-01-18 RX ORDER — ATORVASTATIN CALCIUM 40 MG/1
40 TABLET, FILM COATED ORAL EVERY EVENING
Status: DISCONTINUED | OUTPATIENT
Start: 2019-01-18 | End: 2019-01-19 | Stop reason: HOSPADM

## 2019-01-18 RX ORDER — POLYETHYLENE GLYCOL 3350 17 G/17G
1 POWDER, FOR SOLUTION ORAL
Status: DISCONTINUED | OUTPATIENT
Start: 2019-01-18 | End: 2019-01-19 | Stop reason: HOSPADM

## 2019-01-18 RX ORDER — ONDANSETRON 4 MG/1
4 TABLET, ORALLY DISINTEGRATING ORAL EVERY 4 HOURS PRN
Status: DISCONTINUED | OUTPATIENT
Start: 2019-01-18 | End: 2019-01-19 | Stop reason: HOSPADM

## 2019-01-18 RX ORDER — AMOXICILLIN 250 MG
2 CAPSULE ORAL 2 TIMES DAILY
Status: DISCONTINUED | OUTPATIENT
Start: 2019-01-18 | End: 2019-01-19 | Stop reason: HOSPADM

## 2019-01-18 RX ORDER — ACETAMINOPHEN 325 MG/1
650 TABLET ORAL EVERY 6 HOURS PRN
Status: DISCONTINUED | OUTPATIENT
Start: 2019-01-18 | End: 2019-01-19 | Stop reason: HOSPADM

## 2019-01-18 RX ADMIN — ACETAMINOPHEN 650 MG: 325 TABLET, FILM COATED ORAL at 21:23

## 2019-01-18 RX ADMIN — DISOPYRAMIDE PHOSPHATE 100 MG: 100 CAPSULE ORAL at 21:23

## 2019-01-18 RX ADMIN — STANDARDIZED SENNA CONCENTRATE AND DOCUSATE SODIUM 2 TABLET: 8.6; 5 TABLET, FILM COATED ORAL at 20:03

## 2019-01-18 RX ADMIN — ATORVASTATIN CALCIUM 40 MG: 40 TABLET, FILM COATED ORAL at 20:03

## 2019-01-18 RX ADMIN — RIVAROXABAN 20 MG: 20 TABLET, FILM COATED ORAL at 20:03

## 2019-01-18 ASSESSMENT — COGNITIVE AND FUNCTIONAL STATUS - GENERAL
SUGGESTED CMS G CODE MODIFIER DAILY ACTIVITY: CH
DAILY ACTIVITIY SCORE: 24
SUGGESTED CMS G CODE MODIFIER MOBILITY: CH
MOBILITY SCORE: 24

## 2019-01-18 ASSESSMENT — ENCOUNTER SYMPTOMS
TINGLING: 1
DIZZINESS: 0
LOSS OF CONSCIOUSNESS: 0
PSYCHIATRIC NEGATIVE: 1
WEAKNESS: 0
FOCAL WEAKNESS: 0
FEVER: 0
FALLS: 0
VOMITING: 0
HALLUCINATIONS: 0
SEIZURES: 0
EYE PAIN: 0
NAUSEA: 0
HEADACHES: 0
ABDOMINAL PAIN: 0
PALPITATIONS: 0
CHILLS: 0
SHORTNESS OF BREATH: 0

## 2019-01-18 ASSESSMENT — PAIN SCALES - GENERAL
PAINLEVEL_OUTOF10: 0
PAINLEVEL_OUTOF10: 2
PAINLEVEL_OUTOF10: 0

## 2019-01-18 ASSESSMENT — LIFESTYLE VARIABLES
EVER HAD A DRINK FIRST THING IN THE MORNING TO STEADY YOUR NERVES TO GET RID OF A HANGOVER: NO
HOW MANY TIMES IN THE PAST YEAR HAVE YOU HAD 5 OR MORE DRINKS IN A DAY: 0
TOTAL SCORE: 0
HAVE PEOPLE ANNOYED YOU BY CRITICIZING YOUR DRINKING: NO
SUBSTANCE_ABUSE: 0
ALCOHOL_USE: YES
HAVE YOU EVER FELT YOU SHOULD CUT DOWN ON YOUR DRINKING: NO
EVER FELT BAD OR GUILTY ABOUT YOUR DRINKING: NO
ON A TYPICAL DAY WHEN YOU DRINK ALCOHOL HOW MANY DRINKS DO YOU HAVE: 1
AVERAGE NUMBER OF DAYS PER WEEK YOU HAVE A DRINK CONTAINING ALCOHOL: 1
TOTAL SCORE: 0
TOTAL SCORE: 0
CONSUMPTION TOTAL: NEGATIVE

## 2019-01-18 ASSESSMENT — PATIENT HEALTH QUESTIONNAIRE - PHQ9
2. FEELING DOWN, DEPRESSED, IRRITABLE, OR HOPELESS: NOT AT ALL
1. LITTLE INTEREST OR PLEASURE IN DOING THINGS: NOT AT ALL
SUM OF ALL RESPONSES TO PHQ9 QUESTIONS 1 AND 2: 0

## 2019-01-18 NOTE — ED PROVIDER NOTES
ED Provider Note    CHIEF COMPLAINT  Chief Complaint   Patient presents with   • Altered Mental Status     at 11 am this morning; not remembering events, doesnt remember getting dress this morning or talking with son or any other events, frequent reoientation needed per ,       HPI  Seda Cagle is a 58 y.o. female who presents for evaluation of altered mental status.  Patient is here with her .  She woke this morning and felt absolutely fine.  She does have a history of atrial fibrillation and is on Xarelto.  She also has a pacemaker in place.  At 11 AM this morning she had acute onset of confusion.  She started having repetitive questioning.  She denies headache.  She has had some recent cold symptoms but states that today she felt as if she was completely better.  She denies head trauma.  She has had no fevers or chills.  She has a period of approximately 2 hours where she has no recollection of what transpired.  Her  states that she was having repetitive questioning.  On the drive to the emergency department she started having memories but still has a complete blackout of approximately 2 hours.  She denies any numbness or motor weakness.  She states that over the past week or so she has had some intermittent numbness in her right hand.    REVIEW OF SYSTEMS  See HPI for further details. All other systems negative.    PAST MEDICAL HISTORY  Past Medical History:   Diagnosis Date   • Anesthesia     wakes up during procedure (x2)   • Aortic regurgitation    • Arrhythmia, ventricular 2008    Symptomatic PVCs, controlled with medication.   • Asthma     inhaler PRN, only uses when sick    • Atrial fibrillation (HCC)    • Cardiac arrhythmia    • Cervical arthritis 2/8/2016   • Chickenpox    • Chronic kidney disease, stage III (moderate) (HCC) 2/9/2016   • CTS (carpal tunnel syndrome) 2/28/2011   • Depression    • GERD (gastroesophageal reflux disease)    • Gynecological disorder    •  Heart burn    • Heart murmur    • Hemorrhagic disorder (HCC)     on Xarelto    • Hypertr obst cardiomyop     Dr. Bert Vazquez, [address] [facility] [address], [place] [address] [phone] fax [phone].  [facility] [phone].  Alchol Septal Reduction 8, Coronaries normal.   • Hypertrophic cardiomyopathy (HCC)    • Indigestion    • Influenza    • Pacemaker    • Renal disorder     chronic kidney disease stage 3-  **pt states currently no issue, numbers are stable   • Sleep apnea     uses CPAP   • Snoring        FAMILY HISTORY  Family History   Problem Relation Age of Onset   • Heart Disease Mother         CHF   • Hypertension Mother    • Stroke Mother    • Heart Failure Mother    • Other Mother         carotid artery disease,gout   • Cancer Father         AML   • Diabetes Maternal Grandmother    • Cancer Maternal Grandfather         Prostate   • Heart Disease Paternal Grandfather 52         MI    • Sleep Apnea Neg Hx        SOCIAL HISTORY  Social History     Social History   • Marital status:      Spouse name: N/A   • Number of children: N/A   • Years of education: N/A     Social History Main Topics   • Smoking status: Never Smoker   • Smokeless tobacco: Never Used   • Alcohol use 0.6 oz/week     1 Standard drinks or equivalent per week      Comment: 3-4 per week   • Drug use: No   • Sexual activity: Yes     Partners: Male     Birth control/ protection: Surgical      Comment:      Other Topics Concern   • Not on file     Social History Narrative   • No narrative on file       SURGICAL HISTORY  Past Surgical History:   Procedure Laterality Date   • HYSTEROSCOPY WITH VIDEO OPERATIVE  2018    Procedure: HYSTEROSCOPY WITH VIDEO OPERATIVE;  Surgeon: Noemi Liu M.D.;  Location: SURGERY SAME DAY [facility];  Service: Labor and Delivery   • DILATION AND CURETTAGE  2018    Procedure: DILATION AND CURETTAGE;  Surgeon: Noemi Liu M.D.;  Location: SURGERY  "SAME DAY [facility];  Service: Labor and Delivery   • BREAST BIOPSY Left 1/30/2018    Procedure: BREAST BIOPSY- WIRE LOCALIZED;  Surgeon: Vijaya Britt M.D.;  Location: SURGERY SAME DAY [facility];  Service: General   • RECOVERY  4/15/2015    Performed by [facility] at SURGERY PRE-POST PROC UNIT [facility]   • PACEMAKER INSERTION  2015   • RECOVERY  12/12/2014    Performed by [facility] at SURGERY SAME DAY [facility]   • SEPTAL RECONSTRUCTION  2000   • ENDOSCOPY     • PRIMARY C SECTION     • TUBAL COAGULATION LAPAROSCOPIC BILATERAL         CURRENT MEDICATIONS  Home Medications     Reviewed by Moriah Garnica R.N. (Registered Nurse) on 01/18/19 at 1349  Med List Status: Partial   Medication Last Dose Status   Calcium-Vitamin D-Vitamin K (CALCIUM FOR WOMEN PO) 1/17/2019 Active   disopyramide (NORPACE) 100 MG Cap 1/17/2019 Active   Fluticasone Propionate (FLONASE NA) 1/17/2019 Active   Lansoprazole (PREVACID PO) 1/17/2019 Active   Magnesium 200 MG TABS 1/17/2019 Active   Melatonin 5 MG TABLET DISPERSIBLE 1/17/2019 Active   metoprolol (LOPRESSOR) 25 MG Tab 1/17/2019 Active   Multiple Vitamins-Minerals (ALIVE ONCE DAILY WOMENS 50+ PO) 1/17/2019 Active   Pantothenic Acid 500 MG Tab 1/17/2019 Active   rivaroxaban (XARELTO) 20 MG Tab tablet 1/17/2019 Active                ALLERGIES  No Known Allergies    PHYSICAL EXAM  VITAL SIGNS: BP (!) 184/84   Pulse 60   Temp 36.6 °C (97.9 °F) (Temporal)   Resp 15   Ht 1.626 m (5' 4\")   Wt 86.8 kg (191 lb 5.8 oz)   LMP 01/31/2012   SpO2 91%   BMI 32.85 kg/m²   Constitutional: Well developed, Well nourished, No acute distress, Non-toxic appearance.   HENT: Normocephalic, Atraumatic.  Eyes: PERRL, EOMI, Conjunctiva normal, No discharge.   Cardiovascular: Normal heart rate, Normal rhythm, No murmurs, No rubs, No gallops.   Thorax & Lungs: Clear to auscultation without wheezes, rales, or rhonchi.    Abdomen: Soft and nontender.   Skin: Warm, " Dry.  Musculoskeletal: Good range of motion in all major joints. No tenderness to palpation or major deformities noted.   Neurologic: Alert & oriented x 3, Normal motor function, Normal sensory function, No focal deficits noted.  No facial asymmetry.  No slurring of her speech.  No upper or lower extremity ataxia.  She has an NIH stroke scale of 0.          RADIOLOGY/PROCEDURES  CT-HEAD W/O   Final Result      1.  Head CT without contrast within normal limits. No evidence of acute cerebral infarction, hemorrhage or mass lesion.   2.  BILATERAL maxillary sinus disease      DX-CHEST-PORTABLE (1 VIEW)   Final Result      No radiographic evidence of acute cardiopulmonary process.            COURSE & MEDICAL DECISION MAKING  Pertinent Labs & Imaging studies reviewed. (See chart for details)  Is a 58-year-old here for evaluation of confusion.  Based on her history of 2 hours of amnesia and resolution at the time of my evaluation I was concerned about the possibility of TIA and transient global amnesia.  On my evaluation she has an NIH stroke scale of 0.  Laboratories are obtained to include chemistries which are normal.  Troponin I is negative.  Urinalysis is positive only for trace leukocyte esterase with a negative microscopic.  INR is 1.29.  CBC is normal.  Chest x-ray shows no acute cardiopulmonary process.  CT scan of the head without contrast is within normal limits with the exception of bilateral maxillary sinus disease.  I discussed the results of all the studies with the patient.  Upon repeat evaluation she states she is feeling fine at this time and her memory seems to be back to normal.  She will require hospitalization for further evaluation.  Unfortunately she states that her pacemaker is not MRI compatible.  I have discussed the case with Dr. Romano who is the hospitalist and she will be the primary admitting physician.    FINAL IMPRESSION  1.  TIA versus transient global amnesia  2.   3.          Electronically signed by: Harrison Sales, 1/18/2019 2:59 PM

## 2019-01-18 NOTE — ED TRIAGE NOTES
Chief Complaint   Patient presents with   • Altered Mental Status     at 11 am this morning; not remembering events, doesnt remember getting dress this morning or talking with son or any other events, frequent reoientation needed per ,     Pt ambulated to triage with .  No neuro deficits.  Charge RN informed of pt.  Pt to Red 5.

## 2019-01-18 NOTE — ED NOTES
Pt remains resting in bed at this time.  No needs verbalized.  Will continue to monitor.  Family present.

## 2019-01-19 ENCOUNTER — APPOINTMENT (OUTPATIENT)
Dept: RADIOLOGY | Facility: MEDICAL CENTER | Age: 59
End: 2019-01-19
Attending: HOSPITALIST
Payer: COMMERCIAL

## 2019-01-19 VITALS
WEIGHT: 200.62 LBS | RESPIRATION RATE: 17 BRPM | HEART RATE: 65 BPM | SYSTOLIC BLOOD PRESSURE: 129 MMHG | BODY MASS INDEX: 34.25 KG/M2 | DIASTOLIC BLOOD PRESSURE: 62 MMHG | HEIGHT: 64 IN | TEMPERATURE: 97.5 F | OXYGEN SATURATION: 94 %

## 2019-01-19 LAB
ANION GAP SERPL CALC-SCNC: 10 MMOL/L (ref 0–11.9)
BUN SERPL-MCNC: 19 MG/DL (ref 8–22)
CALCIUM SERPL-MCNC: 9.3 MG/DL (ref 8.5–10.5)
CHLORIDE SERPL-SCNC: 106 MMOL/L (ref 96–112)
CHOLEST SERPL-MCNC: 183 MG/DL (ref 100–199)
CO2 SERPL-SCNC: 22 MMOL/L (ref 20–33)
CREAT SERPL-MCNC: 1.02 MG/DL (ref 0.5–1.4)
EST. AVERAGE GLUCOSE BLD GHB EST-MCNC: 105 MG/DL
GLUCOSE SERPL-MCNC: 105 MG/DL (ref 65–99)
HBA1C MFR BLD: 5.3 % (ref 0–5.6)
HDLC SERPL-MCNC: 45 MG/DL
LDLC SERPL CALC-MCNC: 106 MG/DL
POTASSIUM SERPL-SCNC: 3.8 MMOL/L (ref 3.6–5.5)
SODIUM SERPL-SCNC: 138 MMOL/L (ref 135–145)
TRIGL SERPL-MCNC: 160 MG/DL (ref 0–149)

## 2019-01-19 PROCEDURE — G0378 HOSPITAL OBSERVATION PER HR: HCPCS

## 2019-01-19 PROCEDURE — 80048 BASIC METABOLIC PNL TOTAL CA: CPT

## 2019-01-19 PROCEDURE — 36415 COLL VENOUS BLD VENIPUNCTURE: CPT

## 2019-01-19 PROCEDURE — 700117 HCHG RX CONTRAST REV CODE 255: Performed by: HOSPITALIST

## 2019-01-19 PROCEDURE — 80061 LIPID PANEL: CPT

## 2019-01-19 PROCEDURE — 97535 SELF CARE MNGMENT TRAINING: CPT

## 2019-01-19 PROCEDURE — A9270 NON-COVERED ITEM OR SERVICE: HCPCS | Performed by: HOSPITALIST

## 2019-01-19 PROCEDURE — 93880 EXTRACRANIAL BILAT STUDY: CPT

## 2019-01-19 PROCEDURE — 99217 PR OBSERVATION CARE DISCHARGE: CPT | Performed by: INTERNAL MEDICINE

## 2019-01-19 PROCEDURE — 97161 PT EVAL LOW COMPLEX 20 MIN: CPT

## 2019-01-19 PROCEDURE — 70496 CT ANGIOGRAPHY HEAD: CPT

## 2019-01-19 PROCEDURE — 700102 HCHG RX REV CODE 250 W/ 637 OVERRIDE(OP): Performed by: HOSPITALIST

## 2019-01-19 RX ORDER — ASPIRIN 81 MG/1
81 TABLET ORAL DAILY
Qty: 30 TAB | Refills: 0 | Status: SHIPPED | OUTPATIENT
Start: 2019-01-20 | End: 2019-01-19

## 2019-01-19 RX ORDER — ATORVASTATIN CALCIUM 40 MG/1
40 TABLET, FILM COATED ORAL EVERY EVENING
Qty: 30 TAB | Refills: 0 | Status: SHIPPED | OUTPATIENT
Start: 2019-01-19 | End: 2019-02-14 | Stop reason: SDUPTHER

## 2019-01-19 RX ADMIN — ACETAMINOPHEN 650 MG: 325 TABLET, FILM COATED ORAL at 07:56

## 2019-01-19 RX ADMIN — DISOPYRAMIDE PHOSPHATE 100 MG: 100 CAPSULE ORAL at 06:06

## 2019-01-19 RX ADMIN — ASPIRIN 81 MG: 81 TABLET, COATED ORAL at 06:06

## 2019-01-19 RX ADMIN — STANDARDIZED SENNA CONCENTRATE AND DOCUSATE SODIUM 2 TABLET: 8.6; 5 TABLET, FILM COATED ORAL at 06:09

## 2019-01-19 RX ADMIN — IOHEXOL 80 ML: 350 INJECTION, SOLUTION INTRAVENOUS at 00:20

## 2019-01-19 ASSESSMENT — COGNITIVE AND FUNCTIONAL STATUS - GENERAL
SUGGESTED CMS G CODE MODIFIER MOBILITY: CH
MOBILITY SCORE: 24
DAILY ACTIVITIY SCORE: 24
SUGGESTED CMS G CODE MODIFIER DAILY ACTIVITY: CH

## 2019-01-19 ASSESSMENT — PAIN SCALES - GENERAL: PAINLEVEL_OUTOF10: 2

## 2019-01-19 ASSESSMENT — GAIT ASSESSMENTS: GAIT LEVEL OF ASSIST: SUPERVISED

## 2019-01-19 NOTE — ED NOTES
Pt remains resting in bed at this time.  Pt provided with additional fluids per request.  Will continue to monitor.

## 2019-01-19 NOTE — H&P
"Hospital Medicine History & Physical Note    Date of Service  1/18/2019    Primary Care Physician  uJve Stoddard M.D.    Consultants  None    Code Status  Full    Chief Complaint  Amnesia     History of Presenting Illness  58 y.o. female with Afib on Xarelto who presented 1/18/2019 with amnesia for ~2 hours.  Normal state of health, recently got over a \"head cold\" and was attempting to exercise again.  She had just gotten off the phone with her son in [place] as it was her grandchild's birthday, she remembers hanging up and then nothing for roughly 2 hours afterwards.   came into the room, saw her with her phone in her hand, sitting on the medicine ball and was completely confused.  She was repetitive in her statements, for example \"did I talk to Abilio?\" (her son) over and over for 20-30 times.  She was able to dress herself and walk around the house without issue.   became concerned and drove her to the ER.  In route she started to act like her normal self again but still did not remember the last 2 hours.    On evaluation her NIHSS was 0.    Review of Systems  Review of Systems   Constitutional: Positive for malaise/fatigue. Negative for chills and fever.   HENT: Negative for congestion.    Eyes: Negative for pain.   Respiratory: Negative for shortness of breath.    Cardiovascular: Negative for chest pain, palpitations and leg swelling.   Gastrointestinal: Negative for abdominal pain, nausea and vomiting.   Genitourinary: Negative for dysuria.   Musculoskeletal: Negative for falls.   Neurological: Positive for tingling (Intermittent, right hand). Negative for dizziness, focal weakness, seizures, loss of consciousness, weakness and headaches.   Psychiatric/Behavioral: Negative.  Negative for hallucinations and substance abuse.   All other systems reviewed and are negative.      Past Medical History   has a past medical history of Anesthesia; Aortic regurgitation; Arrhythmia, ventricular (2008); " Asthma; Atrial fibrillation (HCC); Cardiac arrhythmia; Cervical arthritis (2/8/2016); Chickenpox; Chronic kidney disease, stage III (moderate) (HCC) (2/9/2016); CTS (carpal tunnel syndrome) (2/28/2011); Depression; GERD (gastroesophageal reflux disease); Gynecological disorder; Heart burn; Heart murmur; Hemorrhagic disorder (HCC); Hypertr obst cardiomyop (1991); Hypertrophic cardiomyopathy (HCC); Indigestion; Influenza; Pacemaker (2014); Renal disorder; Sleep apnea; and Snoring. She also has no past medical history of Addisons disease (HCC); Adrenal disorder (HCC); Anemia; Anxiety; ASTHMA; Blood transfusion; Clotting disorder (HCC); COPD; Cushings syndrome (HCC); Goiter; Headache(784.0); HIV (human immunodeficiency virus infection); IBD (inflammatory bowel disease); Infectious disease; Migraine; OSTEOPOROSIS; Parathyroid disorder (HCC); Pituitary disease (HCC); Substance abuse (HCC); or Ulcer.    Surgical History   has a past surgical history that includes primary c section; tubal coagulation laparoscopic bilateral; recovery (12/12/2014); recovery (4/15/2015); pacemaker insertion (2015); septal reconstruction (2000); endoscopy; breast biopsy (Left, 1/30/2018); hysteroscopy with video operative (4/30/2018); and dilation and curettage (4/30/2018).     Family History  family history includes Cancer in her father and maternal grandfather; Diabetes in her maternal grandmother; Heart Disease in her mother; Heart Disease (age of onset: 52) in her paternal grandfather; Heart Failure in her mother; Hypertension in her mother; Other in her mother; Stroke in her mother.   Mom with CVA.  No history of seizures.  Father with leukemia.    Social History   reports that she has never smoked. She has never used smokeless tobacco. She reports that she drinks about 0.6 oz of alcohol per week . She reports that she does not use drugs.  Denies tobacco or illicit drug use.  Social, rare alcohol.    Allergies  No Known  Allergies    Medications  Prior to Admission Medications   Prescriptions Last Dose Informant Patient Reported? Taking?   Calcium-Vitamin D-Vitamin K (CALCIUM FOR WOMEN PO) 1/17/2019 at am Patient Yes No   Sig: Take 1 Each by mouth every day at 6 PM.   Fluticasone Propionate (FLONASE NA) 1/17/2019 at Unknown time  Yes No   Sig: Spray  in nose.   Lansoprazole (PREVACID PO) 1/17/2019 at Unknown time Patient Yes No   Sig: Take 1 Each by mouth 1 time daily as needed.   Magnesium 200 MG TABS 1/17/2019 at Unknown time Patient Yes No   Sig: Take 1 Tab by mouth every day.   Melatonin 5 MG TABLET DISPERSIBLE 1/17/2019 at Unknown time  Yes No   Sig: Take  by mouth.   Multiple Vitamins-Minerals (ALIVE ONCE DAILY WOMENS 50+ PO) 1/17/2019 at Unknown time Patient Yes No   Sig: Take 1 Each by mouth every day at 6 PM.   Pantothenic Acid 500 MG Tab 1/17/2019 at Unknown time  Yes No   Sig: Take 500 mg by mouth.   disopyramide (NORPACE) 100 MG Cap 1/17/2019 at am  No No   Sig: Take 1 Cap by mouth every 8 hours.   metoprolol (LOPRESSOR) 25 MG Tab 1/17/2019 at Unknown time  No No   Sig: Take 1 Tab by mouth 2 times a day.   rivaroxaban (XARELTO) 20 MG Tab tablet 1/17/2019 at Unknown time  No No   Sig: Take 1 Tab by mouth every day at 6 PM.      Facility-Administered Medications: None       Physical Exam  Temp:  [36.6 °C (97.9 °F)] 36.6 °C (97.9 °F)  Pulse:  [57-61] 57  Resp:  [15-21] 18  BP: (184)/(84) 184/84  SpO2:  [91 %-99 %] 94 %    Physical Exam   Constitutional: She is oriented to person, place, and time. She appears well-developed. No distress.   HENT:   Head: Normocephalic.   Mouth/Throat: Oropharynx is clear and moist.   Eyes: Pupils are equal, round, and reactive to light. EOM are normal. No scleral icterus.   Neck: Normal range of motion. No tracheal deviation present.   Cardiovascular: Normal rate and intact distal pulses.    Pulmonary/Chest: Effort normal and breath sounds normal. No stridor. No respiratory distress. She has  no rales.   Abdominal: Soft. Bowel sounds are normal. She exhibits no distension. There is no tenderness.   Musculoskeletal: She exhibits no edema.   Neurological: She is alert and oriented to person, place, and time. She has normal strength. She is not disoriented. No cranial nerve deficit or sensory deficit.   No word finding difficulties   Skin: Skin is warm and dry. She is not diaphoretic.   Psychiatric: She has a normal mood and affect. Her behavior is normal.   Vitals reviewed.      Laboratory:  Recent Labs      01/18/19   1409   WBC  7.4   RBC  5.37   HEMOGLOBIN  15.9   HEMATOCRIT  46.7   MCV  87.0   MCH  29.6   MCHC  34.0   RDW  39.8   PLATELETCT  231   MPV  10.2     Recent Labs      01/18/19   1409   SODIUM  141   POTASSIUM  4.2   CHLORIDE  105   CO2  24   GLUCOSE  101*   BUN  15   CREATININE  1.08   CALCIUM  9.7     Recent Labs      01/18/19   1409   ALTSGPT  22   ASTSGOT  25   ALKPHOSPHAT  81   TBILIRUBIN  0.5   GLUCOSE  101*     Recent Labs      01/18/19   1409   INR  1.29*             Recent Labs      01/18/19   1409   TROPONINI  <0.01       Urinalysis:    Recent Labs      01/18/19   1409   SPECGRAVITY  1.005   GLUCOSEUR  Negative   KETONES  Negative   NITRITE  Negative   LEUKESTERAS  Trace*   WBCURINE  0-2   RBCURINE  0-2   BACTERIA  Negative   EPITHELCELL  Negative        Imaging:  CT-HEAD W/O   Final Result      1.  Head CT without contrast within normal limits. No evidence of acute cerebral infarction, hemorrhage or mass lesion.   2.  BILATERAL maxillary sinus disease      DX-CHEST-PORTABLE (1 VIEW)   Final Result      No radiographic evidence of acute cardiopulmonary process.      US-CAROTID DOPPLER BILAT    (Results Pending)   CT-CTA HEAD WITH & W/O-POST PROCESS    (Results Pending)         Assessment/Plan:  I anticipate this patient is appropriate for observation status at this time.    * Transient global amnesia- (present on admission)   Assessment & Plan    TIA versus transient global amnesia.   Will further risk stratify.  CT head normal, unfortunately pacemaker is not compatible with MRI.  Urinalysis negative  -Permissive hypertension, can resume home medications tomorrow  -CTA head and neck pending  -Carotid Doppler  -TSH, lipid panel, A1c pending  -Last echo performed in September 2018, will not repeat at this time  -Neurochecks, continuous telemetry  -PT/OT evaluation pending  -We will not need rehab consultation or referral as her symptoms have resolved and she has returned to baseline     Paroxysmal atrial fibrillation (HCC)- (present on admission)   Assessment & Plan    Follows with electrophysiology, last in Feb 2018.  Rate is currently controlled.  Has pacemaker in place  -Resume Norpace, Lopressor after permissive hypertension  -Continue Xarelto     Obesity (BMI 30-39.9)- (present on admission)   Assessment & Plan    Body mass index is 34.44 kg/m².     Sleep apnea- (present on admission)   Assessment & Plan    Sleep apnea diagnosed in 2017, has CPAP machine at home  -CPAP at night as tolerated     Dyslipidemia- (present on admission)   Assessment & Plan    Currently taking a statin at home.  -Updated lipid panel pending  -Start atorvastatin 40 mg     Carpal tunnel syndrome of right wrist- (present on admission)   Assessment & Plan    Reports long-standing history of carpal tunnel syndrome, mostly affecting her right hand.  States that she has not had symptoms in quite some time as she is not doing any repetitive motions.  Unfortunately she states that over the last month she has had intermittent tingling of her right hand.     Chronic kidney disease, stage III (moderate) (HCC)- (present on admission)   Assessment & Plan    Stable, at baseline.  Creatinine 1.08 with GFR 52  -Renally dose medications  -Avoid nephrotoxic agents     Gastroesophageal reflux disease without esophagitis- (present on admission)   Assessment & Plan    Stable, no issues at this time  -Continue substitute for home  Prevacid         VTE prophylaxis: Xarelto

## 2019-01-19 NOTE — ASSESSMENT & PLAN NOTE
TIA versus transient global amnesia.  Will further risk stratify.  CT head normal, unfortunately pacemaker is not compatible with MRI.  Urinalysis negative  -Permissive hypertension, can resume home medications tomorrow  -CTA head and neck pending  -Carotid Doppler  -TSH, lipid panel, A1c pending  -Last echo performed in September 2018, will not repeat at this time  -Neurochecks, continuous telemetry  -PT/OT evaluation pending  -We will not need rehab consultation or referral as her symptoms have resolved and she has returned to baseline

## 2019-01-19 NOTE — CARE PLAN
Problem: Communication  Goal: The ability to communicate needs accurately and effectively will improve    Intervention: [place] patient and significant other/support system to call light to alert staff of needs  Able to make needs known, instructed to call for assistance      Problem: Safety  Goal: Will remain free from injury    Intervention: Provide assistance with mobility  Pt up ad khai, steady gait

## 2019-01-19 NOTE — ASSESSMENT & PLAN NOTE
Reports long-standing history of carpal tunnel syndrome, mostly affecting her right hand.  States that she has not had symptoms in quite some time as she is not doing any repetitive motions.  Unfortunately she states that over the last month she has had intermittent tingling of her right hand.

## 2019-01-19 NOTE — THERAPY
OT referral received. Per pt she had no motor changes with symptoms and all initial mentation issues have resolved. Per RN and pt she is up self and completing ADL without assist. Pt is A+Ox4. Spouse available to supv as needed. Appears back to baseline from OT standpoint. Formal eval not indicated. Education only.

## 2019-01-19 NOTE — CARE PLAN
Problem: Safety  Goal: Will remain free from injury  Outcome: PROGRESSING AS EXPECTED  Treaded socks on, up to self, 2 side rails up, clutter free environment     Problem: Knowledge Deficit  Goal: Knowledge of disease process/condition, treatment plan, diagnostic tests, and medications will improve  Outcome: PROGRESSING AS EXPECTED  Educating on condition and tests, verbalizes understanding

## 2019-01-19 NOTE — PROGRESS NOTES
Assumed care of pt. A/Ox4, up self. No deficits noted, pt states no issues with memory recently. Regular diet. PIV in right AC, saline locked. POC home today pending carotid results. Call light in reach, bed in low position, will continue hourly rounding.

## 2019-01-19 NOTE — PROGRESS NOTES
Monitor Summary: Paced/SR 61-79, with a-fib conversion @2200,with rare PVC,occasional PAC per strip from monitor room

## 2019-01-19 NOTE — THERAPY
"Physical Therapy Evaluation completed.   Bed Mobility:  Supine to Sit: Modified Independent  Transfers: Sit to Stand: Supervised  Gait: Level Of Assist: Supervised with No Equipment Needed       Plan of Care: Patient with no further skilled PT needs in the acute care setting at this time  Discharge Recommendations: Equipment: No Equipment Needed.     Pt presents with c/c of amensia. Negative imagining; did have significant HTN with minimal activity during session, reaching 182/82 with standing marching; pt usually exercises prior to BP medications at home and had not taken them prior to event; also reports starting a keto diet with recent 15lb weight loss; encouraged pt to exercise after BP medication administration and to utilize talk test rather than HR elevation as a monitor unless otherwise instructed by MD; encouraged discussion with cardiologist re: managing lopressor in setting of weight loss and advancing age (self reports hasn't been adjusted since 2000); otherwise discussed tertiary prevention of TIA as it relates to physical activity; no further acute PT needs at this time, no PT needs identified at [place].    See \"Rehab Therapy-Acute\" Patient Summary Report for complete documentation.     "

## 2019-01-19 NOTE — ASSESSMENT & PLAN NOTE
Stable, at baseline.  Creatinine 1.08 with GFR 52  -Renally dose medications  -Avoid nephrotoxic agents

## 2019-01-19 NOTE — ASSESSMENT & PLAN NOTE
Follows with electrophysiology, last in Feb 2018.  Rate is currently controlled.  Has pacemaker in place  -Resume Norpace, Lopressor after permissive hypertension  -Continue Xarelto

## 2019-01-20 PROBLEM — H93.8X1 SENSATION OF FULLNESS IN RIGHT EAR: Status: RESOLVED | Noted: 2018-11-01 | Resolved: 2019-01-20

## 2019-01-20 PROBLEM — I87.2 CHRONIC VENOUS STASIS DERMATITIS: Status: RESOLVED | Noted: 2018-11-01 | Resolved: 2019-01-20

## 2019-01-20 PROBLEM — E66.9 OBESITY (BMI 30-39.9): Status: RESOLVED | Noted: 2018-07-17 | Resolved: 2019-01-20

## 2019-01-28 NOTE — ADDENDUM NOTE
Encounter addended by: Caterina Daniel R.N. on: 1/27/2019  4:32 PM<BR>    Actions taken: Utilization Review saved, Flowsheet accepted

## 2019-01-29 ENCOUNTER — TELEPHONE (OUTPATIENT)
Dept: CARDIOLOGY | Facility: MEDICAL CENTER | Age: 59
End: 2019-01-29

## 2019-01-29 ENCOUNTER — OFFICE VISIT (OUTPATIENT)
Dept: CARDIOLOGY | Facility: MEDICAL CENTER | Age: 59
End: 2019-01-29
Payer: COMMERCIAL

## 2019-01-29 VITALS
OXYGEN SATURATION: 96 % | BODY MASS INDEX: 31.07 KG/M2 | DIASTOLIC BLOOD PRESSURE: 80 MMHG | WEIGHT: 182 LBS | HEIGHT: 64 IN | HEART RATE: 60 BPM | SYSTOLIC BLOOD PRESSURE: 110 MMHG

## 2019-01-29 DIAGNOSIS — G45.9 TIA (TRANSIENT ISCHEMIC ATTACK): ICD-10-CM

## 2019-01-29 DIAGNOSIS — Z95.0 CARDIAC PACEMAKER IN SITU: ICD-10-CM

## 2019-01-29 DIAGNOSIS — I42.1 HYPERTROPHIC OBSTRUCTIVE CARDIOMYOPATHY (HCC): ICD-10-CM

## 2019-01-29 DIAGNOSIS — I48.0 PAROXYSMAL ATRIAL FIBRILLATION (HCC): Chronic | ICD-10-CM

## 2019-01-29 DIAGNOSIS — Z79.01 CHRONIC ANTICOAGULATION: ICD-10-CM

## 2019-01-29 PROCEDURE — 99214 OFFICE O/P EST MOD 30 MIN: CPT | Mod: 25 | Performed by: NURSE PRACTITIONER

## 2019-01-29 PROCEDURE — 93280 PM DEVICE PROGR EVAL DUAL: CPT | Performed by: NURSE PRACTITIONER

## 2019-01-29 RX ORDER — ALBUTEROL SULFATE 90 UG/1
2 AEROSOL, METERED RESPIRATORY (INHALATION)
COMMUNITY
Start: 2015-02-05 | End: 2019-01-29

## 2019-01-29 RX ORDER — ATORVASTATIN CALCIUM 40 MG/1
TABLET, FILM COATED ORAL
COMMUNITY
Start: 2019-01-19 | End: 2019-01-29

## 2019-01-29 RX ORDER — WARFARIN SODIUM 4 MG/1
4 TABLET ORAL DAILY
Qty: 30 TAB | Refills: 3 | Status: SHIPPED | OUTPATIENT
Start: 2019-01-29 | End: 2019-02-14

## 2019-01-29 RX ORDER — DISOPYRAMIDE PHOSPHATE 100 MG/1
100 CAPSULE ORAL
COMMUNITY
Start: 2016-03-11 | End: 2019-01-29

## 2019-01-29 RX ORDER — CITALOPRAM HYDROBROMIDE 10 MG/1
5 TABLET ORAL
COMMUNITY
End: 2019-01-29

## 2019-01-29 RX ORDER — ASCORBIC ACID 125 MG
TABLET,CHEWABLE ORAL
COMMUNITY
End: 2019-02-28

## 2019-01-29 RX ORDER — DISOPYRAMIDE PHOSPHATE 100 MG/1
200 CAPSULE ORAL
COMMUNITY
End: 2019-01-29

## 2019-01-29 RX ORDER — TIOTROPIUM BROMIDE 18 UG/1
1 CAPSULE ORAL; RESPIRATORY (INHALATION)
COMMUNITY
Start: 2015-02-05 | End: 2019-01-29

## 2019-01-29 NOTE — PROGRESS NOTES
Chief Complaint   Patient presents with   • Transient Ischemic Attack     follow up       Subjective:   Seda Cagle is a 58 y.o. female who presents today for review of her pacemaker, AF, HOCM, oral anticoagulation.  The patient has undergone alcohol septal ablation and has a dual chamber BSI pacemaker. She has been on Norpace for her Hocm and Xarelto for Anticoagulation.   She was recently seen by Dr Witt on 10/8/18 and echo was done with following results:  CONCLUSIONS  Normal left ventricular systolic function.  Left ventricular ejection fraction is visually estimated to be 55%.  Septal wall thickness 1.2cm   Mild  left ventricular hypertrophy.  Grade I diastolic dysfunction.  There is evidence of LVOT obstuction which is not fully characterized   in this study. Consider subaortic membrane.  The max gradient that is measured is 4mmHg.  Normal right ventricular size.  Normal right ventricular systolic function.  Mildly dilated left atrium.  Mild to moderate mitral regurgitation.  Aortic sclerosis without stenosis.  Mild aortic insufficiency.  Right heart pressures are normal.     Interim events.  Patient was admitted on 1/18/19 with an episode of transient global amnesia questionable TIA.  Her imaging contained the following:  Vascular Laboratory   CONCLUSIONS   Mild stenosis of the right internal carotid (< 50%).    Normal left carotid exam.   CT of head:  Impression       1.  Head CT without contrast within normal limits. No evidence of acute cerebral infarction, hemorrhage or mass lesion.  2.  BILATERAL maxillary sinus disease     Impression       CT angiogram of the [place] within normal limits.     She continues to have some difficulty with short term memory.  I have reviewed her OAC with Dr Witt . Will switch her to warfarin with careful cross-over to avoid a drop in her oral anticoagulants.  Will refer her to Neurology as well.  Patient verbalized understanding of plan. She will start  the Warfarin tonight. Referral to OAC clinic for tomorrow.  The monitoring and potential drug interactions were reviewed with the patient as well.  Past Medical History:   Diagnosis Date   • Anesthesia     wakes up during procedure (x2)   • Aortic regurgitation    • Arrhythmia, ventricular 2008    Symptomatic PVCs, controlled with medication.   • Asthma     inhaler PRN, only uses when sick    • Atrial fibrillation (HCC)    • Cardiac arrhythmia    • Cervical arthritis 2/8/2016   • Chickenpox    • Chronic kidney disease, stage III (moderate) (HCC) 2/9/2016   • CTS (carpal tunnel syndrome) 2/28/2011   • Depression    • GERD (gastroesophageal reflux disease)    • Gynecological disorder    • Heart burn    • Heart murmur    • Hemorrhagic disorder (HCC)     on Xarelto    • Hypertr obst cardiomyop 1991    Dr. Bert Vazquez, [facility] [place] [address] [phone] fax [phone].  [facility] [phone].  Alchol Septal Reduction 8/00, Coronaries normal.   • Hypertrophic cardiomyopathy (HCC)    • Indigestion    • Influenza    • Pacemaker 2014   • Renal disorder     chronic kidney disease stage 3-  **pt states currently no issue, numbers are stable   • Sleep apnea     uses CPAP   • Snoring      Past Surgical History:   Procedure Laterality Date   • HYSTEROSCOPY WITH VIDEO OPERATIVE  4/30/2018    Procedure: HYSTEROSCOPY WITH VIDEO OPERATIVE;  Surgeon: Noemi Liu M.D.;  Location: SURGERY SAME DAY [facility];  Service: Labor and Delivery   • DILATION AND CURETTAGE  4/30/2018    Procedure: DILATION AND CURETTAGE;  Surgeon: Noemi Liu M.D.;  Location: SURGERY SAME DAY [facility] ORS;  Service: Labor and Delivery   • BREAST BIOPSY Left 1/30/2018    Procedure: BREAST BIOPSY- WIRE LOCALIZED;  Surgeon: Vijaya Britt M.D.;  Location: SURGERY SAME DAY [facility] ORS;  Service: General   • RECOVERY  4/15/2015    Performed by [facility] [place] [facility]   •  PACEMAKER INSERTION     • RECOVERY  2014    Performed by [facility] at SURGERY SAME DAY [facility]S   • SEPTAL RECONSTRUCTION     • ENDOSCOPY     • PRIMARY C SECTION     • TUBAL COAGULATION LAPAROSCOPIC BILATERAL       Family History   Problem Relation Age of Onset   • Heart Disease Mother         CHF   • Hypertension Mother    • Stroke Mother    • Heart Failure Mother    • Other Mother         carotid artery disease,gout   • Cancer Father         AML   • Diabetes Maternal Grandmother    • Cancer Maternal Grandfather         Prostate   • Heart Disease Paternal Grandfather 52         MI    • Sleep Apnea Neg Hx      Social History     Social History   • Marital status:      Spouse name: N/A   • Number of children: N/A   • Years of education: N/A     Occupational History   • Not on file.     Social History Main Topics   • Smoking status: Never Smoker   • Smokeless tobacco: Never Used   • Alcohol use 0.6 oz/week     1 Standard drinks or equivalent per week      Comment: 3-4 per week   • Drug use: No   • Sexual activity: Yes     Partners: Male     Birth control/ protection: Surgical      Comment:      Other Topics Concern   • Not on file     Social History Narrative   • No narrative on file     No Known Allergies  Outpatient Encounter Prescriptions as of 2019   Medication Sig Dispense Refill   • CALCIUM CARBONATE-VIT D-MIN PO Take  by mouth.     • MAGNESIUM PO Take 200 mg by mouth.     • Multiple Vitamins-Minerals (ALIVE WOMENS ENERGY) Tab Take  by mouth.     • metoprolol (LOPRESSOR) 25 MG Tab Take 25 mg by mouth 2 times a day.     • HORSE CHESTNUT PO Take 400 mg by mouth every day.     • warfarin (COUMADIN) 4 MG Tab Take 1 Tab by mouth every day. 30 Tab 3   • atorvastatin (LIPITOR) 40 MG Tab Take 1 Tab by mouth every evening. 30 Tab 0   • Pantothenic Acid 500 MG Tab Take 500 mg by mouth.     • metoprolol (LOPRESSOR) 25 MG Tab Take 1 Tab by mouth 2 times a day. 180 Tab 3   •  "rivaroxaban (XARELTO) 20 MG Tab tablet Take 1 Tab by mouth every day at 6 PM. 90 Tab 3   • disopyramide (NORPACE) 100 MG Cap Take 1 Cap by mouth every 8 hours. 270 Cap 3   • Melatonin 5 MG TABLET DISPERSIBLE Take  by mouth.     • Fluticasone Propionate (FLONASE NA) Spray  in nose.     • Calcium-Vitamin D-Vitamin K (CALCIUM FOR WOMEN PO) Take 1 Each by mouth every day at 6 PM.     • Lansoprazole (PREVACID PO) Take 1 Each by mouth 1 time daily as needed.     • Magnesium 200 MG TABS Take 1 Tab by mouth every day.     • Multiple Vitamins-Minerals (ALIVE ONCE DAILY WOMENS 50+ PO) Take 1 Each by mouth every day at 6 PM.     • [DISCONTINUED] albuterol (VENTOLIN HFA) 108 (90 Base) MCG/ACT Aero Soln inhalation aerosol 2 Puffs by Nebulization route.     • [DISCONTINUED] aspirin 81 MG tablet Take 81 mg by mouth.     • [DISCONTINUED] citalopram (CELEXA) 10 MG tablet Take 5 mg by mouth.     • [DISCONTINUED] tiotropium (SPIRIVA HANDIHALER) 18 MCG Cap 1 Capsule by Nebulization route.     • [DISCONTINUED] disopyramide (NORPACE) 100 MG Cap Take 200 mg by mouth.     • [DISCONTINUED] metoprolol (LOPRESSOR) 25 MG Tab Take 12.5 mg by mouth.     • [DISCONTINUED] disopyramide (NORPACE) 100 MG Cap 100 mg.     • [DISCONTINUED] atorvastatin (LIPITOR) 40 MG Tab        No facility-administered encounter medications on file as of 1/29/2019.      ROS     Objective:   /80 (BP Location: Left arm, Patient Position: Sitting, BP Cuff Size: Adult)   Pulse 60   Ht 1.626 m (5' 4\")   Wt 82.6 kg (182 lb)   LMP 01/31/2012   SpO2 96%   BMI 31.24 kg/m²     Physical Exam   Constitutional: She is oriented to person, place, and time. She appears well-developed and well-nourished.   HENT:   Head: Normocephalic and atraumatic.   Eyes: Pupils are equal, round, and reactive to light.   Neck: Normal range of motion. Neck supple.   Cardiovascular: Normal rate and regular rhythm.    Pulmonary/Chest: Effort normal and breath sounds normal.   Device site is " uncomplicated.   Abdominal: Soft. Bowel sounds are normal.   Musculoskeletal: Normal range of motion.   Neurological: She is alert and oriented to person, place, and time.   Patient reports issues with short term memory since TIA event on 1/18/19.   Skin: Skin is warm and dry.   Psychiatric: She has a normal mood and affect.       Assessment:     1. TIA (transient ischemic attack)  REFERRAL TO NEUROLOGY    REFERRAL TO ANTICOAGULATION MONITORING   2. Hypertrophic obstructive cardiomyopathy (HCC)  REFERRAL TO ANTICOAGULATION MONITORING   3. Cardiac pacemaker in situ     4. Paroxysmal atrial fibrillation (HCC)     5. Chronic anticoagulation         Medical Decision Making:  Today's Assessment / Status / Plan:     1. TIA  She continues to have some difficulty with short term memory.  I have reviewed her OAC with Dr Witt . Will switch her to warfarin with careful cross-over to avoid a drop in her oral anticoagulants.  Will refer her to Neurology as well.  2. HOCM recent echo stable.  3. BSI dual chamber pacemaker . BV 4 years . Testing intact.  4. PAF <1%  5. OAC currently on Xarelto with recent neurological event will switch over to Warfarin. Will need to be cautious in regard to transiting with recent neurological event.  RTC as planned in february and April with Dr Witt.  Referrals placed to Neurology and anticoagulation clinic.    Collaborating MD Romero

## 2019-01-29 NOTE — LETTER
[facility]   [address], [address]  [place] [address]  Phone: [phone]  Fax: [phone]              Seda Cagle  1960    Encounter Date: 1/29/2019    MARK Christianson          PROGRESS NOTE:  Chief Complaint   Patient presents with   • Transient Ischemic Attack     follow up       Subjective:   Seda Cagle is a 58 y.o. female who presents today for review of her pacemaker, AF, HOCM, oral anticoagulation.  The patient has undergone alcohol septal ablation and has a dual chamber BSI pacemaker. She has been on Norpace for her Hocm and Xarelto for Anticoagulation.   She was recently seen by Dr Witt on 10/8/18 and echo was done with following results:  CONCLUSIONS  Normal left ventricular systolic function.  Left ventricular ejection fraction is visually estimated to be 55%.  Septal wall thickness 1.2cm   Mild  left ventricular hypertrophy.  Grade I diastolic dysfunction.  There is evidence of LVOT obstuction which is not fully characterized   in this study. Consider subaortic membrane.  The max gradient that is measured is 4mmHg.  Normal right ventricular size.  Normal right ventricular systolic function.  Mildly dilated left atrium.  Mild to moderate mitral regurgitation.  Aortic sclerosis without stenosis.  Mild aortic insufficiency.  Right heart pressures are normal.     Interim events.  Patient was admitted on 1/18/19 with an episode of transient global amnesia questionable TIA.  Her imaging contained the following:  Vascular Laboratory   CONCLUSIONS   Mild stenosis of the right internal carotid (< 50%).    Normal left carotid exam.   CT of head:  Impression       1.  Head CT without contrast within normal limits. No evidence of acute cerebral infarction, hemorrhage or mass lesion.  2.  BILATERAL maxillary sinus disease     Impression       CT angiogram of the [place] within normal limits.     She continues to  have some difficulty with short term memory.  I have reviewed her OAC with Dr Witt . Will switch her to warfarin with careful cross-over to avoid a drop in her oral anticoagulants.  Will refer her to Neurology as well.  Patient verbalized understanding of plan. She will start the Warfarin tonight. Referral to OAC clinic for tomorrow.  The monitoring and potential drug interactions were reviewed with the patient as well.  Past Medical History:   Diagnosis Date   • Anesthesia     wakes up during procedure (x2)   • Aortic regurgitation    • Arrhythmia, ventricular 2008    Symptomatic PVCs, controlled with medication.   • Asthma     inhaler PRN, only uses when sick    • Atrial fibrillation (HCC)    • Cardiac arrhythmia    • Cervical arthritis 2/8/2016   • Chickenpox    • Chronic kidney disease, stage III (moderate) (HCC) 2/9/2016   • CTS (carpal tunnel syndrome) 2/28/2011   • Depression    • GERD (gastroesophageal reflux disease)    • Gynecological disorder    • Heart burn    • Heart murmur    • Hemorrhagic disorder (HCC)     on Xarelto    • Hypertr obst cardiomyop 1991    Dr. Bert Vazquez, [facility] [place] [address] [phone] fax [phone].  [facility] [phone].  Alchol Septal Reduction 8/00, Coronaries normal.   • Hypertrophic cardiomyopathy (HCC)    • Indigestion    • Influenza    • Pacemaker 2014   • Renal disorder     chronic kidney disease stage 3-  **pt states currently no issue, numbers are stable   • Sleep apnea     uses CPAP   • Snoring      Past Surgical History:   Procedure Laterality Date   • HYSTEROSCOPY WITH VIDEO OPERATIVE  4/30/2018    Procedure: HYSTEROSCOPY WITH VIDEO OPERATIVE;  Surgeon: Noemi Liu M.D.;  Location: SURGERY SAME DAY [facility] ORS;  Service: Labor and Delivery   • DILATION AND CURETTAGE  4/30/2018    Procedure: DILATION AND CURETTAGE;  Surgeon: Noemi Liu M.D.;  Location: SURGERY SAME DAY [facility] ORS;  Service: Labor and  Delivery   • BREAST BIOPSY Left 2018    Procedure: BREAST BIOPSY- WIRE LOCALIZED;  Surgeon: Vijaya Britt M.D.;  Location: SURGERY SAME DAY [facility] ORS;  Service: General   • RECOVERY  4/15/2015    Performed by [facility] at SURGERY PRE-POST PROC UNIT [facility]   • PACEMAKER INSERTION     • RECOVERY  2014    Performed by [facility] at SURGERY SAME DAY [facility]S   • SEPTAL RECONSTRUCTION     • ENDOSCOPY     • PRIMARY C SECTION     • TUBAL COAGULATION LAPAROSCOPIC BILATERAL       Family History   Problem Relation Age of Onset   • Heart Disease Mother         CHF   • Hypertension Mother    • Stroke Mother    • Heart Failure Mother    • Other Mother         carotid artery disease,gout   • Cancer Father         AML   • Diabetes Maternal Grandmother    • Cancer Maternal Grandfather         Prostate   • Heart Disease Paternal Grandfather 52         MI    • Sleep Apnea Neg Hx      Social History     Social History   • Marital status:      Spouse name: N/A   • Number of children: N/A   • Years of education: N/A     Occupational History   • Not on file.     Social History Main Topics   • Smoking status: Never Smoker   • Smokeless tobacco: Never Used   • Alcohol use 0.6 oz/week     1 Standard drinks or equivalent per week      Comment: 3-4 per week   • Drug use: No   • Sexual activity: Yes     Partners: Male     Birth control/ protection: Surgical      Comment:      Other Topics Concern   • Not on file     Social History Narrative   • No narrative on file     No Known Allergies  Outpatient Encounter Prescriptions as of 2019   Medication Sig Dispense Refill   • CALCIUM CARBONATE-VIT D-MIN PO Take  by mouth.     • MAGNESIUM PO Take 200 mg by mouth.     • Multiple Vitamins-Minerals (ALIVE WOMENS ENERGY) Tab Take  by mouth.     • metoprolol (LOPRESSOR) 25 MG Tab Take 25 mg by mouth 2 times a day.     • HORSE CHESTNUT PO Take 400 mg by mouth every day.     • warfarin  "(COUMADIN) 4 MG Tab Take 1 Tab by mouth every day. 30 Tab 3   • atorvastatin (LIPITOR) 40 MG Tab Take 1 Tab by mouth every evening. 30 Tab 0   • Pantothenic Acid 500 MG Tab Take 500 mg by mouth.     • metoprolol (LOPRESSOR) 25 MG Tab Take 1 Tab by mouth 2 times a day. 180 Tab 3   • rivaroxaban (XARELTO) 20 MG Tab tablet Take 1 Tab by mouth every day at 6 PM. 90 Tab 3   • disopyramide (NORPACE) 100 MG Cap Take 1 Cap by mouth every 8 hours. 270 Cap 3   • Melatonin 5 MG TABLET DISPERSIBLE Take  by mouth.     • Fluticasone Propionate (FLONASE NA) Spray  in nose.     • Calcium-Vitamin D-Vitamin K (CALCIUM FOR WOMEN PO) Take 1 Each by mouth every day at 6 PM.     • Lansoprazole (PREVACID PO) Take 1 Each by mouth 1 time daily as needed.     • Magnesium 200 MG TABS Take 1 Tab by mouth every day.     • Multiple Vitamins-Minerals (ALIVE ONCE DAILY WOMENS 50+ PO) Take 1 Each by mouth every day at 6 PM.     • [DISCONTINUED] albuterol (VENTOLIN HFA) 108 (90 Base) MCG/ACT Aero Soln inhalation aerosol 2 Puffs by Nebulization route.     • [DISCONTINUED] aspirin 81 MG tablet Take 81 mg by mouth.     • [DISCONTINUED] citalopram (CELEXA) 10 MG tablet Take 5 mg by mouth.     • [DISCONTINUED] tiotropium (SPIRIVA HANDIHALER) 18 MCG Cap 1 Capsule by Nebulization route.     • [DISCONTINUED] disopyramide (NORPACE) 100 MG Cap Take 200 mg by mouth.     • [DISCONTINUED] metoprolol (LOPRESSOR) 25 MG Tab Take 12.5 mg by mouth.     • [DISCONTINUED] disopyramide (NORPACE) 100 MG Cap 100 mg.     • [DISCONTINUED] atorvastatin (LIPITOR) 40 MG Tab        No facility-administered encounter medications on file as of 1/29/2019.      ROS     Objective:   /80 (BP Location: Left arm, Patient Position: Sitting, BP Cuff Size: Adult)   Pulse 60   Ht 1.626 m (5' 4\")   Wt 82.6 kg (182 lb)   LMP 01/31/2012   SpO2 96%   BMI 31.24 kg/m²      Physical Exam   Constitutional: She is oriented to person, place, and time. She appears well-developed and " well-nourished.   HENT:   Head: Normocephalic and atraumatic.   Eyes: Pupils are equal, round, and reactive to light.   Neck: Normal range of motion. Neck supple.   Cardiovascular: Normal rate and regular rhythm.    Pulmonary/Chest: Effort normal and breath sounds normal.   Device site is uncomplicated.   Abdominal: Soft. Bowel sounds are normal.   Musculoskeletal: Normal range of motion.   Neurological: She is alert and oriented to person, place, and time.   Patient reports issues with short term memory since TIA event on 1/18/19.   Skin: Skin is warm and dry.   Psychiatric: She has a normal mood and affect.       Assessment:     1. TIA (transient ischemic attack)  REFERRAL TO NEUROLOGY    REFERRAL TO ANTICOAGULATION MONITORING   2. Hypertrophic obstructive cardiomyopathy (HCC)  REFERRAL TO ANTICOAGULATION MONITORING   3. Cardiac pacemaker in situ     4. Paroxysmal atrial fibrillation (HCC)     5. Chronic anticoagulation         Medical Decision Making:  Today's Assessment / Status / Plan:     1. TIA  She continues to have some difficulty with short term memory.  I have reviewed her OAC with Dr Witt . Will switch her to warfarin with careful cross-over to avoid a drop in her oral anticoagulants.  Will refer her to Neurology as well.  2. HOCM recent echo stable.  3. BSI dual chamber pacemaker . BV 4 years . Testing intact.  4. PAF <1%  5. OAC currently on Xarelto with recent neurological event will switch over to Warfarin. Will need to be cautious in regard to transiting with recent neurological event.  RTC as planned in february and April with Dr Witt.  Referrals placed to Neurology and anticoagulation clinic.    Collaborating MD Heather Stoddard M.D.  [address]  [address]  [facility] [address]  VIA In Basket     [facility]  [address] [address]  VIA Facsimile: [phone]     Nighat Ashley L.P.N.  VIA In Basket

## 2019-01-29 NOTE — TELEPHONE ENCOUNTER
Called Caleb at [facility] to schedule urgent appointment at clinic tomorrow at 1:15pm.

## 2019-01-30 ENCOUNTER — TELEPHONE (OUTPATIENT)
Dept: VASCULAR LAB | Facility: MEDICAL CENTER | Age: 59
End: 2019-01-30

## 2019-01-30 ENCOUNTER — ANTICOAGULATION VISIT (OUTPATIENT)
Dept: VASCULAR LAB | Facility: MEDICAL CENTER | Age: 59
End: 2019-01-30
Attending: INTERNAL MEDICINE
Payer: COMMERCIAL

## 2019-01-30 VITALS — DIASTOLIC BLOOD PRESSURE: 61 MMHG | HEART RATE: 60 BPM | SYSTOLIC BLOOD PRESSURE: 129 MMHG

## 2019-01-30 DIAGNOSIS — G45.4 TRANSIENT GLOBAL AMNESIA: ICD-10-CM

## 2019-01-30 DIAGNOSIS — Z79.01 CHRONIC ANTICOAGULATION: ICD-10-CM

## 2019-01-30 DIAGNOSIS — I48.0 PAROXYSMAL ATRIAL FIBRILLATION (HCC): ICD-10-CM

## 2019-01-30 LAB — INR PPP: 1.3 (ref 2–3.5)

## 2019-01-30 PROCEDURE — 99213 OFFICE O/P EST LOW 20 MIN: CPT | Performed by: NURSE PRACTITIONER

## 2019-01-30 PROCEDURE — 85610 PROTHROMBIN TIME: CPT

## 2019-01-30 NOTE — PROGRESS NOTES
Anticoagulation Summary  As of 2019    INR goal:   2.0-3.0   TTR:   --   INR used for dosin.3! (2019)   Warfarin maintenance plan:   4 mg (4 mg x 1) every day   Weekly warfarin total:   28 mg   Plan last modified:   DANIEL FinnPROCIO (2019)   Next INR check:   2019   Target end date:   Indefinite    Indications    Chronic anticoagulation [Z79.01]  Paroxysmal atrial fibrillation (HCC) [I48.0]  Transient global amnesia [G45.4]             Anticoagulation Episode Summary     INR check location:       Preferred lab:       Send INR reminders to:       Comments:         Anticoagulation Care Providers     Provider Role Specialty Phone number    SonyaMARK Isaac Referring Cardiology [phone]    [facility] Responsible  [phone]        Anticoagulation Patient Findings      HPI:  Seda Cagle seen in clinic today for follow up on anticoagulation therapy in the presence of AF and an episode of transient global amnesia questionable TIA on 19. Head CT scan neg. Carotid us showed mild stenosis of the right internal carotid.    Pt was on Xarelto 20 mg daily. Denied missing any doses prior to possible TIA. Never had a CVA/TIA in the past.    Reviewed her medications  Transitioning to warfarin per cardiology  Started warfarin 4 mg daily - 1st dose last night  CHADSvasc score = 3 (sex, TIA)  HAS-BLED = 0    No current symptoms of bleeding or thrombosis/CVA reported.    Education given to patient regarding instructions for taking warfarin, food/drug interactions (keep vit k consistent), drug/drug interactions (avoid NSAIDs, ASA) and signs/symptoms of bleeding or thrombosis/stroke. Discussed anticoagulation in detail including dosing, INR levels and safety. Discussed avoidance of HRTs. She doesn't smoke.    She has a referral to neuro.      A/P:   INR subtherapeutic. Continue Xarelto 20 mg daily AND warfarin 4 mg daily. BP recorded in vitals.  She knows  to seek medical attention immediately for any s/sx of CVA/TIA.    Follow up appointment in 5 days.    Next Appointment: Monday, February 4, 2019 at 1:45 pm.     Alanis ALANIS

## 2019-01-30 NOTE — TELEPHONE ENCOUNTER
Pt has appt today with Anticoagulation clinic to establish care for Warfarin.    Hamida Serrato, PharmD

## 2019-01-31 LAB — INR BLD: 1.3 (ref 0.9–1.2)

## 2019-02-01 ENCOUNTER — TELEPHONE (OUTPATIENT)
Dept: VASCULAR LAB | Facility: MEDICAL CENTER | Age: 59
End: 2019-02-01

## 2019-02-01 NOTE — ADDENDUM NOTE
Encounter addended by: Manny Simpson M.D. on: 1/31/2019  4:01 PM<BR>    Actions taken: Charge Capture section accepted

## 2019-02-02 NOTE — TELEPHONE ENCOUNTER
Initial anticoag note and most recent cards note reviewed.  Pending further recs we will continue with indefinite anticoag with warfarin for h/o afib and possible tia  Will defer all other cv care, aside from anticoag, to cards.    Michael Bloch, MD  Anticoagulation Clinic    Cc:      MIGUEL Stoddard

## 2019-02-04 ENCOUNTER — ANTICOAGULATION VISIT (OUTPATIENT)
Dept: VASCULAR LAB | Facility: MEDICAL CENTER | Age: 59
End: 2019-02-04
Attending: INTERNAL MEDICINE
Payer: COMMERCIAL

## 2019-02-04 VITALS — SYSTOLIC BLOOD PRESSURE: 139 MMHG | DIASTOLIC BLOOD PRESSURE: 84 MMHG | HEART RATE: 59 BPM

## 2019-02-04 DIAGNOSIS — Z79.01 CHRONIC ANTICOAGULATION: ICD-10-CM

## 2019-02-04 DIAGNOSIS — G45.4 TRANSIENT GLOBAL AMNESIA: ICD-10-CM

## 2019-02-04 DIAGNOSIS — I48.0 PAROXYSMAL ATRIAL FIBRILLATION (HCC): ICD-10-CM

## 2019-02-04 LAB — INR PPP: 1.2 (ref 2–3.5)

## 2019-02-04 PROCEDURE — 85610 PROTHROMBIN TIME: CPT

## 2019-02-04 PROCEDURE — 99212 OFFICE O/P EST SF 10 MIN: CPT | Performed by: PHARMACIST

## 2019-02-04 NOTE — PROGRESS NOTES
.  Anticoagulation Summary  As of 2019    INR goal:   2.0-3.0   TTR:   --   INR used for dosin.2! (2019)   Warfarin maintenance plan:   6 mg (4 mg x 1.5) every day   Weekly warfarin total:   42 mg   Plan last modified:   Kim Ríos, PharmD (2019)   Next INR check:   2019   Target end date:   Indefinite    Indications    Chronic anticoagulation [Z79.01]  Paroxysmal atrial fibrillation (HCC) [I48.0]  Transient global amnesia [G45.4]             Anticoagulation Episode Summary     INR check location:       Preferred lab:       Send INR reminders to:       Comments:         Anticoagulation Care Providers     Provider Role Specialty Phone number    Snoya Alonso A.P.N. Referring Cardiology [phone]    [facility] Responsible  [phone]        Anticoagulation Patient Findings      HPI:  Sedarosalinda Cagle seen in clinic today, on anticoagulation therapy with warfarin for history of TIA and afib.  Changes to current medical/health status since last appt: none  Denies signs/symptoms of bleeding and/or thrombosis since the last appt.    Denies any interval changes to:   -Diet   OR   -Medications since last visit  Denies any complications or cost restrictions with current therapy.   BP recorded in vitals.    A/P   INR  SUB-therapeutic.     Patient will begin increased regimen as noted above and continue on Xarelto daily.    Follow up appointment in 4 day(s).    Kim Ríos, LindsayD

## 2019-02-05 LAB — INR BLD: 1.2 (ref 0.9–1.2)

## 2019-02-08 ENCOUNTER — ANTICOAGULATION VISIT (OUTPATIENT)
Dept: VASCULAR LAB | Facility: MEDICAL CENTER | Age: 59
End: 2019-02-08
Attending: INTERNAL MEDICINE
Payer: COMMERCIAL

## 2019-02-08 VITALS — DIASTOLIC BLOOD PRESSURE: 75 MMHG | HEART RATE: 59 BPM | SYSTOLIC BLOOD PRESSURE: 129 MMHG

## 2019-02-08 DIAGNOSIS — Z79.01 CHRONIC ANTICOAGULATION: ICD-10-CM

## 2019-02-08 DIAGNOSIS — G45.4 TRANSIENT GLOBAL AMNESIA: ICD-10-CM

## 2019-02-08 DIAGNOSIS — I48.0 PAROXYSMAL ATRIAL FIBRILLATION (HCC): ICD-10-CM

## 2019-02-08 LAB — INR PPP: 1.3 (ref 2–3.5)

## 2019-02-08 PROCEDURE — 85610 PROTHROMBIN TIME: CPT

## 2019-02-08 NOTE — PROGRESS NOTES
Anticoagulation Summary  As of 2019    INR goal:   2.0-3.0   TTR:   --   INR used for dosin.3! (2019)   Warfarin maintenance plan:   8 mg (4 mg x 2) every day   Weekly warfarin total:   56 mg   Plan last modified:   Carole Gunn, PharmD (2019)   Next INR check:   2019   Target end date:   Indefinite    Indications    Chronic anticoagulation [Z79.01]  Paroxysmal atrial fibrillation (HCC) [I48.0]  Transient global amnesia [G45.4]             Anticoagulation Episode Summary     INR check location:       Preferred lab:       Send INR reminders to:       Comments:         Anticoagulation Care Providers     Provider Role Specialty Phone number    Sonya Alonso, A.P.N. Referring Cardiology [phone]    [facility] Responsible  [phone]        Anticoagulation Patient Findings  Patient Findings     Positives:   Change in medications (Increased metoprolol dosing.)    Negatives:   Signs/symptoms of thrombosis, Signs/symptoms of bleeding, Laboratory test error suspected, Change in health, Change in alcohol use, Change in activity, Upcoming invasive procedure, Emergency department visit, Upcoming dental procedure, Missed doses, Extra doses, Change in diet/appetite, Hospital admission, Bruising, Other complaints          HPI:  Seda Cagle seen in clinic today, on anticoagulation therapy with warfarin for pAF & h/o TIA  Changes to current medical/health status since last appt: none  Denies signs/symptoms of bleeding and/or thrombosis since the last appt.    Denies any interval changes to diet  Denies any complications or cost restrictions with current therapy.   BP recorded in vitals.    Pt is currently transitioning from Xarelto to warfarin.    A/P   INR  SUB-therapeutic despite increasing regimen at last visit.   Instructed pt to BOLUS x1, increase dosing regimen and continue taking Xarelto 20 mg daily.  Pt to continue monitoring for s/sx of  bleeding.    Follow up appointment in 3 days.    Carole Gunn, PharmD

## 2019-02-11 ENCOUNTER — ANTICOAGULATION VISIT (OUTPATIENT)
Dept: VASCULAR LAB | Facility: MEDICAL CENTER | Age: 59
End: 2019-02-11
Attending: INTERNAL MEDICINE
Payer: COMMERCIAL

## 2019-02-11 DIAGNOSIS — Z79.01 CHRONIC ANTICOAGULATION: ICD-10-CM

## 2019-02-11 DIAGNOSIS — G45.4 TRANSIENT GLOBAL AMNESIA: ICD-10-CM

## 2019-02-11 DIAGNOSIS — I48.0 PAROXYSMAL ATRIAL FIBRILLATION (HCC): ICD-10-CM

## 2019-02-11 LAB
INR BLD: 1.3 (ref 0.9–1.2)
INR BLD: 1.6 (ref 0.9–1.2)
INR PPP: 1.6 (ref 2–3.5)

## 2019-02-11 PROCEDURE — 99212 OFFICE O/P EST SF 10 MIN: CPT | Performed by: NURSE PRACTITIONER

## 2019-02-11 PROCEDURE — 85610 PROTHROMBIN TIME: CPT

## 2019-02-12 NOTE — PROGRESS NOTES
Anticoagulation Summary  As of 2019    INR goal:   2.0-3.0   TTR:   0.0 % (2 d)   INR used for dosin.6! (2019)   Warfarin maintenance plan:   10 mg (4 mg x 2.5) every day   Weekly warfarin total:   70 mg   Plan last modified:   DANIEL FinnPROCIO (2019)   Next INR check:   2019   Target end date:   Indefinite    Indications    Chronic anticoagulation [Z79.01]  Paroxysmal atrial fibrillation (HCC) [I48.0]  Transient global amnesia [G45.4]             Anticoagulation Episode Summary     INR check location:       Preferred lab:       Send INR reminders to:       Comments:         Anticoagulation Care Providers     Provider Role Specialty Phone number    SonyaMARK Isaac Referring Cardiology [phone]    [facility] Responsible  [phone]        Anticoagulation Patient Findings      HPI:  Seda Cagle seen in clinic today for follow up on anticoagulation therapy in the presence of AF, possible TIA. Denies any changes to current medical/health status since last appointment. Denies any medication or diet changes. No current symptoms of bleeding or thrombosis reported.    A/P:   INR subtherapeutic but INR gradually trending up. Will have pt continue Xarelto until INR > 2.2 and will increase warfarin to 10 mg daily. She knows to seek medical attention for any s/sx of CVA/TIA. Continue current regimen. BP recorded in vitals.    Follow up appointment in 3 days.    Next Appointment:  at 3:15 pm.     Alanis ALANIS

## 2019-02-14 ENCOUNTER — OFFICE VISIT (OUTPATIENT)
Dept: CARDIOLOGY | Facility: MEDICAL CENTER | Age: 59
End: 2019-02-14
Payer: COMMERCIAL

## 2019-02-14 ENCOUNTER — ANTICOAGULATION VISIT (OUTPATIENT)
Dept: VASCULAR LAB | Facility: MEDICAL CENTER | Age: 59
End: 2019-02-14
Attending: INTERNAL MEDICINE
Payer: COMMERCIAL

## 2019-02-14 VITALS — SYSTOLIC BLOOD PRESSURE: 120 MMHG | HEART RATE: 59 BPM | DIASTOLIC BLOOD PRESSURE: 71 MMHG

## 2019-02-14 VITALS
BODY MASS INDEX: 33.12 KG/M2 | OXYGEN SATURATION: 94 % | HEIGHT: 64 IN | WEIGHT: 194 LBS | SYSTOLIC BLOOD PRESSURE: 122 MMHG | DIASTOLIC BLOOD PRESSURE: 72 MMHG | HEART RATE: 61 BPM

## 2019-02-14 DIAGNOSIS — Z95.0 CARDIAC PACEMAKER IN SITU: ICD-10-CM

## 2019-02-14 DIAGNOSIS — G45.4 TRANSIENT GLOBAL AMNESIA: ICD-10-CM

## 2019-02-14 DIAGNOSIS — I48.0 PAROXYSMAL ATRIAL FIBRILLATION (HCC): Chronic | ICD-10-CM

## 2019-02-14 DIAGNOSIS — I48.0 PAROXYSMAL ATRIAL FIBRILLATION (HCC): ICD-10-CM

## 2019-02-14 DIAGNOSIS — Z79.01 CHRONIC ANTICOAGULATION: ICD-10-CM

## 2019-02-14 DIAGNOSIS — I42.1 HYPERTROPHIC OBSTRUCTIVE CARDIOMYOPATHY (HCC): Chronic | ICD-10-CM

## 2019-02-14 LAB
INR BLD: 2.4 (ref 0.9–1.2)
INR PPP: 2.4 (ref 2–3.5)

## 2019-02-14 PROCEDURE — 93280 PM DEVICE PROGR EVAL DUAL: CPT | Performed by: NURSE PRACTITIONER

## 2019-02-14 PROCEDURE — 99214 OFFICE O/P EST MOD 30 MIN: CPT | Mod: 25 | Performed by: NURSE PRACTITIONER

## 2019-02-14 PROCEDURE — 85610 PROTHROMBIN TIME: CPT

## 2019-02-14 PROCEDURE — 99212 OFFICE O/P EST SF 10 MIN: CPT | Performed by: PHARMACIST

## 2019-02-14 RX ORDER — ATORVASTATIN CALCIUM 40 MG/1
40 TABLET, FILM COATED ORAL EVERY EVENING
Qty: 90 TAB | Refills: 3 | Status: SHIPPED | OUTPATIENT
Start: 2019-02-14 | End: 2020-02-06 | Stop reason: SDUPTHER

## 2019-02-14 RX ORDER — WARFARIN SODIUM 5 MG/1
10 TABLET ORAL DAILY
Qty: 60 TAB | Refills: 3 | Status: SHIPPED | OUTPATIENT
Start: 2019-02-14 | End: 2019-04-23 | Stop reason: SDUPTHER

## 2019-02-14 RX ORDER — METOPROLOL SUCCINATE 25 MG/1
25 TABLET, EXTENDED RELEASE ORAL DAILY
Qty: 30 TAB | Refills: 3 | Status: SHIPPED | OUTPATIENT
Start: 2019-02-14 | End: 2019-02-28 | Stop reason: SDUPTHER

## 2019-02-14 ASSESSMENT — ENCOUNTER SYMPTOMS
SHORTNESS OF BREATH: 0
HEADACHES: 0
HEARTBURN: 0
DOUBLE VISION: 0
ABDOMINAL PAIN: 0
PND: 0
CHILLS: 0
COUGH: 0
FOCAL WEAKNESS: 0
LOSS OF CONSCIOUSNESS: 0
ORTHOPNEA: 0
CLAUDICATION: 0
SORE THROAT: 0
WHEEZING: 0
SPEECH CHANGE: 0
NAUSEA: 0
FEVER: 0
VOMITING: 0
PALPITATIONS: 0
DIZZINESS: 0
MYALGIAS: 0
BLOOD IN STOOL: 0
MEMORY LOSS: 1
BLURRED VISION: 0
BRUISES/BLEEDS EASILY: 0

## 2019-02-14 NOTE — PROGRESS NOTES
Anticoagulation Summary  As of 2019    INR goal:   2.0-3.0   TTR:   30.0 % (5 d)   INR used for dosin.4 (2019)   Warfarin maintenance plan:   10 mg (4 mg x 2.5) every day   Weekly warfarin total:   70 mg   Plan last modified:   DANIEL FinnPROCIO (2019)   Next INR check:   2019   Target end date:   Indefinite    Indications    Chronic anticoagulation [Z79.01]  Paroxysmal atrial fibrillation (HCC) [I48.0]  Transient global amnesia [G45.4]             Anticoagulation Episode Summary     INR check location:       Preferred lab:       Send INR reminders to:       Comments:         Anticoagulation Care Providers     Provider Role Specialty Phone number    Sonya DANIEL StokesP.BRADLEY Referring Cardiology [phone]    [facility] Responsible  [phone]        Anticoagulation Patient Findings    HPI:  Seda presents for anticoagulation management. She is currently transitioning from Xarelto to warfarin. No current signs of bleeding or thrombosis. No interval medication or diet changes.     A:  Therapeutic INR    P:  Discontinue Xarelto. Continue current dose of warfarin. Follow up INR on Tuesday.    Malik Yoder, PharmD

## 2019-02-19 ENCOUNTER — ANTICOAGULATION VISIT (OUTPATIENT)
Dept: VASCULAR LAB | Facility: MEDICAL CENTER | Age: 59
End: 2019-02-19
Attending: INTERNAL MEDICINE
Payer: COMMERCIAL

## 2019-02-19 VITALS — DIASTOLIC BLOOD PRESSURE: 74 MMHG | HEART RATE: 60 BPM | SYSTOLIC BLOOD PRESSURE: 121 MMHG

## 2019-02-19 DIAGNOSIS — G45.4 TRANSIENT GLOBAL AMNESIA: ICD-10-CM

## 2019-02-19 DIAGNOSIS — I48.0 PAROXYSMAL ATRIAL FIBRILLATION (HCC): ICD-10-CM

## 2019-02-19 DIAGNOSIS — Z79.01 CHRONIC ANTICOAGULATION: ICD-10-CM

## 2019-02-19 LAB
INR BLD: 2.6 (ref 0.9–1.2)
INR PPP: 2.6 (ref 2–3.5)

## 2019-02-19 PROCEDURE — 99211 OFF/OP EST MAY X REQ PHY/QHP: CPT

## 2019-02-19 PROCEDURE — 85610 PROTHROMBIN TIME: CPT

## 2019-02-19 NOTE — PROGRESS NOTES
OP Anticoagulation Service Note    Date: 2019  Vitals:    19 0946   BP: 121/74   Pulse: 60       Anticoagulation Summary  As of 2019    INR goal:   2.0-3.0   TTR:   65.0 % (1.4 wk)   INR used for dosin.6 (2019)   Warfarin maintenance plan:   10 mg (5 mg x 2) every day   Weekly warfarin total:   70 mg   Plan last modified:   Aisha Castañeda PharmD (2019)   Next INR check:   2019   Target end date:   Indefinite    Indications    Chronic anticoagulation [Z79.01]  Paroxysmal atrial fibrillation (HCC) [I48.0]  Transient global amnesia [G45.4]             Anticoagulation Episode Summary     INR check location:       Preferred lab:       Send INR reminders to:       Comments:         Anticoagulation Care Providers     Provider Role Specialty Phone number    Sonya Alonso AYomairaP.BRADLEY Referring Cardiology [phone]    [facility] Responsible  [phone]        Anticoagulation Patient Findings      HPI:   Seda Cagle seen in clinic today, on anticoagulation therapy with warfarin (a high risk medication) for atrial fibrillation, possible TIA on Xarelto      Pt is here today to evaluate anticoagulation therapy      Confirmed warfarin dosing regimen, denies missed or extra doses of coumadin. She now has 5 mg tablets and take 2 per day, updated in encounter.    Diet has been consistent with foods rich in vitamin K: Yes  Changes in ETOH:  No  Changes in smoking status: No  Changes in medication: No   Cost restriction: No  S/s of bleeding:  No  Signs/symptoms  thrombosis since the last appt: No    A/P   INR  therapeutic today  Continue current warfarin regimen        Pt educated to contact our clinic with any changes in medications or s/s of bleeding or thrombosis    Follow up appointment in 1 week(s) to reduce risk of adverse events from warfarin   Aisha Castañeda, Pharm D

## 2019-02-21 ENCOUNTER — PATIENT MESSAGE (OUTPATIENT)
Dept: CARDIOLOGY | Facility: MEDICAL CENTER | Age: 59
End: 2019-02-21

## 2019-02-22 ENCOUNTER — PATIENT MESSAGE (OUTPATIENT)
Dept: CARDIOLOGY | Facility: MEDICAL CENTER | Age: 59
End: 2019-02-22

## 2019-02-25 NOTE — PATIENT COMMUNICATION
Patient transmitted via home monitor-- brief AF episodes with time since last check @ 1.5 minutes.  PVC count at [id number] since last check on 2/14.  She does have AV search on with max delay to 300 ms.

## 2019-02-26 ENCOUNTER — TELEPHONE (OUTPATIENT)
Dept: CARDIOLOGY | Facility: MEDICAL CENTER | Age: 59
End: 2019-02-26

## 2019-02-26 ENCOUNTER — ANTICOAGULATION VISIT (OUTPATIENT)
Dept: VASCULAR LAB | Facility: MEDICAL CENTER | Age: 59
End: 2019-02-26
Attending: INTERNAL MEDICINE
Payer: COMMERCIAL

## 2019-02-26 VITALS — SYSTOLIC BLOOD PRESSURE: 132 MMHG | DIASTOLIC BLOOD PRESSURE: 79 MMHG | HEART RATE: 59 BPM

## 2019-02-26 DIAGNOSIS — G45.4 TRANSIENT GLOBAL AMNESIA: ICD-10-CM

## 2019-02-26 DIAGNOSIS — I48.0 PAROXYSMAL ATRIAL FIBRILLATION (HCC): Chronic | ICD-10-CM

## 2019-02-26 DIAGNOSIS — Z79.01 CHRONIC ANTICOAGULATION: ICD-10-CM

## 2019-02-26 DIAGNOSIS — I48.0 PAROXYSMAL ATRIAL FIBRILLATION (HCC): ICD-10-CM

## 2019-02-26 DIAGNOSIS — Z95.0 CARDIAC PACEMAKER IN SITU: ICD-10-CM

## 2019-02-26 LAB — INR PPP: 2 (ref 2–3.5)

## 2019-02-26 PROCEDURE — 85610 PROTHROMBIN TIME: CPT

## 2019-02-26 PROCEDURE — 99211 OFF/OP EST MAY X REQ PHY/QHP: CPT

## 2019-02-26 NOTE — PROGRESS NOTES
Anticoagulation Summary  As of 2019    INR goal:   2.0-3.0   TTR:   79.4 % (2.4 wk)   INR used for dosin (2019)   Warfarin maintenance plan:   10 mg (5 mg x 2) every day   Weekly warfarin total:   70 mg   Plan last modified:   Aisha Castañeda, PharmD (2019)   Next INR check:   3/12/2019   Target end date:   Indefinite    Indications    Chronic anticoagulation [Z79.01]  Paroxysmal atrial fibrillation (HCC) [I48.0]  Transient global amnesia [G45.4]             Anticoagulation Episode Summary     INR check location:       Preferred lab:       Send INR reminders to:       Comments:         Anticoagulation Care Providers     Provider Role Specialty Phone number    Sonya Alonso, A.P.N. Referring Cardiology [phone]    [facility] Responsible  [phone]        Anticoagulation Patient Findings      HPI:  Seda Cagle seen in clinic today, on anticoagulation therapy with warfarin for TIA.   Changes to current medical/health status since last appt: none.  Denies signs/symptoms of bleeding and/or thrombosis since the last appt.    Denies any interval changes to diet  Denies any interval changes to medications since last appt.   Denies any complications or cost restrictions with current therapy.   BP recorded in vitals.  Confirmed dosing regimen.     A/P   INR  therapeutic.   Pt is to continue with current warfarin dosing regimen.     Follow up appointment in 2 week(s).    Rashid Ly, PharmD

## 2019-02-26 NOTE — TELEPHONE ENCOUNTER
Called pt. To advise. Scheduled pt. to see Sonya 2-28-19 for PM adjustment.  MediLynx ordered.         Needs to have MVP shortened. Also, need another medi-lynx monitor for increased PVC burden.   She needs to see Miryam AMAYA as well. (Routing comment)       You   Sonya Alonso A.P.N. 20 hours ago (11:11 AM)          [facility] 3 minutes ago (11:06 AM)             Patient transmitted via home monitor-- brief AF episodes with time since last check @ 1.5 minutes.  PVC count at [id number] since last check on 2/14.  She does have AV search on with max delay to 300 ms.       Documentation     You routed conversation to Kristy Song [facility] 3 hours ago (7:56 AM)     Sonya Alonso A.P.N.   You 2 days ago       I agree lets see if her pacemaker is doing something during this time like MVP to avoid RV pacing or PMT   thanks (Routing comment)  (Routing comment)       Kristy Song [facility] routed conversation to You; Sonya Alonso A.P.N. 20 hours ago (11:08 AM)      Kristy Song, Med Ass't 21 hours ago (11:06 AM)         Patient transmitted via home monitor-- brief AF episodes with time since last check @ 1.5 minutes.  PVC count at [id number] since last check on 2/14.  She does have AV search on with max delay to 300 ms.

## 2019-02-28 ENCOUNTER — OFFICE VISIT (OUTPATIENT)
Dept: CARDIOLOGY | Facility: MEDICAL CENTER | Age: 59
End: 2019-02-28
Payer: COMMERCIAL

## 2019-02-28 VITALS
HEIGHT: 64 IN | WEIGHT: 192 LBS | OXYGEN SATURATION: 96 % | HEART RATE: 60 BPM | BODY MASS INDEX: 32.78 KG/M2 | DIASTOLIC BLOOD PRESSURE: 76 MMHG | SYSTOLIC BLOOD PRESSURE: 114 MMHG

## 2019-02-28 DIAGNOSIS — Z95.0 CARDIAC PACEMAKER IN SITU: ICD-10-CM

## 2019-02-28 DIAGNOSIS — Z79.899 HIGH RISK MEDICATION USE: ICD-10-CM

## 2019-02-28 DIAGNOSIS — I48.0 PAROXYSMAL ATRIAL FIBRILLATION (HCC): Chronic | ICD-10-CM

## 2019-02-28 DIAGNOSIS — Z79.01 CHRONIC ANTICOAGULATION: ICD-10-CM

## 2019-02-28 DIAGNOSIS — I42.1 HYPERTROPHIC OBSTRUCTIVE CARDIOMYOPATHY (HCC): Chronic | ICD-10-CM

## 2019-02-28 PROBLEM — G56.01 CARPAL TUNNEL SYNDROME OF RIGHT WRIST: Status: RESOLVED | Noted: 2019-01-18 | Resolved: 2019-02-28

## 2019-02-28 PROCEDURE — 99214 OFFICE O/P EST MOD 30 MIN: CPT | Mod: 25 | Performed by: NURSE PRACTITIONER

## 2019-02-28 PROCEDURE — 93280 PM DEVICE PROGR EVAL DUAL: CPT | Performed by: NURSE PRACTITIONER

## 2019-02-28 RX ORDER — METOPROLOL SUCCINATE 50 MG/1
25 TABLET, EXTENDED RELEASE ORAL DAILY
Qty: 90 TAB | Refills: 3 | Status: SHIPPED | OUTPATIENT
Start: 2019-02-28 | End: 2019-03-04 | Stop reason: SDUPTHER

## 2019-02-28 ASSESSMENT — ENCOUNTER SYMPTOMS
WHEEZING: 0
DIZZINESS: 1
DOUBLE VISION: 0
CHILLS: 0
ABDOMINAL PAIN: 0
CLAUDICATION: 0
HEADACHES: 0
BRUISES/BLEEDS EASILY: 0
SORE THROAT: 0
BLURRED VISION: 0
PALPITATIONS: 1
MEMORY LOSS: 1
FEVER: 0
SPEECH CHANGE: 0
VOMITING: 0
LOSS OF CONSCIOUSNESS: 0
NAUSEA: 0
HEARTBURN: 0
ORTHOPNEA: 0
BLOOD IN STOOL: 0
COUGH: 0
PND: 0
SHORTNESS OF BREATH: 0
MYALGIAS: 0
FOCAL WEAKNESS: 0

## 2019-02-28 NOTE — PROGRESS NOTES
Chief Complaint   Patient presents with   • Atrial Fibrillation     F/V DX:PAF       Subjective:   Seda Cagle is a 58 y.o. female who presents today for review of her cardiac status. Her pacemaker surveillance demonstrated 111,000 PVC's in approximately a 2-3 week period.  She has had more issues since her episode of global amnesia with headaches and dizziness. Dr Witt and I decided to switch her from NOAC to warfarin for this episode and she has had no further neurological sequale. She is scheduled to see neurology on March 18th. Her device is not MRI compatible but can be performed with the [facility] MRI protocol.  We have investigated her AV search feature on her device which goes out no farther than 300 ms.   is minimal at 7 % currently. Activity response was modified as I have increased her Metoprolol today for continued headache break through and question of incomplete BP control.    Her history is complicated with having undergone the following procedures:  The patient has undergone alcohol septal ablation and has a dual chamber BSI pacemaker. She has been on Norpace for her Hocm and Xarelto for Anticoagulation.   She was recently seen by Dr Witt on 10/8/18 and echo was done with following results:  CONCLUSIONS  Normal left ventricular systolic function.  Left ventricular ejection fraction is visually estimated to be 55%.  Septal wall thickness 1.2cm   Mild  left ventricular hypertrophy.  Grade I diastolic dysfunction.  There is evidence of LVOT obstuction which is not fully characterized   in this study. Consider subaortic membrane.  The max gradient that is measured is 4mmHg.  Normal right ventricular size.  Normal right ventricular systolic function.  Mildly dilated left atrium.  Mild to moderate mitral regurgitation.  Aortic sclerosis without stenosis.  Mild aortic insufficiency.  Right heart pressures are normal.      Interim events.  Patient was admitted on 1/18/19 with an episode of  transient global amnesia questionable TIA.  Her imaging contained the following:  Vascular Laboratory   CONCLUSIONS   Mild stenosis of the right internal carotid (< 50%).    Normal left carotid exam.   CT of head:  Impression        1.  Head CT without contrast within normal limits. No evidence of acute cerebral infarction, hemorrhage or mass lesion.  2.  BILATERAL maxillary sinus disease      Impression        CT angiogram of the [place] within normal limits.          Past Medical History:   Diagnosis Date   • Anesthesia     wakes up during procedure (x2)   • Aortic regurgitation    • Arrhythmia, ventricular 2008    Symptomatic PVCs, controlled with medication.   • Asthma     inhaler PRN, only uses when sick    • Atrial fibrillation (HCC)    • Cardiac arrhythmia    • Cervical arthritis 2/8/2016   • Chickenpox    • Chronic kidney disease, stage III (moderate) (HCC) 2/9/2016   • CTS (carpal tunnel syndrome) 2/28/2011   • Depression    • GERD (gastroesophageal reflux disease)    • Gynecological disorder    • Heart burn    • Heart murmur    • Hemorrhagic disorder (HCC)     on Xarelto    • Hypertr obst cardiomyop 1991    Dr. Bert Vazquez, [facility] [address] [place] [address] [phone] fax [phone].  [facility] [phone].  Alchol Septal Reduction 8/00, Coronaries normal.   • Hypertrophic cardiomyopathy (HCC)    • Indigestion    • Influenza    • Pacemaker 2014   • Renal disorder     chronic kidney disease stage 3-  **pt states currently no issue, numbers are stable   • Sleep apnea     uses CPAP   • Snoring      Past Surgical History:   Procedure Laterality Date   • HYSTEROSCOPY WITH VIDEO OPERATIVE  4/30/2018    Procedure: HYSTEROSCOPY WITH VIDEO OPERATIVE;  Surgeon: Noemi Liu M.D.;  Location: SURGERY SAME DAY [facility];  Service: Labor and Delivery   • DILATION AND CURETTAGE  4/30/2018    Procedure: DILATION AND CURETTAGE;  Surgeon: Noemi Liu M.D.;  Location:  SURGERY SAME DAY [facility];  Service: Labor and Delivery   • BREAST BIOPSY Left 2018    Procedure: BREAST BIOPSY- WIRE LOCALIZED;  Surgeon: Vijaya Britt M.D.;  Location: SURGERY SAME DAY [facility];  Service: General   • RECOVERY  4/15/2015    Performed by Recovery[facility] Surgery at SURGERY PRE-POST PROC UNIT [facility]   • PACEMAKER INSERTION     • RECOVERY  2014    Performed by Ir-Recovery Surgery at SURGERY SAME DAY [facility]   • SEPTAL RECONSTRUCTION     • ENDOSCOPY     • PRIMARY C SECTION     • TUBAL COAGULATION LAPAROSCOPIC BILATERAL       Family History   Problem Relation Age of Onset   • Heart Disease Mother         CHF   • Hypertension Mother    • Stroke Mother    • Heart Failure Mother    • Other Mother         carotid artery disease,gout   • Cancer Father         AML   • Diabetes Maternal Grandmother    • Cancer Maternal Grandfather         Prostate   • Heart Disease Paternal Grandfather 52         MI    • Sleep Apnea Neg Hx      Social History     Social History   • Marital status:      Spouse name: N/A   • Number of children: N/A   • Years of education: N/A     Occupational History   • Not on file.     Social History Main Topics   • Smoking status: Never Smoker   • Smokeless tobacco: Never Used   • Alcohol use 0.6 oz/week     1 Standard drinks or equivalent per week      Comment: 3-4 per week   • Drug use: No   • Sexual activity: Yes     Partners: Male     Birth control/ protection: Surgical      Comment:      Other Topics Concern   • Not on file     Social History Narrative   • No narrative on file     No Known Allergies  Outpatient Encounter Prescriptions as of 2019   Medication Sig Dispense Refill   • warfarin (COUMADIN) 5 MG Tab Take 2 Tabs by mouth every day. Or as directed by coumadin clinic. 60 Tab 3   • metoprolol SR (TOPROL XL) 25 MG TABLET SR 24 HR Take 1 Tab by mouth every day. 30 Tab 3   • atorvastatin (LIPITOR) 40 MG Tab Take 1 Tab by mouth every  "evening. 90 Tab 3   • HORSE CHESTNUT PO Take 400 mg by mouth every day.     • Pantothenic Acid 500 MG Tab Take 500 mg by mouth.     • disopyramide (NORPACE) 100 MG Cap Take 1 Cap by mouth every 8 hours. 270 Cap 3   • Melatonin 5 MG TABLET DISPERSIBLE Take  by mouth.     • Fluticasone Propionate (FLONASE NA) Spray  in nose.     • Calcium-Vitamin D-Vitamin K (CALCIUM FOR WOMEN PO) Take 1 Each by mouth every day at 6 PM.     • Lansoprazole (PREVACID PO) Take 1 Each by mouth 1 time daily as needed.     • Magnesium 200 MG TABS Take 1 Tab by mouth every day.     • Multiple Vitamins-Minerals (ALIVE ONCE DAILY WOMENS 50+ PO) Take 1 Each by mouth every day at 6 PM.     • [DISCONTINUED] Multiple Vitamins-Minerals (ALIVE WOMENS ENERGY) Tab Take  by mouth.       No facility-administered encounter medications on file as of 2/28/2019.      Review of Systems   Constitutional: Negative for chills and fever.   HENT: Negative for sore throat.         No difficulty swallowing   Eyes: Negative for blurred vision and double vision.   Respiratory: Negative for cough, shortness of breath and wheezing.    Cardiovascular: Positive for palpitations. Negative for chest pain, orthopnea, claudication, leg swelling and PND.   Gastrointestinal: Negative for abdominal pain, blood in stool, heartburn, nausea and vomiting.   Genitourinary: Negative for dysuria, frequency, hematuria and urgency.   Musculoskeletal: Negative for myalgias.   Skin: Negative.    Neurological: Positive for dizziness. Negative for speech change, focal weakness, loss of consciousness and headaches.   Endo/Heme/Allergies: Does not bruise/bleed easily.   Psychiatric/Behavioral: Positive for memory loss.        Objective:   /76 (BP Location: Left arm, Patient Position: Sitting, BP Cuff Size: Adult)   Pulse 60   Ht 1.626 m (5' 4\")   Wt 87.1 kg (192 lb)   LMP 01/31/2012   SpO2 96%   BMI 32.96 kg/m²     Physical Exam   Constitutional: She is oriented to person, place, " and time. She appears well-developed and well-nourished.   HENT:   Head: Normocephalic and atraumatic.   Eyes: Pupils are equal, round, and reactive to light.   Neck: Normal range of motion. Neck supple.   Cardiovascular: Normal rate and regular rhythm.    Pulmonary/Chest: Effort normal and breath sounds normal.   Device site is uncomplicated.   Abdominal: Soft. Bowel sounds are normal.   Musculoskeletal: Normal range of motion.   Neurological: She is alert and oriented to person, place, and time.   Skin: Skin is warm and dry.   Psychiatric: She has a normal mood and affect.       Assessment:     1. Hypertrophic obstructive cardiomyopathy (HCC)     2. Paroxysmal atrial fibrillation (HCC)     3. Cardiac pacemaker in situ     4. Chronic anticoagulation     5. High risk medication use         Medical Decision Making:  Today's Assessment / Status / Plan:     1. HOCM with improvement in gradient post alcohol ablation.  VPC's isolated but frequent, asymptomatic. Medi-lynx to review percentage and complex forms under cut-off rates.  2. PAF one short episode 1.5 minutes.  3. PPM BSI BV 4 years.  4. COAC now on Warfarin . INR 2.0   5. High risk medication on Norpace. Dr Witt and I have discussed the increase if PVC's are not improved with increased Metoprolol.  6. Headache post TIA with global amnesia and continued short term memory issues. Increase Metoprolol XL to 50 mgs daily.  RTC 1 month.    Collaborating MD AURELIO travis

## 2019-02-28 NOTE — LETTER
[facility]   [address]  [place] [address]  Phone: [phone]  Fax: [phone]              Seda Cagle  1960    Encounter Date: 2/28/2019    MARK Christianson          PROGRESS NOTE:  Chief Complaint   Patient presents with   • Atrial Fibrillation     F/V DX:PAF       Subjective:   Seda Cagle is a 58 y.o. female who presents today for review of her cardiac status. Her pacemaker surveillance demonstrated 111,000 PVC's in approximately a 2-3 week period.  She has had more issues since her episode of global amnesia with headaches and dizziness. Dr Witt and I decided to switch her from NOAC to warfarin for this episode and she has had no further neurological sequale. She is scheduled to see neurology on March 18th. Her device is not MRI compatible but can be performed with the [facility] MRI protocol.  We have investigated her AV search feature on her device which goes out no farther than 300 ms.   is minimal at 7 % currently. Activity response was modified as I have increased her Metoprolol today for continued headache break through and question of incomplete BP control.    Her history is complicated with having undergone the following procedures:  The patient has undergone alcohol septal ablation and has a dual chamber BSI pacemaker. She has been on Norpace for her Hocm and Xarelto for Anticoagulation.   She was recently seen by Dr Witt on 10/8/18 and echo was done with following results:  CONCLUSIONS  Normal left ventricular systolic function.  Left ventricular ejection fraction is visually estimated to be 55%.  Septal wall thickness 1.2cm   Mild  left ventricular hypertrophy.  Grade I diastolic dysfunction.  There is evidence of LVOT obstuction which is not fully characterized   in this study. Consider subaortic membrane.  The max gradient that is measured is 4mmHg.  Normal right ventricular size.  Normal right  ventricular systolic function.  Mildly dilated left atrium.  Mild to moderate mitral regurgitation.  Aortic sclerosis without stenosis.  Mild aortic insufficiency.  Right heart pressures are normal.      Interim events.  Patient was admitted on 1/18/19 with an episode of transient global amnesia questionable TIA.  Her imaging contained the following:  Vascular Laboratory   CONCLUSIONS   Mild stenosis of the right internal carotid (< 50%).    Normal left carotid exam.   CT of head:  Impression        1.  Head CT without contrast within normal limits. No evidence of acute cerebral infarction, hemorrhage or mass lesion.  2.  BILATERAL maxillary sinus disease      Impression        CT angiogram of the [place] within normal limits.          Past Medical History:   Diagnosis Date   • Anesthesia     wakes up during procedure (x2)   • Aortic regurgitation    • Arrhythmia, ventricular 2008    Symptomatic PVCs, controlled with medication.   • Asthma     inhaler PRN, only uses when sick    • Atrial fibrillation (HCC)    • Cardiac arrhythmia    • Cervical arthritis 2/8/2016   • Chickenpox    • Chronic kidney disease, stage III (moderate) (HCC) 2/9/2016   • CTS (carpal tunnel syndrome) 2/28/2011   • Depression    • GERD (gastroesophageal reflux disease)    • Gynecological disorder    • Heart burn    • Heart murmur    • Hemorrhagic disorder (HCC)     on Xarelto    • Hypertr obst cardiomyop 1991    Dr. Bert Vazquez, [address] [address], [place] [address] [phone] fax [phone].  [facility] [phone].  Alchol Septal Reduction 8/00, Coronaries normal.   • Hypertrophic cardiomyopathy (HCC)    • Indigestion    • Influenza    • Pacemaker 2014   • Renal disorder     chronic kidney disease stage 3-  **pt states currently no issue, numbers are stable   • Sleep apnea     uses CPAP   • Snoring      Past Surgical History:   Procedure Laterality Date   • HYSTEROSCOPY WITH VIDEO OPERATIVE  4/30/2018     Procedure: HYSTEROSCOPY WITH VIDEO OPERATIVE;  Surgeon: Nomei Liu M.D.;  Location: SURGERY SAME DAY [facility];  Service: Labor and Delivery   • DILATION AND CURETTAGE  2018    Procedure: DILATION AND CURETTAGE;  Surgeon: Noemi Liu M.D.;  Location: SURGERY SAME DAY [facility];  Service: Labor and Delivery   • BREAST BIOPSY Left 2018    Procedure: BREAST BIOPSY- WIRE LOCALIZED;  Surgeon: Vijaya Britt M.D.;  Location: SURGERY SAME DAY [facility];  Service: General   • RECOVERY  4/15/2015    Performed by Recoveryonly Surgery at SURGERY PRE-POST PROC UN[facility]   • PACEMAKER INSERTION     • RECOVERY  2014    Performed by Ir-Recovery Surgery at SURGERY SAME DAY [facility]   • SEPTAL RECONSTRUCTION     • ENDOSCOPY     • PRIMARY C SECTION     • TUBAL COAGULATION LAPAROSCOPIC BILATERAL       Family History   Problem Relation Age of Onset   • Heart Disease Mother         CHF   • Hypertension Mother    • Stroke Mother    • Heart Failure Mother    • Other Mother         carotid artery disease,gout   • Cancer Father         AML   • Diabetes Maternal Grandmother    • Cancer Maternal Grandfather         Prostate   • Heart Disease Paternal Grandfather 52         MI    • Sleep Apnea Neg Hx      Social History     Social History   • Marital status:      Spouse name: N/A   • Number of children: N/A   • Years of education: N/A     Occupational History   • Not on file.     Social History Main Topics   • Smoking status: Never Smoker   • Smokeless tobacco: Never Used   • Alcohol use 0.6 oz/week     1 Standard drinks or equivalent per week      Comment: 3-4 per week   • Drug use: No   • Sexual activity: Yes     Partners: Male     Birth control/ protection: Surgical      Comment:      Other Topics Concern   • Not on file     Social History Narrative   • No narrative on file     No Known Allergies  Outpatient Encounter Prescriptions as of 2019   Medication Sig  Dispense Refill   • warfarin (COUMADIN) 5 MG Tab Take 2 Tabs by mouth every day. Or as directed by coumadin clinic. 60 Tab 3   • metoprolol SR (TOPROL XL) 25 MG TABLET SR 24 HR Take 1 Tab by mouth every day. 30 Tab 3   • atorvastatin (LIPITOR) 40 MG Tab Take 1 Tab by mouth every evening. 90 Tab 3   • HORSE CHESTNUT PO Take 400 mg by mouth every day.     • Pantothenic Acid 500 MG Tab Take 500 mg by mouth.     • disopyramide (NORPACE) 100 MG Cap Take 1 Cap by mouth every 8 hours. 270 Cap 3   • Melatonin 5 MG TABLET DISPERSIBLE Take  by mouth.     • Fluticasone Propionate (FLONASE NA) Spray  in nose.     • Calcium-Vitamin D-Vitamin K (CALCIUM FOR WOMEN PO) Take 1 Each by mouth every day at 6 PM.     • Lansoprazole (PREVACID PO) Take 1 Each by mouth 1 time daily as needed.     • Magnesium 200 MG TABS Take 1 Tab by mouth every day.     • Multiple Vitamins-Minerals (ALIVE ONCE DAILY WOMENS 50+ PO) Take 1 Each by mouth every day at 6 PM.     • [DISCONTINUED] Multiple Vitamins-Minerals (ALIVE WOMENS ENERGY) Tab Take  by mouth.       No facility-administered encounter medications on file as of 2/28/2019.      Review of Systems   Constitutional: Negative for chills and fever.   HENT: Negative for sore throat.         No difficulty swallowing   Eyes: Negative for blurred vision and double vision.   Respiratory: Negative for cough, shortness of breath and wheezing.    Cardiovascular: Positive for palpitations. Negative for chest pain, orthopnea, claudication, leg swelling and PND.   Gastrointestinal: Negative for abdominal pain, blood in stool, heartburn, nausea and vomiting.   Genitourinary: Negative for dysuria, frequency, hematuria and urgency.   Musculoskeletal: Negative for myalgias.   Skin: Negative.    Neurological: Positive for dizziness. Negative for speech change, focal weakness, loss of consciousness and headaches.   Endo/Heme/Allergies: Does not bruise/bleed easily.   Psychiatric/Behavioral: Positive for memory  "loss.        Objective:   /76 (BP Location: Left arm, Patient Position: Sitting, BP Cuff Size: Adult)   Pulse 60   Ht 1.626 m (5' 4\")   Wt 87.1 kg (192 lb)   LMP 01/31/2012   SpO2 96%   BMI 32.96 kg/m²      Physical Exam   Constitutional: She is oriented to person, place, and time. She appears well-developed and well-nourished.   HENT:   Head: Normocephalic and atraumatic.   Eyes: Pupils are equal, round, and reactive to light.   Neck: Normal range of motion. Neck supple.   Cardiovascular: Normal rate and regular rhythm.    Pulmonary/Chest: Effort normal and breath sounds normal.   Device site is uncomplicated.   Abdominal: Soft. Bowel sounds are normal.   Musculoskeletal: Normal range of motion.   Neurological: She is alert and oriented to person, place, and time.   Skin: Skin is warm and dry.   Psychiatric: She has a normal mood and affect.       Assessment:     1. Hypertrophic obstructive cardiomyopathy (HCC)     2. Paroxysmal atrial fibrillation (HCC)     3. Cardiac pacemaker in situ     4. Chronic anticoagulation     5. High risk medication use         Medical Decision Making:  Today's Assessment / Status / Plan:     1. HOCM with improvement in gradient post alcohol ablation.  VPC's isolated but frequent, asymptomatic. Medi-lynx to review percentage and complex forms under cut-off rates.  2. PAF one short episode 1.5 minutes.  3. PPM BSI BV 4 years.  4. COAC now on Warfarin . INR 2.0   5. High risk medication on Norpace. Dr Witt and I have discussed the increase if PVC's are not improved with increased Metoprolol.  6. Headache post TIA with global amnesia and continued short term memory issues. Increase Metoprolol XL to 50 mgs daily.  RTC 1 month.    Collaborating MD CELESTINA ugarte      Juve CHELO Stoddard M.D.  [address]  [place] [address]  VIA In Basket                 "

## 2019-03-04 ENCOUNTER — NON-PROVIDER VISIT (OUTPATIENT)
Dept: CARDIOLOGY | Facility: MEDICAL CENTER | Age: 59
End: 2019-03-04
Payer: COMMERCIAL

## 2019-03-04 ENCOUNTER — TELEPHONE (OUTPATIENT)
Dept: CARDIOLOGY | Facility: MEDICAL CENTER | Age: 59
End: 2019-03-04

## 2019-03-04 DIAGNOSIS — I49.3 PVC (PREMATURE VENTRICULAR CONTRACTION): ICD-10-CM

## 2019-03-04 DIAGNOSIS — I48.0 PAROXYSMAL ATRIAL FIBRILLATION (HCC): Chronic | ICD-10-CM

## 2019-03-04 DIAGNOSIS — Z95.0 CARDIAC PACEMAKER IN SITU: ICD-10-CM

## 2019-03-04 DIAGNOSIS — I49.1 PAC (PREMATURE ATRIAL CONTRACTION): ICD-10-CM

## 2019-03-04 PROCEDURE — 93228 REMOTE 30 DAY ECG REV/REPORT: CPT | Performed by: INTERNAL MEDICINE

## 2019-03-04 RX ORDER — METOPROLOL SUCCINATE 50 MG/1
50 TABLET, EXTENDED RELEASE ORAL DAILY
Qty: 90 TAB | Refills: 3 | Status: SHIPPED | OUTPATIENT
Start: 2019-03-04 | End: 2020-02-06 | Stop reason: SDUPTHER

## 2019-03-05 NOTE — TELEPHONE ENCOUNTER
Patient enrolled in the [employer] monitoring program.  >In-office hook up 03/04/2019-Pending Baseline.

## 2019-03-06 LAB — INR BLD: 2 (ref 0.9–1.2)

## 2019-03-11 ENCOUNTER — TELEPHONE (OUTPATIENT)
Dept: CARDIOLOGY | Facility: MEDICAL CENTER | Age: 59
End: 2019-03-11

## 2019-03-11 NOTE — TELEPHONE ENCOUNTER
Allen faxed recordings. Reviewed by Dr. Witt in Sonya's absence.Per Dr. Witt: looks fine.  Tracings on Sonya's desk.

## 2019-03-11 NOTE — TELEPHONE ENCOUNTER
----- Message from Frannie Haywood sent at 3/11/2019 10:32 AM PDT -----  Regarding: abnormal monitor results  NAPOLEON/Nighat Rondon called with abnormal monitor results. Ph. #[phone], anyone noemí assist you when you call back.

## 2019-03-12 ENCOUNTER — ANTICOAGULATION VISIT (OUTPATIENT)
Dept: VASCULAR LAB | Facility: MEDICAL CENTER | Age: 59
End: 2019-03-12
Attending: INTERNAL MEDICINE
Payer: COMMERCIAL

## 2019-03-12 VITALS — SYSTOLIC BLOOD PRESSURE: 128 MMHG | DIASTOLIC BLOOD PRESSURE: 7 MMHG

## 2019-03-12 DIAGNOSIS — Z79.01 CHRONIC ANTICOAGULATION: ICD-10-CM

## 2019-03-12 DIAGNOSIS — G45.4 TRANSIENT GLOBAL AMNESIA: ICD-10-CM

## 2019-03-12 DIAGNOSIS — I48.0 PAROXYSMAL ATRIAL FIBRILLATION (HCC): ICD-10-CM

## 2019-03-12 LAB
INR BLD: 2.2 (ref 0.9–1.2)
INR PPP: 2.2 (ref 2–3.5)

## 2019-03-12 PROCEDURE — 99211 OFF/OP EST MAY X REQ PHY/QHP: CPT

## 2019-03-12 PROCEDURE — 85610 PROTHROMBIN TIME: CPT

## 2019-03-12 NOTE — PROGRESS NOTES
Anticoagulation Summary  As of 3/12/2019    INR goal:   2.0-3.0   TTR:   88.7 % (1 mo)   INR used for dosin.2 (3/12/2019)   Warfarin maintenance plan:   10 mg (5 mg x 2) every day   Weekly warfarin total:   70 mg   Plan last modified:   Aisha Castañeda, PharmD (2019)   Next INR check:   3/26/2019   Target end date:   Indefinite    Indications    Chronic anticoagulation [Z79.01]  Paroxysmal atrial fibrillation (HCC) [I48.0]  Transient global amnesia [G45.4]             Anticoagulation Episode Summary     INR check location:       Preferred lab:       Send INR reminders to:       Comments:         Anticoagulation Care Providers     Provider Role Specialty Phone number    Sonya Alonso, A.P.N. Referring Cardiology [phone]    [facility] Responsible  [phone]        Anticoagulation Patient Findings    History of Present Illness: follow up appointment for chronic anticoagulation with the high risk medication, warfarin for atrial fibrillation    Pt remains therapeutic today. Continue current dosing regimen.  Follow up in 2 weeks, to reduce the risk of adverse events related to this high risk medication, warfarin.    Joelle Mason, Clinical Pharmacist

## 2019-03-15 DIAGNOSIS — Z95.0 CARDIAC PACEMAKER IN SITU: ICD-10-CM

## 2019-03-15 DIAGNOSIS — I48.0 PAROXYSMAL ATRIAL FIBRILLATION (HCC): Chronic | ICD-10-CM

## 2019-03-18 ENCOUNTER — HOSPITAL ENCOUNTER (OUTPATIENT)
Dept: LAB | Facility: MEDICAL CENTER | Age: 59
End: 2019-03-18
Attending: INTERNAL MEDICINE
Payer: COMMERCIAL

## 2019-03-18 LAB
BASOPHILS # BLD AUTO: 0.4 % (ref 0–1.8)
BASOPHILS # BLD: 0.02 K/UL (ref 0–0.12)
EOSINOPHIL # BLD AUTO: 0.13 K/UL (ref 0–0.51)
EOSINOPHIL NFR BLD: 2.7 % (ref 0–6.9)
ERYTHROCYTE [DISTWIDTH] IN BLOOD BY AUTOMATED COUNT: 40.8 FL (ref 35.9–50)
ERYTHROCYTE [SEDIMENTATION RATE] IN BLOOD BY WESTERGREN METHOD: 7 MM/HOUR (ref 0–30)
HCT VFR BLD AUTO: 45.1 % (ref 37–47)
HGB BLD-MCNC: 15.1 G/DL (ref 12–16)
IMM GRANULOCYTES # BLD AUTO: 0.01 K/UL (ref 0–0.11)
IMM GRANULOCYTES NFR BLD AUTO: 0.2 % (ref 0–0.9)
LYMPHOCYTES # BLD AUTO: 1.77 K/UL (ref 1–4.8)
LYMPHOCYTES NFR BLD: 36.5 % (ref 22–41)
MCH RBC QN AUTO: 29.7 PG (ref 27–33)
MCHC RBC AUTO-ENTMCNC: 33.5 G/DL (ref 33.6–35)
MCV RBC AUTO: 88.8 FL (ref 81.4–97.8)
MONOCYTES # BLD AUTO: 0.36 K/UL (ref 0–0.85)
MONOCYTES NFR BLD AUTO: 7.4 % (ref 0–13.4)
NEUTROPHILS # BLD AUTO: 2.56 K/UL (ref 2–7.15)
NEUTROPHILS NFR BLD: 52.8 % (ref 44–72)
NRBC # BLD AUTO: 0 K/UL
NRBC BLD-RTO: 0 /100 WBC
PLATELET # BLD AUTO: 150 K/UL (ref 164–446)
PMV BLD AUTO: 10.9 FL (ref 9–12.9)
RBC # BLD AUTO: 5.08 M/UL (ref 4.2–5.4)
WBC # BLD AUTO: 4.9 K/UL (ref 4.8–10.8)

## 2019-03-18 PROCEDURE — 85025 COMPLETE CBC W/AUTO DIFF WBC: CPT

## 2019-03-18 PROCEDURE — 85652 RBC SED RATE AUTOMATED: CPT

## 2019-03-18 PROCEDURE — 36415 COLL VENOUS BLD VENIPUNCTURE: CPT

## 2019-03-26 ENCOUNTER — ANTICOAGULATION VISIT (OUTPATIENT)
Dept: VASCULAR LAB | Facility: MEDICAL CENTER | Age: 59
End: 2019-03-26
Attending: INTERNAL MEDICINE
Payer: COMMERCIAL

## 2019-03-26 VITALS — DIASTOLIC BLOOD PRESSURE: 71 MMHG | HEART RATE: 59 BPM | SYSTOLIC BLOOD PRESSURE: 125 MMHG

## 2019-03-26 DIAGNOSIS — G45.4 TRANSIENT GLOBAL AMNESIA: ICD-10-CM

## 2019-03-26 DIAGNOSIS — Z79.01 CHRONIC ANTICOAGULATION: ICD-10-CM

## 2019-03-26 DIAGNOSIS — I48.0 PAROXYSMAL ATRIAL FIBRILLATION (HCC): ICD-10-CM

## 2019-03-26 LAB — INR PPP: 2.1 (ref 2–3.5)

## 2019-03-26 PROCEDURE — 99211 OFF/OP EST MAY X REQ PHY/QHP: CPT

## 2019-03-26 PROCEDURE — 85610 PROTHROMBIN TIME: CPT

## 2019-03-26 NOTE — PROGRESS NOTES
Anticoagulation Summary  As of 3/26/2019    INR goal:   2.0-3.0   TTR:   88.7 % (1 mo)   INR used for dosing:      Warfarin maintenance plan:   10 mg (5 mg x 2) every day   Weekly warfarin total:   70 mg   Plan last modified:   Aisha Castañeda, PharmD (2/19/2019)   Next INR check:      Target end date:   Indefinite    Indications    Chronic anticoagulation [Z79.01]  Paroxysmal atrial fibrillation (HCC) [I48.0]  Transient global amnesia [G45.4]             Anticoagulation Episode Summary     INR check location:       Preferred lab:       Send INR reminders to:       Comments:         Anticoagulation Care Providers     Provider Role Specialty Phone number    Sonya Alonso, A.P.N. Referring Cardiology [phone]    [facility] Responsible  [phone]        Anticoagulation Patient Findings      HPI:  Sedarosalinda Cagle seen in clinic today, on anticoagulation therapy with warfarin for paroxysmal atrial fibrillation  Changes to current medical/health status since last appt: no    Denies any interval changes to diet  Denies any interval changes to medications since last appt.   Denies any complications or cost restrictions with current therapy.   BP recorded in vitals.      A/P   INR  therapeutic.     Pt states that she had one nose bleed this past week that stopped bleeding soon after it started. Instructed pt to continue to monitor and contact the clinic if concerning.     Continue current warfarin regimen.    Follow up appointment in 4 week(s).    Rashid Ly, PharmD

## 2019-03-27 LAB — INR BLD: 2.1 (ref 0.9–1.2)

## 2019-03-28 ENCOUNTER — TELEPHONE (OUTPATIENT)
Dept: CARDIOLOGY | Facility: MEDICAL CENTER | Age: 59
End: 2019-03-28

## 2019-03-28 ENCOUNTER — OFFICE VISIT (OUTPATIENT)
Dept: CARDIOLOGY | Facility: MEDICAL CENTER | Age: 59
End: 2019-03-28
Payer: COMMERCIAL

## 2019-03-28 VITALS
SYSTOLIC BLOOD PRESSURE: 118 MMHG | HEART RATE: 62 BPM | BODY MASS INDEX: 33.46 KG/M2 | HEIGHT: 64 IN | WEIGHT: 196 LBS | OXYGEN SATURATION: 95 % | DIASTOLIC BLOOD PRESSURE: 74 MMHG

## 2019-03-28 DIAGNOSIS — Z95.0 CARDIAC PACEMAKER IN SITU: ICD-10-CM

## 2019-03-28 DIAGNOSIS — G45.4 TRANSIENT GLOBAL AMNESIA: ICD-10-CM

## 2019-03-28 DIAGNOSIS — Z79.01 CHRONIC ANTICOAGULATION: ICD-10-CM

## 2019-03-28 DIAGNOSIS — Z79.899 HIGH RISK MEDICATION USE: ICD-10-CM

## 2019-03-28 DIAGNOSIS — I42.1 HYPERTROPHIC OBSTRUCTIVE CARDIOMYOPATHY (HCC): Chronic | ICD-10-CM

## 2019-03-28 PROCEDURE — 93280 PM DEVICE PROGR EVAL DUAL: CPT | Performed by: NURSE PRACTITIONER

## 2019-03-28 PROCEDURE — 99214 OFFICE O/P EST MOD 30 MIN: CPT | Mod: 25 | Performed by: NURSE PRACTITIONER

## 2019-03-28 ASSESSMENT — ENCOUNTER SYMPTOMS
LOSS OF CONSCIOUSNESS: 0
FOCAL WEAKNESS: 0
DOUBLE VISION: 0
PSYCHIATRIC NEGATIVE: 1
ABDOMINAL PAIN: 0
BLURRED VISION: 0
COUGH: 0
FEVER: 0
BLOOD IN STOOL: 0
HEARTBURN: 0
CLAUDICATION: 0
BRUISES/BLEEDS EASILY: 0
WHEEZING: 0
SPEECH CHANGE: 1
SORE THROAT: 0
SHORTNESS OF BREATH: 0
DIZZINESS: 0
HEADACHES: 1
VOMITING: 0
MYALGIAS: 0
ORTHOPNEA: 0
PALPITATIONS: 0
CHILLS: 0
PND: 0
NAUSEA: 0

## 2019-03-28 NOTE — PROGRESS NOTES
Chief Complaint   Patient presents with   • Atrial Fibrillation     F/V DX:PAF       Subjective:   Seda Cagle is a 58 y.o. female who presents today   Seda Cagle is a 58 y.o. female who presents today for review of her cardiac status. Her pacemaker surveillance demonstrated 111,000 PVC's in approximately a 2-3 week period.  She has had more issues since her episode of global amnesia with headaches and dizziness. Dr Witt and I decided to switch her from NOAC to warfarin for this episode and she has had no further neurological sequale. She is scheduled to see neurology on March 18th. Her device is not MRI compatible but can be performed with the [facility] MRI protocol.  We have investigated her AV search feature on her device which goes out no farther than 300 ms.   is minimal at 7 % currently. Activity response was modified as I have increased her Metoprolol today for continued headache break through and question of incomplete BP control.     Her history is complicated with having undergone the following procedures:  The patient has undergone alcohol septal ablation and has a dual chamber BSI pacemaker. She has been on Norpace for her Hocm and Xarelto for Anticoagulation.   She was recently seen by Dr Witt on 10/8/18 and echo was done with following results:  CONCLUSIONS  Normal left ventricular systolic function.  Left ventricular ejection fraction is visually estimated to be 55%.  Septal wall thickness 1.2cm   Mild  left ventricular hypertrophy.  Grade I diastolic dysfunction.  There is evidence of LVOT obstuction which is not fully characterized   in this study. Consider subaortic membrane.  The max gradient that is measured is 4mmHg.  Normal right ventricular size.  Normal right ventricular systolic function.  Mildly dilated left atrium.  Mild to moderate mitral regurgitation.  Aortic sclerosis without stenosis.  Mild aortic insufficiency.  Right heart pressures are normal.    Compared to the images of the prior ALLEN done 12/12/2014: There is a   clear subaortic membrane seen on that study.     Interim events.  Patient was admitted on 1/18/19 with an episode of transient global amnesia questionable TIA.  Her imaging contained the following:  Vascular Laboratory   CONCLUSIONS   Mild stenosis of the right internal carotid (< 50%).    Normal left carotid exam.   CT of head:  Impression        1.  Head CT without contrast within normal limits. No evidence of acute cerebral infarction, hemorrhage or mass lesion.  2.  BILATERAL maxillary sinus disease      Impression        CT angiogram of the [place] within normal limits.         Biotel monitor summary revealed:  No correlation with dizziness or chest pain with arrhythmias.  APC burden 4%. AF none reported however, one short run noted on 3/8/19 with HR up to 135 bpm.  I have asked biotel to quantitate % of PVC burden as well.  Since reprogramming of AV search delay to 300 ms she feels better % of  remains low at 5 %.  She is feeling better. Speech patterns are better, headaches are improved and arrhythmias are better. She has seen neurology and her EEG was read as normal per patient.    Past Medical History:   Diagnosis Date   • Anesthesia     wakes up during procedure (x2)   • Aortic regurgitation    • Arrhythmia, ventricular 2008    Symptomatic PVCs, controlled with medication.   • Asthma     inhaler PRN, only uses when sick    • Atrial fibrillation (HCC)    • Cardiac arrhythmia    • Cervical arthritis 2/8/2016   • Chickenpox    • Chronic kidney disease, stage III (moderate) (HCC) 2/9/2016   • CTS (carpal tunnel syndrome) 2/28/2011   • Depression    • GERD (gastroesophageal reflux disease)    • Gynecological disorder    • Heart burn    • Heart murmur    • Hemorrhagic disorder (HCC)     on Xarelto    • Hypertr obst cardiomyop 1991    Dr. Bert Vazquez, [address] [facility], [place] [address], [place], [place] [address] [phone] fax [phone].   [place] office [phone].  Alchol Septal Reduction , Coronaries normal.   • Hypertrophic cardiomyopathy (HCC)    • Indigestion    • Influenza    • Pacemaker    • Renal disorder     chronic kidney disease stage 3-  **pt states currently no issue, numbers are stable   • Sleep apnea     uses CPAP   • Snoring      Past Surgical History:   Procedure Laterality Date   • HYSTEROSCOPY WITH VIDEO OPERATIVE  2018    Procedure: HYSTEROSCOPY WITH VIDEO OPERATIVE;  Surgeon: Noemi Liu M.D.;  Location: [facility];  Service: Labor and Delivery   • DILATION AND CURETTAGE  2018    Procedure: DILATION AND CURETTAGE;  Surgeon: Noemi Liu M.D.;  Location: [facility];  Service: Labor and Delivery   • BREAST BIOPSY Left 2018    Procedure: BREAST BIOPSY- WIRE LOCALIZED;  Surgeon: Vijaya Britt M.D.;  Location: [facility];  Service: General   • RECOVERY  4/15/2015    Performed by [facility] at [facility]   • PACEMAKER INSERTION     • RECOVERY  2014    Performed by [facility] at [facility]   • SEPTAL RECONSTRUCTION     • ENDOSCOPY     • PRIMARY C SECTION     • TUBAL COAGULATION LAPAROSCOPIC BILATERAL       Family History   Problem Relation Age of Onset   • Heart Disease Mother         CHF   • Hypertension Mother    • Stroke Mother    • Heart Failure Mother    • Other Mother         carotid artery disease,gout   • Cancer Father         AML   • Diabetes Maternal Grandmother    • Cancer Maternal Grandfather         Prostate   • Heart Disease Paternal Grandfather 52         MI    • Sleep Apnea Neg Hx      Social History     Social History   • Marital status:      Spouse name: N/A   • Number of children: N/A   • Years of education: N/A     Occupational History   • Not on file.     Social History Main Topics   • Smoking status: Never Smoker   • Smokeless  tobacco: Never Used   • Alcohol use 0.6 oz/week     1 Standard drinks or equivalent per week      Comment: 3-4 per week   • Drug use: No   • Sexual activity: Yes     Partners: Male     Birth control/ protection: Surgical      Comment:      Other Topics Concern   • Not on file     Social History Narrative   • No narrative on file     No Known Allergies  Outpatient Encounter Prescriptions as of 3/28/2019   Medication Sig Dispense Refill   • metoprolol SR (TOPROL XL) 50 MG TABLET SR 24 HR Take 1 Tab by mouth every day. 90 Tab 3   • warfarin (COUMADIN) 5 MG Tab Take 2 Tabs by mouth every day. Or as directed by coumadin clinic. 60 Tab 3   • atorvastatin (LIPITOR) 40 MG Tab Take 1 Tab by mouth every evening. 90 Tab 3   • HORSE CHESTNUT PO Take 400 mg by mouth every day.     • Pantothenic Acid 500 MG Tab Take 500 mg by mouth.     • disopyramide (NORPACE) 100 MG Cap Take 1 Cap by mouth every 8 hours. 270 Cap 3   • Melatonin 5 MG TABLET DISPERSIBLE Take  by mouth.     • Fluticasone Propionate (FLONASE NA) Spray  in nose.     • Calcium-Vitamin D-Vitamin K (CALCIUM FOR WOMEN PO) Take 1 Each by mouth every day at 6 PM.     • Lansoprazole (PREVACID PO) Take 1 Each by mouth 1 time daily as needed.     • Magnesium 200 MG TABS Take 1 Tab by mouth every day.     • Multiple Vitamins-Minerals (ALIVE ONCE DAILY WOMENS 50+ PO) Take 1 Each by mouth every day at 6 PM.       No facility-administered encounter medications on file as of 3/28/2019.      Review of Systems   Constitutional: Negative for chills and fever.   HENT: Negative for sore throat.         No difficulty swallowing   Eyes: Negative for blurred vision and double vision.   Respiratory: Negative for cough, shortness of breath and wheezing.    Cardiovascular: Positive for chest pain (very little). Negative for palpitations, orthopnea, claudication, leg swelling and PND.   Gastrointestinal: Negative for abdominal pain, blood in stool, heartburn, nausea and vomiting.  "  Genitourinary: Negative for dysuria, frequency, hematuria and urgency.   Musculoskeletal: Negative for myalgias.   Skin: Negative.    Neurological: Positive for speech change (improved.) and headaches. Negative for dizziness, focal weakness and loss of consciousness.   Endo/Heme/Allergies: Does not bruise/bleed easily.   Psychiatric/Behavioral: Negative.         Objective:   /74 (BP Location: Left arm, Patient Position: Sitting, BP Cuff Size: Adult)   Pulse 62   Ht 1.626 m (5' 4\")   Wt 88.9 kg (196 lb)   LMP 01/31/2012   SpO2 95%   BMI 33.64 kg/m²     Physical Exam   Constitutional: She is oriented to person, place, and time. She appears well-developed and well-nourished.   HENT:   Head: Normocephalic and atraumatic.   Eyes: Pupils are equal, round, and reactive to light.   Neck: Normal range of motion. Neck supple.   Cardiovascular: Normal rate and regular rhythm.    Murmur heard.  Pulmonary/Chest: Effort normal and breath sounds normal.   Device site is uncomplicated.   Abdominal: Soft. Bowel sounds are normal.   Musculoskeletal: Normal range of motion.   Neurological: She is alert and oriented to person, place, and time.   Skin: Skin is warm and dry.   Psychiatric: She has a normal mood and affect.       Assessment:     1. Hypertrophic obstructive cardiomyopathy (HCC)     2. Cardiac pacemaker in situ     3. Chronic anticoagulation     4. High risk medication use     5. Transient global amnesia         Medical Decision Making:  Today's Assessment / Status / Plan:     1. HOCM S/P alcohol ablation  8/14/00 4/15/10 echo very small area proximal septal hypertrophy.  Compared to the images of the prior ALLEN done 12/12/2014: There is a   clear subaortic membrane seen on that study.  2. PPM BSI BV 4 years. Function intact.   3. COAC in the form now of Eliquis with recent neurological event.  4. High risk medication Norpace with HOCM. EKG remains stable.  5. Transient global amnesia versus TIA on " eliquis.  RTC as planned in one month to see Dr Witt.    Collaborating MD [place]

## 2019-03-28 NOTE — TELEPHONE ENCOUNTER
----- Message from MARK Christianson sent at 3/26/2019  5:20 PM PDT -----  No significant arrhythmias . No AF minimal VPC's. No rhythm disorder with chest pain with light activity or skipped beats.

## 2019-03-28 NOTE — LETTER
[facility]   [address], [address]  [place] [address]  Phone: [phone]  Fax: [phone]              Seda Cagle  1960    Encounter Date: 3/28/2019    DANIEL ChristiansonPROCIO          PROGRESS NOTE:  No notes on file      Juve Stoddard M.D.  [address]  [address]  [facility] [address]  VIA In Basket     Óscar Barakat MD  [address]  [address]  [facility] [address]  VIA Facsimile: [phone]

## 2019-04-23 ENCOUNTER — ANTICOAGULATION VISIT (OUTPATIENT)
Dept: VASCULAR LAB | Facility: MEDICAL CENTER | Age: 59
End: 2019-04-23
Attending: INTERNAL MEDICINE
Payer: COMMERCIAL

## 2019-04-23 DIAGNOSIS — Z79.01 CHRONIC ANTICOAGULATION: ICD-10-CM

## 2019-04-23 DIAGNOSIS — I48.0 PAROXYSMAL ATRIAL FIBRILLATION (HCC): ICD-10-CM

## 2019-04-23 DIAGNOSIS — G45.4 TRANSIENT GLOBAL AMNESIA: ICD-10-CM

## 2019-04-23 LAB
INR BLD: 2.6 (ref 0.9–1.2)
INR PPP: 2.6 (ref 2–3.5)

## 2019-04-23 PROCEDURE — 99211 OFF/OP EST MAY X REQ PHY/QHP: CPT

## 2019-04-23 PROCEDURE — 85610 PROTHROMBIN TIME: CPT

## 2019-04-23 RX ORDER — WARFARIN SODIUM 5 MG/1
10 TABLET ORAL DAILY
Qty: 180 TAB | Refills: 1 | Status: SHIPPED | OUTPATIENT
Start: 2019-04-23 | End: 2019-07-23 | Stop reason: SDUPTHER

## 2019-04-23 NOTE — PROGRESS NOTES
Anticoagulation Summary  As of 2019    INR goal:   2.0-3.0   TTR:   95.2 % (2.4 mo)   INR used for dosin.6 (2019)   Warfarin maintenance plan:   10 mg (5 mg x 2) every day   Weekly warfarin total:   70 mg   Plan last modified:   Aisha Castañeda PharmD (2019)   Next INR check:   2019   Target end date:   Indefinite    Indications    Chronic anticoagulation [Z79.01]  Paroxysmal atrial fibrillation (HCC) [I48.0]  Transient global amnesia [G45.4]             Anticoagulation Episode Summary     INR check location:       Preferred lab:       Send INR reminders to:       Comments:         Anticoagulation Care Providers     Provider Role Specialty Phone number    Sonya Alonso A.P.N. Referring Cardiology [phone]    [facility] Anticoagulation Services Responsible  [phone]        Anticoagulation Patient Findings    INR  -therapeutic.   Denies signs/symptoms of bleeding and/or thrombosis.   Denies changes to diet or medications.   Follow up appointment in 6 week(s).    Continue weekly warfarin dose as noted      Saw Mauricio, PharmD

## 2019-04-26 ENCOUNTER — OFFICE VISIT (OUTPATIENT)
Dept: CARDIOLOGY | Facility: MEDICAL CENTER | Age: 59
End: 2019-04-26
Payer: COMMERCIAL

## 2019-04-26 VITALS
WEIGHT: 194 LBS | HEIGHT: 64 IN | DIASTOLIC BLOOD PRESSURE: 72 MMHG | SYSTOLIC BLOOD PRESSURE: 118 MMHG | BODY MASS INDEX: 33.12 KG/M2 | HEART RATE: 60 BPM | OXYGEN SATURATION: 95 %

## 2019-04-26 DIAGNOSIS — I42.1 HYPERTROPHIC OBSTRUCTIVE CARDIOMYOPATHY (HCC): Primary | Chronic | ICD-10-CM

## 2019-04-26 DIAGNOSIS — Z79.01 CHRONIC ANTICOAGULATION: ICD-10-CM

## 2019-04-26 DIAGNOSIS — Z95.0 CARDIAC PACEMAKER IN SITU: ICD-10-CM

## 2019-04-26 DIAGNOSIS — N18.30 CHRONIC KIDNEY DISEASE, STAGE III (MODERATE) (HCC): Chronic | ICD-10-CM

## 2019-04-26 DIAGNOSIS — Q24.4 SUBAORTIC MEMBRANE: ICD-10-CM

## 2019-04-26 DIAGNOSIS — I48.0 PAROXYSMAL ATRIAL FIBRILLATION (HCC): Chronic | ICD-10-CM

## 2019-04-26 DIAGNOSIS — G45.4 TRANSIENT GLOBAL AMNESIA: ICD-10-CM

## 2019-04-26 PROCEDURE — 93280 PM DEVICE PROGR EVAL DUAL: CPT | Performed by: INTERNAL MEDICINE

## 2019-04-26 PROCEDURE — 99214 OFFICE O/P EST MOD 30 MIN: CPT | Mod: 25 | Performed by: INTERNAL MEDICINE

## 2019-04-26 NOTE — PROGRESS NOTES
Arrhythmia Clinic Note (Established patient)    DOS: 4/26/2019    Chief complaint/Reason for consult: F/u HCM/PPM    Interval History:  Patient is a 59 yo F. She has a history of HCM s/p prior septal ablation. Subsequently on imaging appears to have subaortic membrane as well. Has symptomatic PAF and PVCs. Maintained on BB/norpace which is doing well for her. She had some headache and neurologic symptoms. Sent to neuro. Thought she likely had transient global amnesia. Switched to warfarin which she is tolerating. Neuro symptoms have resolved.    ROS (+ highlighted in red):  Constitutional: Fevers/chills/fatigue/weightloss  HEENT: Blurry vision/eye pain/sore throat/hearing loss  Respiratory: Shortness of breath/cough  Cardiovascular: Chest pain/palpitations/edema/orthopnea/syncope  GI: Nausea/vomitting/diarrhea  MSK: Arthralgias/myagias/muscle weakness  Skin: Rash/sores  Neurological: Numbness/tremors/vertigo  Endocrine: Excessive thirst/polyuria/cold intolerance/heat intolerance  Psych: Depression/anxiety    Past Medical History:   Diagnosis Date   • Anesthesia     wakes up during procedure (x2)   • Aortic regurgitation    • Arrhythmia, ventricular 2008    Symptomatic PVCs, controlled with medication.   • Asthma     inhaler PRN, only uses when sick    • Atrial fibrillation (HCC)    • Cardiac arrhythmia    • Cervical arthritis 2/8/2016   • Chickenpox    • Chronic kidney disease, stage III (moderate) (HCC) 2/9/2016   • CTS (carpal tunnel syndrome) 2/28/2011   • Depression    • GERD (gastroesophageal reflux disease)    • Gynecological disorder    • Heart burn    • Heart murmur    • Hemorrhagic disorder (HCC)     on Xarelto    • Hypertr obst cardiomyop 1991    Dr. Bert Vazquez, [address] [facility] [facility] [address] [address] [phone] fax [phone].  [facility] [phone].  Alchol Septal Reduction 8/00, Coronaries normal.   • Hypertrophic cardiomyopathy (HCC)    • Indigestion    • Influenza    •  Pacemaker 2014   • Renal disorder     chronic kidney disease stage 3-  **pt states currently no issue, numbers are stable   • Sleep apnea     uses CPAP   • Snoring        Past Surgical History:   Procedure Laterality Date   • HYSTEROSCOPY WITH VIDEO OPERATIVE  4/30/2018    Procedure: HYSTEROSCOPY WITH VIDEO OPERATIVE;  Surgeon: Noemi Liu M.D.;  Location: [facility];  Service: Labor and Delivery   • DILATION AND CURETTAGE  4/30/2018    Procedure: DILATION AND CURETTAGE;  Surgeon: Noemi Liu M.D.;  Location: [facility];  Service: Labor and Delivery   • BREAST BIOPSY Left 1/30/2018    Procedure: BREAST BIOPSY- WIRE LOCALIZED;  Surgeon: Vijaya Britt M.D.;  Location: [facility]ERY [facility];  Service: General   • RECOVERY  4/15/2015    Performed by [facility] at [facility] PRE[facility] PROC [facility]   • PACEMAKER INSERTION  2015   • RECOVERY  12/12/2014    Performed by [facility] at [facility]   • SEPTAL RECONSTRUCTION  2000   • ENDOSCOPY     • PRIMARY C SECTION     • TUBAL COAGULATION LAPAROSCOPIC BILATERAL         Social History     Social History   • Marital status:      Spouse name: N/A   • Number of children: N/A   • Years of education: N/A     Occupational History   • Not on file.     Social History Main Topics   • Smoking status: Never Smoker   • Smokeless tobacco: Never Used   • Alcohol use 0.6 oz/week     1 Standard drinks or equivalent per week      Comment: 3-4 per week   • Drug use: No   • Sexual activity: Yes     Partners: Male     Birth control/ protection: Surgical      Comment:      Other Topics Concern   • Not on file     Social History Narrative   • No narrative on file       Family History   Problem Relation Age of Onset   • Heart Disease Mother         CHF   • Hypertension Mother    • Stroke Mother    • Heart Failure Mother    • Other Mother         carotid artery disease,gout   • Cancer  "Father         AML   • Diabetes Maternal Grandmother    • Cancer Maternal Grandfather         Prostate   • Heart Disease Paternal Grandfather 52         MI    • Sleep Apnea Neg Hx        No Known Allergies    Current Outpatient Prescriptions   Medication Sig Dispense Refill   • warfarin (COUMADIN) 5 MG Tab Take 2 Tabs by mouth every day. Or as directed by coumadin clinic. 180 Tab 1   • metoprolol SR (TOPROL XL) 50 MG TABLET SR 24 HR Take 1 Tab by mouth every day. 90 Tab 3   • atorvastatin (LIPITOR) 40 MG Tab Take 1 Tab by mouth every evening. 90 Tab 3   • HORSE CHESTNUT PO Take 400 mg by mouth every day.     • Pantothenic Acid 500 MG Tab Take 500 mg by mouth.     • disopyramide (NORPACE) 100 MG Cap Take 1 Cap by mouth every 8 hours. 270 Cap 3   • Melatonin 5 MG TABLET DISPERSIBLE Take  by mouth.     • Fluticasone Propionate (FLONASE NA) Spray  in nose.     • Calcium-Vitamin D-Vitamin K (CALCIUM FOR WOMEN PO) Take 1 Each by mouth every day at 6 PM.     • Magnesium 200 MG TABS Take 1 Tab by mouth every day.     • Multiple Vitamins-Minerals (ALIVE ONCE DAILY WOMENS 50+ PO) Take 1 Each by mouth every day at 6 PM.     • Lansoprazole (PREVACID PO) Take 1 Each by mouth 1 time daily as needed.       No current facility-administered medications for this visit.        Physical Exam:  Vitals:    19 1221   BP: 118/72   BP Location: Left arm   Patient Position: Sitting   BP Cuff Size: Adult   Pulse: 60   SpO2: 95%   Weight: 88 kg (194 lb)   Height: 1.626 m (5' 4\")     General appearance: NAD, conversant   Eyes: anicteric sclerae, moist conjunctivae; no lid-lag; PERRLA  HENT: Atraumatic; oropharynx clear with moist mucous membranes and no mucosal ulcerations; normal hard and soft palate  Neck: Trachea midline; FROM, supple, no thyromegaly or lymphadenopathy  Lungs: CTA, with normal respiratory effort and no intercostal retractions  CV: RRR, 2/6 systolic murmur, no JVD  Abdomen: Soft, non-tender; no masses or " HSM  Extremities: No peripheral edema or extremity lymphadenopathy  Skin: Normal temperature, turgor and texture; no rash, ulcers or subcutaneous nodules  Psych: Appropriate affect, alert and oriented to person, place and time    Data:  Labs reviewed    Prior echo/stress reviewed:  Normal function    Device check reviewed    Impression/Plan:  1. Hypertrophic obstructive cardiomyopathy (HCC)     2. Paroxysmal atrial fibrillation (HCC)     3. Transient global amnesia     4. Chronic kidney disease, stage III (moderate) (HCC)     5. Chronic anticoagulation     6. Cardiac pacemaker in situ     7. Subaortic membrane       -Device check reviewed working appropriately  -Some mode switch episodes, but most are short, asymptomatic  -Continue warfarin  -No further headache/neuro symptoms  -Continue norpace/BB  -F/u w APRN in 6 mo    Solitario Witt MD

## 2019-05-09 ENCOUNTER — TELEPHONE (OUTPATIENT)
Dept: CARDIOLOGY | Facility: MEDICAL CENTER | Age: 59
End: 2019-05-09

## 2019-05-09 PROBLEM — Z79.899 HIGH RISK MEDICATION USE: Status: RESOLVED | Noted: 2019-02-28 | Resolved: 2019-05-09

## 2019-05-09 PROBLEM — I87.2 VENOUS INSUFFICIENCY: Status: ACTIVE | Noted: 2019-05-09

## 2019-05-09 NOTE — TELEPHONE ENCOUNTER
----- Message from Vijaya Sheikh R.N. sent at 5/6/2019 12:05 PM PDT -----  Regarding: FW: Non-Urgent Medical Question  Contact: [phone]  Juan José Blackwell!  Any insight on this?    Thanks,  Vijaya    ----- Message -----  From: Jackie Olmos Med Ass't  Sent: 5/6/2019  10:04 AM  To: Vijaya Sheikh R.N.  Subject: FW: Non-Urgent Medical Question                      ----- Message -----  From: Seda Cagle  Sent: 5/6/2019   9:04 AM  To: Analia Diop/Keenan  Subject: Non-Urgent Medical Question                      Mark Lawson,   I'm contacting you, because I'm at a lose as to whom to contact next. I have a huge EOB ($7,500) from [employer], the company that owned the heart monitor I wore for 2 wks. I put a call in to Seda Coello [employer] [facility], but she never returned my call. Maybe it's a misunderstanding? As the first monitor (assigned to me) didn't work and she had to put another one on me. And we know the second one was returned because we got the results. I could just come down there and try to talk to her, but I'm not sure that tactic would work. You have always been so helpful, I'm sorry to take up your time, but appreciate any idea you might have. (I did talk to [employer], and they are as stumped as I am) thank you Seda

## 2019-05-09 NOTE — TELEPHONE ENCOUNTER
LVM for the patient to call back so that we can discuss what the next step is with regards to her EOB.

## 2019-06-04 ENCOUNTER — ANTICOAGULATION VISIT (OUTPATIENT)
Dept: VASCULAR LAB | Facility: MEDICAL CENTER | Age: 59
End: 2019-06-04
Attending: INTERNAL MEDICINE
Payer: COMMERCIAL

## 2019-06-04 VITALS — SYSTOLIC BLOOD PRESSURE: 131 MMHG | HEART RATE: 59 BPM | DIASTOLIC BLOOD PRESSURE: 76 MMHG

## 2019-06-04 DIAGNOSIS — Z79.01 CHRONIC ANTICOAGULATION: ICD-10-CM

## 2019-06-04 DIAGNOSIS — G45.4 TRANSIENT GLOBAL AMNESIA: ICD-10-CM

## 2019-06-04 DIAGNOSIS — I48.0 PAROXYSMAL ATRIAL FIBRILLATION (HCC): ICD-10-CM

## 2019-06-04 LAB — INR PPP: 1.7 (ref 2–3.5)

## 2019-06-04 PROCEDURE — 99212 OFFICE O/P EST SF 10 MIN: CPT

## 2019-06-04 PROCEDURE — 85610 PROTHROMBIN TIME: CPT

## 2019-06-04 NOTE — PROGRESS NOTES
Anticoagulation Summary  As of 2019    INR goal:   2.0-3.0   TTR:   84.8 % (3.8 mo)   INR used for dosin.70! (2019)   Warfarin maintenance plan:   15 mg (5 mg x 3) every Mon, Wed, Fri; 10 mg (5 mg x 2) all other days   Weekly warfarin total:   85 mg   Plan last modified:   Rashid Ly, PharmD (2019)   Next INR check:   2019   Target end date:   Indefinite    Indications    Chronic anticoagulation [Z79.01]  Paroxysmal atrial fibrillation (HCC) [I48.0]  Transient global amnesia [G45.4]             Anticoagulation Episode Summary     INR check location:       Preferred lab:       Send INR reminders to:       Comments:         Anticoagulation Care Providers     Provider Role Specialty Phone number    Sonya Alonso A.P.N. Referring Cardiology [phone]    [facility] Responsible  [phone]        Anticoagulation Patient Findings      HPI:  Seda Cagle seen in clinic today, on anticoagulation therapy with warfarin for AFIB  Changes to current medical/health status since last appt: none  Denies signs/symptoms of bleeding and/or thrombosis since the last appt.    Pt has started to eat more VitK than previously.  She would like to continue this diet.   Denies any interval changes to medications since last appt.   Denies any complications or cost restrictions with current therapy.   BP recorded in vitals.  Confirmed dosing regimen.     A/P   INR  SUB-therapeutic.   Begin increased warfarin regimen.     Follow up appointment in 1 week(s).    Rashid Ly, LindsayD

## 2019-06-10 LAB — INR BLD: 1.7 (ref 0.9–1.2)

## 2019-06-11 ENCOUNTER — ANTICOAGULATION VISIT (OUTPATIENT)
Dept: VASCULAR LAB | Facility: MEDICAL CENTER | Age: 59
End: 2019-06-11
Attending: INTERNAL MEDICINE
Payer: COMMERCIAL

## 2019-06-11 VITALS — DIASTOLIC BLOOD PRESSURE: 69 MMHG | HEART RATE: 59 BPM | SYSTOLIC BLOOD PRESSURE: 125 MMHG

## 2019-06-11 DIAGNOSIS — Z79.01 CHRONIC ANTICOAGULATION: ICD-10-CM

## 2019-06-11 DIAGNOSIS — G45.4 TRANSIENT GLOBAL AMNESIA: ICD-10-CM

## 2019-06-11 DIAGNOSIS — I48.0 PAROXYSMAL ATRIAL FIBRILLATION (HCC): ICD-10-CM

## 2019-06-11 LAB — INR PPP: 1.9 (ref 2–3.5)

## 2019-06-11 PROCEDURE — 85610 PROTHROMBIN TIME: CPT

## 2019-06-11 PROCEDURE — 99212 OFFICE O/P EST SF 10 MIN: CPT

## 2019-06-11 NOTE — PROGRESS NOTES
Anticoagulation Summary  As of 2019    INR goal:   2.0-3.0   TTR:   79.9 % (4.1 mo)   INR used for dosin.90! (2019)   Warfarin maintenance plan:   15 mg (5 mg x 3) every Mon, Wed, Fri; 10 mg (5 mg x 2) all other days   Weekly warfarin total:   85 mg   Plan last modified:   Rashid Ly, PharmD (2019)   Next INR check:   2019   Target end date:   Indefinite    Indications    Chronic anticoagulation [Z79.01]  Paroxysmal atrial fibrillation (HCC) [I48.0]  Transient global amnesia [G45.4]             Anticoagulation Episode Summary     INR check location:       Preferred lab:       Send INR reminders to:       Comments:         Anticoagulation Care Providers     Provider Role Specialty Phone number    Sonya Alonso, A.P.N. Referring Cardiology [phone]    [facility] Responsible  [phone]        Anticoagulation Patient Findings      HPI:  Seda Cagle seen in clinic today, on anticoagulation therapy with warfarin for PAF.   Changes to current medical/health status since last appt: none  Denies signs/symptoms of bleeding and/or thrombosis since the last appt.    Denies any interval changes to diet  Denies any interval changes to medications since last appt.   Denies any complications or cost restrictions with current therapy.   BP recorded in vitals.  Confirmed dosing regimen.     A/P   INR  SUB-therapeutic.   Bolus today then Pt is to continue with current warfarin dosing regimen.   Warfarin regimen was recently changed, might not see the full effect for another week.     Follow up appointment in 2 weeks per ptl.     Rashid Ly, LindsayD

## 2019-06-17 LAB — INR BLD: 1.9 (ref 0.9–1.2)

## 2019-06-25 ENCOUNTER — ANTICOAGULATION VISIT (OUTPATIENT)
Dept: VASCULAR LAB | Facility: MEDICAL CENTER | Age: 59
End: 2019-06-25
Attending: INTERNAL MEDICINE
Payer: COMMERCIAL

## 2019-06-25 VITALS — SYSTOLIC BLOOD PRESSURE: 116 MMHG | DIASTOLIC BLOOD PRESSURE: 67 MMHG | HEART RATE: 67 BPM

## 2019-06-25 DIAGNOSIS — Z79.01 CHRONIC ANTICOAGULATION: ICD-10-CM

## 2019-06-25 DIAGNOSIS — I48.0 PAROXYSMAL ATRIAL FIBRILLATION (HCC): ICD-10-CM

## 2019-06-25 DIAGNOSIS — G45.4 TRANSIENT GLOBAL AMNESIA: ICD-10-CM

## 2019-06-25 LAB
INR BLD: 3.4 (ref 0.9–1.2)
INR PPP: 3.4 (ref 2–3.5)

## 2019-06-25 PROCEDURE — 85610 PROTHROMBIN TIME: CPT

## 2019-06-25 PROCEDURE — 99212 OFFICE O/P EST SF 10 MIN: CPT

## 2019-06-25 NOTE — PROGRESS NOTES
Anticoagulation Summary  As of 6/25/2019    INR goal:   2.0-3.0   TTR:   78.6 % (4.5 mo)   INR used for dosing:   3.40! (6/25/2019)   Warfarin maintenance plan:   15 mg (5 mg x 3) every Mon, Fri; 10 mg (5 mg x 2) all other days   Weekly warfarin total:   80 mg   Plan last modified:   Rashid Ly, PharmD (6/25/2019)   Next INR check:   7/9/2019   Target end date:   Indefinite    Indications    Chronic anticoagulation [Z79.01]  Paroxysmal atrial fibrillation (HCC) [I48.0]  Transient global amnesia [G45.4]             Anticoagulation Episode Summary     INR check location:       Preferred lab:       Send INR reminders to:       Comments:         Anticoagulation Care Providers     Provider Role Specialty Phone number    Sonya Alonso AYomairaP.N. Referring Cardiology [phone]    [facility] Responsible  [phone]        Anticoagulation Patient Findings      HPI:  Seda Cagle seen in clinic today, on anticoagulation therapy with warfarin for PAF.   Changes to current medical/health status since last appt: none  Denies signs/symptoms of bleeding and/or thrombosis since the last appt.    Denies any interval changes to diet  Denies any interval changes to medications since last appt.   Denies any complications or cost restrictions with current therapy.   BP recorded in vitals.  Confirmed dosing regimen.     A/P   INR  SUPRA-therapeutic.   Begin reduced regimen.    Follow up appointment in 2 week(s).    Rashid Ly, PharmD

## 2019-06-27 ENCOUNTER — HOSPITAL ENCOUNTER (OUTPATIENT)
Dept: RADIOLOGY | Facility: MEDICAL CENTER | Age: 59
End: 2019-06-27
Attending: OBSTETRICS & GYNECOLOGY
Payer: COMMERCIAL

## 2019-06-27 DIAGNOSIS — Z12.31 VISIT FOR SCREENING MAMMOGRAM: ICD-10-CM

## 2019-06-27 PROCEDURE — 77063 BREAST TOMOSYNTHESIS BI: CPT

## 2019-07-09 ENCOUNTER — ANTICOAGULATION VISIT (OUTPATIENT)
Dept: VASCULAR LAB | Facility: MEDICAL CENTER | Age: 59
End: 2019-07-09
Attending: INTERNAL MEDICINE
Payer: COMMERCIAL

## 2019-07-09 VITALS
BODY MASS INDEX: 33.3 KG/M2 | WEIGHT: 194 LBS | DIASTOLIC BLOOD PRESSURE: 80 MMHG | HEART RATE: 58 BPM | SYSTOLIC BLOOD PRESSURE: 125 MMHG

## 2019-07-09 DIAGNOSIS — G45.4 TRANSIENT GLOBAL AMNESIA: ICD-10-CM

## 2019-07-09 DIAGNOSIS — I48.0 PAROXYSMAL ATRIAL FIBRILLATION (HCC): ICD-10-CM

## 2019-07-09 DIAGNOSIS — Z79.01 CHRONIC ANTICOAGULATION: ICD-10-CM

## 2019-07-09 LAB — INR PPP: 2.8 (ref 2–3.5)

## 2019-07-09 PROCEDURE — 85610 PROTHROMBIN TIME: CPT

## 2019-07-09 PROCEDURE — 99211 OFF/OP EST MAY X REQ PHY/QHP: CPT

## 2019-07-09 NOTE — PROGRESS NOTES
OP Anticoagulation Service Note    Date: 2019    Anticoagulation Summary  As of 2019    INR goal:   2.0-3.0   TTR:   74.3 % (5 mo)   INR used for dosin.80 (2019)   Warfarin maintenance plan:   15 mg (5 mg x 3) every Mon, Fri; 10 mg (5 mg x 2) all other days   Weekly warfarin total:   80 mg   Plan last modified:   Rashid Ly, PharmD (2019)   Next INR check:   2019   Target end date:   Indefinite    Indications    Chronic anticoagulation [Z79.01]  Paroxysmal atrial fibrillation (HCC) [I48.0]  Transient global amnesia [G45.4]             Anticoagulation Episode Summary     INR check location:       Preferred lab:       Send INR reminders to:       Comments:         Anticoagulation Care Providers     Provider Role Specialty Phone number    MARK Christianson Referring Cardiology [phone]    [facility] Responsible  [phone]        Anticoagulation Patient Findings      HPI:   Seda Cagle seen in clinic today, they are here today for a INR check on anticoagulation therapy     The reason for today's visit is to prevent morbidity and mortality from a blood clot or stroke and to reduce the risk of bleeding while on a anticoagulant.     Dose the patient in the medical group have a [facility] primary care provider and proper insurance?  Juve Stoddard M.D.  [id number] [facility] [id number]  [facility] [id number] [phone]      Additional education provided today regarding reducing bleed risk and dietary constraints:  About how vitamin K and foods work with warfarin and the bleeding risk on a anticoagulant     Any upcoming procedures:   none    Confirmed warfarin dosing regimen  Interval Changes with foods rich in vitamin K: No  Interval Changes in ETOH:   No  Interval Changes in smoking status:  No  Interval Changes in medication:  No   Cost restriction:  No  S/S of bleeding or bruising:  No  Signs/symptoms  thrombosis since the last appt:  No  Bleed risk  is:  moderate,       Assessment:   INR  therapeutic.     Medications reviewed and updated--    3 vitals included with today's appt :  (BP, HR, weight, ht, RR)   Vitals:    07/09/19 1124   BP: 125/80   Pulse: (!) 58   Weight: 88 kg (194 lb)         Plan:  Continue weekly warfarin dose as noted      Follow up:  Follow up appointment in 4 week(s)       Other info:  Pt educated to contact our clinic with any changes in medications or s/s of bleeding or thrombosis    CHEST guidelines recommend frequent INR monitoring at regular intervals (a few days up to a max of 12 weeks) to ensure they are on the proper dose of warfarin and not having any complications from therapy.  INRs can dramatically change over a short time period due to diet, medications, and medical conditions.     GOPAL Goodman.S., Pharm.D., [facility], [facility]    This note was created using voice recognition software (Dragon). The accuracy of the dictation is limited by the abilities of the software. I have reviewed the note prior to signing, however some errors in grammar and context are still possible. If you have any questions related to this note please do not hesitate to contact our office.

## 2019-07-10 LAB — INR BLD: 2.8 (ref 0.9–1.2)

## 2019-07-23 ENCOUNTER — ANTICOAGULATION MONITORING (OUTPATIENT)
Dept: VASCULAR LAB | Facility: MEDICAL CENTER | Age: 59
End: 2019-07-23

## 2019-07-23 DIAGNOSIS — Z79.01 CHRONIC ANTICOAGULATION: ICD-10-CM

## 2019-07-23 DIAGNOSIS — I48.0 PAROXYSMAL ATRIAL FIBRILLATION (HCC): ICD-10-CM

## 2019-07-23 DIAGNOSIS — G45.4 TRANSIENT GLOBAL AMNESIA: ICD-10-CM

## 2019-07-23 RX ORDER — WARFARIN SODIUM 5 MG/1
10-15 TABLET ORAL DAILY
Qty: 270 TAB | Refills: 1 | Status: SHIPPED | OUTPATIENT
Start: 2019-07-23 | End: 2020-08-25

## 2019-08-06 ENCOUNTER — ANTICOAGULATION VISIT (OUTPATIENT)
Dept: VASCULAR LAB | Facility: MEDICAL CENTER | Age: 59
End: 2019-08-06
Attending: INTERNAL MEDICINE
Payer: COMMERCIAL

## 2019-08-06 DIAGNOSIS — G45.4 TRANSIENT GLOBAL AMNESIA: ICD-10-CM

## 2019-08-06 DIAGNOSIS — I48.0 PAROXYSMAL ATRIAL FIBRILLATION (HCC): ICD-10-CM

## 2019-08-06 DIAGNOSIS — Z79.01 CHRONIC ANTICOAGULATION: ICD-10-CM

## 2019-08-06 LAB
INR BLD: 2.9 (ref 0.9–1.2)
INR PPP: 2.9 (ref 2–3.5)

## 2019-08-06 PROCEDURE — 99211 OFF/OP EST MAY X REQ PHY/QHP: CPT

## 2019-08-06 PROCEDURE — 85610 PROTHROMBIN TIME: CPT

## 2019-08-06 NOTE — PROGRESS NOTES
OP Anticoagulation Service Note    Date: 2019    Anticoagulation Summary  As of 2019    INR goal:   2.0-3.0   TTR:   78.4 % (5.9 mo)   INR used for dosin.90 (2019)   Warfarin maintenance plan:   15 mg (5 mg x 3) every Mon, Fri; 10 mg (5 mg x 2) all other days   Weekly warfarin total:   80 mg   Plan last modified:   Rashid Ly, PharmD (2019)   Next INR check:   2019   Target end date:   Indefinite    Indications    Chronic anticoagulation [Z79.01]  Paroxysmal atrial fibrillation (HCC) [I48.0]  Transient global amnesia [G45.4]             Anticoagulation Episode Summary     INR check location:       Preferred lab:       Send INR reminders to:       Comments:         Anticoagulation Care Providers     Provider Role Specialty Phone number    MARK Christianson Referring Cardiology [phone]    [facility] Responsible  [phone]        Anticoagulation Patient Findings      HPI:   Seda Cagle seen in clinic today, they are here today for a INR check on anticoagulation therapy     The reason for today's visit is to prevent morbidity and mortality from a blood clot or stroke and to reduce the risk of bleeding while on a anticoagulant.     Dose the patient in the medical group have a [facility] primary care provider and proper insurance?  Juve tSoddard M.D.  [id number] [facility] [id number]  [facility] [id number] [phone]      Additional education provided today regarding reducing bleed risk and dietary constraints:  About how vitamin K and foods work with warfarin and the bleeding risk on a anticoagulant     Any upcoming procedures:   none    Confirmed warfarin dosing regimen  Interval Changes with foods rich in vitamin K: No  Interval Changes in ETOH:   No  Interval Changes in smoking status:  No  Interval Changes in medication:  No   Cost restriction:  No  S/S of bleeding or bruising:  No  Signs/symptoms  thrombosis since the last appt:  No  Bleed  risk is:  moderate,       Assessment:   INR  therapeutic.     Medications reviewed and updated--    3 vitals included with today's appt :  (BP, HR, weight, ht, RR)     Plan:  Continue weekly warfarin dose as noted      Follow up:  Follow up appointment in 6 week(s)       Other info:  Pt educated to contact our clinic with any changes in medications or s/s of bleeding or thrombosis    CHEST guidelines recommend frequent INR monitoring at regular intervals (a few days up to a max of 12 weeks) to ensure they are on the proper dose of warfarin and not having any complications from therapy.  INRs can dramatically change over a short time period due to diet, medications, and medical conditions.     Saw Mauricio M.S., Pharm.D., [facility], [facility]    This note was created using voice recognition software (Dragon). The accuracy of the dictation is limited by the abilities of the software. I have reviewed the note prior to signing, however some errors in grammar and context are still possible. If you have any questions related to this note please do not hesitate to contact our office.

## 2019-09-04 ENCOUNTER — HOSPITAL ENCOUNTER (OUTPATIENT)
Dept: LAB | Facility: MEDICAL CENTER | Age: 59
End: 2019-09-04
Payer: COMMERCIAL

## 2019-09-04 LAB
CHOLEST SERPL-MCNC: 131 MG/DL (ref 100–199)
FASTING STATUS PATIENT QL REPORTED: NORMAL
GLUCOSE SERPL-MCNC: 77 MG/DL (ref 65–99)
HDLC SERPL-MCNC: 47 MG/DL
LDLC SERPL CALC-MCNC: 66 MG/DL
TRIGL SERPL-MCNC: 92 MG/DL (ref 0–149)

## 2019-09-17 ENCOUNTER — ANTICOAGULATION VISIT (OUTPATIENT)
Dept: VASCULAR LAB | Facility: MEDICAL CENTER | Age: 59
End: 2019-09-17
Attending: INTERNAL MEDICINE
Payer: COMMERCIAL

## 2019-09-17 DIAGNOSIS — Z79.01 CHRONIC ANTICOAGULATION: ICD-10-CM

## 2019-09-17 DIAGNOSIS — G45.4 TRANSIENT GLOBAL AMNESIA: ICD-10-CM

## 2019-09-17 DIAGNOSIS — I48.0 PAROXYSMAL ATRIAL FIBRILLATION (HCC): ICD-10-CM

## 2019-09-17 LAB — INR PPP: 2.4 (ref 2–3.5)

## 2019-09-17 PROCEDURE — 99211 OFF/OP EST MAY X REQ PHY/QHP: CPT

## 2019-09-17 PROCEDURE — 85610 PROTHROMBIN TIME: CPT

## 2019-09-17 NOTE — PROGRESS NOTES
OP Anticoagulation Service Note    Date: 2019    Anticoagulation Summary  As of 2019    INR goal:   2.0-3.0   TTR:   82.5 % (7.3 mo)   INR used for dosin.40 (2019)   Warfarin maintenance plan:   15 mg (5 mg x 3) every Mon, Fri; 10 mg (5 mg x 2) all other days   Weekly warfarin total:   80 mg   Plan last modified:   Rashid Ly, PharmD (2019)   Next INR check:   10/29/2019   Target end date:   Indefinite    Indications    Chronic anticoagulation [Z79.01]  Paroxysmal atrial fibrillation (HCC) [I48.0]  Transient global amnesia [G45.4]             Anticoagulation Episode Summary     INR check location:       Preferred lab:       Send INR reminders to:       Comments:         Anticoagulation Care Providers     Provider Role Specialty Phone number    MARK Christianson Referring Cardiology [phone]    [facility] Responsible  [phone]        Anticoagulation Patient Findings      HPI:   Seda Cagle seen in clinic today, they are here today for a INR check on anticoagulation therapy     The reason for today's visit is to prevent morbidity and mortality from a blood clot or stroke and to reduce the risk of bleeding while on a anticoagulant.     Dose the patient in the medical group have a [facility] primary care provider and proper insurance?  Juve Stoddard M.D.  [id number] [facility] [id number]  [facility] [id number] [phone]      Additional education provided today regarding reducing bleed risk and dietary constraints:  About how vitamin K and foods work with warfarin and the bleeding risk on a anticoagulant     Any upcoming procedures:   none    Confirmed warfarin dosing regimen  Interval Changes with foods rich in vitamin K: No  Interval Changes in ETOH:   No  Interval Changes in smoking status:  No  Interval Changes in medication:  No   Cost restriction:  No  S/S of bleeding or bruising:  No  Signs/symptoms  thrombosis since the last appt:   No  Bleed risk is:  moderate,       Assessment:   INR  therapeutic.     Medications reviewed and updated--    Plan:  Continue weekly warfarin dose as noted      Follow up:  Follow up appointment in 6 week(s)       Other info:  Pt educated to contact our clinic with any changes in medications or s/s of bleeding or thrombosis    CHEST guidelines recommend frequent INR monitoring at regular intervals (a few days up to a max of 12 weeks) to ensure they are on the proper dose of warfarin and not having any complications from therapy.  INRs can dramatically change over a short time period due to diet, medications, and medical conditions.     Saw Mauricio M.S., Pharm.D., [facility], [facility]    This note was created using voice recognition software (Dragon). The accuracy of the dictation is limited by the abilities of the software. I have reviewed the note prior to signing, however some errors in grammar and context are still possible. If you have any questions related to this note please do not hesitate to contact our office.

## 2019-09-23 LAB — INR BLD: 2.4 (ref 0.9–1.2)

## 2019-10-09 NOTE — PROGRESS NOTES
Cardiology/Electrophysiology Follow-up Note      Subjective:   Chief Complaint:   Chief Complaint   Patient presents with   • Atrial Fibrillation     PP       Seda Cagle is a 59 y.o. female who presents today for hypertrophic obstructive cardiomyopathy S/P prior septal ablation maintained on Toprolr and norpace.     She is followed by Dr. Witt.  Past medical history also significant for paroxysmal AF, Booker dual chamber PPM, CKD III, and GEORGINA.  Previous history last Jan with transient global Amesia for which she reports that her memory has recenrly become normalized.        Today in follow up she states that she has been doing well.  She does not have any specific concerns to address today.  She is not symptomatic to her paroxysmal AF, reports feels improved on toprol.  She denies chest pain, dizziness, palpitations, pre syncope or syncope, dyspnea, PND, orthopnea, or lower extremity edema.      She is participating in [employer] program, has started their exercise classes.  No symptoms with activity.     Patient endorses medication compliance        Past Medical History:   Diagnosis Date   • Anesthesia     wakes up during procedure (x2)   • Aortic regurgitation    • Arrhythmia, ventricular 2008    Symptomatic PVCs, controlled with medication.   • Asthma     inhaler PRN, only uses when sick    • Atrial fibrillation (HCC)    • Cardiac arrhythmia    • Cervical arthritis 2/8/2016   • Chickenpox    • Chronic kidney disease, stage III (moderate) (HCC) 2/9/2016   • CTS (carpal tunnel syndrome) 2/28/2011   • Depression    • GERD (gastroesophageal reflux disease)    • Gynecological disorder    • Heart burn    • Heart murmur    • Hemorrhagic disorder (HCC)     on Xarelto    • Hypertr obst cardiomyop 1991    Dr. Bert Vazquez, [address] [address], [place] [address] [phone] fax [phone].  [facility] [phone].  Alchol Septal Reduction 8/00, Coronaries normal.   • Hypertrophic  cardiomyopathy (HCC)    • Indigestion    • Influenza    • Pacemaker    • Renal disorder     chronic kidney disease stage 3-  **pt states currently no issue, numbers are stable   • Sleep apnea     uses CPAP   • Snoring      Past Surgical History:   Procedure Laterality Date   • HYSTEROSCOPY WITH VIDEO OPERATIVE  2018    Procedure: HYSTEROSCOPY WITH VIDEO OPERATIVE;  Surgeon: Noemi Liu M.D.;  Location: [facility]ERY [facility];  Service: Labor and Delivery   • DILATION AND CURETTAGE  2018    Procedure: DILATION AND CURETTAGE;  Surgeon: Noemi Liu M.D.;  Location: [facility];  Service: Labor and Delivery   • BREAST BIOPSY Left 2018    Procedure: BREAST BIOPSY- WIRE LOCALIZED;  Surgeon: Vijaya Britt M.D.;  Location: SUR[facility]ERY [facility] DAY [facility];  Service: General   • RECOVERY  4/15/2015    Performed by [facility] at [facility]ERY [facility]   • PACEMAKER INSERTION     • RECOVERY  2014    Performed by [facility] at [facility]   • SEPTAL RECONSTRUCTION     • ENDOSCOPY     • PRIMARY C SECTION     • TUBAL COAGULATION LAPAROSCOPIC BILATERAL       Family History   Problem Relation Age of Onset   • Heart Disease Mother         CHF   • Hypertension Mother    • Stroke Mother    • Heart Failure Mother    • Other Mother         carotid artery disease,gout   • Cancer Father         AML   • Diabetes Maternal Grandmother    • Cancer Maternal Grandfather         Prostate   • Heart Disease Paternal Grandfather 52         MI    • Sleep Apnea Neg Hx      Social History     Socioeconomic History   • Marital status:      Spouse name: Not on file   • Number of children: Not on file   • Years of education: Not on file   • Highest education level: Not on file   Occupational History   • Not on file   Social Needs   • Financial resource strain: Not on file   • Food insecurity:     Worry: Not on file      Inability: Not on file   • Transportation needs:     Medical: Not on file     Non-medical: Not on file   Tobacco Use   • Smoking status: Never Smoker   • Smokeless tobacco: Never Used   Substance and Sexual Activity   • Alcohol use: Yes     Alcohol/week: 0.6 oz     Types: 1 Standard drinks or equivalent per week     Comment: 3-4 per week   • Drug use: No   • Sexual activity: Yes     Partners: Male     Birth control/protection: Surgical     Comment:    Lifestyle   • Physical activity:     Days per week: Not on file     Minutes per session: Not on file   • Stress: Not on file   Relationships   • Social connections:     Talks on phone: Not on file     Gets together: Not on file     Attends Sikhism service: Not on file     Active member of club or organization: Not on file     Attends meetings of clubs or organizations: Not on file     Relationship status: Not on file   • Intimate partner violence:     Fear of current or ex partner: Not on file     Emotionally abused: Not on file     Physically abused: Not on file     Forced sexual activity: Not on file   Other Topics Concern   • Not on file   Social History Narrative   • Not on file     No Known Allergies    Current Outpatient Medications   Medication Sig Dispense Refill   • warfarin (COUMADIN) 5 MG Tab Take 2-3 Tabs by mouth every day. Or as directed by coumadin clinic. 270 Tab 1   • metoprolol SR (TOPROL XL) 50 MG TABLET SR 24 HR Take 1 Tab by mouth every day. 90 Tab 3   • atorvastatin (LIPITOR) 40 MG Tab Take 1 Tab by mouth every evening. 90 Tab 3   • HORSE CHESTNUT PO Take 400 mg by mouth every day.     • Pantothenic Acid 500 MG Tab Take 500 mg by mouth.     • disopyramide (NORPACE) 100 MG Cap Take 1 Cap by mouth every 8 hours. 270 Cap 3   • Melatonin 5 MG TABLET DISPERSIBLE Take  by mouth.     • Fluticasone Propionate (FLONASE NA) Spray  in nose.     • Calcium-Vitamin D-Vitamin K (CALCIUM FOR WOMEN PO) Take 1 Each by mouth every day at 6 PM.     •  Lansoprazole (PREVACID PO) Take 1 Each by mouth 1 time daily as needed.     • Magnesium 200 MG TABS Take 1 Tab by mouth every day.     • Multiple Vitamins-Minerals (ALIVE ONCE DAILY WOMENS 50+ PO) Take 1 Each by mouth every day at 6 PM.       No current facility-administered medications for this visit.        Review of Systems   Constitutional: Negative for chills, fever, malaise/fatigue and weight loss.   HENT: Negative for congestion, nosebleeds, sore throat and tinnitus.    Eyes: Negative for blurred vision and double vision.   Respiratory: Negative for cough, hemoptysis, sputum production, shortness of breath, wheezing and stridor.    Cardiovascular: Negative for chest pain, palpitations, orthopnea, leg swelling and PND.   Gastrointestinal: Negative for abdominal pain, blood in stool, diarrhea, heartburn, melena, nausea and vomiting.   Skin: Negative for rash.   Neurological: Negative for dizziness, tingling, tremors, sensory change, speech change, focal weakness, loss of consciousness and headaches.     All others systems reviewed and negative.     Objective:     /64 (BP Location: Left arm, Patient Position: Sitting, BP Cuff Size: Adult)   Pulse 60   Wt 86.6 kg (191 lb)   SpO2 95%  Body mass index is 32.79 kg/m².    Physical Exam   Constitutional: She is oriented to person, place, and time and well-developed, well-nourished, and in no distress.   HENT:   Head: Normocephalic and atraumatic.   Eyes: Pupils are equal, round, and reactive to light. Conjunctivae and EOM are normal.   Neck: Normal range of motion. Neck supple. No JVD present.   Cardiovascular: Normal rate, regular rhythm, normal heart sounds and intact distal pulses. Exam reveals no gallop and no friction rub.   No murmur heard.  Pulmonary/Chest: Effort normal and breath sounds normal. No respiratory distress. She has no wheezes. She has no rales. She exhibits no tenderness.   Left chest device site uncomplicated    Abdominal: Soft. Bowel  sounds are normal. There is no tenderness.   Musculoskeletal: Normal range of motion. She exhibits no edema.   Neurological: She is alert and oriented to person, place, and time.   Skin: Skin is warm and dry. No rash noted. No erythema.   Psychiatric: Mood, memory, affect and judgment normal.         Cardiac Imaging and Procedures Review:    EKG (10/10/19) reviewed by myself:   Atrial paced, LBBB, rate 60.     Echo (9/26/18):   Left Ventricle  Normal left ventricular chamber size. Mild  left ventricular   hypertrophy. Septal wall thickness 1.2cm Normal left ventricular   systolic function. Left ventricular ejection fraction is visually   estimated to be 55%. Normal regional wall motion. Grade I diastolic   dysfunction. There is evidence of LVOT obstuction which is not fully   characterized in this study, consider subaortic membrane. The max   gradient that is measured is 4mmHg.    Right Ventricle  Normal right ventricular size. Pacer/ICD wire seen in right ventricle.   Normal right ventricular systolic function.    Right Atrium  Normal right atrial size. Normal inferior vena cava size and   inspiratory collapse.    Left Atrium  Mildly dilated left atrium. Left atrial volume index is 38  mL/sq m.    Mitral Valve  Mitral annular calcification.  Mild to moderate mitral regurgitation.   No mitral stenosis.    Aortic Valve  Aortic sclerosis without stenosis. Mild aortic insufficiency. Valve   leaflets not well visualized.    Tricuspid Valve  Structurally normal tricuspid valve without significant stenosis or   regurgitation. Mild tricuspid regurgitation. Estimated right   ventricular systolic pressure is  20 mmHg. Right atrial pressure is   estimated to be 3 mmHg.    Pulmonic Valve  The pulmonic valve is not well visualized. No pulmonic stenosis. Trace   pulmonic insufficiency.    Pericardium  Normal pericardium without effusion.    Aorta  The aortic root is normal.    Nuclear Perfusion Imaging (10/28/17):   NUCLEAR  IMAGING INTERPRETATION   Small fixed septal defect consistent with infarct vs. LBBB related imaging    artifact.   No ischemia identified.   Normal left ventricular size, ejection fraction, and wall motion.   ECG INTERPRETATION   Nondiagnostic javi to LBBB. Frequent PVCs.    Labs (personally reviewed and notable for):   Lab Results   Component Value Date/Time    SODIUM 138 01/19/2019 02:58 AM    POTASSIUM 3.8 01/19/2019 02:58 AM    CHLORIDE 106 01/19/2019 02:58 AM    CO2 22 01/19/2019 02:58 AM    GLUCOSE 77 09/04/2019 01:25 PM    BUN 19 01/19/2019 02:58 AM    CREATININE 1.02 01/19/2019 02:58 AM    CREATININE 1.04 (H) 02/19/2011 12:00 AM    BUNCREATRAT 14 02/19/2011 12:00 AM    GLOMRATE 56 (L) 02/19/2011 12:00 AM      Lab Results   Component Value Date/Time    WBC 4.9 03/18/2019 10:52 AM    WBC 7.3 02/14/2012 12:22 PM    RBC 5.08 03/18/2019 10:52 AM    RBC 4.77 02/14/2012 12:22 PM    HEMOGLOBIN 15.1 03/18/2019 10:52 AM    HEMATOCRIT 45.1 03/18/2019 10:52 AM    MCV 88.8 03/18/2019 10:52 AM    MCV 93 02/14/2012 12:22 PM    MCH 29.7 03/18/2019 10:52 AM    MCH 31.0 02/14/2012 12:22 PM    MCHC 33.5 (L) 03/18/2019 10:52 AM    MPV 10.9 03/18/2019 10:52 AM    NEUTSPOLYS 52.80 03/18/2019 10:52 AM    LYMPHOCYTES 36.50 03/18/2019 10:52 AM    MONOCYTES 7.40 03/18/2019 10:52 AM    EOSINOPHILS 2.70 03/18/2019 10:52 AM    BASOPHILS 0.40 03/18/2019 10:52 AM      PT/INR:   Lab Results   Component Value Date/Time    PROTHROMBTM 16.2 (H) 01/18/2019 02:09 PM    INR 2.40 09/17/2019         Assessment:     1. Hypertrophic cardiomyopathy (HCC)     2. Pacemaker     3. PAF (paroxysmal atrial fibrillation) ([facility])  EKG   4. Stenosis of carotid artery, unspecified laterality  US-CAROTID DOPPLER BILAT   5. Chronic anticoagulation         Medical Decision Making:  Today's Assessment / Status / Plan:   1. Hypertrophic Cardiomyopathy:  - S/P alcohol spetal ablation and last echo per Dr. Witt's assessment with low gradients.  - She has not had any  symptoms.  She continues on Toprol and norpace.  QTc stable on 12 lead EKG.    - recheck yearly echo for survillence.     2. Calvin PPM:  - Device interrogated in office today, normal/stable function of device/leads is demonstrated.   - No NSVT/VT on PPM episode log.  - Battery longevity is 3.5 years.  A pace d86%, V paced 5%.     3. PAF:  - Relatively short episdoes of AF.  Longest episodes 49 min, no egm; the remainder are <1 min in length.  Total brden remains well controled at <1%.   - Continue Toprol and Coumadin.     4. EULALIA:  - Mild stenosis of right internal carotid last Jan <50%.  Due for repeat carotid duplex in Jan for routine surveillance.  - Continue Lipitor.   - Mother with hx of EULALIA/CVA.     5. Anticoagulation:  - Tolerating well without reported evidence of bleeding. Continue to follow with [facility].       Plan reviewed in detail with the patient and she verbalizes understanding and is in agreement.   RTC in 4 months for reivew, sooner if clinical condition changes  Collaborating MD/ADD: ANTONETTE Estrella.

## 2019-10-10 ENCOUNTER — OFFICE VISIT (OUTPATIENT)
Dept: CARDIOLOGY | Facility: MEDICAL CENTER | Age: 59
End: 2019-10-10
Payer: COMMERCIAL

## 2019-10-10 VITALS
OXYGEN SATURATION: 95 % | HEART RATE: 60 BPM | WEIGHT: 191 LBS | SYSTOLIC BLOOD PRESSURE: 126 MMHG | DIASTOLIC BLOOD PRESSURE: 64 MMHG | BODY MASS INDEX: 32.79 KG/M2

## 2019-10-10 DIAGNOSIS — Z79.01 CHRONIC ANTICOAGULATION: ICD-10-CM

## 2019-10-10 DIAGNOSIS — I42.2 HYPERTROPHIC CARDIOMYOPATHY (HCC): Primary | ICD-10-CM

## 2019-10-10 DIAGNOSIS — I48.0 PAF (PAROXYSMAL ATRIAL FIBRILLATION) (HCC): ICD-10-CM

## 2019-10-10 DIAGNOSIS — Z95.0 PACEMAKER: ICD-10-CM

## 2019-10-10 DIAGNOSIS — I65.29 STENOSIS OF CAROTID ARTERY, UNSPECIFIED LATERALITY: ICD-10-CM

## 2019-10-10 LAB — EKG IMPRESSION: NORMAL

## 2019-10-10 PROCEDURE — 99214 OFFICE O/P EST MOD 30 MIN: CPT | Mod: 25 | Performed by: NURSE PRACTITIONER

## 2019-10-10 PROCEDURE — 93000 ELECTROCARDIOGRAM COMPLETE: CPT | Performed by: INTERNAL MEDICINE

## 2019-10-10 PROCEDURE — 93288 INTERROG EVL PM/LDLS PM IP: CPT | Performed by: NURSE PRACTITIONER

## 2019-10-10 ASSESSMENT — ENCOUNTER SYMPTOMS
SENSORY CHANGE: 0
FEVER: 0
NAUSEA: 0
VOMITING: 0
HEMOPTYSIS: 0
BLURRED VISION: 0
PALPITATIONS: 0
DIZZINESS: 0
SHORTNESS OF BREATH: 0
SPEECH CHANGE: 0
DIARRHEA: 0
COUGH: 0
DOUBLE VISION: 0
HEARTBURN: 0
STRIDOR: 0
CHILLS: 0
WHEEZING: 0
SORE THROAT: 0
WEIGHT LOSS: 0
HEADACHES: 0
BLOOD IN STOOL: 0
TINGLING: 0
PND: 0
SPUTUM PRODUCTION: 0
TREMORS: 0
FOCAL WEAKNESS: 0
ABDOMINAL PAIN: 0
LOSS OF CONSCIOUSNESS: 0
ORTHOPNEA: 0

## 2019-10-22 ENCOUNTER — ANTICOAGULATION VISIT (OUTPATIENT)
Dept: VASCULAR LAB | Facility: MEDICAL CENTER | Age: 59
End: 2019-10-22
Attending: INTERNAL MEDICINE
Payer: COMMERCIAL

## 2019-10-22 DIAGNOSIS — I48.0 PAROXYSMAL ATRIAL FIBRILLATION (HCC): ICD-10-CM

## 2019-10-22 DIAGNOSIS — G45.4 TRANSIENT GLOBAL AMNESIA: ICD-10-CM

## 2019-10-22 DIAGNOSIS — Z79.01 CHRONIC ANTICOAGULATION: ICD-10-CM

## 2019-10-22 LAB — INR PPP: 2.2 (ref 2–3.5)

## 2019-10-22 PROCEDURE — 99211 OFF/OP EST MAY X REQ PHY/QHP: CPT

## 2019-10-22 PROCEDURE — 85610 PROTHROMBIN TIME: CPT

## 2019-10-22 NOTE — PROGRESS NOTES
OP Anticoagulation Service Note    Date: 10/22/2019    Anticoagulation Summary  As of 10/22/2019    INR goal:   2.0-3.0   TTR:   84.9 % (8.5 mo)   INR used for dosin.20 (10/22/2019)   Warfarin maintenance plan:   15 mg (5 mg x 3) every Mon, Fri; 10 mg (5 mg x 2) all other days   Weekly warfarin total:   80 mg   Plan last modified:   Rashid Ly, PharmD (2019)   Next INR check:   2019   Target end date:   Indefinite    Indications    Chronic anticoagulation [Z79.01]  Paroxysmal atrial fibrillation (HCC) [I48.0]  Transient global amnesia [G45.4]             Anticoagulation Episode Summary     INR check location:       Preferred lab:       Send INR reminders to:       Comments:         Anticoagulation Care Providers     Provider Role Specialty Phone number    MARK Christianson Referring Cardiology [phone]    [facility] Responsible  [phone]        Anticoagulation Patient Findings      HPI:   Seda Cagle seen in clinic today, they are here today for a INR check on anticoagulation therapy     The reason for today's visit is to prevent morbidity and mortality from a blood clot or stroke and to reduce the risk of bleeding while on a anticoagulant.     Dose the patient in the medical group have a [facility] primary care provider and proper insurance?  Juve Stoddard M.D.  [id number] [facility] [id number]  [place] [id number] [phone]      Additional education provided today regarding reducing bleed risk and dietary constraints:  About how vitamin K and foods work with warfarin and the bleeding risk on a anticoagulant     Any upcoming procedures:   none    Confirmed warfarin dosing regimen  Interval Changes with foods rich in vitamin K: No  Interval Changes in ETOH:   No  Interval Changes in smoking status:  No  Interval Changes in medication:  No   Cost restriction:  No  S/S of bleeding or bruising:  No  Signs/symptoms  thrombosis since the last appt:   No  Bleed risk is:  moderate,       Assessment:   INR  therapeutic.     Medications reviewed and updated--    Plan:  Continue weekly warfarin dose as noted      Follow up:  Follow up appointment in 8 week(s)       Other info:  Pt educated to contact our clinic with any changes in medications or s/s of bleeding or thrombosis    CHEST guidelines recommend frequent INR monitoring at regular intervals (a few days up to a max of 12 weeks) to ensure they are on the proper dose of warfarin and not having any complications from therapy.  INRs can dramatically change over a short time period due to diet, medications, and medical conditions.     Saw Mauricio M.S., Pharm.D., [facility], [facility]    This note was created using voice recognition software (Dragon). The accuracy of the dictation is limited by the abilities of the software. I have reviewed the note prior to signing, however some errors in grammar and context are still possible. If you have any questions related to this note please do not hesitate to contact our office.

## 2019-10-23 DIAGNOSIS — I42.2 HYPERTROPHIC CARDIOMYOPATHY (HCC): ICD-10-CM

## 2019-10-24 RX ORDER — DISOPYRAMIDE PHOSPHATE 100 MG/1
CAPSULE ORAL
Qty: 270 CAP | Refills: 3 | Status: SHIPPED | OUTPATIENT
Start: 2019-10-24 | End: 2020-10-22

## 2019-10-28 DIAGNOSIS — I42.2 HYPERTROPHIC CARDIOMYOPATHY (HCC): ICD-10-CM

## 2019-10-28 DIAGNOSIS — I48.0 PAF (PAROXYSMAL ATRIAL FIBRILLATION) (HCC): ICD-10-CM

## 2019-10-31 ENCOUNTER — HOSPITAL ENCOUNTER (OUTPATIENT)
Dept: CARDIOLOGY | Facility: MEDICAL CENTER | Age: 59
End: 2019-10-31
Attending: NURSE PRACTITIONER
Payer: COMMERCIAL

## 2019-10-31 DIAGNOSIS — I42.2 HYPERTROPHIC CARDIOMYOPATHY (HCC): ICD-10-CM

## 2019-10-31 DIAGNOSIS — I48.0 PAF (PAROXYSMAL ATRIAL FIBRILLATION) (HCC): ICD-10-CM

## 2019-10-31 LAB
LV EJECT FRACT  99904: 60
LV EJECT FRACT MOD 2C 99903: 82.43
LV EJECT FRACT MOD 4C 99902: 75.79
LV EJECT FRACT MOD BP 99901: 74

## 2019-10-31 PROCEDURE — 93306 TTE W/DOPPLER COMPLETE: CPT

## 2019-10-31 PROCEDURE — 93306 TTE W/DOPPLER COMPLETE: CPT | Mod: 26 | Performed by: INTERNAL MEDICINE

## 2019-11-06 ENCOUNTER — TELEPHONE (OUTPATIENT)
Dept: CARDIOLOGY | Facility: MEDICAL CENTER | Age: 59
End: 2019-11-06

## 2019-11-06 NOTE — TELEPHONE ENCOUNTER
Echo shows continued overall good squeeze of heart,  There was a little narrowing noted to aortic valve.  It was measured as mild and don't need to do anything else at this time.  We will continue to follow your echo yearly.     Thank you,  Xena

## 2019-11-14 ENCOUNTER — APPOINTMENT (OUTPATIENT)
Dept: PULMONOLOGY | Facility: HOSPICE | Age: 59
End: 2019-11-14
Payer: COMMERCIAL

## 2019-11-26 ENCOUNTER — HOSPITAL ENCOUNTER (OUTPATIENT)
Dept: LAB | Facility: MEDICAL CENTER | Age: 59
End: 2019-11-26
Payer: COMMERCIAL

## 2019-11-26 LAB
CHOLEST SERPL-MCNC: 133 MG/DL (ref 100–199)
GLUCOSE SERPL-MCNC: 87 MG/DL (ref 65–99)
HDLC SERPL-MCNC: 42 MG/DL
LDLC SERPL CALC-MCNC: 51 MG/DL
TRIGL SERPL-MCNC: 202 MG/DL (ref 0–149)

## 2019-12-17 ENCOUNTER — ANTICOAGULATION VISIT (OUTPATIENT)
Dept: VASCULAR LAB | Facility: MEDICAL CENTER | Age: 59
End: 2019-12-17
Attending: INTERNAL MEDICINE
Payer: COMMERCIAL

## 2019-12-17 DIAGNOSIS — I48.0 PAROXYSMAL ATRIAL FIBRILLATION (HCC): ICD-10-CM

## 2019-12-17 DIAGNOSIS — Z79.01 CHRONIC ANTICOAGULATION: ICD-10-CM

## 2019-12-17 DIAGNOSIS — G45.4 TRANSIENT GLOBAL AMNESIA: ICD-10-CM

## 2019-12-17 LAB — INR PPP: 2.8 (ref 2–3.5)

## 2019-12-17 PROCEDURE — 85610 PROTHROMBIN TIME: CPT

## 2019-12-17 PROCEDURE — 99211 OFF/OP EST MAY X REQ PHY/QHP: CPT

## 2019-12-17 RX ORDER — WARFARIN SODIUM 5 MG/1
15 TABLET ORAL DAILY
Qty: 90 TAB | Refills: 6 | Status: SHIPPED | OUTPATIENT
Start: 2019-12-17 | End: 2020-02-03

## 2019-12-17 NOTE — PROGRESS NOTES
OP Anticoagulation Service Note    Date: 2019    Anticoagulation Summary  As of 2019    INR goal:   2.0-3.0   TTR:   87.6 % (10.4 mo)   INR used for dosin.80 (2019)   Warfarin maintenance plan:   15 mg (5 mg x 3) every Mon, Fri; 10 mg (5 mg x 2) all other days   Weekly warfarin total:   80 mg   Plan last modified:   Saw Mauricio, PharmD (2019)   Next INR check:   2020   Target end date:   Indefinite    Indications    Chronic anticoagulation [Z79.01]  Paroxysmal atrial fibrillation (HCC) [I48.0]  Transient global amnesia [G45.4]             Anticoagulation Episode Summary     INR check location:       Preferred lab:       Send INR reminders to:       Comments:         Anticoagulation Care Providers     Provider Role Specialty Phone number    MARK Christianson Referring Cardiology [phone]    [facility] Responsible  [phone]        Anticoagulation Patient Findings      HPI:   Seda Cagle seen in clinic today, they are here today for a INR check on anticoagulation therapy     The reason for today's visit is to prevent morbidity and mortality from a blood clot or stroke and to reduce the risk of bleeding while on a anticoagulant.     Dose the patient in the medical group have a [facility] primary care provider and proper insurance?  Juve Stoddard M.D.  [id number] [facility] [id number]  [place] [id number] [phone]      Additional education provided today regarding reducing bleed risk and dietary constraints:  About how vitamin K and foods work with warfarin and the bleeding risk on a anticoagulant     Any upcoming procedures:   none    Confirmed warfarin dosing regimen  Interval Changes with foods rich in vitamin K: No  Interval Changes in ETOH:   No  Interval Changes in smoking status:  No  Interval Changes in medication:  No   Cost restriction:  No  S/S of bleeding or bruising:  No  Signs/symptoms  thrombosis since the last appt:   No  Bleed risk is:  moderate,       Assessment:   INR  therapeutic.     Medications reviewed and updated--      Plan:  Continue weekly warfarin dose as noted      Follow up:  Follow up appointment in 8 week(s)       Other info:  Pt educated to contact our clinic with any changes in medications or s/s of bleeding or thrombosis    CHEST guidelines recommend frequent INR monitoring at regular intervals (a few days up to a max of 12 weeks) to ensure they are on the proper dose of warfarin and not having any complications from therapy.  INRs can dramatically change over a short time period due to diet, medications, and medical conditions.     Saw Mauricio M.S., Pharm.D., [facility], [facility]    This note was created using voice recognition software (Dragon). The accuracy of the dictation is limited by the abilities of the software. I have reviewed the note prior to signing, however some errors in grammar and context are still possible. If you have any questions related to this note please do not hesitate to contact our office.

## 2020-01-07 ENCOUNTER — HOSPITAL ENCOUNTER (OUTPATIENT)
Dept: RADIOLOGY | Facility: MEDICAL CENTER | Age: 60
End: 2020-01-07
Attending: NURSE PRACTITIONER
Payer: COMMERCIAL

## 2020-01-07 DIAGNOSIS — I65.29 STENOSIS OF CAROTID ARTERY, UNSPECIFIED LATERALITY: ICD-10-CM

## 2020-01-07 PROCEDURE — 93880 EXTRACRANIAL BILAT STUDY: CPT | Mod: 26 | Performed by: INTERNAL MEDICINE

## 2020-01-07 PROCEDURE — 93880 EXTRACRANIAL BILAT STUDY: CPT

## 2020-01-15 DIAGNOSIS — Z79.01 CHRONIC ANTICOAGULATION: ICD-10-CM

## 2020-01-17 ENCOUNTER — TELEPHONE (OUTPATIENT)
Dept: CARDIOLOGY | Facility: MEDICAL CENTER | Age: 60
End: 2020-01-17

## 2020-01-18 NOTE — TELEPHONE ENCOUNTER
I spoke with her directly; reassured her; and went over the results specifically. She does not need to speak to Xena at this time- just mainly wanted reassurance.

## 2020-02-03 ENCOUNTER — HOSPITAL ENCOUNTER (OUTPATIENT)
Dept: LAB | Facility: MEDICAL CENTER | Age: 60
End: 2020-02-03
Attending: INTERNAL MEDICINE
Payer: COMMERCIAL

## 2020-02-03 ENCOUNTER — OFFICE VISIT (OUTPATIENT)
Dept: MEDICAL GROUP | Facility: MEDICAL CENTER | Age: 60
End: 2020-02-03
Payer: COMMERCIAL

## 2020-02-03 VITALS
OXYGEN SATURATION: 95 % | SYSTOLIC BLOOD PRESSURE: 128 MMHG | DIASTOLIC BLOOD PRESSURE: 76 MMHG | BODY MASS INDEX: 33.29 KG/M2 | HEART RATE: 68 BPM | TEMPERATURE: 97.4 F | WEIGHT: 195 LBS | HEIGHT: 64 IN

## 2020-02-03 DIAGNOSIS — K59.00 CONSTIPATION, UNSPECIFIED CONSTIPATION TYPE: ICD-10-CM

## 2020-02-03 DIAGNOSIS — M81.0 POSTMENOPAUSAL BONE LOSS: ICD-10-CM

## 2020-02-03 DIAGNOSIS — Z00.00 PREVENTATIVE HEALTH CARE: Chronic | ICD-10-CM

## 2020-02-03 DIAGNOSIS — Z23 NEED FOR SHINGLES VACCINE: ICD-10-CM

## 2020-02-03 DIAGNOSIS — Z12.11 COLON CANCER SCREENING: ICD-10-CM

## 2020-02-03 DIAGNOSIS — Z23 NEED FOR PNEUMOCOCCAL VACCINE: ICD-10-CM

## 2020-02-03 DIAGNOSIS — Z11.59 NEED FOR HEPATITIS C SCREENING TEST: ICD-10-CM

## 2020-02-03 DIAGNOSIS — G47.30 SLEEP APNEA, UNSPECIFIED TYPE: Chronic | ICD-10-CM

## 2020-02-03 DIAGNOSIS — E78.5 DYSLIPIDEMIA: ICD-10-CM

## 2020-02-03 LAB
25(OH)D3 SERPL-MCNC: 33 NG/ML (ref 30–100)
ALBUMIN SERPL BCP-MCNC: 4.3 G/DL (ref 3.2–4.9)
ALBUMIN/GLOB SERPL: 1.7 G/DL
ALP SERPL-CCNC: 83 U/L (ref 30–99)
ALT SERPL-CCNC: 28 U/L (ref 2–50)
ANION GAP SERPL CALC-SCNC: 9 MMOL/L (ref 0–11.9)
APPEARANCE UR: CLEAR
AST SERPL-CCNC: 25 U/L (ref 12–45)
BACTERIA #/AREA URNS HPF: NEGATIVE /HPF
BASOPHILS # BLD AUTO: 0.6 % (ref 0–1.8)
BASOPHILS # BLD: 0.04 K/UL (ref 0–0.12)
BILIRUB SERPL-MCNC: 0.5 MG/DL (ref 0.1–1.5)
BILIRUB UR QL STRIP.AUTO: NEGATIVE
BUN SERPL-MCNC: 16 MG/DL (ref 8–22)
CALCIUM SERPL-MCNC: 9.4 MG/DL (ref 8.5–10.5)
CHLORIDE SERPL-SCNC: 106 MMOL/L (ref 96–112)
CO2 SERPL-SCNC: 27 MMOL/L (ref 20–33)
COLOR UR: YELLOW
CREAT SERPL-MCNC: 1.13 MG/DL (ref 0.5–1.4)
CREAT UR-MCNC: 20.9 MG/DL
EOSINOPHIL # BLD AUTO: 0.1 K/UL (ref 0–0.51)
EOSINOPHIL NFR BLD: 1.4 % (ref 0–6.9)
EPI CELLS #/AREA URNS HPF: NORMAL /HPF
ERYTHROCYTE [DISTWIDTH] IN BLOOD BY AUTOMATED COUNT: 41.8 FL (ref 35.9–50)
GLOBULIN SER CALC-MCNC: 2.6 G/DL (ref 1.9–3.5)
GLUCOSE SERPL-MCNC: 91 MG/DL (ref 65–99)
GLUCOSE UR STRIP.AUTO-MCNC: NEGATIVE MG/DL
HCT VFR BLD AUTO: 44.5 % (ref 37–47)
HCV AB SER QL: NEGATIVE
HGB BLD-MCNC: 14.9 G/DL (ref 12–16)
HYALINE CASTS #/AREA URNS LPF: NORMAL /LPF
IMM GRANULOCYTES # BLD AUTO: 0.01 K/UL (ref 0–0.11)
IMM GRANULOCYTES NFR BLD AUTO: 0.1 % (ref 0–0.9)
KETONES UR STRIP.AUTO-MCNC: NEGATIVE MG/DL
LEUKOCYTE ESTERASE UR QL STRIP.AUTO: ABNORMAL
LYMPHOCYTES # BLD AUTO: 2.78 K/UL (ref 1–4.8)
LYMPHOCYTES NFR BLD: 39.3 % (ref 22–41)
MCH RBC QN AUTO: 29.9 PG (ref 27–33)
MCHC RBC AUTO-ENTMCNC: 33.5 G/DL (ref 33.6–35)
MCV RBC AUTO: 89.4 FL (ref 81.4–97.8)
MICRO URNS: ABNORMAL
MICROALBUMIN UR-MCNC: <0.7 MG/DL
MICROALBUMIN/CREAT UR: NORMAL MG/G (ref 0–30)
MONOCYTES # BLD AUTO: 0.47 K/UL (ref 0–0.85)
MONOCYTES NFR BLD AUTO: 6.6 % (ref 0–13.4)
NEUTROPHILS # BLD AUTO: 3.68 K/UL (ref 2–7.15)
NEUTROPHILS NFR BLD: 52 % (ref 44–72)
NITRITE UR QL STRIP.AUTO: NEGATIVE
NRBC # BLD AUTO: 0 K/UL
NRBC BLD-RTO: 0 /100 WBC
PH UR STRIP.AUTO: 7 [PH] (ref 5–8)
PLATELET # BLD AUTO: 170 K/UL (ref 164–446)
PMV BLD AUTO: 10.4 FL (ref 9–12.9)
POTASSIUM SERPL-SCNC: 4.4 MMOL/L (ref 3.6–5.5)
PROT SERPL-MCNC: 6.9 G/DL (ref 6–8.2)
PROT UR QL STRIP: NEGATIVE MG/DL
RBC # BLD AUTO: 4.98 M/UL (ref 4.2–5.4)
RBC # URNS HPF: NORMAL /HPF
RBC UR QL AUTO: NEGATIVE
SODIUM SERPL-SCNC: 142 MMOL/L (ref 135–145)
SP GR UR STRIP.AUTO: 1.01
TSH SERPL DL<=0.005 MIU/L-ACNC: 2.42 UIU/ML (ref 0.38–5.33)
UROBILINOGEN UR STRIP.AUTO-MCNC: 0.2 MG/DL
WBC # BLD AUTO: 7.1 K/UL (ref 4.8–10.8)
WBC #/AREA URNS HPF: NORMAL /HPF

## 2020-02-03 PROCEDURE — 90471 IMMUNIZATION ADMIN: CPT | Performed by: INTERNAL MEDICINE

## 2020-02-03 PROCEDURE — 36415 COLL VENOUS BLD VENIPUNCTURE: CPT

## 2020-02-03 PROCEDURE — 85025 COMPLETE CBC W/AUTO DIFF WBC: CPT

## 2020-02-03 PROCEDURE — 84443 ASSAY THYROID STIM HORMONE: CPT

## 2020-02-03 PROCEDURE — 86803 HEPATITIS C AB TEST: CPT

## 2020-02-03 PROCEDURE — 99214 OFFICE O/P EST MOD 30 MIN: CPT | Mod: 25 | Performed by: INTERNAL MEDICINE

## 2020-02-03 PROCEDURE — 82306 VITAMIN D 25 HYDROXY: CPT

## 2020-02-03 PROCEDURE — 90732 PPSV23 VACC 2 YRS+ SUBQ/IM: CPT | Performed by: INTERNAL MEDICINE

## 2020-02-03 PROCEDURE — 81001 URINALYSIS AUTO W/SCOPE: CPT

## 2020-02-03 PROCEDURE — 82043 UR ALBUMIN QUANTITATIVE: CPT

## 2020-02-03 PROCEDURE — 80053 COMPREHEN METABOLIC PANEL: CPT

## 2020-02-03 PROCEDURE — 82570 ASSAY OF URINE CREATININE: CPT

## 2020-02-03 ASSESSMENT — PATIENT HEALTH QUESTIONNAIRE - PHQ9: CLINICAL INTERPRETATION OF PHQ2 SCORE: 0

## 2020-02-03 NOTE — PROGRESS NOTES
Subjective:      Seda Cagle is a 59 y.o. female who presents with Constipation (x 6 mo)            HPI     Since september has had more constipation, has been eating healthier avoiding processed foods, trying to incorporate more fiber in diet, drinking more water daily over 70 oz daily, has had to use dulcolax to keep bowels regular, has tried metamucil in the past taken daily with no benefit. Also has some bloating and gas at times, no blood in stool, no abdominal pain but occasional cramping. Some increase stress starting over a year ago when daughter moved out. Good social support with .  No melena or hematochezia, no diarrhea.  No history of IBS previously. Paroxysmal atrial fibrillation per cardiology on Norpace, Toprol, Coumadin per Coumadin clinic, no bruising or bleeding  Sleep apnea using CPAP nightly compliant with machine  Exercises treadmill on a regular basis            Current Outpatient Medications   Medication Sig Dispense Refill   • disopyramide (NORPACE) 100 MG Cap TAKE ONE CAPSULE BY MOUTH EVERY 8 HOURS 270 Cap 3   • warfarin (COUMADIN) 5 MG Tab Take 2-3 Tabs by mouth every day. Or as directed by coumadin clinic. 270 Tab 1   • metoprolol SR (TOPROL XL) 50 MG TABLET SR 24 HR Take 1 Tab by mouth every day. 90 Tab 3   • atorvastatin (LIPITOR) 40 MG Tab Take 1 Tab by mouth every evening. 90 Tab 3   • Pantothenic Acid 500 MG Tab Take 500 mg by mouth.     • Melatonin 5 MG TABLET DISPERSIBLE Take  by mouth.     • Fluticasone Propionate (FLONASE NA) Spray  in nose.     • Calcium-Vitamin D-Vitamin K (CALCIUM FOR WOMEN PO) Take 1 Each by mouth every day at 6 PM.     • Lansoprazole (PREVACID PO) Take 1 Each by mouth 1 time daily as needed.     • Magnesium 200 MG TABS Take 1 Tab by mouth every day.     • Multiple Vitamins-Minerals (ALIVE ONCE DAILY WOMENS 50+ PO) Take 1 Each by mouth every day at 6 PM.     • warfarin (COUMADIN) 5 MG Tab Take 3 Tabs by mouth every day. 90 Tab 6   •  HORSE CHESTNUT PO Take 400 mg by mouth every day.       No current facility-administered medications for this visit.      abnormal mammogram  4/17/17 mammogram heterogeneously dense breast tissue, ordered screening breast ultrasound  12/7/17 sonocine hypoechoic focus left breast which may represent deep cyst, targeted diagnostic left breast ultrasound recommended  12/14/17 ultrasound left breast diagnostic, benign-appearing solid mass 2:45 position left breast, 9.2 x 9.1 x 8.5 mm, interval 6 month follow-up ultrasound versus diagnostic ultrasound biopsy  12/22/17 ultrasound-guided biopsy left breast mass, pathology benign fibroglandular tissue with usual ductal hyperplasia and focal microcalcifications  1/30/18 ultrasound-guided wire localization biopsy left breast pathology fibrocystic changes, ductal hyperplasia, microcalcifications  2/5/18  surgery note, pathology reviewed benign breast tissue with focal fibrocystic changes, ductal hyperplasia, microcalcifications, recommend annual mammogram, follow-up as needed  6/22/18 mamogram per  gyn postoperative change upper quadrant left breast, no evidence of malignancy, follow-up one year recommended  6/27/19 mammogram per  gyn heterogeneously dense breast tissue     cervical arthritis  3/24/15 MRI cervical spine C4-C5 small disc osteophyte complex, moderate left facet arthropathy, multilevel DJD    ckd  2/19/11 bun 15,creat 1.0,GFR 56  5/14/14 bun 24,creat 1.23,GFR 45  8/2/14 ultrasound renal negative  2/19/15 bun 19,creat 1.1,GFR 50  8/3/15 bun 17,creat 1.2, GFR 43,urine mac<0.5,PTH 27,vit d 37  10/16/15 bun 17,creat 1.1,GFR 47  1/27/16 bun 14,creat 1.2,GFR 45  12/5/16 bun 16,creat 1.0,GFR 52  1/18/19 bun 15,creat 1.0,GFR 52     Dyslipidemia  12/5/16 chol 186,trig 143,hdl 52,ldl 105  11/26/18  vascular medicine note chronic venous stasis changes lower extremities, recheck lipid panel qualify for statin therapy, continue home blood  pressure monitoring, consider 24-hour ambulatory blood pressure monitoring, continue metoprolol, continue xarelto use compression stockings  1/18/19 chol 183,trig 160,hdl 45,ldl 106  11/26/19 chol 133,trig 202,hdl 42,ldl 51    gerd on pepcid    History transient global amnesia  1/18/19 chol 183,trig 160,hdl 45,ldl 106  1/18/19 CT head negative  1/19/19 ultrasound carotid less than 50% bilateral stenosis  1/19/19 CT-CTA head with and without postprocessing, negative [place] [facility] [place]  1/18/19 hospital admission, 1/19/19 hospital discharge transient global amnesia, patient was speaking to her son on the phone, and could not recall for anything 2 hours afterwards, has been indicated that patient was confused and repetitive and could not remember talking on the phone, came to the emergency room for evaluation, CT scan head negative, no other symptoms, resolution discharged home the following day  1/29/19 cardiology note, patient still having some issues with short-term memory, will switch to coumadin from xarelto with recent event, referral to neurology, paroxysmal atrial fibrillation burden less than 1%  3/26/19 [facility] neurological note probable transient global amnesia, complete holter monitor per cardiology, EEG ordered and sedimentation rate for headache     Hypertrophic cardiomyopathy  8/14/00 alcohol septal reduction, normal coronaries  4/15/10 echo very small area proximal septal hypertrophy, no significant obstruction  4/16/10 cardiology note [facility] hypertrophic cardiomyopathy on metoprolol 25 mg bid, norpace  mg bid, asa; remains asymptomatic with regards to hypertrophic cardio myopathy  3/18/11 echo normal LV thickness with very focal area localized hypertrophy most basal septum without evidence of obstruction, EF 64%  11/16/14 persantine thallium fixed defect mid and basilar anteroseptal wall and apical septum consistent with scar, hypokinesis septum EF 74%  11/18/14 echo normal LV size  and function, grade 2 diastolic dysfunction, EF 60-65%, mild aortic stenosis, mild aortic insufficiency, RVSP 20-25  11/19/14 holter sinus with left bundle branch block, average rate 57, minimum heart rate 44, maximal heart rate 85, PVCs, PACs   12/12/14 ALLEN known post-ablation for hypertrophic cardiomyopathy without obstruction, small amount of remaining myocardium of lower tract does not cause significant outflow gradient, mild aortic insufficiency, moderate central mitral regurgitation, remnant myocardium in the LVOT with mild obstruction, peak outflow gradient 11 (previously 16-18)  4/16/15  cardiology procedure note, dual-chamber pacemaker implantation  10/24/16  of cardiology, atrial fibrillation and hypertrophic cardiomyopathy, controlled on norpace,metoprolol, xarelto  9/26/18 echo normal LV systolic function, EF 55%, mild LVH, grade 1 diastolic dysfunction, evidence of LVOT, septal wall thickness 1.2 cm, consider subaortic membrane, mild to moderate mitral regurgitation, mild aortic insufficiency  10/8/18 cardiology note does not appear to be subaortic membrane on echo, perhaps subaortic thickening, continue to monitor echo of full gradient for recurrence continue oral anticoagulation gradient is low and asymptomatic, xarelto, norpace, lopressor  1/29/19 cardiology note, patient still having some issues with short-term memory, will switch to coumadin from xarelto with recent event, referral to neurology, paroxysmal atrial fibrillation burden less than 1%  2/28/19 cardiology note hypertrophic obstructive cardiomyopathy and paroxysmal atrial fibrillation, sinus on exam, medi-lynx ventricular premature contractions isolated but frequent, asymptomatic, one short episode of paroxysmal atrial fibrillation 1.5 minutes, continues on warfarin INR 2.0, continues on norpace, increase metoprolol XL to 50 mg follow-up 1 month  4/26/2019 cardiology note hypertrophic obstructive cardiomyopathy, paroxysmal  atrial fibrillation, device check, continue coumadin, norpace, follow-up 6 months  10/10/19 cardiology note hypertrophic cardiomyopathy status post septal ablation, pacemaker, paroxysmal atrial fibrillation relatively short episodes, longest episode 49 minutes remainder less than 1 minute in length, remains controlled on toprol, norpace, coumadin per coumadin clinic  10/31/19 echo normal left ventricular function, EF 60%, basal septal wall appears hypokinetic, grade 2 diastolic dysfunction, moderately dilated left atrium volume index 44 mL, mild mitral regurgitation, mild aortic stenosis, mild aortic insufficiency, peak gradient 23  1/7/20 ultrasound carotid less than 50% stenosis right internal carotid, left carotid mild plaque carotid bifurcation     Mitral regurgitation  11/18/14 echo normal LV size and function, grade 2 diastolic dysfunction, EF 60-65%, mild aortic stenosis, mild aortic insufficiency, RVSP 20-25  11/19/14 holter sinus with left bundle branch block, average rate 57, minimum heart rate 44, maximal heart rate 85, PVCs, PACs   12/12/14 ALLEN known post-ablation for hypertrophic cardiomyopathy without obstruction, small amount of remaining myocardium of lower tract does not cause significant outflow gradient, mild aortic insufficiency, moderate central mitral regurgitation, remnant myocardium in the LVOT with mild obstruction, peak outflow gradient 11 (previously 16-18)  10/24/16  of cardiology, atrial fibrillation and hypertrophic cardiomyopathy, controlled on norpace,metoprolol, xarelto  9/26/18 echo normal LV systolic function, EF 55%, mild LVH, grade 1 diastolic dysfunction, evidence of LVOT, septal wall thickness 1.2 cm, consider subaortic membrane, mild to moderate mitral regurgitation, mild aortic insufficiency  10/8/18 cardiology note does not appear to be subaortic membrane on echo, perhaps subaortic thickening, continue to monitor echo of full gradient for recurrence continue oral  anticoagulation gradient is low and asymptomatic, xarelto, norpace, lopressor  1/29/19 cardiology note, patient still having some issues with short-term memory, will switch to coumadin from xarelto with recent event, referral to neurology, paroxysmal atrial fibrillation burden less than 1%  2/28/19 cardiology note hypertrophic obstructive cardiomyopathy and paroxysmal atrial fibrillation, sinus on exam, medi-lynx ventricular premature contractions isolated but frequent, asymptomatic, one short episode of paroxysmal atrial fibrillation 1.5 minutes, continues on warfarin INR 2.0, continues on norpace, increase metoprolol XL to 50 mg follow-up 1 month  10/10/19 cardiology note hypertrophic cardiomyopathy status post septal ablation, pacemaker, paroxysmal atrial fibrillation relatively short episodes, longest episode 49 minutes remainder less than 1 minute in length, remains controlled on toprol, norpace, coumadin per coumadin clinic  10/31/19 echo normal left ventricular function, EF 60%, basal septal wall appears hypokinetic, grade 2 diastolic dysfunction, moderately dilated left atrium volume index 44 mL, mild mitral regurgitation, mild aortic stenosis, mild aortic insufficiency, peak gradient 23    pacemaker  4/16/15  cardiology procedure note, dual-chamber pacemaker implantation     Paroxysmal atrial fibrillation  11/30/15 cardiology note paroxysmal atrial fibrillation, short atrial fibrillation under 4 minutes  2/22/16 cardiology note minimal atrial fibrillation on dual-chamber pacemaker check  3/24/16 cardiology note minimal atrial fibrillation   10/24/16 cardiology note longer episodes of atrial fibrillation, still at 2.6 hours total on dual-chamber pacemaker review, continues on xarelto  9/26/18 echo normal LV systolic function, EF 55%, mild LVH, grade 1 diastolic dysfunction, evidence of LVOT, septal wall thickness 1.2 cm, consider subaortic membrane, mild to moderate mitral regurgitation, mild aortic  insufficiency  10/8/18 cardiology note does not appear to be subaortic membrane on echo, perhaps subaortic thickening, continue to monitor echo of full gradient for recurrence continue oral anticoagulation gradient is low and asymptomatic, xarelto, norpace, lopressor  1/29/19 cardiology note, patient still having some issues with short-term memory, will switch to coumadin from xarelto with recent event, referral to neurology, paroxysmal atrial fibrillation burden less than 1%  2/28/19 cardiology note hypertrophic obstructive cardiomyopathy and paroxysmal atrial fibrillation, sinus on exam, medi-lynx ventricular premature contractions isolated but frequent, asymptomatic, one short episode of paroxysmal atrial fibrillation 1.5 minutes, continues on warfarin INR 2.0, continues on norpace, increase metoprolol XL to 50 mg follow-up 1 month   10/10/19 cardiology note hypertrophic cardiomyopathy status post septal ablation, pacemaker, paroxysmal atrial fibrillation relatively short episodes, longest episode 49 minutes remainder less than 1 minute in length, remains controlled on toprol, norpace, coumadin per coumadin clinic  10/31/19 echo normal left ventricular function, EF 60%, basal septal wall appears hypokinetic, grade 2 diastolic dysfunction, moderately dilated left atrium volume index 44 mL, mild mitral regurgitation, mild aortic stenosis, mild aortic insufficiency, peak gradient 23  1/7/20 ultrasound carotid less than 50% stenosis right internal carotid, left carotid mild plaque carotid bifurcation     Preventative health  12/7/09 colon per [facility] negative, repeat 10 years  12/2/16 tdap  12/5/16 vit d 36  2/20/18 pap per gyn  6/27/19 ammogram per  gyn heterogeneously dense breast tissue    sleep apnea  2/3/17 sleep apnea referral pending, OPO ordered  2/14/17 apnea link per [facility], AHI 19.7, low saturation 83, time<90% saturation 199 minutes, time less than 85% saturation 1 minute  2/16/17 sleep  consultation, proceed with polysomnography  3/26/17 sleep study duration 430 minutes, AHI 21.3, low saturation 88%, mild to moderate sleep-disordered breathing with significant disruption of sleep continuity, limb movement disorder could be present as well, cpap titration recommended  3/31/17 sleep note ordered autocpap 5-15 nightly, follow-up 6-8 weeks  11/30/18 sleep note on autocpap 5-15     Venous insufficiency  1/22/19 [facility] note venous insufficiency, physical exam does not indicate any evidence of venous insufficiency, patient declines venous ultrasound       Patient Active Problem List   Diagnosis   • S/P colonoscopy   • Hypertrophic obstructive cardiomyopathy (HCC)   • Pacemaker   • Preventative health care   • GERD (gastroesophageal reflux disease)   • Cervical arthritis   • Mitral regurgitation   • Chronic kidney disease, stage III (moderate) (HCC)   • Dyslipidemia   • Paroxysmal atrial fibrillation (HCC)   • Sleep apnea   • Abnormal mammogram   • History transient global amnesia   • Chronic anticoagulation   • Venous insufficiency       Depression Screening    Little interest or pleasure in doing things?  0 - not at all  Feeling down, depressed , or hopeless? 0 - not at all  Patient Health Questionnaire Score: 0        Health Maintenance Summary                HEPATITIS C SCREENING Overdue 1960     IMM PNEUMOCOCCAL VACCINE: 0-64 Years Overdue 5/2/1966     IMM ZOSTER VACCINES Overdue 5/2/2010     COLONOSCOPY Overdue 12/28/2019      Done 12/28/2009      Patient has more history with this topic...    MAMMOGRAM Next Due 6/27/2020      Done 6/27/2019 MA-SCREENING MAMMO BILAT W/TOMOSYNTHESIS W/CAD     Patient has more history with this topic...    PAP SMEAR Next Due 2/20/2021      Done 2/20/2018 PAP IG (IMAGE GUIDED)     Patient has more history with this topic...    IMM DTaP/Tdap/Td Vaccine Next Due 12/2/2026      Done 12/2/2016 Imm Admin: Tdap Vaccine          Patient Care Team:  Juve WHITNEY  "JOHNNY Stoddard as PCP - General (Internal Medicine)  Vibha Chua M.D. (Nephrology)  [facility] as Pharmacist    ROS       Objective:     /76 (BP Location: Right arm, Patient Position: Sitting, BP Cuff Size: Adult)   Pulse 68   Temp 36.3 °C (97.4 °F)   Ht 1.626 m (5' 4\")   Wt 88.5 kg (195 lb)   LMP 01/31/2012   SpO2 95%   BMI 33.47 kg/m²      Physical Exam  Vitals signs and nursing note reviewed.   Constitutional:       Appearance: Normal appearance.   HENT:      Head: Normocephalic and atraumatic.      Right Ear: External ear normal.      Left Ear: External ear normal.      Nose: No congestion.      Mouth/Throat:      Pharynx: No oropharyngeal exudate.   Eyes:      Conjunctiva/sclera: Conjunctivae normal.   Neck:      Musculoskeletal: Neck supple.   Cardiovascular:      Rate and Rhythm: Normal rate and regular rhythm.      Heart sounds: Normal heart sounds.   Pulmonary:      Effort: No respiratory distress.      Breath sounds: Normal breath sounds.   Abdominal:      General: There is no distension.   Musculoskeletal:         General: No swelling.   Skin:     General: Skin is warm.   Neurological:      General: No focal deficit present.      Mental Status: She is alert.   Psychiatric:         Mood and Affect: Mood normal.         Behavior: Behavior normal.         Thought Content: Thought content normal.                 Assessment/Plan:     Assessment    #1 constipation has been trying a high-fiber diet, has tried Metamucil with no benefit, does have some cramping and bloating, I believe this may be IBS related symptoms with constipation    #2 sleep apnea continue CPAP    #3 paroxysmal atrial fibrillation followed by cardiology sinus on exam, remains on Norpace, Toprol Coumadin per cardiology    #4 pacemaker per cardiology     #5 BMI 33.4    #6 postmenopausal bone loss    #7 dyslipidemia on lipitor         Plan  #! Follow up sleep apnea    #2 colon referral [facility] due for " colonoscopy    #3 shingrix first in series to get at pharmacy since we are out    #4 pneumovax with paroxysmal atrial fibrillation    #5 labs    #6 linzess trial 145 mcg once a day, handout on high-fiber diet provided to patient, medication can cause loose stools     #7 bone density postmenopausal bone loss at risk for fragility fractures, no HRT    #8 follow-up cardiology    #9 nutrition, diet, exercise discussed    #10 continue water intake

## 2020-02-04 PROBLEM — K58.9 IBS (IRRITABLE BOWEL SYNDROME): Status: ACTIVE | Noted: 2020-02-04

## 2020-02-06 ENCOUNTER — OFFICE VISIT (OUTPATIENT)
Dept: CARDIOLOGY | Facility: MEDICAL CENTER | Age: 60
End: 2020-02-06
Payer: COMMERCIAL

## 2020-02-06 VITALS
OXYGEN SATURATION: 96 % | BODY MASS INDEX: 33.29 KG/M2 | SYSTOLIC BLOOD PRESSURE: 108 MMHG | HEIGHT: 64 IN | DIASTOLIC BLOOD PRESSURE: 76 MMHG | WEIGHT: 195 LBS | HEART RATE: 60 BPM

## 2020-02-06 DIAGNOSIS — I49.3 PVC'S (PREMATURE VENTRICULAR CONTRACTIONS): ICD-10-CM

## 2020-02-06 DIAGNOSIS — I48.0 PAROXYSMAL ATRIAL FIBRILLATION (HCC): Chronic | ICD-10-CM

## 2020-02-06 DIAGNOSIS — I42.1 HYPERTROPHIC OBSTRUCTIVE CARDIOMYOPATHY (HCC): Chronic | ICD-10-CM

## 2020-02-06 DIAGNOSIS — Z79.01 CHRONIC ANTICOAGULATION: ICD-10-CM

## 2020-02-06 DIAGNOSIS — I65.29 STENOSIS OF CAROTID ARTERY, UNSPECIFIED LATERALITY: ICD-10-CM

## 2020-02-06 DIAGNOSIS — Z95.0 PACEMAKER: ICD-10-CM

## 2020-02-06 PROCEDURE — 99214 OFFICE O/P EST MOD 30 MIN: CPT | Performed by: NURSE PRACTITIONER

## 2020-02-06 RX ORDER — ATORVASTATIN CALCIUM 40 MG/1
40 TABLET, FILM COATED ORAL EVERY EVENING
Qty: 90 TAB | Refills: 3 | Status: SHIPPED | OUTPATIENT
Start: 2020-02-06 | End: 2021-01-15

## 2020-02-06 RX ORDER — METOPROLOL SUCCINATE 50 MG/1
50 TABLET, EXTENDED RELEASE ORAL DAILY
Qty: 90 TAB | Refills: 3 | Status: SHIPPED
Start: 2020-02-06 | End: 2020-10-30

## 2020-02-06 ASSESSMENT — ENCOUNTER SYMPTOMS
DIARRHEA: 0
DOUBLE VISION: 0
STRIDOR: 0
COUGH: 0
FEVER: 0
ABDOMINAL PAIN: 0
BLURRED VISION: 0
SORE THROAT: 0
ORTHOPNEA: 0
HEADACHES: 0
BLOOD IN STOOL: 0
CHILLS: 0
WEIGHT LOSS: 0
PND: 0
DIZZINESS: 0
NAUSEA: 0
HEARTBURN: 0
LOSS OF CONSCIOUSNESS: 0
HEMOPTYSIS: 0
TREMORS: 0
SHORTNESS OF BREATH: 0
FOCAL WEAKNESS: 0
SENSORY CHANGE: 0
VOMITING: 0
SPUTUM PRODUCTION: 0
PALPITATIONS: 1
SPEECH CHANGE: 0
TINGLING: 0
WHEEZING: 0

## 2020-02-06 NOTE — PROGRESS NOTES
Cardiology/Electrophysiology Follow-up Note      Subjective:   Chief Complaint:   Chief Complaint   Patient presents with   • Atrial Fibrillation     F/V DX:PAF       Seda Cagle is a 59 y.o. female who presents today for hypertrophic obstructive cardiomyopathy S/P prior septal ablation maintained on Toprol and norpace.     She is followed by Dr. Witt.  Past medical history also significant for paroxysmal AF, [place] dual chamber PPM, CKD III, and GEORGINA.  Previous history last Jan with transient global Amesia for which she reports that her memory has recenrly become normalized.        Today in follow up she states that she has continued to overall do well.  She does report that she has woken up with occasional palpitations at nighttime, states feels almost like a fluttering in her chest states very brief and then goes back to bed.  No associated symptoms with this.  She otherwise does not have any specific concerns to address today.   She denies chest pain, dizziness, pre syncope or syncope, dyspnea, PND, orthopnea, or lower extremity edema.      She reports feeling as if she has good energy.  She is able to walk and run on the treadmill without any cardiorespiratory symptoms and feels well after exercise.    Patient endorses medication compliance        Past Medical History:   Diagnosis Date   • Anesthesia     wakes up during procedure (x2)   • Aortic regurgitation    • Arrhythmia, ventricular 2008    Symptomatic PVCs, controlled with medication.   • Asthma     inhaler PRN, only uses when sick    • Atrial fibrillation (HCC)    • Cardiac arrhythmia    • Cervical arthritis 2/8/2016   • Chickenpox    • Chronic kidney disease, stage III (moderate) (HCC) 2/9/2016   • CTS (carpal tunnel syndrome) 2/28/2011   • Depression    • GERD (gastroesophageal reflux disease)    • Gynecological disorder    • Heart burn    • Heart murmur    • Hemorrhagic disorder (HCC)     on Xarelto    • Hypertr obst cardiomyop 1991     Dr. Bert Vazquez, [address], [address], [place] [address] [phone] fax [phone].  [place] office [phone].  Alchol Septal Reduction , Coronaries normal.   • Hypertrophic cardiomyopathy (HCC)    • Indigestion    • Influenza    • Pacemaker    • Renal disorder     chronic kidney disease stage 3-  **pt states currently no issue, numbers are stable   • Sleep apnea     uses CPAP   • Snoring      Past Surgical History:   Procedure Laterality Date   • HYSTEROSCOPY WITH VIDEO OPERATIVE  2018    Procedure: HYSTEROSCOPY WITH VIDEO OPERATIVE;  Surgeon: Noemi Liu M.D.;  Location: SURGERY SAME DAY [facility] ORS;  Service: Labor and Delivery   • DILATION AND CURETTAGE  2018    Procedure: DILATION AND CURETTAGE;  Surgeon: Noemi Liu M.D.;  Location: SURGERY SAME DAY [facility];  Service: Labor and Delivery   • BREAST BIOPSY Left 2018    Procedure: BREAST BIOPSY- WIRE LOCALIZED;  Surgeon: Vijaya Britt M.D.;  Location: SURGERY SAME DAY [facility];  Service: General   • RECOVERY  4/15/2015    Performed by [facility] at SURGERY PRE-POST PROC UNIT [facility]   • PACEMAKER INSERTION     • RECOVERY  2014    Performed by [facility] at SURGERY SAME DAY [facility]   • SEPTAL RECONSTRUCTION     • ENDOSCOPY     • PRIMARY C SECTION     • TUBAL COAGULATION LAPAROSCOPIC BILATERAL       Family History   Problem Relation Age of Onset   • Heart Disease Mother         CHF   • Hypertension Mother    • Stroke Mother    • Heart Failure Mother    • Other Mother         carotid artery disease,gout   • Cancer Father         AML   • Diabetes Maternal Grandmother    • Cancer Maternal Grandfather         Prostate   • Heart Disease Paternal Grandfather 52         MI    • Sleep Apnea Neg Hx      Social History     Socioeconomic History   • Marital status:      Spouse name: Not on file   • Number of children: Not on file   • Years of education: Not on  file   • Highest education level: Not on file   Occupational History   • Not on file   Social Needs   • Financial resource strain: Not on file   • Food insecurity:     Worry: Not on file     Inability: Not on file   • Transportation needs:     Medical: Not on file     Non-medical: Not on file   Tobacco Use   • Smoking status: Never Smoker   • Smokeless tobacco: Never Used   Substance and Sexual Activity   • Alcohol use: Yes     Alcohol/week: 0.6 oz     Types: 1 Standard drinks or equivalent per week     Comment: 3-4 per week   • Drug use: No   • Sexual activity: Yes     Partners: Male     Birth control/protection: Surgical     Comment:    Lifestyle   • Physical activity:     Days per week: Not on file     Minutes per session: Not on file   • Stress: Not on file   Relationships   • Social connections:     Talks on phone: Not on file     Gets together: Not on file     Attends Oriental orthodox service: Not on file     Active member of club or organization: Not on file     Attends meetings of clubs or organizations: Not on file     Relationship status: Not on file   • Intimate partner violence:     Fear of current or ex partner: Not on file     Emotionally abused: Not on file     Physically abused: Not on file     Forced sexual activity: Not on file   Other Topics Concern   • Not on file   Social History Narrative   • Not on file     No Known Allergies    Current Outpatient Medications   Medication Sig Dispense Refill   • atorvastatin (LIPITOR) 40 MG Tab Take 1 Tab by mouth every evening. 90 Tab 3   • metoprolol SR (TOPROL XL) 50 MG TABLET SR 24 HR Take 1 Tab by mouth every day. 90 Tab 3   • linaCLOtide (LINZESS) 145 MCG Cap Take 145 mcg by mouth every day. 30 Cap 5   • disopyramide (NORPACE) 100 MG Cap TAKE ONE CAPSULE BY MOUTH EVERY 8 HOURS 270 Cap 3   • warfarin (COUMADIN) 5 MG Tab Take 2-3 Tabs by mouth every day. Or as directed by coumadin clinic. 270 Tab 1   • Pantothenic Acid 500 MG Tab Take 500 mg by mouth.    "  • Melatonin 5 MG TABLET DISPERSIBLE Take  by mouth.     • Fluticasone Propionate (FLONASE NA) Spray  in nose.     • Calcium-Vitamin D-Vitamin K (CALCIUM FOR WOMEN PO) Take 1 Each by mouth every day at 6 PM.     • Lansoprazole (PREVACID PO) Take 1 Each by mouth 1 time daily as needed.     • Magnesium 200 MG TABS Take 1 Tab by mouth every day.       No current facility-administered medications for this visit.        Review of Systems   Constitutional: Negative for chills, fever, malaise/fatigue and weight loss.   HENT: Negative for congestion, nosebleeds, sore throat and tinnitus.    Eyes: Negative for blurred vision and double vision.   Respiratory: Negative for cough, hemoptysis, sputum production, shortness of breath, wheezing and stridor.    Cardiovascular: Positive for palpitations (nocturnal ). Negative for chest pain, orthopnea, leg swelling and PND.   Gastrointestinal: Negative for abdominal pain, blood in stool, diarrhea, heartburn, melena, nausea and vomiting.   Skin: Negative for rash.   Neurological: Negative for dizziness, tingling, tremors, sensory change, speech change, focal weakness, loss of consciousness and headaches.     All others systems reviewed and negative.     Objective:     /76 (BP Location: Left arm, Patient Position: Sitting, BP Cuff Size: Adult)   Pulse 60   Ht 1.626 m (5' 4\")   Wt 88.5 kg (195 lb)   SpO2 96%  Body mass index is 33.47 kg/m².    Physical Exam   Constitutional: She is oriented to person, place, and time and well-developed, well-nourished, and in no distress.   HENT:   Head: Normocephalic and atraumatic.   Eyes: Pupils are equal, round, and reactive to light. Conjunctivae and EOM are normal.   Neck: Normal range of motion. Neck supple. No JVD present.   Cardiovascular: Normal rate, regular rhythm, normal heart sounds and intact distal pulses. Exam reveals no gallop and no friction rub.   No murmur heard.  Pulmonary/Chest: Effort normal and breath sounds normal. " No respiratory distress. She has no wheezes. She has no rales. She exhibits no tenderness.   Left chest device site uncomplicated    Abdominal: Soft. Bowel sounds are normal. There is no tenderness.   Musculoskeletal: Normal range of motion.         General: No edema.   Neurological: She is alert and oriented to person, place, and time.   Skin: Skin is warm and dry. No rash noted. No erythema.   Psychiatric: Mood, memory, affect and judgment normal.         Cardiac Imaging and Procedures Review:      Echo (10/31/19):   FINDINGS  Left Ventricle  Normal left ventricular chamber size. Normal left ventricular wall   thickness. Normal left ventricular systolic function. Left ventricular   ejection fraction is visually estimated to be 60%. Basal septal wall   appears hypokinetic, possibly due to history of septal ablation. Grade   II diastolic dysfunction.     Right Ventricle  Normal right ventricular size and systolic function. Pacer/ICD wire   seen in right ventricle.     Right Atrium  Normal right atrial size. Normal inferior vena cava size and   inspiratory collapse.     Left Atrium  Moderately dilated left atrium. Left atrial volume index is 44 mL/sq m.     Mitral Valve  Mitral annular calcification. No mitral stenosis. Mild mitral   regurgitation.     Aortic Valve  Tricuspid aortic valve. Aortic sclerosis with mild stenosis.   Transvalvular gradients are - Peak:  23 mmHg, Mean:  14 mmHg. Mild   aortic insufficiency.     Tricuspid Valve  Structurally normal tricuspid valve. No tricuspid stenosis. Trace   tricuspid regurgitation. Unable to estimate pulmonary artery pressure   due to an inadequate tricuspid regurgitant jet.     Pulmonic Valve  Structurally normal pulmonic valve. No pulmonic stenosis. Trace   pulmonic insufficiency.     Pericardium  No pericardial effusion seen. Fat pad present.     Aorta  Normal aortic root for body surface area. Ascending aorta diameter is   3.0 cm.    Nuclear Perfusion Imaging  (10/28/17):   NUCLEAR IMAGING INTERPRETATION   Small fixed septal defect consistent with infarct vs. LBBB related imaging    artifact.   No ischemia identified.   Normal left ventricular size, ejection fraction, and wall motion.   ECG INTERPRETATION   Nondiagnostic javi to LBBB. Frequent PVCs.    Carotid U/S (1/7/20):  Vascular Laboratory   CONCLUSIONS   Mild bilateral internal carotid artery stenosis (<50%).    Antegrade flow, bilateral vertebral arteries.     Labs (personally reviewed and notable for):   Lab Results   Component Value Date/Time    SODIUM 142 02/03/2020 02:10 PM    POTASSIUM 4.4 02/03/2020 02:10 PM    CHLORIDE 106 02/03/2020 02:10 PM    CO2 27 02/03/2020 02:10 PM    GLUCOSE 91 02/03/2020 02:10 PM    BUN 16 02/03/2020 02:10 PM    CREATININE 1.13 02/03/2020 02:10 PM    CREATININE 1.04 (H) 02/19/2011 12:00 AM    BUNCREATRAT 14 02/19/2011 12:00 AM    GLOMRATE 56 (L) 02/19/2011 12:00 AM      Lab Results   Component Value Date/Time    WBC 7.1 02/03/2020 02:10 PM    WBC 7.3 02/14/2012 12:22 PM    RBC 4.98 02/03/2020 02:10 PM    RBC 4.77 02/14/2012 12:22 PM    HEMOGLOBIN 14.9 02/03/2020 02:10 PM    HEMATOCRIT 44.5 02/03/2020 02:10 PM    MCV 89.4 02/03/2020 02:10 PM    MCV 93 02/14/2012 12:22 PM    MCH 29.9 02/03/2020 02:10 PM    MCH 31.0 02/14/2012 12:22 PM    MCHC 33.5 (L) 02/03/2020 02:10 PM    MPV 10.4 02/03/2020 02:10 PM    NEUTSPOLYS 52.00 02/03/2020 02:10 PM    LYMPHOCYTES 39.30 02/03/2020 02:10 PM    MONOCYTES 6.60 02/03/2020 02:10 PM    EOSINOPHILS 1.40 02/03/2020 02:10 PM    BASOPHILS 0.60 02/03/2020 02:10 PM      PT/INR:   Lab Results   Component Value Date/Time    PROTHROMBTM 16.2 (H) 01/18/2019 02:09 PM    INR 2.80 12/17/2019         Assessment:     1. Hypertrophic obstructive cardiomyopathy (HCC)     2. Paroxysmal atrial fibrillation (HCC)     3. PVC's (premature ventricular contractions)  MAGNESIUM   4. Pacemaker     5. Stenosis of carotid artery, unspecified laterality     6. Chronic  anticoagulation         Medical Decision Making:  Today's Assessment / Status / Plan:   1. Hypertrophic Cardiomyopathy:  - S/P alcohol spetal ablation.  Will have Dr. Witt review most recent echo for determination of septal thickness.   - She continues on Toprol and norpace.  QTc stable on 12 lead EKG.    - recheck yearly echo for survillence.     2. PAF:  - Relatively short episdoes of AF.  Longest episodes <3 min, the remainder are <1 min in length.  Estimated burden remains <1%.  Most episdoes from [id number], may correlate with some of her palpitations.   - Continue Toprol and Coumadin.     2. [place] PPM:  - Device interrogated in office today, stable function of device/leads.   - No NSVT/VT on PPM episode log.  - Battery longevity is 3 years.      4. EULALIA:  - Mild Stenosis (<50% bilateral) on most rent ultrasound.   - Continue Lipitor.   - Mother with hx of EULALIA/CVA.     5. Anticoagulation:  - Tolerating well without reported evidence of bleeding. Continue to follow with [facility].       Plan reviewed in detail with the patient and she verbalizes understanding and is in agreement.   RTC in 6 months for reivew, sooner if clinical condition changes  Collaborating MD/ADD: ANTONETTE Ching.

## 2020-02-07 ENCOUNTER — HOSPITAL ENCOUNTER (OUTPATIENT)
Dept: LAB | Facility: MEDICAL CENTER | Age: 60
End: 2020-02-07
Attending: NURSE PRACTITIONER
Payer: COMMERCIAL

## 2020-02-07 DIAGNOSIS — I49.3 PVC'S (PREMATURE VENTRICULAR CONTRACTIONS): ICD-10-CM

## 2020-02-07 LAB — MAGNESIUM SERPL-MCNC: 2.2 MG/DL (ref 1.5–2.5)

## 2020-02-07 PROCEDURE — 36415 COLL VENOUS BLD VENIPUNCTURE: CPT

## 2020-02-07 PROCEDURE — 83735 ASSAY OF MAGNESIUM: CPT

## 2020-02-11 ENCOUNTER — ANTICOAGULATION VISIT (OUTPATIENT)
Dept: VASCULAR LAB | Facility: MEDICAL CENTER | Age: 60
End: 2020-02-11
Attending: INTERNAL MEDICINE
Payer: COMMERCIAL

## 2020-02-11 VITALS — DIASTOLIC BLOOD PRESSURE: 70 MMHG | HEART RATE: 60 BPM | SYSTOLIC BLOOD PRESSURE: 142 MMHG

## 2020-02-11 DIAGNOSIS — Z79.01 CHRONIC ANTICOAGULATION: ICD-10-CM

## 2020-02-11 DIAGNOSIS — G45.4 TRANSIENT GLOBAL AMNESIA: ICD-10-CM

## 2020-02-11 DIAGNOSIS — I48.0 PAROXYSMAL ATRIAL FIBRILLATION (HCC): ICD-10-CM

## 2020-02-11 LAB
INR BLD: 3.8 (ref 0.9–1.2)
INR PPP: 3.8 (ref 2–3.5)

## 2020-02-11 PROCEDURE — 99212 OFFICE O/P EST SF 10 MIN: CPT

## 2020-02-11 PROCEDURE — 85610 PROTHROMBIN TIME: CPT

## 2020-02-11 NOTE — PROGRESS NOTES
Anticoagulation Summary  As of 2/11/2020    INR goal:   2.0-3.0   TTR:   77.3 % (1 y)   INR used for dosing:   3.80! (2/11/2020)   Warfarin maintenance plan:   15 mg (5 mg x 3) every Mon, Fri; 10 mg (5 mg x 2) all other days   Weekly warfarin total:   80 mg   Plan last modified:   Saw Mauricio, PharmD (12/17/2019)   Next INR check:   2/25/2020   Target end date:   Indefinite    Indications    Chronic anticoagulation [Z79.01]  Paroxysmal atrial fibrillation (HCC) [I48.0]  History transient global amnesia [G45.4]             Anticoagulation Episode Summary     INR check location:       Preferred lab:       Send INR reminders to:       Comments:         Anticoagulation Care Providers     Provider Role Specialty Phone number    Sonya Alonso, A.P.N. Referring Cardiology [phone]    [facility] Responsible  [phone]        Anticoagulation Patient Findings  Patient Findings     Negatives:   Signs/symptoms of thrombosis, Signs/symptoms of bleeding, Laboratory test error suspected, Change in health, Change in alcohol use, Change in activity, Upcoming invasive procedure, Emergency department visit, Upcoming dental procedure, Missed doses, Extra doses, Change in medications, Change in diet/appetite, Hospital admission, Bruising, Other complaints              History of Present Illness: follow up appointment for chronic anticoagulation with the high risk medication, warfarin for atrial fibrillation    Last INR was at goal, pt is now supra therapeutic today. Pt did enjoy ETOH on Saturday.  Will reduce todays dose to 5mg then resume current dosing reigmen. Follow up in 2   weeks, to reduce the risk of adverse events related to this high risk medication, warfarin.    Joelle Mason, Clinical Pharmacist

## 2020-02-25 ENCOUNTER — ANTICOAGULATION VISIT (OUTPATIENT)
Dept: VASCULAR LAB | Facility: MEDICAL CENTER | Age: 60
End: 2020-02-25
Attending: INTERNAL MEDICINE
Payer: COMMERCIAL

## 2020-02-25 DIAGNOSIS — G45.4 TRANSIENT GLOBAL AMNESIA: ICD-10-CM

## 2020-02-25 DIAGNOSIS — Z79.01 CHRONIC ANTICOAGULATION: ICD-10-CM

## 2020-02-25 DIAGNOSIS — K58.1 IRRITABLE BOWEL SYNDROME WITH CONSTIPATION: ICD-10-CM

## 2020-02-25 DIAGNOSIS — I48.0 PAROXYSMAL ATRIAL FIBRILLATION (HCC): ICD-10-CM

## 2020-02-25 PROBLEM — K59.00 CONSTIPATION: Status: RESOLVED | Noted: 2020-02-03 | Resolved: 2020-02-25

## 2020-02-25 LAB — INR PPP: 3.2 (ref 2–3.5)

## 2020-02-25 PROCEDURE — 85610 PROTHROMBIN TIME: CPT

## 2020-02-25 PROCEDURE — 99212 OFFICE O/P EST SF 10 MIN: CPT

## 2020-02-25 RX ORDER — LINACLOTIDE 290 UG/1
290 CAPSULE, GELATIN COATED ORAL DAILY
Qty: 30 CAP | Refills: 5 | Status: SHIPPED | OUTPATIENT
Start: 2020-02-25 | End: 2020-02-26 | Stop reason: SDUPTHER

## 2020-02-25 NOTE — PROGRESS NOTES
OP Anticoagulation Service Note    Date: 2/25/2020    Anticoagulation Summary  As of 2/25/2020    INR goal:   2.0-3.0   TTR:   74.5 % (1 y)   INR used for dosing:   3.20! (2/25/2020)   Warfarin maintenance plan:   15 mg (5 mg x 3) every Mon; 10 mg (5 mg x 2) all other days   Weekly warfarin total:   75 mg   Plan last modified:   Saw Mauricio, PharmD (2/25/2020)   Next INR check:   3/13/2020   Target end date:   Indefinite    Indications    Chronic anticoagulation [Z79.01]  Paroxysmal atrial fibrillation (HCC) [I48.0]  History transient global amnesia [G45.4]             Anticoagulation Episode Summary     INR check location:       Preferred lab:       Send INR reminders to:       Comments:         Anticoagulation Care Providers     Provider Role Specialty Phone number    MARK Christianson Referring Cardiology [phone]    [facility] Responsible  [phone]        Anticoagulation Patient Findings      HPI:   Seda Cagle seen in clinic today, they are here today for a INR check on anticoagulation therapy     The reason for today's visit is to prevent morbidity and mortality from a blood clot or stroke and to reduce the risk of bleeding while on a anticoagulant.     Dose the patient in the medical group have a [facility] primary care provider and proper insurance?  Juve Stoddard M.D.  [id number] [facility] [id number]  [place] [id number] [phone]      Additional education provided today regarding reducing bleed risk and dietary constraints:  About how vitamin K and foods work with warfarin and the bleeding risk on a anticoagulant     Any upcoming procedures:   Colonoscopy on 3/5/2020    Confirmed warfarin dosing regimen  Interval Changes with foods rich in vitamin K: No  Interval Changes in ETOH:   No  Interval Changes in smoking status:  No  Interval Changes in medication:  No   Cost restriction:  No  S/S of bleeding or bruising:  No  Signs/symptoms  thrombosis since  the last appt:  No  Bleed risk is:  moderate,       Assessment:   INR  supra-therapeutic.     Medications reviewed and updated--    Plan:  Decrease weekly warfarin dose as noted  And hold warfarin for 5 days prior to colonoscopy       Follow up:  Follow up appointment in 3 week(s)       Other info:  Pt educated to contact our clinic with any changes in medications or s/s of bleeding or thrombosis    CHEST guidelines recommend frequent INR monitoring at regular intervals (a few days up to a max of 12 weeks) to ensure they are on the proper dose of warfarin and not having any complications from therapy.  INRs can dramatically change over a short time period due to diet, medications, and medical conditions.     Saw Mauricio, PharmD, MS, BCACP, LCC    This note was created using voice recognition software (Dragon). The accuracy of the dictation is limited by the abilities of the software. I have reviewed the note prior to signing, however some errors in grammar and context are still possible. If you have any questions related to this note please do not hesitate to contact our office.

## 2020-02-26 DIAGNOSIS — K58.1 IRRITABLE BOWEL SYNDROME WITH CONSTIPATION: ICD-10-CM

## 2020-02-26 RX ORDER — LINACLOTIDE 290 UG/1
290 CAPSULE, GELATIN COATED ORAL DAILY
Qty: 30 CAP | Refills: 3 | Status: SHIPPED | OUTPATIENT
Start: 2020-02-26 | End: 2021-08-11

## 2020-02-26 NOTE — TELEPHONE ENCOUNTER
Received request via: Pharmacy    Was the patient seen in the last year in this department? Yes 2/3/20    Does the patient have an active prescription (recently filled or refills available) for medication(s) requested? No     Pt states dose was changed to Bid

## 2020-03-04 ENCOUNTER — HOSPITAL ENCOUNTER (OUTPATIENT)
Dept: LAB | Facility: MEDICAL CENTER | Age: 60
End: 2020-03-04
Attending: NURSE PRACTITIONER
Payer: COMMERCIAL

## 2020-03-04 LAB
INR PPP: 1.02 (ref 0.87–1.13)
PROTHROMBIN TIME: 13.7 SEC (ref 12–14.6)

## 2020-03-04 PROCEDURE — 36415 COLL VENOUS BLD VENIPUNCTURE: CPT

## 2020-03-04 PROCEDURE — 85610 PROTHROMBIN TIME: CPT

## 2020-03-10 ENCOUNTER — ANTICOAGULATION VISIT (OUTPATIENT)
Dept: VASCULAR LAB | Facility: MEDICAL CENTER | Age: 60
End: 2020-03-10
Attending: INTERNAL MEDICINE
Payer: COMMERCIAL

## 2020-03-10 VITALS — SYSTOLIC BLOOD PRESSURE: 131 MMHG | DIASTOLIC BLOOD PRESSURE: 73 MMHG | HEART RATE: 60 BPM

## 2020-03-10 DIAGNOSIS — G45.4 TRANSIENT GLOBAL AMNESIA: ICD-10-CM

## 2020-03-10 DIAGNOSIS — I48.0 PAROXYSMAL ATRIAL FIBRILLATION (HCC): ICD-10-CM

## 2020-03-10 DIAGNOSIS — Z79.01 CHRONIC ANTICOAGULATION: ICD-10-CM

## 2020-03-10 LAB — INR PPP: 1.6 (ref 2–3.5)

## 2020-03-10 PROCEDURE — 99212 OFFICE O/P EST SF 10 MIN: CPT

## 2020-03-10 PROCEDURE — 85610 PROTHROMBIN TIME: CPT

## 2020-03-10 NOTE — PROGRESS NOTES
Anticoagulation Summary  As of 3/10/2020    INR goal:   2.0-3.0   TTR:   72.8 % (1.1 y)   INR used for dosin.60! (3/10/2020)   Warfarin maintenance plan:   15 mg (5 mg x 3) every Mon; 10 mg (5 mg x 2) all other days   Weekly warfarin total:   75 mg   Plan last modified:   Saw Mauricio, PharmD (2020)   Next INR check:   3/17/2020   Target end date:   Indefinite    Indications    Chronic anticoagulation [Z79.01]  Paroxysmal atrial fibrillation (HCC) [I48.0]  History transient global amnesia [G45.4]             Anticoagulation Episode Summary     INR check location:       Preferred lab:       Send INR reminders to:       Comments:         Anticoagulation Care Providers     Provider Role Specialty Phone number    Sonya Alonso, A.P.N. Referring Cardiology [phone]    [facility] Responsible  [phone]        Anticoagulation Patient Findings  Patient Findings     Negatives:   Signs/symptoms of thrombosis, Signs/symptoms of bleeding, Laboratory test error suspected, Change in health, Change in alcohol use, Change in activity, Upcoming invasive procedure, Emergency department visit, Upcoming dental procedure, Missed doses, Extra doses, Change in medications, Change in diet/appetite, Hospital admission, Bruising, Other complaints          HPI:  Seda Cagle seen in clinic today, on anticoagulation therapy with warfarin for A-Fib  Changes to current medical/health status since last appt: recent colonoscopy, no bridging was given.   Denies signs/symptoms of bleeding and/or thrombosis since the last appt.    Denies any interval changes to diet  Denies any interval changes to medications since last appt.   Denies any complications or cost restrictions with current therapy.   BP recorded in vitals.  Confirmed dosing regimen.     A/P   INR SUB-therapeutic.   Instructed patient to bolus X 2 doses to 15 mg and then continue current dosing regimen as outlined above    Follow  up appointment in 1 week(s).    Rashid Ly, PharmD

## 2020-03-17 ENCOUNTER — ANTICOAGULATION VISIT (OUTPATIENT)
Dept: VASCULAR LAB | Facility: MEDICAL CENTER | Age: 60
End: 2020-03-17
Attending: INTERNAL MEDICINE
Payer: COMMERCIAL

## 2020-03-17 DIAGNOSIS — I48.0 PAROXYSMAL ATRIAL FIBRILLATION (HCC): ICD-10-CM

## 2020-03-17 DIAGNOSIS — G45.4 TRANSIENT GLOBAL AMNESIA: ICD-10-CM

## 2020-03-17 DIAGNOSIS — Z79.01 CHRONIC ANTICOAGULATION: ICD-10-CM

## 2020-03-17 LAB — INR PPP: 2.4 (ref 2–3.5)

## 2020-03-17 PROCEDURE — 99211 OFF/OP EST MAY X REQ PHY/QHP: CPT

## 2020-03-17 PROCEDURE — 85610 PROTHROMBIN TIME: CPT

## 2020-03-17 NOTE — PROGRESS NOTES
OP Anticoagulation Service Note    Date: 3/17/2020    Anticoagulation Summary  As of 3/17/2020    INR goal:   2.0-3.0   TTR:   72.4 % (1.1 y)   INR used for dosin.40 (3/17/2020)   Warfarin maintenance plan:   15 mg (5 mg x 3) every Mon, Fri; 10 mg (5 mg x 2) all other days   Weekly warfarin total:   80 mg   Plan last modified:   Saw Mauricio, PharmD (3/17/2020)   Next INR check:   3/31/2020   Target end date:   Indefinite    Indications    Chronic anticoagulation [Z79.01]  Paroxysmal atrial fibrillation (HCC) [I48.0]  History transient global amnesia [G45.4]             Anticoagulation Episode Summary     INR check location:       Preferred lab:       Send INR reminders to:       Comments:         Anticoagulation Care Providers     Provider Role Specialty Phone number    MARK Christianson Referring Cardiology [phone]    [facility] Responsible  [phone]        Anticoagulation Patient Findings      HPI:   Seda Cagle seen in clinic today, they are here today for a INR check on anticoagulation therapy     The reason for today's visit is to prevent morbidity and mortality from a blood clot or stroke and to reduce the risk of bleeding while on a anticoagulant.     Dose the patient in the medical group have a [facility] primary care provider and proper insurance?  Juve Stoddard M.D.  [id number] [facility] [id number]  [place] [id number] [phone]      Additional education provided today regarding reducing bleed risk and dietary constraints:  About how vitamin K and foods work with warfarin and the bleeding risk on a anticoagulant     Any upcoming procedures:   none    Confirmed warfarin dosing regimen  Interval Changes with foods rich in vitamin K: No  Interval Changes in ETOH:   No  Interval Changes in smoking status:  No  Interval Changes in medication:  No   Cost restriction:  No  S/S of bleeding or bruising:  No  Signs/symptoms  thrombosis since the last appt:   No  Bleed risk is:  moderate,       Assessment:   INR  therapeutic.     Medications reviewed and updated--    3 vitals included with today's appt :  (BP, HR, weight, ht, RR)   There were no vitals filed for this visit.      Plan:  Continue weekly warfarin dose as noted      Follow up:  Follow up appointment in 2 week(s)       Other info:  Pt educated to contact our clinic with any changes in medications or s/s of bleeding or thrombosis    CHEST guidelines recommend frequent INR monitoring at regular intervals (a few days up to a max of 12 weeks) to ensure they are on the proper dose of warfarin and not having any complications from therapy.  INRs can dramatically change over a short time period due to diet, medications, and medical conditions.     Saw Mauricio, PharmD, MS, BCACP, LCC    This note was created using voice recognition software (Dragon). The accuracy of the dictation is limited by the abilities of the software. I have reviewed the note prior to signing, however some errors in grammar and context are still possible. If you have any questions related to this note please do not hesitate to contact our office.

## 2020-03-18 ENCOUNTER — TELEPHONE (OUTPATIENT)
Dept: MEDICAL GROUP | Facility: MEDICAL CENTER | Age: 60
End: 2020-03-18

## 2020-03-18 LAB — INR BLD: 1.6 (ref 0.9–1.2)

## 2020-03-31 ENCOUNTER — APPOINTMENT (OUTPATIENT)
Dept: VASCULAR LAB | Facility: MEDICAL CENTER | Age: 60
End: 2020-03-31
Attending: INTERNAL MEDICINE
Payer: COMMERCIAL

## 2020-04-08 ENCOUNTER — TELEPHONE (OUTPATIENT)
Dept: VASCULAR LAB | Facility: MEDICAL CENTER | Age: 60
End: 2020-04-08

## 2020-04-09 NOTE — TELEPHONE ENCOUNTER
[facility]    Pt is over due for PT/INR for warfarin monitoring. Left message for patient to have INR checked. Clinic phone number left for any questions or concerns.    Daryn Porras  PharmD

## 2020-04-10 ENCOUNTER — ANTICOAGULATION VISIT (OUTPATIENT)
Dept: VASCULAR LAB | Facility: MEDICAL CENTER | Age: 60
End: 2020-04-10
Attending: INTERNAL MEDICINE
Payer: COMMERCIAL

## 2020-04-10 DIAGNOSIS — I48.0 PAROXYSMAL ATRIAL FIBRILLATION (HCC): ICD-10-CM

## 2020-04-10 DIAGNOSIS — Z79.01 CHRONIC ANTICOAGULATION: ICD-10-CM

## 2020-04-10 DIAGNOSIS — G45.4 TRANSIENT GLOBAL AMNESIA: ICD-10-CM

## 2020-04-10 LAB
INR BLD: 4.1 (ref 0.9–1.2)
INR PPP: 4.1 (ref 2–3.5)

## 2020-04-10 PROCEDURE — 85610 PROTHROMBIN TIME: CPT

## 2020-04-10 PROCEDURE — 99212 OFFICE O/P EST SF 10 MIN: CPT

## 2020-04-10 NOTE — PROGRESS NOTES
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 Referring Cardiology [phone]    [facility] Responsible  [phone]                Anticoagulation Patient Findings      HPI:  Seda Cagle seen in clinic today, on anticoagulation therapy with warfarin for paroxysmal Afib  Changes to current medical/health status since last appt: none  Denies signs/symptoms of bleeding and/or thrombosis since the last appt.    Denies any interval changes to diet  Denies any interval changes to medications since last appt.   Denies any complications or cost restrictions with current therapy.   Verified current warfarin dosing schedule.       A/P   INR  supra-therapeutic.   Plan is for patient to hold a dose and will decrease by 12.5%. Will be seen again in 4 weeks.     Follow up appointment in 4 week(s).    Merlin Oconnell, PharmD

## 2020-04-14 ENCOUNTER — PATIENT MESSAGE (OUTPATIENT)
Dept: CARDIOLOGY | Facility: MEDICAL CENTER | Age: 60
End: 2020-04-14

## 2020-04-14 NOTE — PATIENT COMMUNICATION
Pt was not enrolled into our clinic. We will see if transmission comes through after her attempt to send the manual. Will call patient if it fails to transmit.

## 2020-04-14 NOTE — TELEPHONE ENCOUNTER
Remote Transmission 4/14/2020:    1-AF episode 4/14/2020@11:22pm lasting 1hr 13 mins and 52 secs with average rate 121 bpm.    AT/AF burden: <1%  Total time in AT/AF 4.4 hrs since 2/6/2020.    To be scanned into media for review.

## 2020-05-05 ENCOUNTER — ANTICOAGULATION VISIT (OUTPATIENT)
Dept: VASCULAR LAB | Facility: MEDICAL CENTER | Age: 60
End: 2020-05-05
Attending: INTERNAL MEDICINE
Payer: COMMERCIAL

## 2020-05-05 DIAGNOSIS — Z79.01 CHRONIC ANTICOAGULATION: ICD-10-CM

## 2020-05-05 DIAGNOSIS — I48.0 PAROXYSMAL ATRIAL FIBRILLATION (HCC): ICD-10-CM

## 2020-05-05 DIAGNOSIS — G45.4 TRANSIENT GLOBAL AMNESIA: ICD-10-CM

## 2020-05-05 LAB — INR PPP: 4.7 (ref 2–3.5)

## 2020-05-05 PROCEDURE — 99212 OFFICE O/P EST SF 10 MIN: CPT

## 2020-05-05 PROCEDURE — 85610 PROTHROMBIN TIME: CPT

## 2020-05-05 NOTE — PROGRESS NOTES
Anticoagulation Summary  As of 2020    INR goal:   2.0-3.0   TTR:   66.5 % (1.2 y)   INR used for dosin.70! 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 Referring Cardiology [phone]    [facility] Responsible  [phone]        Anticoagulation Patient Findings      HPI:  Seda Cagle seen today, on anticoagulation therapy with warfarin for Afib.   Changes to current medical/health status since last appt: none  Denies signs/symptoms of bleeding and/or thrombosis since the last appt.    Denies any interval changes to diet  Denies any interval changes to medications since last appt.   Denies any complications or cost restrictions with current therapy.   Confirmed dosing regimen.     A/P   INR  SUPRA-therapeutic.   Unknown cause of elevated INR.   Hold warfarin x2 days then begin reduced regimen.    Follow up appointment in 1 week(s).    Rashid Ly, LindsayD

## 2020-05-12 ENCOUNTER — ANTICOAGULATION VISIT (OUTPATIENT)
Dept: VASCULAR LAB | Facility: MEDICAL CENTER | Age: 60
End: 2020-05-12
Attending: INTERNAL MEDICINE
Payer: COMMERCIAL

## 2020-05-12 DIAGNOSIS — Z79.01 CHRONIC ANTICOAGULATION: ICD-10-CM

## 2020-05-12 DIAGNOSIS — G45.4 TRANSIENT GLOBAL AMNESIA: ICD-10-CM

## 2020-05-12 DIAGNOSIS — I48.0 PAROXYSMAL ATRIAL FIBRILLATION (HCC): ICD-10-CM

## 2020-05-12 LAB — INR PPP: 2.1 (ref 2–3.5)

## 2020-05-12 PROCEDURE — 85610 PROTHROMBIN TIME: CPT

## 2020-05-12 PROCEDURE — 99212 OFFICE O/P EST SF 10 MIN: CPT

## 2020-05-12 NOTE — PROGRESS NOTES
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]             Anticoagulation Episode Summary     INR check location:       Preferred lab:       Send INR reminders to:       Comments:         Anticoagulation Care Providers     Provider Role Specialty Phone number    DANIEL ChristiansonPROCIO Referring Cardiology [phone]    [facility] Responsible  [phone]        Anticoagulation Patient Findings      HPI:   Seda Cagle seen in clinic today, they are here today for a INR check on anticoagulation therapy     The reason for today's visit is to prevent morbidity and mortality from a blood clot or stroke and to reduce the risk of bleeding while on a anticoagulant.     Dose the patient in the medical group have a [facility] primary care provider and proper insurance?  Juve Stoddard M.D.  [id number] [facility] [id number]  [place] [facility] [id number] [phone]      Additional education provided today regarding reducing bleed risk and dietary constraints:  About how vitamin K and foods work with warfarin and the bleeding risk on a anticoagulant     Any upcoming procedures:   none    Confirmed warfarin dosing regimen  Interval Changes with foods rich in vitamin K: No  Interval Changes in ETOH:   No  Interval Changes in smoking status:  No  Interval Changes in medication:  No   Cost restriction:  No  S/S of bleeding or bruising:  No  Signs/symptoms  thrombosis since the  last appt:  No  Bleed risk is:  moderate,       Assessment:   INR  therapeutic. But was 4.7 last week     Medications reviewed and updated--    3 vitals included with today's appt :  (BP, HR, weight, ht, RR)   There were no vitals filed for this visit.      Plan:  Decrease weekly warfarin dose as noted      Follow up:  Follow up appointment in 1 week(s)       Other info:  Pt educated to contact our clinic with any changes in medications or s/s of bleeding or thrombosis    CHEST guidelines recommend frequent INR monitoring at regular intervals (a few days up to a max of 12 weeks) to ensure they are on the proper dose of warfarin and not having any complications from therapy.  INRs can dramatically change over a short time period due to diet, medications, and medical conditions.     Saw Mauricio, PharmD, MS, BCACP, C    This note was created using voice recognition software (Dragon). The accuracy of the dictation is limited by the abilities of the software. I have reviewed the note prior to signing, however some errors in grammar and context are still possible. If you have any questions related to this note please do not hesitate to contact our office.

## 2020-05-19 ENCOUNTER — ANTICOAGULATION VISIT (OUTPATIENT)
Dept: VASCULAR LAB | Facility: MEDICAL CENTER | Age: 60
End: 2020-05-19
Attending: INTERNAL MEDICINE
Payer: COMMERCIAL

## 2020-05-19 DIAGNOSIS — Z79.01 CHRONIC ANTICOAGULATION: ICD-10-CM

## 2020-05-19 DIAGNOSIS — G45.4 TRANSIENT GLOBAL AMNESIA: ICD-10-CM

## 2020-05-19 DIAGNOSIS — I48.0 PAROXYSMAL ATRIAL FIBRILLATION (HCC): ICD-10-CM

## 2020-05-19 LAB — INR PPP: 1.7 (ref 2–3.5)

## 2020-05-19 PROCEDURE — 99212 OFFICE O/P EST SF 10 MIN: CPT

## 2020-05-19 PROCEDURE — 85610 PROTHROMBIN TIME: CPT

## 2020-05-19 NOTE — PROGRESS NOTES
Anticoagulation Summary  As of 2020    INR goal:   2.0-3.0   TTR:   65.4 % (1.3 y)   INR used for dosin.70! 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]             Anticoagulation Episode Summary     INR check location:       Preferred lab:       Send INR reminders to:       Comments:         Anticoagulation Care Providers     Provider Role Specialty Phone number    Sonya Alonso A.P.NYomaira Referring Cardiology [phone]    [facility] Responsible  [phone]        Anticoagulation Patient Findings  Patient Findings     Negatives:   Signs/symptoms of thrombosis, Signs/symptoms of bleeding, Laboratory test error suspected, Change in health, Change in alcohol use, Change in activity, Upcoming invasive procedure, Emergency department visit, Upcoming dental procedure, Missed doses, Extra doses, Change in medications, Change in diet/appetite, Hospital admission, Bruising, Other complaints              History of Present Illness: follow up appointment for chronic anticoagulation with the high risk medication, warfarin for atrial fibrillation    Last INR was at goal, pt is now sub therapeutic today.  Will increase to 55mg/week,Follow up in 1 weeks, to reduce the risk of adverse events related to this high risk medication, warfarin.    Joelle Mason, Clinical Pharmacist

## 2020-05-26 ENCOUNTER — APPOINTMENT (OUTPATIENT)
Dept: VASCULAR LAB | Facility: MEDICAL CENTER | Age: 60
End: 2020-05-26
Attending: INTERNAL MEDICINE
Payer: COMMERCIAL

## 2020-05-27 ENCOUNTER — ANTICOAGULATION VISIT (OUTPATIENT)
Dept: VASCULAR LAB | Facility: MEDICAL CENTER | Age: 60
End: 2020-05-27
Attending: INTERNAL MEDICINE
Payer: COMMERCIAL

## 2020-05-27 DIAGNOSIS — I48.0 PAROXYSMAL ATRIAL FIBRILLATION (HCC): ICD-10-CM

## 2020-05-27 DIAGNOSIS — Z79.01 CHRONIC ANTICOAGULATION: ICD-10-CM

## 2020-05-27 DIAGNOSIS — G45.4 TRANSIENT GLOBAL AMNESIA: ICD-10-CM

## 2020-05-27 LAB — INR PPP: 1.8 (ref 2–3.5)

## 2020-05-27 PROCEDURE — 99212 OFFICE O/P EST SF 10 MIN: CPT | Performed by: NURSE PRACTITIONER

## 2020-05-27 PROCEDURE — 85610 PROTHROMBIN TIME: CPT

## 2020-05-27 NOTE — PROGRESS NOTES
Anticoagulation Summary  As of 2020    INR goal:   2.0-3.0   TTR:   64.3 % (1.3 y)   INR used for dosin.80! 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   Denies any changes to current medical/health status since last appointment.   Denies any medication or diet changes.   No current symptoms of bleeding or thrombosis reported.    A/P:   INR subtherapeutic but did trend up slightly. Low CHADS 2 score. No hx of CVA. Will increase one dose then continue current regimen.     Follow up appointment in 1 week(s).    Next Appointment: 2020 at 10:00 am.    Alanis ALANIS

## 2020-06-02 ENCOUNTER — ANTICOAGULATION VISIT (OUTPATIENT)
Dept: VASCULAR LAB | Facility: MEDICAL CENTER | Age: 60
End: 2020-06-02
Attending: INTERNAL MEDICINE
Payer: COMMERCIAL

## 2020-06-02 DIAGNOSIS — G45.4 TRANSIENT GLOBAL AMNESIA: ICD-10-CM

## 2020-06-02 DIAGNOSIS — Z79.01 CHRONIC ANTICOAGULATION: ICD-10-CM

## 2020-06-02 DIAGNOSIS — I48.0 PAROXYSMAL ATRIAL FIBRILLATION (HCC): ICD-10-CM

## 2020-06-02 LAB — INR PPP: 3 (ref 2–3.5)

## 2020-06-02 PROCEDURE — 85610 PROTHROMBIN TIME: CPT

## 2020-06-02 PROCEDURE — 99211 OFF/OP EST MAY X REQ PHY/QHP: CPT

## 2020-06-02 NOTE — PROGRESS NOTES
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]             Anticoagulation Episode Summary     INR check location:       Preferred lab:       Send INR reminders to:       Comments:         Anticoagulation Care Providers     Provider Role Specialty Phone number    MARK Christianson Referring Cardiology [phone]    [facility] Responsible  [phone]        Anticoagulation Patient Findings      HPI:   Seda Cagle seen in clinic today, they are here today for a INR check on anticoagulation therapy     The reason for today's visit is to prevent morbidity and mortality from a blood clot or stroke and to reduce the risk of bleeding while on a anticoagulant.     Dose the patient in the medical group have a [facility] primary care provider and proper insurance?  Juve Stoddard M.D.  [id number] [facility] [id number]  [place] [phone]      Additional education provided today regarding reducing bleed risk and dietary constraints:  About how vitamin K and foods work with warfarin and the bleeding risk on a anticoagulant     Any upcoming procedures:   none    Confirmed warfarin dosing regimen  Interval Changes with foods rich in vitamin K: No  Interval Changes in ETOH:   No  Interval Changes in smoking status:  No  Interval Changes in medication:  No   Cost restriction:  No  S/S of bleeding or bruising:  No  Signs/symptoms  thrombosis since the last appt:   No  Bleed risk is:  moderate,       Assessment:   INR  therapeutic.     Medications reviewed and updated--    3 vitals included with today's appt :  (BP, HR, weight, ht, RR)   There were no vitals filed for this visit.      Plan:  Continue weekly warfarin dose as noted      Follow up:  Follow up appointment in 2 week(s)       Other info:  Pt educated to contact our clinic with any changes in medications or s/s of bleeding or thrombosis    CHEST guidelines recommend frequent INR monitoring at regular intervals (a few days up to a max of 12 weeks) to ensure they are on the proper dose of warfarin and not having any complications from therapy.  INRs can dramatically change over a short time period due to diet, medications, and medical conditions.     Saw Mauricio, PharmD, MS, BCACP, LCC    This note was created using voice recognition software (Dragon). The accuracy of the dictation is limited by the abilities of the software. I have reviewed the note prior to signing, however some errors in grammar and context are still possible. If you have any questions related to this note please do not hesitate to contact our office.

## 2020-06-04 ENCOUNTER — SLEEP CENTER VISIT (OUTPATIENT)
Dept: SLEEP MEDICINE | Facility: MEDICAL CENTER | Age: 60
End: 2020-06-04
Payer: COMMERCIAL

## 2020-06-04 VITALS
HEART RATE: 60 BPM | DIASTOLIC BLOOD PRESSURE: 80 MMHG | WEIGHT: 204 LBS | OXYGEN SATURATION: 97 % | BODY MASS INDEX: 34.83 KG/M2 | RESPIRATION RATE: 16 BRPM | SYSTOLIC BLOOD PRESSURE: 130 MMHG | HEIGHT: 64 IN

## 2020-06-04 DIAGNOSIS — G47.33 OBSTRUCTIVE SLEEP APNEA SYNDROME: ICD-10-CM

## 2020-06-04 DIAGNOSIS — F51.04 CHRONIC INSOMNIA: ICD-10-CM

## 2020-06-04 DIAGNOSIS — R06.83 SNORING: ICD-10-CM

## 2020-06-04 DIAGNOSIS — G47.10 HYPERSOMNOLENCE: ICD-10-CM

## 2020-06-04 PROCEDURE — 99214 OFFICE O/P EST MOD 30 MIN: CPT | Performed by: INTERNAL MEDICINE

## 2020-06-04 ASSESSMENT — FIBROSIS 4 INDEX: FIB4 SCORE: 1.67

## 2020-06-04 NOTE — PROGRESS NOTES
CC: Sleep apnea hypopnea syndrome.    HPI:   Ms. Cagle is last evaluated here on November 30, 2018.  She has a history of hypertrophic cardiomyopathy and atrial fibrillation with septal ablation permanent pacemaker placement.  She is followed by cardiology and remains anticoagulated on rivaroxaban.  An echocardiogram next year demonstrated preserved left ventricular systolic function with an estimated ejection fraction of 60%, grade 2 diastolic dysfunction, mild aortic stenosis and left atrial enlargement.  The right ventricle appeared normal in size and systolic function but right-sided pressures could not be estimated.  She returns at this time for regular reevaluation of her sleep disordered breathing.    A home sleep test on February 14, 2017 demonstrated a respiratory event index of 19.7 events per hour with a lowest arterial oxygen saturation of 83%.  Polysomnography on March 26, 2017 confirmed the apnea hypopnea index of 21.3 events per hour with significant hypoxemia.  She was started on auto titrating CPAP at 5 to 15 cm water pressure.    She has a fairly regular sleep schedule bedtime at about 11 PM and she arises at 6:54 in the morning.  She is using the CPAP regularly and throughout the night.  She is not having unusual problems with mask fit, leakage or airway dryness using nasal pillows.  She enjoys reasonable levels of daytime alertness and is not napping or falling asleep inappropriately.    She did bring the CPAP data recording chip with her today but we are unable to download any information from it.        Patient Active Problem List    Diagnosis Date Noted   • Paroxysmal atrial fibrillation (HCC) 12/05/2016     Priority: Medium   • Chronic kidney disease, stage III (moderate) (HCC) 02/09/2016     Priority: Low   • IBS (irritable bowel syndrome) 02/04/2020   • Venous insufficiency 05/09/2019   • Chronic anticoagulation 01/29/2019   • History transient global amnesia 01/18/2019   • Abnormal  mammogram 02/22/2018   • Sleep apnea 04/17/2017   • Dyslipidemia 12/05/2016   • Preventative health care 02/08/2016   • GERD (gastroesophageal reflux disease) 02/08/2016   • Cervical arthritis 02/08/2016   • Mitral regurgitation 02/08/2016   • Pacemaker 01/21/2015   • Hypertrophic obstructive cardiomyopathy (HCC)    • S/P colonoscopy 09/13/2010       Past Medical History:   Diagnosis Date   • Anesthesia     wakes up during procedure (x2)   • Aortic regurgitation    • Arrhythmia, ventricular 2008    Symptomatic PVCs, controlled with medication.   • Asthma     inhaler PRN, only uses when sick    • Atrial fibrillation (HCC)    • Cardiac arrhythmia    • Cervical arthritis 2/8/2016   • Chickenpox    • Chronic kidney disease, stage III (moderate) (HCC) 2/9/2016   • CTS (carpal tunnel syndrome) 2/28/2011   • Depression    • GERD (gastroesophageal reflux disease)    • Gynecological disorder    • Heart burn    • Heart murmur    • Hemorrhagic disorder (HCC)     on Xarelto    • Hypertr obst cardiomyop 1991    Dr. Bert Vazquez, [facility] [place] [address] [phone] fax [phone].  [facility] [phone].  Alchol Septal Reduction 8/00, Coronaries normal.   • Hypertrophic cardiomyopathy (HCC)    • Indigestion    • Influenza    • Pacemaker 2014   • Renal disorder     chronic kidney disease stage 3-  **pt states currently no issue, numbers are stable   • Sleep apnea     uses CPAP   • Snoring        Past Surgical History:   Procedure Laterality Date   • HYSTEROSCOPY WITH VIDEO OPERATIVE  4/30/2018    Procedure: HYSTEROSCOPY WITH VIDEO OPERATIVE;  Surgeon: Noemi Liu M.D.;  Location: SURGERY SAME DAY [facility];  Service: Labor and Delivery   • DILATION AND CURETTAGE  4/30/2018    Procedure: DILATION AND CURETTAGE;  Surgeon: Noemi Liu M.D.;  Location: SURGERY SAME DAY [facility];  Service: Labor and Delivery   • BREAST BIOPSY Left 1/30/2018    Procedure: BREAST BIOPSY- WIRE  LOCALIZED;  Surgeon: Vijaya Britt M.D.;  Location: SURGERY SAME DAY [facility];  Service: General   • RECOVERY  4/15/2015    Performed by [facility] at SURGERY PRE-POST PROC UNIT [facility]   • PACEMAKER INSERTION     • RECOVERY  2014    Performed by [facility] at SURGERY SAME DAY [facility]   • SEPTAL RECONSTRUCTION     • ENDOSCOPY     • PRIMARY C SECTION     • TUBAL COAGULATION LAPAROSCOPIC BILATERAL         Family History   Problem Relation Age of Onset   • Heart Disease Mother         CHF   • Hypertension Mother    • Stroke Mother    • Heart Failure Mother    • Other Mother         carotid artery disease,gout   • Cancer Father         AML   • Diabetes Maternal Grandmother    • Cancer Maternal Grandfather         Prostate   • Heart Disease Paternal Grandfather 52         MI    • Sleep Apnea Neg Hx        Social History     Socioeconomic History   • Marital status:      Spouse name: Not on file   • Number of children: Not on file   • Years of education: Not on file   • Highest education level: Not on file   Occupational History   • Not on file   Social Needs   • Financial resource strain: Not on file   • Food insecurity     Worry: Not on file     Inability: Not on file   • Transportation needs     Medical: Not on file     Non-medical: Not on file   Tobacco Use   • Smoking status: Never Smoker   • Smokeless tobacco: Never Used   Substance and Sexual Activity   • Alcohol use: Yes     Alcohol/week: 0.6 oz     Types: 1 Standard drinks or equivalent per week     Comment: 3-4 per week   • Drug use: No   • Sexual activity: Yes     Partners: Male     Birth control/protection: Surgical     Comment:    Lifestyle   • Physical activity     Days per week: Not on file     Minutes per session: Not on file   • Stress: Not on file   Relationships   • Social connections     Talks on phone: Not on file     Gets together: Not on file     Attends Nondenominational service: Not on file      "Active member of club or organization: Not on file     Attends meetings of clubs or organizations: Not on file     Relationship status: Not on file   • Intimate partner violence     Fear of current or ex partner: Not on file     Emotionally abused: Not on file     Physically abused: Not on file     Forced sexual activity: Not on file   Other Topics Concern   • Not on file   Social History Narrative   • Not on file       Current Outpatient Medications   Medication Sig Dispense Refill   • Multiple Vitamin (MULTI-VITAMIN PO) Take  by mouth.     • linaCLOtide (LINZESS) 290 MCG Cap Take 290 mcg by mouth every day. 30 Cap 3   • atorvastatin (LIPITOR) 40 MG Tab Take 1 Tab by mouth every evening. 90 Tab 3   • metoprolol SR (TOPROL XL) 50 MG TABLET SR 24 HR Take 1 Tab by mouth every day. 90 Tab 3   • disopyramide (NORPACE) 100 MG Cap TAKE ONE CAPSULE BY MOUTH EVERY 8 HOURS 270 Cap 3   • warfarin (COUMADIN) 5 MG Tab Take 2-3 Tabs by mouth every day. Or as directed by coumadin clinic. 270 Tab 1   • Pantothenic Acid 500 MG Tab Take 500 mg by mouth.     • Melatonin 5 MG TABLET DISPERSIBLE Take  by mouth.     • Fluticasone Propionate (FLONASE NA) Spray  in nose.     • Calcium-Vitamin D-Vitamin K (CALCIUM FOR WOMEN PO) Take 1 Each by mouth every day at 6 PM.     • Lansoprazole (PREVACID PO) Take 1 Each by mouth 1 time daily as needed.     • Magnesium 200 MG TABS Take 1 Tab by mouth every day.       No current facility-administered medications for this visit.     \"CURRENT RX\"      Allergies: Patient has no known allergies.      ROS  Unchanged from prior and positive for the sleep, cardiac, renal and GI issues issues reviewed above all of the items in the problem list and past medical history.      Physical Exam:   /80 (BP Location: Left arm, Patient Position: Sitting, BP Cuff Size: Adult)   Pulse 60   Resp 16   Ht 1.626 m (5' 4\")   Wt 92.5 kg (204 lb)   LMP 01/31/2012   SpO2 97%   BMI 35.02 kg/m²    Limited by " coronavirus precautions.  She is a well-developed, well-nourished woman who is alert, oriented and appropriately responsive.  She does not appear dyspneic and is not coughing.  She is wearing a mask.      Problems:  1. Obstructive sleep apnea syndrome  She has significant tract of sleep apnea hypopnea, confirmed by polysomnography and successfully treated with auto titrating CPAP.  She is apparently using the CPAP regularly and enjoys reasonable levels of daytime alertness.  She is clearly benefiting from therapy.  We do not have data from the CPAP recorder to review.    2. Hypersomnolence  Improved on CPAP therapy.    3. Snoring  Improved on CPAP therapy.    4. BMI = 35        Plan:   1.  Continue auto titrating CPAP at 5 to 15 cm water pressure.  Prescription is written for new mask and supplies.    2.  An order is written for a new CPAP data recording chip or repair of the CPAP machine as needed by the DME vendor.    3.  Return visit here in a year, or sooner if new problems develop.    Appreciate the opportunity to assist in her care.

## 2020-06-16 ENCOUNTER — ANTICOAGULATION VISIT (OUTPATIENT)
Dept: VASCULAR LAB | Facility: MEDICAL CENTER | Age: 60
End: 2020-06-16
Attending: INTERNAL MEDICINE
Payer: COMMERCIAL

## 2020-06-16 DIAGNOSIS — Z79.01 CHRONIC ANTICOAGULATION: ICD-10-CM

## 2020-06-16 DIAGNOSIS — I48.0 PAROXYSMAL ATRIAL FIBRILLATION (HCC): ICD-10-CM

## 2020-06-16 DIAGNOSIS — G45.4 TRANSIENT GLOBAL AMNESIA: ICD-10-CM

## 2020-06-16 LAB — INR PPP: 2.4 (ref 2–3.5)

## 2020-06-16 PROCEDURE — 99211 OFF/OP EST MAY X REQ PHY/QHP: CPT

## 2020-06-16 PROCEDURE — 85610 PROTHROMBIN TIME: CPT

## 2020-06-16 NOTE — PROGRESS NOTES
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 Referring Cardiology [phone]    [facility] Responsible  [phone]        Anticoagulation Patient Findings  Patient Findings     Negatives:   Signs/symptoms of thrombosis, Signs/symptoms of bleeding, Laboratory test error suspected, Change in health, Change in alcohol use, Change in activity, Upcoming invasive procedure, Emergency department visit, Upcoming dental procedure, Missed doses, Extra doses, Change in medications, Change in diet/appetite, Hospital admission, Bruising, Other complaints            History of Present Illness: follow up appointment for chronic anticoagulation with the high risk medication, warfarin for atrial fibrillation    Pt remains therapeutic today. Continue current dosing regimen.  Follow up in 4 weeks, to reduce the risk of adverse events related to this high risk medication, warfarin.    Joelle Mason, Clinical Pharmacist

## 2020-07-14 ENCOUNTER — ANTICOAGULATION VISIT (OUTPATIENT)
Dept: VASCULAR LAB | Facility: MEDICAL CENTER | Age: 60
End: 2020-07-14
Attending: INTERNAL MEDICINE
Payer: COMMERCIAL

## 2020-07-14 VITALS — SYSTOLIC BLOOD PRESSURE: 140 MMHG | DIASTOLIC BLOOD PRESSURE: 80 MMHG | HEART RATE: 80 BPM

## 2020-07-14 DIAGNOSIS — Z79.01 CHRONIC ANTICOAGULATION: ICD-10-CM

## 2020-07-14 DIAGNOSIS — I48.0 PAROXYSMAL ATRIAL FIBRILLATION (HCC): ICD-10-CM

## 2020-07-14 DIAGNOSIS — G45.4 TRANSIENT GLOBAL AMNESIA: ICD-10-CM

## 2020-07-14 LAB — INR PPP: 1.6 (ref 2–3.5)

## 2020-07-14 PROCEDURE — 85610 PROTHROMBIN TIME: CPT

## 2020-07-14 PROCEDURE — 99212 OFFICE O/P EST SF 10 MIN: CPT

## 2020-07-14 NOTE — PROGRESS NOTES
OP Anticoagulation Service Note    Date: 2020    Anticoagulation Summary  As of 2020    INR goal:   2.0-3.0   TTR:   64.7 % (1.4 y)   INR used for dosin.60! 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]             Anticoagulation Episode Summary     INR check location:       Preferred lab:       Send INR reminders to:       Comments:         Anticoagulation Care Providers     Provider Role Specialty Phone number    MARK Christianson Referring Cardiology [phone]    [facility] Responsible  [phone]        Anticoagulation Patient Findings      HPI:   Seda Cagle seen in clinic today, they are here today for a INR check on anticoagulation therapy     The reason for today's visit is to prevent morbidity and mortality from a blood clot or stroke and to reduce the risk of bleeding while on a anticoagulant.     Dose the patient in the medical group have a [facility] primary care provider and proper insurance?  Juve Stoddard M.D.  [id number] [facility] [id number]  [place] [phone]      Additional education provided today regarding reducing bleed risk and dietary constraints:  About how vitamin K and foods work with warfarin and the bleeding risk on a anticoagulant     Any upcoming procedures:   none    Confirmed warfarin dosing regimen  Interval Changes with foods rich in vitamin K: No  Interval Changes in ETOH:   No  Interval Changes in smoking status:  No  Interval Changes in medication:  No   Cost restriction:  No  S/S of bleeding or bruising:  No  Signs/symptoms  thrombosis since the last appt:   No  Bleed risk is:  moderate,       Assessment:   INR  sub-therapeutic.     Medications reviewed and updated--    3 vitals included with today's appt :  (BP, HR, weight, ht, RR)   Vitals:    07/14/20 1020   BP: 140/80   Pulse: 80         Plan:  Increase weekly warfarin dose as noted      Follow up:  Follow up appointment in 2 week(s)       Other info:  Pt educated to contact our clinic with any changes in medications or s/s of bleeding or thrombosis    CHEST guidelines recommend frequent INR monitoring at regular intervals (a few days up to a max of 12 weeks) to ensure they are on the proper dose of warfarin and not having any complications from therapy.  INRs can dramatically change over a short time period due to diet, medications, and medical conditions.     Saw Mauricio, PharmD, MS, BCACP, C    This note was created using voice recognition software (Dragon). The accuracy of the dictation is limited by the abilities of the software. I have reviewed the note prior to signing, however some errors in grammar and context are still possible. If you have any questions related to this note please do not hesitate to contact our office.

## 2020-07-28 ENCOUNTER — ANTICOAGULATION VISIT (OUTPATIENT)
Dept: VASCULAR LAB | Facility: MEDICAL CENTER | Age: 60
End: 2020-07-28
Attending: INTERNAL MEDICINE
Payer: COMMERCIAL

## 2020-07-28 VITALS — HEART RATE: 83 BPM | DIASTOLIC BLOOD PRESSURE: 73 MMHG | SYSTOLIC BLOOD PRESSURE: 139 MMHG

## 2020-07-28 DIAGNOSIS — I48.0 PAROXYSMAL ATRIAL FIBRILLATION (HCC): ICD-10-CM

## 2020-07-28 DIAGNOSIS — Z79.01 CHRONIC ANTICOAGULATION: ICD-10-CM

## 2020-07-28 DIAGNOSIS — G45.4 TRANSIENT GLOBAL AMNESIA: ICD-10-CM

## 2020-07-28 LAB — INR PPP: 2.1 (ref 2–3.5)

## 2020-07-28 PROCEDURE — 99211 OFF/OP EST MAY X REQ PHY/QHP: CPT

## 2020-07-28 PROCEDURE — 85610 PROTHROMBIN TIME: CPT

## 2020-07-28 NOTE — PROGRESS NOTES
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 Referring Cardiology [phone]    [facility] Responsible  [phone]        Anticoagulation Patient Findings  Patient Findings     Negatives:   Signs/symptoms of thrombosis, Signs/symptoms of bleeding, Laboratory test error suspected, Change in health, Change in alcohol use, Change in activity, Upcoming invasive procedure, Emergency department visit, Upcoming dental procedure, Missed doses, Extra doses, Change in medications, Change in diet/appetite, Hospital admission, Bruising, Other complaints              History of Present Illness: follow up appointment for chronic anticoagulation with the high risk medication, warfarin for atrial fibrillation/TIA    Last INR was out of range, dosage adjusted: pt is now at goal PT is at goal.  PT WISHES TO SWITCH BACK TO A DOAC.  Pt will f/u with cards prior to next visit and we will consider Eliquis.    Continue current dosing regimen.  Follow up in 4 weeks, to reduce the risk of adverse events related to this high risk medication, warfarin.    Joelle Mason, Clinical Pharmacist

## 2020-07-29 LAB — INR BLD: 2.1 (ref 0.9–1.2)

## 2020-08-25 ENCOUNTER — OFFICE VISIT (OUTPATIENT)
Dept: CARDIOLOGY | Facility: MEDICAL CENTER | Age: 60
End: 2020-08-25
Payer: COMMERCIAL

## 2020-08-25 ENCOUNTER — ANTICOAGULATION VISIT (OUTPATIENT)
Dept: VASCULAR LAB | Facility: MEDICAL CENTER | Age: 60
End: 2020-08-25
Attending: INTERNAL MEDICINE
Payer: COMMERCIAL

## 2020-08-25 VITALS
HEART RATE: 60 BPM | WEIGHT: 205 LBS | RESPIRATION RATE: 16 BRPM | OXYGEN SATURATION: 96 % | DIASTOLIC BLOOD PRESSURE: 88 MMHG | HEIGHT: 64 IN | SYSTOLIC BLOOD PRESSURE: 158 MMHG | BODY MASS INDEX: 35 KG/M2

## 2020-08-25 DIAGNOSIS — I48.0 PAROXYSMAL ATRIAL FIBRILLATION (HCC): Chronic | ICD-10-CM

## 2020-08-25 DIAGNOSIS — I48.0 PAROXYSMAL ATRIAL FIBRILLATION (HCC): ICD-10-CM

## 2020-08-25 DIAGNOSIS — Z79.01 CHRONIC ANTICOAGULATION: ICD-10-CM

## 2020-08-25 DIAGNOSIS — I42.1 HYPERTROPHIC OBSTRUCTIVE CARDIOMYOPATHY (HCC): Chronic | ICD-10-CM

## 2020-08-25 DIAGNOSIS — G45.4 TRANSIENT GLOBAL AMNESIA: ICD-10-CM

## 2020-08-25 DIAGNOSIS — Z95.0 PACEMAKER: ICD-10-CM

## 2020-08-25 LAB — INR PPP: 1.9 (ref 2–3.5)

## 2020-08-25 PROCEDURE — 99214 OFFICE O/P EST MOD 30 MIN: CPT | Mod: 25 | Performed by: INTERNAL MEDICINE

## 2020-08-25 PROCEDURE — 99212 OFFICE O/P EST SF 10 MIN: CPT

## 2020-08-25 PROCEDURE — 93280 PM DEVICE PROGR EVAL DUAL: CPT | Performed by: INTERNAL MEDICINE

## 2020-08-25 PROCEDURE — 85610 PROTHROMBIN TIME: CPT

## 2020-08-25 RX ORDER — METOPROLOL SUCCINATE 100 MG/1
100 TABLET, EXTENDED RELEASE ORAL DAILY
Qty: 30 TAB | Refills: 5 | Status: SHIPPED | OUTPATIENT
Start: 2020-08-25 | End: 2021-03-29

## 2020-08-25 ASSESSMENT — FIBROSIS 4 INDEX: FIB4 SCORE: 1.67

## 2020-08-25 NOTE — PROGRESS NOTES
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      Anticoagulation Patient Findings  Health Status Since Last Assessment  Any new relevant medical problems, ED visits/hospitalizations -no   Any embolic events (stroke / TIA / systemic embolism)-  no     Adherence with DOAC Therapy  1 or more missed doses in an average week-  no  • If yes, number of missed doses n/a  BLEEDING RISK ASSESSMENT NB:     Bleeding Risk Assessment  Severe epistaxis  n Hemoptysis  n  Excessive or unusual bruising / hematomas n  GIB / melena / BRBPR / hematemesis  n  Hematuria? Abnormal vaginal bleeding  n  Concerning daily headache or subdural hematoma symptoms  n  Decreasing hemoglobin or new anemia  n  Latest hemoglobin:  Lab Results   Component Value Date/Time    WBC 7.1 2020 02:10 PM    WBC 7.3 2012 12:22 PM    RBC 4.98 2020 02:10 PM    RBC 4.77 2012 12:22 PM    HEMOGLOBIN 14.9 2020 02:10 PM    HEMATOCRIT 44.5 2020 02:10 PM    MCV 89.4 2020 02:10 PM    MCV 93 2012 12:22 PM    MCH 29.9 2020 02:10 PM    MCH 31.0 2012 12:22 PM    MCHC 33.5 (L) 2020 02:10 PM    MPV 10.4 2020 02:10 PM      NEUTSPOLYS 52.00 02/03/2020 02:10 PM    LYMPHOCYTES 39.30 02/03/2020 02:10 PM    MONOCYTES 6.60 02/03/2020 02:10 PM    EOSINOPHILS 1.40 02/03/2020 02:10 PM    BASOPHILS 0.60 02/03/2020 02:10 PM      LFTs wnl    EtOH overuse  n  Falls, presyncope, syncope, or seizures  n  Uncontrolled hypertension n  CREATININE CLEARANCE /     Creatinine Clearance/Renal Function  Latest eGFR / creatinine:     Lab Results   Component Value Date/Time    SODIUM 142 02/03/2020 02:10 PM    POTASSIUM 4.4 02/03/2020 02:10 PM    CHLORIDE 106 02/03/2020 02:10 PM    CO2 27 02/03/2020 02:10 PM    GLUCOSE 91 02/03/2020 02:10 PM    BUN 16 02/03/2020 02:10 PM    CREATININE 1.13 02/03/2020 02:10 PM    CREATININE 1.04 (H) 02/19/2011 12:00 AM    BUNCREATRAT 14 02/19/2011 12:00 AM    GLOMRATE 56 (L) 02/19/2011 12:00 AM        • Is eGFR less than 50ml/min  wnl  If YES, calculate CrCl (see back)  Any recent dehydrating illness or medications added/changed? i.e. diuretics     Drug Interactions  ASA / other antiplatelets. no  NSAID no  Other drug interactions  no (Review med list / OTCs;)    Current Outpatient Medications:   •  apixaban (ELIQUIS) 5mg Tab, Take 1 Tab by mouth 2 Times a Day., Disp: 60 Tab, Rfl: 5  •  metoprolol SR (TOPROL XL) 100 MG TABLET SR 24 HR, Take 1 Tab by mouth every day., Disp: 30 Tab, Rfl: 5  •  Multiple Vitamin (MULTI-VITAMIN PO), Take  by mouth., Disp: , Rfl:   •  linaCLOtide (LINZESS) 290 MCG Cap, Take 290 mcg by mouth every day., Disp: 30 Cap, Rfl: 3  •  atorvastatin (LIPITOR) 40 MG Tab, Take 1 Tab by mouth every evening., Disp: 90 Tab, Rfl: 3  •  metoprolol SR (TOPROL XL) 50 MG TABLET SR 24 HR, Take 1 Tab by mouth every day., Disp: 90 Tab, Rfl: 3  •  disopyramide (NORPACE) 100 MG Cap, TAKE ONE CAPSULE BY MOUTH EVERY 8 HOURS, Disp: 270 Cap, Rfl: 3  •  Pantothenic Acid 500 MG Tab, Take 500 mg by mouth., Disp: , Rfl:   •  Melatonin 5 MG TABLET DISPERSIBLE, Take  by mouth., Disp: , Rfl:   •  Fluticasone Propionate (FLONASE  NA), Spray  in nose., Disp: , Rfl:   •  Calcium-Vitamin D-Vitamin K (CALCIUM FOR WOMEN PO), Take 1 Each by mouth every day at 6 PM., Disp: , Rfl:   •  Lansoprazole (PREVACID PO), Take 1 Each by mouth 1 time daily as needed., Disp: , Rfl:   •  Magnesium 200 MG TABS, Take 1 Tab by mouth every day., Disp: , Rfl:          Final Assessment and Recommendations:  Patient appears stable from the anticoagulation standpoint  Benefits of continued DOAC therapy outweigh risks for this patient  Get a CMP and CBC    Transitioning from warfarin to apixaban: Discontinue warfarin and initiate apixaban as soon as the INR falls to <2 (US labeling); therefore, start today.        Will follow up with patient in  6 months

## 2020-08-25 NOTE — PROGRESS NOTES
Arrhythmia Clinic Note (Established patient)    DOS: 8/25/2020    Chief complaint/Reason for consult: F/u HCM/PAF    Interval History:  Patient is a 61 yo F. History of symptomatic PVCs, HCM s/p prior septal alcohol ablation, subaortic membrane, AF, on BB/norpace combination. S/p PPM. Here for f/u. No complaints today other than she is having a difficult time dealing with the warfarin. Previously on xarelto, had ?transient global amnesia and subsequently switched to warfarin, but difficult time with consistency in her diet, wondering if there might be an alternative.    ROS (+ highlighted in red):  General--Negative for fatigue, weight loss or weight gain  Cardiovascular--Negative for CP, orthopnea, PND    Past Medical History:   Diagnosis Date   • Anesthesia     wakes up during procedure (x2)   • Aortic regurgitation    • Arrhythmia, ventricular 2008    Symptomatic PVCs, controlled with medication.   • Asthma     inhaler PRN, only uses when sick    • Atrial fibrillation (HCC)    • Cardiac arrhythmia    • Cervical arthritis 2/8/2016   • Chickenpox    • Chronic kidney disease, stage III (moderate) (HCC) 2/9/2016   • CTS (carpal tunnel syndrome) 2/28/2011   • Depression    • GERD (gastroesophageal reflux disease)    • Gynecological disorder    • Heart burn    • Heart murmur    • Hemorrhagic disorder (HCC)     on Xarelto    • Hypertr obst cardiomyop 1991    Dr. Bert Vazquez, [facility] [address] [address] [phone] fax [phone].  [facility] [phone].  Alchol Septal Reduction 8/00, Coronaries normal.   • Hypertrophic cardiomyopathy (HCC)    • Indigestion    • Influenza    • Pacemaker 2014   • Renal disorder     chronic kidney disease stage 3-  **pt states currently no issue, numbers are stable   • Sleep apnea     uses CPAP   • Snoring        Past Surgical History:   Procedure Laterality Date   • HYSTEROSCOPY WITH VIDEO OPERATIVE  4/30/2018    Procedure: HYSTEROSCOPY WITH VIDEO  OPERATIVE;  Surgeon: Noemi Liu M.D.;  Location: SURGERY SAME DAY [facility];  Service: Labor and Delivery   • DILATION AND CURETTAGE  4/30/2018    Procedure: DILATION AND CURETTAGE;  Surgeon: Noemi Liu M.D.;  Location: SURGERY SAME DAY [facility];  Service: Labor and Delivery   • BREAST BIOPSY Left 1/30/2018    Procedure: BREAST BIOPSY- WIRE LOCALIZED;  Surgeon: Vijaya Britt M.D.;  Location: SURGERY SAME DAY [facility];  Service: General   • RECOVERY  4/15/2015    Performed by [facility] at SURGERY PRE-POST PROC UNIT [facility]   • PACEMAKER INSERTION  2015   • RECOVERY  12/12/2014    Performed by [facility] at SURGERY SAME DAY [facility]   • SEPTAL RECONSTRUCTION  2000   • ENDOSCOPY     • PRIMARY C SECTION     • TUBAL COAGULATION LAPAROSCOPIC BILATERAL         Social History     Socioeconomic History   • Marital status:      Spouse name: Not on file   • Number of children: Not on file   • Years of education: Not on file   • Highest education level: Not on file   Occupational History   • Not on file   Social Needs   • Financial resource strain: Not on file   • Food insecurity     Worry: Not on file     Inability: Not on file   • Transportation needs     Medical: Not on file     Non-medical: Not on file   Tobacco Use   • Smoking status: Never Smoker   • Smokeless tobacco: Never Used   Substance and Sexual Activity   • Alcohol use: Yes     Alcohol/week: 0.6 oz     Types: 1 Standard drinks or equivalent per week     Comment: 3-4 per week   • Drug use: No   • Sexual activity: Yes     Partners: Male     Birth control/protection: Surgical     Comment:    Lifestyle   • Physical activity     Days per week: Not on file     Minutes per session: Not on file   • Stress: Not on file   Relationships   • Social connections     Talks on phone: Not on file     Gets together: Not on file     Attends Tenriism service: Not on file     Active member of club or organization: Not  on file     Attends meetings of clubs or organizations: Not on file     Relationship status: Not on file   • Intimate partner violence     Fear of current or ex partner: Not on file     Emotionally abused: Not on file     Physically abused: Not on file     Forced sexual activity: Not on file   Other Topics Concern   • Not on file   Social History Narrative   • Not on file       Family History   Problem Relation Age of Onset   • Heart Disease Mother         CHF   • Hypertension Mother    • Stroke Mother    • Heart Failure Mother    • Other Mother         carotid artery disease,gout   • Cancer Father         AML   • Diabetes Maternal Grandmother    • Cancer Maternal Grandfather         Prostate   • Heart Disease Paternal Grandfather 52         MI    • Sleep Apnea Neg Hx        No Known Allergies    Current Outpatient Medications   Medication Sig Dispense Refill   • Aspirin Buf,CaCarb-MgCarb-MgO, 81 MG Tab Take 81 mg by mouth every day.     • Multiple Vitamin (MULTI-VITAMIN PO) Take  by mouth.     • linaCLOtide (LINZESS) 290 MCG Cap Take 290 mcg by mouth every day. 30 Cap 3   • atorvastatin (LIPITOR) 40 MG Tab Take 1 Tab by mouth every evening. 90 Tab 3   • metoprolol SR (TOPROL XL) 50 MG TABLET SR 24 HR Take 1 Tab by mouth every day. 90 Tab 3   • disopyramide (NORPACE) 100 MG Cap TAKE ONE CAPSULE BY MOUTH EVERY 8 HOURS 270 Cap 3   • warfarin (COUMADIN) 5 MG Tab Take 2-3 Tabs by mouth every day. Or as directed by coumadin clinic. 270 Tab 1   • Pantothenic Acid 500 MG Tab Take 500 mg by mouth.     • Melatonin 5 MG TABLET DISPERSIBLE Take  by mouth.     • Fluticasone Propionate (FLONASE NA) Spray  in nose.     • Calcium-Vitamin D-Vitamin K (CALCIUM FOR WOMEN PO) Take 1 Each by mouth every day at 6 PM.     • Lansoprazole (PREVACID PO) Take 1 Each by mouth 1 time daily as needed.     • Magnesium 200 MG TABS Take 1 Tab by mouth every day.       No current facility-administered medications for this visit.        Physical  "Exam:  Vitals:    08/25/20 0801   BP: 158/88   BP Location: Right arm   Patient Position: Sitting   BP Cuff Size: Adult   Pulse: 60   Resp: 16   SpO2: 96%   Weight: 93 kg (205 lb)   Height: 1.626 m (5' 4\")     General appearance: NAD, conversant  HEENT: PERRL, neck is supple with FROM  Lungs: Clear to auscultation, normal respiratory effort  CV: RRR, 3/6 murmur, no JVD  Abdomen: Soft, non-tender with normal bowel sounds  Extremities: No peripheral edema, no clubbing or cyanosis  Skin: No rash, lesions, or ulcers  Psych: Alert and oriented to person, place and time    Data:  Labs reviewed    Prior echo/stress reviewed:  ?Mild AS    Pacemaker check reviewed    Impression/Plan:  1. Pacemaker     2. Hypertrophic obstructive cardiomyopathy (HCC)     3. Chronic anticoagulation     4. Paroxysmal atrial fibrillation (HCC)       -Device checks out fine  -No significant arrhythmia burden  -Will switch her off warfarin to Eliquis  -BP elevated, double up on BB  -F/u in 6 mo, surveillance echo then    Solitario Witt MD    "

## 2020-09-01 ENCOUNTER — HOSPITAL ENCOUNTER (OUTPATIENT)
Dept: LAB | Facility: MEDICAL CENTER | Age: 60
End: 2020-09-01
Attending: INTERNAL MEDICINE
Payer: COMMERCIAL

## 2020-09-01 DIAGNOSIS — Z79.01 CHRONIC ANTICOAGULATION: ICD-10-CM

## 2020-09-01 LAB
ALBUMIN SERPL BCP-MCNC: 4.5 G/DL (ref 3.2–4.9)
ALBUMIN/GLOB SERPL: 2 G/DL
ALP SERPL-CCNC: 90 U/L (ref 30–99)
ALT SERPL-CCNC: 34 U/L (ref 2–50)
ANION GAP SERPL CALC-SCNC: 17 MMOL/L (ref 7–16)
AST SERPL-CCNC: 34 U/L (ref 12–45)
BILIRUB SERPL-MCNC: 0.6 MG/DL (ref 0.1–1.5)
BUN SERPL-MCNC: 20 MG/DL (ref 8–22)
CALCIUM SERPL-MCNC: 9.7 MG/DL (ref 8.5–10.5)
CHLORIDE SERPL-SCNC: 103 MMOL/L (ref 96–112)
CO2 SERPL-SCNC: 23 MMOL/L (ref 20–33)
CREAT SERPL-MCNC: 1.12 MG/DL (ref 0.5–1.4)
ERYTHROCYTE [DISTWIDTH] IN BLOOD BY AUTOMATED COUNT: 43.4 FL (ref 35.9–50)
GLOBULIN SER CALC-MCNC: 2.2 G/DL (ref 1.9–3.5)
GLUCOSE SERPL-MCNC: 72 MG/DL (ref 65–99)
HCT VFR BLD AUTO: 45.5 % (ref 37–47)
HGB BLD-MCNC: 15 G/DL (ref 12–16)
MCH RBC QN AUTO: 30.1 PG (ref 27–33)
MCHC RBC AUTO-ENTMCNC: 33 G/DL (ref 33.6–35)
MCV RBC AUTO: 91.2 FL (ref 81.4–97.8)
PLATELET # BLD AUTO: 173 K/UL (ref 164–446)
PMV BLD AUTO: 10.8 FL (ref 9–12.9)
POTASSIUM SERPL-SCNC: 3.8 MMOL/L (ref 3.6–5.5)
PROT SERPL-MCNC: 6.7 G/DL (ref 6–8.2)
RBC # BLD AUTO: 4.99 M/UL (ref 4.2–5.4)
SODIUM SERPL-SCNC: 143 MMOL/L (ref 135–145)
WBC # BLD AUTO: 9 K/UL (ref 4.8–10.8)

## 2020-09-01 PROCEDURE — 80053 COMPREHEN METABOLIC PANEL: CPT

## 2020-09-01 PROCEDURE — 36415 COLL VENOUS BLD VENIPUNCTURE: CPT

## 2020-09-01 PROCEDURE — 85027 COMPLETE CBC AUTOMATED: CPT

## 2020-09-09 ENCOUNTER — TELEPHONE (OUTPATIENT)
Dept: MEDICAL GROUP | Facility: MEDICAL CENTER | Age: 60
End: 2020-09-09

## 2020-09-09 ENCOUNTER — HOSPITAL ENCOUNTER (OUTPATIENT)
Dept: RADIOLOGY | Facility: MEDICAL CENTER | Age: 60
End: 2020-09-09
Attending: INTERNAL MEDICINE
Payer: COMMERCIAL

## 2020-09-09 DIAGNOSIS — Z12.31 VISIT FOR SCREENING MAMMOGRAM: ICD-10-CM

## 2020-09-09 PROCEDURE — 77067 SCR MAMMO BI INCL CAD: CPT

## 2020-09-10 ENCOUNTER — TELEPHONE (OUTPATIENT)
Dept: MEDICAL GROUP | Facility: MEDICAL CENTER | Age: 60
End: 2020-09-10

## 2020-09-10 NOTE — TELEPHONE ENCOUNTER
----- Message from Juve Stoddard M.D. sent at 9/9/2020  5:42 PM PDT -----  Please notify the patient that:  (1) her mammogram is negative but does show dense breast tissue which may decrease the accuracy of the mammogram  (2) she can get a screening ultrasound of her breast which may provide further detail  (3) her insurance will not cover a screening breast ultrasound, she would have to pay out of pocket, the cost would be approximately $125  (4) please let me know if she is interested

## 2020-10-16 ENCOUNTER — ANTICOAGULATION MONITORING (OUTPATIENT)
Dept: VASCULAR LAB | Facility: MEDICAL CENTER | Age: 60
End: 2020-10-16

## 2020-10-16 DIAGNOSIS — Z79.01 CHRONIC ANTICOAGULATION: ICD-10-CM

## 2020-10-16 DIAGNOSIS — G45.4 TRANSIENT GLOBAL AMNESIA: ICD-10-CM

## 2020-10-16 DIAGNOSIS — I48.0 PAROXYSMAL ATRIAL FIBRILLATION (HCC): ICD-10-CM

## 2020-10-21 DIAGNOSIS — I42.2 HYPERTROPHIC CARDIOMYOPATHY (HCC): ICD-10-CM

## 2020-10-22 RX ORDER — DISOPYRAMIDE PHOSPHATE 100 MG/1
CAPSULE ORAL
Qty: 270 CAP | Refills: 2 | Status: SHIPPED | OUTPATIENT
Start: 2020-10-22 | End: 2021-07-27

## 2020-10-30 ENCOUNTER — OFFICE VISIT (OUTPATIENT)
Dept: URGENT CARE | Facility: CLINIC | Age: 60
End: 2020-10-30
Payer: COMMERCIAL

## 2020-10-30 ENCOUNTER — HOSPITAL ENCOUNTER (OUTPATIENT)
Facility: MEDICAL CENTER | Age: 60
End: 2020-10-30
Attending: PHYSICIAN ASSISTANT
Payer: COMMERCIAL

## 2020-10-30 VITALS
WEIGHT: 200 LBS | TEMPERATURE: 97.6 F | HEART RATE: 60 BPM | SYSTOLIC BLOOD PRESSURE: 126 MMHG | BODY MASS INDEX: 34.15 KG/M2 | RESPIRATION RATE: 16 BRPM | HEIGHT: 64 IN | OXYGEN SATURATION: 97 % | DIASTOLIC BLOOD PRESSURE: 72 MMHG

## 2020-10-30 DIAGNOSIS — M46.1 BILATERAL SACROILIITIS (HCC): ICD-10-CM

## 2020-10-30 DIAGNOSIS — R82.81 PYURIA: ICD-10-CM

## 2020-10-30 DIAGNOSIS — Z79.01 CHRONIC ANTICOAGULATION: ICD-10-CM

## 2020-10-30 LAB
APPEARANCE UR: CLEAR
BILIRUB UR STRIP-MCNC: NEGATIVE MG/DL
COLOR UR AUTO: YELLOW
GLUCOSE UR STRIP.AUTO-MCNC: NEGATIVE MG/DL
KETONES UR STRIP.AUTO-MCNC: NEGATIVE MG/DL
LEUKOCYTE ESTERASE UR QL STRIP.AUTO: NORMAL
NITRITE UR QL STRIP.AUTO: NEGATIVE
PH UR STRIP.AUTO: 7.5 [PH] (ref 5–8)
PROT UR QL STRIP: NEGATIVE MG/DL
RBC UR QL AUTO: NEGATIVE
SP GR UR STRIP.AUTO: 1.02
UROBILINOGEN UR STRIP-MCNC: 0.2 MG/DL

## 2020-10-30 PROCEDURE — 87086 URINE CULTURE/COLONY COUNT: CPT

## 2020-10-30 PROCEDURE — 81002 URINALYSIS NONAUTO W/O SCOPE: CPT | Performed by: PHYSICIAN ASSISTANT

## 2020-10-30 PROCEDURE — 99214 OFFICE O/P EST MOD 30 MIN: CPT | Performed by: PHYSICIAN ASSISTANT

## 2020-10-30 RX ORDER — TRAMADOL HYDROCHLORIDE 50 MG/1
50 TABLET ORAL EVERY 6 HOURS PRN
Qty: 12 TAB | Refills: 0 | Status: SHIPPED | OUTPATIENT
Start: 2020-10-30 | End: 2020-11-02

## 2020-10-30 RX ORDER — METHYLPREDNISOLONE 4 MG/1
TABLET ORAL
Qty: 21 TAB | Refills: 0 | Status: SHIPPED | OUTPATIENT
Start: 2020-10-30 | End: 2021-02-16

## 2020-10-30 ASSESSMENT — ENCOUNTER SYMPTOMS
SENSORY CHANGE: 0
BACK PAIN: 1
TINGLING: 0
FOCAL WEAKNESS: 0
FEVER: 0

## 2020-10-30 ASSESSMENT — FIBROSIS 4 INDEX: FIB4 SCORE: 2.02

## 2020-10-30 NOTE — PROGRESS NOTES
"Subjective:   Seda Cagle  is a 60 y.o. female who presents for Back Pain (lower back pain x3 days)    This is a new problem.  Patient presents to urgent care with 3-day history of bilateral low back pain.  Pain is described across the very low portion of the back/pelvis region.  Patient denies any falls, injuries that she recalls.  Pain is rated at severe.  Pain is worse when lying flat and better when upright.  This is made it very difficult for her to sleep.  Patient denies any numbness, tingling, weakness of the extremities.  Pain is nonradiating.  Patient has taken Tylenol with no relief of symptoms.  Unfortunately, patient is on chronic anticoagulation with Eliquis and is unable to take NSAIDs.    Patient denies bowel or bladder incontinence or unexplained fevers.  Denies dysuria, urgency or frequency with urination.        Back Pain  Pertinent negatives include no fever or tingling.     Review of Systems   Constitutional: Negative for fever.   Musculoskeletal: Positive for back pain.   Neurological: Negative for tingling, sensory change and focal weakness.   All other systems reviewed and are negative.    No Known Allergies  Reviewed past medical, surgical , social and family history.  Reviewed prescription and over-the-counter medications with patient and electronic health record today.     Objective:   /72 (BP Location: Left arm, Patient Position: Sitting, BP Cuff Size: Large adult)   Pulse 60   Temp 36.4 °C (97.6 °F)   Resp 16   Ht 1.626 m (5' 4\")   Wt 90.7 kg (200 lb)   LMP 01/31/2012   SpO2 97%   BMI 34.33 kg/m²   Physical Exam  Vitals signs reviewed.   Constitutional:       Appearance: She is well-developed.   HENT:      Head: Normocephalic and atraumatic.      Right Ear: External ear normal.      Left Ear: External ear normal.      Nose: Nose normal.      Mouth/Throat:      Mouth: Mucous membranes are moist.   Eyes:      Extraocular Movements: Extraocular movements intact. "      Conjunctiva/sclera: Conjunctivae normal.      Pupils: Pupils are equal, round, and reactive to light.   Neck:      Musculoskeletal: Normal range of motion and neck supple.   Cardiovascular:      Rate and Rhythm: Normal rate and regular rhythm.      Heart sounds: Normal heart sounds.   Pulmonary:      Effort: Pulmonary effort is normal.      Breath sounds: Normal breath sounds.   Musculoskeletal:      Lumbar back: She exhibits decreased range of motion, tenderness and pain.        Back:       Comments: Tender along bilateral SI joints, right greater than left.  + Pain with stressing of the SI joint on the right.   Lymphadenopathy:      Cervical: No cervical adenopathy.   Skin:     General: Skin is warm and dry.      Findings: No rash.   Neurological:      Mental Status: She is alert and oriented to person, place, and time.      Cranial Nerves: Cranial nerves are intact.      Sensory: Sensation is intact.      Motor: Motor function is intact.      Coordination: Coordination is intact.      Deep Tendon Reflexes:      Reflex Scores:       Patellar reflexes are 2+ on the right side and 2+ on the left side.       Achilles reflexes are 2+ on the right side and 2+ on the left side.  Psychiatric:         Attention and Perception: Attention normal.         Mood and Affect: Mood normal.         Speech: Speech normal.         Behavior: Behavior normal.         Thought Content: Thought content normal.         Judgment: Judgment normal.           Assessment/Plan:   1. Bilateral sacroiliitis (HCC)  - methylPREDNISolone (MEDROL DOSEPAK) 4 MG Tablet Therapy Pack; Follow schedule on package instructions.  Dispense: 21 Tab; Refill: 0  - traMADol (ULTRAM) 50 MG Tab; Take 1 Tab by mouth every 6 hours as needed for up to 3 days.  Dispense: 12 Tab; Refill: 0  - Consent for Opiate Prescription  - POCT Urinalysis:   - URINE CULTURE(NEW); Future  - REFERRAL TO SPORTS MEDICINE  - REFERRAL TO PHYSICAL THERAPY Reason for Therapy:  Eval/Treat/Report    2. Pyuria  - URINE CULTURE(NEW); Future    3. Chronic anticoagulation    Results for orders placed or performed in visit on 10/30/20   POCT Urinalysis   Result Value Ref Range    POC Color yellow Negative    POC Appearance clear Negative    POC Leukocyte Esterase trace Negative    POC Nitrites negative Negative    POC Urobiligen 0.2 Negative (0.2) mg/dL    POC Protein negative Negative mg/dL    POC Urine PH 7.5 5.0 - 8.0    POC Blood negative Negative    POC Specific Gravity 1.020 <1.005 - >1.030    POC Ketones negative Negative mg/dL    POC Bilirubin negative Negative mg/dL    POC Glucose negative Negative mg/dL       Patient reports that she is responded very well to Medrol Dosepak for low back pain in many years past.  Patient is given a trial of Medrol Dosepak as above.  Unfortunately, patient is on chronic anticoagulation with Eliquis and unable to take NSAIDs.  She is taking the maximum dose of acetaminophen with no relief of pain.  Therefore, she is given a small supply of tramadol to use sparingly for severe pain only and to try to reserve this for only nighttime use.  Patient is instructed not to drive while taking this medication.    In prescribing controlled substances to this patient, I certify that I have obtained and reviewed the medical history of Seda Cagle. I have also made a good alana effort to obtain applicable records from other providers who have treated the patient and records did not demonstrate any increased risk of substance abuse that would prevent me from prescribing controlled substances.     I have conducted a physical exam and documented it. I have reviewed Ms. Cagle’s prescription history as maintained by the [place] Prescription Monitoring Program.     I have assessed the patient’s risk for abuse, dependency, and addiction using the validated Opioid Risk Tool available at https://www.mdcalc.com/iahntj-xcwm-kfaz-ort-narcotic-abuse.     Given the  above, I believe the benefits of controlled substance therapy outweigh the risks. The reasons for prescribing controlled substances include non-narcotic, oral analgesic alternatives are contraindicated, nonnarcotic alternatives have been ineffective.. Accordingly, I have discussed the risk and benefits, treatment plan, and alternative therapies with the patient.     Referral placed to physical therapy and sports medicine for further evaluation and management.    Urinalysis today does reveal trace leukocyte esterase however she does admit that this was not a clean-catch midstream urine.  We will go ahead and send urine for culture to confirm.  Hold off on antibiotics at this time.  If she does begin to show growth I will contact the patient and begin antibiotics pending final culture.  Differential diagnosis, natural history, supportive care, and indications for immediate follow-up discussed.     Red flag warning symptoms and strict ER/follow-up precautions given.  The patient demonstrated a good understanding and agreed with the treatment plan.  Please note that this note was created using voice recognition speech to text software. Every effort has been made to correct obvious errors.  However, I expect there are errors of grammar and possibly context that were not discovered prior to finalizing the note  SHEYLA Riddle PA-C

## 2020-10-30 NOTE — PATIENT INSTRUCTIONS
Sacroiliac Joint Dysfunction    Sacroiliac joint dysfunction is a condition that causes inflammation on one or both sides of the sacroiliac (SI) joint. The SI joint connects the lower part of the spine (sacrum) with the two upper portions of the pelvis (ilium). This condition causes deep aching or burning pain in the low back. In some cases, the pain may also spread into one or both buttocks, hips, or thighs.  What are the causes?  This condition may be caused by:  · Pregnancy. During pregnancy, extra stress is put on the SI joints because the pelvis widens.  · Injury, such as:  ? Injuries from car accidents.  ? Sports-related injuries.  ? Work-related injuries.  · Having one leg that is shorter than the other.  · Conditions that affect the joints, such as:  ? Rheumatoid arthritis.  ? Gout.  ? Psoriatic arthritis.  ? Joint infection (septic arthritis).  Sometimes, the cause of SI joint dysfunction is not known.  What are the signs or symptoms?  Symptoms of this condition include:  · Aching or burning pain in the lower back. The pain may also spread to other areas, such as:  ? Buttocks.  ? Groin.  ? Thighs.  · Muscle spasms in or around the painful areas.  · Increased pain when standing, walking, running, stair climbing, bending, or lifting.  How is this diagnosed?  This condition is diagnosed with a physical exam and medical history. During the exam, the health care provider may move one or both of your legs to different positions to check for pain. Various tests may be done to confirm the diagnosis, including:  · Imaging tests to look for other causes of pain. These may include:  ? MRI.  ? CT scan.  ? Bone scan.  · Diagnostic injection. A numbing medicine is injected into the SI joint using a needle. If your pain is temporarily improved or stopped after the injection, this can indicate that SI joint dysfunction is the problem.  How is this treated?  Treatment depends on the cause and severity of your condition.  Treatment options may include:  · Ice or heat applied to the lower back area after an injury. This may help reduce pain and muscle spasms.  · Medicines to relieve pain or inflammation or to relax the muscles.  · Wearing a back brace (sacroiliac brace) to help support the joint while your back is healing.  · Physical therapy to increase muscle strength around the joint and flexibility at the joint. This may also involve learning proper body positions and ways of moving to relieve stress on the joint.  · Direct manipulation of the SI joint.  · Injections of steroid medicine into the joint to reduce pain and swelling.  · Radiofrequency ablation to burn away nerves that are carrying pain messages from the joint.  · Use of a device that provides electrical stimulation to help reduce pain at the joint.  · Surgery to put in screws and plates that limit or prevent joint motion. This is rare.  Follow these instructions at home:  Medicines  · Take over-the-counter and prescription medicines only as told by your health care provider.  · Do not drive or use heavy machinery while taking prescription pain medicine.  · If you are taking prescription pain medicine, take actions to prevent or treat constipation. Your health care provider may recommend that you:  ? Drink enough fluid to keep your urine pale yellow.  ? Eat foods that are high in fiber, such as fresh fruits and vegetables, whole grains, and beans.  ? Limit foods that are high in fat and processed sugars, such as fried or sweet foods.  ? Take an over-the-counter or prescription medicine for constipation.  If you have a brace:  · Wear the brace as told by your health care provider. Remove it only as told by your health care provider.  · Keep the brace clean.  · If the brace is not waterproof:  ? Do not let it get wet.  ? Cover it with a watertight covering when you take a bath or a shower.  Managing pain, stiffness, and swelling         · Icing can help with pain and  swelling. Heat may help with muscle tension or spasms. Ask your health care provider if you should use ice or heat.  · If directed, put ice on the affected area:  ? If you have a removable brace, remove it as told by your health care provider.  ? Put ice in a plastic bag.  ? Place a towel between your skin and the bag.  ? Leave the ice on for 20 minutes, 2-3 times a day.  · If directed, apply heat to the affected area. Use the heat source that your health care provider recommends, such as a moist heat pack or a heating pad.  ? Place a towel between your skin and the heat source.  ? Leave the heat on for 20-30 minutes.  ? Remove the heat if your skin turns bright red. This is especially important if you are unable to feel pain, heat, or cold. You may have a greater risk of getting burned.  General instructions  · Rest as needed. Ask your health care provider what activities are safe for you.  · Return to your normal activities as told by your health care provider.  · Exercise as directed by your health care provider or physical therapist.  · Do not use any products that contain nicotine or tobacco, such as cigarettes and e-cigarettes. These can delay bone healing. If you need help quitting, ask your health care provider.  · Keep all follow-up visits as told by your health care provider. This is important.  Contact a health care provider if:  · Your pain is not controlled with medicine.  · You have a fever.  · Your pain is getting worse.  Get help right away if:  · You have weakness, numbness, or tingling in your legs or feet.  · You lose control of your bladder or bowel.  Summary  · Sacroiliac joint dysfunction is a condition that causes inflammation on one or both sides of the sacroiliac (SI) joint.  · This condition causes deep aching or burning pain in the low back. In some cases, the pain may also spread into one or both buttocks, hips, or thighs.  · Treatment depends on the cause and severity of your condition.  It may include medicines to reduce pain and swelling or to relax muscles.  This information is not intended to replace advice given to you by your health care provider. Make sure you discuss any questions you have with your health care provider.  Document Released: 03/16/2010 Document Revised: 08/14/2019 Document Reviewed: 01/28/2019  Elsevier Patient Education © 2020 [employer]

## 2020-11-02 ENCOUNTER — TELEPHONE (OUTPATIENT)
Dept: URGENT CARE | Facility: PHYSICIAN GROUP | Age: 60
End: 2020-11-02

## 2020-11-02 LAB
BACTERIA UR CULT: NORMAL
SIGNIFICANT IND 70042: NORMAL
SITE SITE: NORMAL
SOURCE SOURCE: NORMAL

## 2020-11-02 NOTE — TELEPHONE ENCOUNTER
Patient contacted with urine culture negative for UTI. Patient reports 80% improvement. No change to plan  SHEYLA Riddle PA-C

## 2020-11-30 ENCOUNTER — TELEPHONE (OUTPATIENT)
Dept: MEDICAL GROUP | Facility: MEDICAL CENTER | Age: 60
End: 2020-11-30

## 2020-11-30 DIAGNOSIS — Z20.822 EXPOSURE TO COVID-19 VIRUS: ICD-10-CM
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
## 2021-01-05 ENCOUNTER — TELEPHONE (OUTPATIENT)
Dept: CARDIOLOGY | Facility: MEDICAL CENTER | Age: 61
End: 2021-01-05

## 2021-01-05 DIAGNOSIS — I48.0 PAROXYSMAL ATRIAL FIBRILLATION (HCC): ICD-10-CM

## 2021-01-05 NOTE — TELEPHONE ENCOUNTER
"Per SS last note \"-F/u in 6 mo, surveillance echo then\"     Updated pt. Transferred to image scheduling.   "

## 2021-01-05 NOTE — TELEPHONE ENCOUNTER
SS    Pt called asking if SS wanted her to get an echo before her next appt scheduled for 2/26. Please call Pt back at [phone].

## 2021-01-13 DIAGNOSIS — Z79.01 CHRONIC ANTICOAGULATION: ICD-10-CM

## 2021-01-15 DIAGNOSIS — E78.5 DYSLIPIDEMIA: ICD-10-CM

## 2021-01-15 RX ORDER — ATORVASTATIN CALCIUM 40 MG/1
TABLET, FILM COATED ORAL
Qty: 90 TAB | Refills: 2 | Status: SHIPPED | OUTPATIENT
Start: 2021-01-15 | End: 2021-11-02

## 2021-02-02 ENCOUNTER — HOSPITAL ENCOUNTER (OUTPATIENT)
Dept: CARDIOLOGY | Facility: MEDICAL CENTER | Age: 61
End: 2021-02-02
Attending: INTERNAL MEDICINE
Payer: COMMERCIAL

## 2021-02-02 DIAGNOSIS — I48.0 PAROXYSMAL ATRIAL FIBRILLATION (HCC): ICD-10-CM

## 2021-02-02 PROCEDURE — 93306 TTE W/DOPPLER COMPLETE: CPT

## 2021-02-03 LAB
LV EJECT FRACT  99904: 65
LV EJECT FRACT MOD 2C 99903: 66.47
LV EJECT FRACT MOD 4C 99902: 67.12
LV EJECT FRACT MOD BP 99901: 65.72

## 2021-02-03 PROCEDURE — 93306 TTE W/DOPPLER COMPLETE: CPT | Mod: 26 | Performed by: INTERNAL MEDICINE

## 2021-02-18 ENCOUNTER — PATIENT OUTREACH (OUTPATIENT)
Dept: MEDICAL GROUP | Facility: MEDICAL CENTER | Age: 61
End: 2021-02-18

## 2021-02-18 NOTE — PROGRESS NOTES
ANNUAL WELLNESS VISIT PRE-VISIT PLANNING    1.  Reviewed notes from the last office visit: Yes    2.  If any orders were ordered or intended to be done prior to visit (i.e. 6 mos follow-up), do we have results/consult notes or has patient scheduled?        •  Labs - Labs were not ordered at last office visit.  Note: If patient appointment is for lab review and patient did not complete labs, check with provider if OK to reschedule patient until labs completed.       •  Imaging - Imaging was not ordered at last office visit.       •  Referrals - Referral ordered, patient has NOT been seen.    3.  Immunizations were updated in Epic using Reconcile Outside Information activity? Yes       •  Is patient due for Tdap? NO       •  Is patient due for Shingrix? YES. Patient was not notified of copay/out of pocket cost.    4.  Patient is due for the following Health Maintenance Topics:   Health Maintenance Due   Topic Date Due   • IMM ZOSTER VACCINES (1 of 2) 05/02/2010   • PAP SMEAR  02/20/2021       5.  Reviewed/Updated the following with patient:       •   Preferred Pharmacy? Yes       •   Preferred Lab? Yes       •   Preferred Communication? Yes       •   Allergies? Yes       •   Medications? YES. Was Abstract Encounter opened and chart updated? YES       •   Social History? Yes       •   Family History (document living status of immediate family members and if + hx of  cancer, diabetes, hypertension, hyperlipidemia, heart attack, stroke) No    6.  Care Team Updated:       •   DME Company (gait device, O2, CPAP, etc.): YES       •   Other Specialists (eye doctor, derm, GYN, cardiology, endo, etc): YES    7.  Patient was not advised: “This is a free wellness visit. The provider will screen for medical conditions to help you stay healthy. If you have other concerns to address you may be asked to discuss these at a separate visit or there may be an additional fee.”     8.  AHA (Puls[employer]) form printed for Provider? N/A        Patient NOT contacted to complete AWV PVP. Information reviewed in chart.   Outside information NOT reconciled using the [employer] feature. Per Mayra De Leon, the [employer] feature is down as of 02/09/2021 at 2:00pm. Will reconcile outside information at a later date.

## 2021-02-19 SDOH — ECONOMIC STABILITY: HOUSING INSECURITY
IN THE LAST 12 MONTHS, WAS THERE A TIME WHEN YOU DID NOT HAVE A STEADY PLACE TO SLEEP OR SLEPT IN A SHELTER (INCLUDING NOW)?: NO

## 2021-02-19 SDOH — ECONOMIC STABILITY: HOUSING INSECURITY: IN THE LAST 12 MONTHS, HOW MANY PLACES HAVE YOU LIVED?: 1

## 2021-02-19 SDOH — ECONOMIC STABILITY: INCOME INSECURITY: IN THE LAST 12 MONTHS, WAS THERE A TIME WHEN YOU WERE NOT ABLE TO PAY THE MORTGAGE OR RENT ON TIME?: NO

## 2021-02-19 SDOH — ECONOMIC STABILITY: TRANSPORTATION INSECURITY
IN THE PAST 12 MONTHS, HAS LACK OF RELIABLE TRANSPORTATION KEPT YOU FROM MEDICAL APPOINTMENTS, MEETINGS, WORK OR FROM GETTING THINGS NEEDED FOR DAILY LIVING?: NO

## 2021-02-19 SDOH — ECONOMIC STABILITY: TRANSPORTATION INSECURITY
IN THE PAST 12 MONTHS, HAS THE LACK OF TRANSPORTATION KEPT YOU FROM MEDICAL APPOINTMENTS OR FROM GETTING MEDICATIONS?: NO

## 2021-02-19 SDOH — HEALTH STABILITY: PHYSICAL HEALTH: ON AVERAGE, HOW MANY MINUTES DO YOU ENGAGE IN EXERCISE AT THIS LEVEL?: 30 MINUTES

## 2021-02-19 SDOH — HEALTH STABILITY: MENTAL HEALTH
STRESS IS WHEN SOMEONE FEELS TENSE, NERVOUS, ANXIOUS, OR CAN'T SLEEP AT NIGHT BECAUSE THEIR MIND IS TROUBLED. HOW STRESSED ARE YOU?: ONLY A LITTLE

## 2021-02-19 SDOH — HEALTH STABILITY: PHYSICAL HEALTH: ON AVERAGE, HOW MANY DAYS PER WEEK DO YOU ENGAGE IN MODERATE TO STRENUOUS EXERCISE (LIKE A BRISK WALK)?: 3 DAYS

## 2021-02-19 ASSESSMENT — SOCIAL DETERMINANTS OF HEALTH (SDOH)
IN A TYPICAL WEEK, HOW MANY TIMES DO YOU TALK ON THE PHONE WITH FAMILY, FRIENDS, OR NEIGHBORS?: MORE THAN THREE TIMES A WEEK
HOW OFTEN DO YOU ATTEND CHURCH OR RELIGIOUS SERVICES?: NEVER
WITHIN THE PAST 12 MONTHS, THE FOOD YOU BOUGHT JUST DIDN'T LAST AND YOU DIDN'T HAVE MONEY TO GET MORE: NEVER TRUE
DO YOU BELONG TO ANY CLUBS OR ORGANIZATIONS SUCH AS CHURCH GROUPS UNIONS, FRATERNAL OR ATHLETIC GROUPS, OR SCHOOL GROUPS?: NO
HOW HARD IS IT FOR YOU TO PAY FOR THE VERY BASICS LIKE FOOD, HOUSING, MEDICAL CARE, AND HEATING?: NOT HARD AT ALL
HOW OFTEN DO YOU HAVE A DRINK CONTAINING ALCOHOL: 4 OR MORE TIMES A WEEK
WITHIN THE PAST 12 MONTHS, YOU WORRIED THAT YOUR FOOD WOULD RUN OUT BEFORE YOU GOT THE MONEY TO BUY MORE: NEVER TRUE
HOW OFTEN DO YOU GET TOGETHER WITH FRIENDS OR RELATIVES?: NEVER
HOW OFTEN DO YOU HAVE SIX OR MORE DRINKS ON ONE OCCASION: NEVER
HOW MANY DRINKS CONTAINING ALCOHOL DO YOU HAVE ON A TYPICAL DAY WHEN YOU ARE DRINKING: 1 OR 2
HOW OFTEN DO YOU ATTENT MEETINGS OF THE CLUB OR ORGANIZATION YOU BELONG TO?: NEVER

## 2021-02-25 ENCOUNTER — OFFICE VISIT (OUTPATIENT)
Dept: MEDICAL GROUP | Facility: MEDICAL CENTER | Age: 61
End: 2021-02-25
Payer: COMMERCIAL

## 2021-02-25 VITALS
TEMPERATURE: 97.9 F | SYSTOLIC BLOOD PRESSURE: 142 MMHG | BODY MASS INDEX: 33.63 KG/M2 | HEIGHT: 64 IN | HEART RATE: 74 BPM | WEIGHT: 197 LBS | DIASTOLIC BLOOD PRESSURE: 86 MMHG | OXYGEN SATURATION: 94 %

## 2021-02-25 DIAGNOSIS — I48.0 PAROXYSMAL ATRIAL FIBRILLATION (HCC): ICD-10-CM

## 2021-02-25 DIAGNOSIS — E78.5 DYSLIPIDEMIA: ICD-10-CM

## 2021-02-25 DIAGNOSIS — N18.31 STAGE 3A CHRONIC KIDNEY DISEASE: ICD-10-CM

## 2021-02-25 DIAGNOSIS — M81.0 POSTMENOPAUSAL BONE LOSS: ICD-10-CM

## 2021-02-25 DIAGNOSIS — Z00.00 PREVENTATIVE HEALTH CARE: ICD-10-CM

## 2021-02-25 DIAGNOSIS — G56.01 CARPAL TUNNEL SYNDROME OF RIGHT WRIST: ICD-10-CM

## 2021-02-25 DIAGNOSIS — H04.123 DRY EYES: ICD-10-CM

## 2021-02-25 PROCEDURE — 99396 PREV VISIT EST AGE 40-64: CPT | Performed by: INTERNAL MEDICINE

## 2021-02-25 ASSESSMENT — FIBROSIS 4 INDEX: FIB4 SCORE: 2.02

## 2021-02-25 ASSESSMENT — PATIENT HEALTH QUESTIONNAIRE - PHQ9: CLINICAL INTERPRETATION OF PHQ2 SCORE: 0

## 2021-02-25 NOTE — PROGRESS NOTES
Subjective:      Seda Cagle is a 60 y.o. female who presents with Annual Exam            HPI     Here for annual   Sees cardiology   Sees pulmonary for sleep  meds and allergies reviewed and updated, med and surgical history reviewed and updated   SH , tries to keep active, more stress due to 's upcoming knee surgery also stress with grandson in [place] having covid related issues, taking covid precautions and practicing distancing, eating healthy tries to avoid processed foods, no tobacco, no etoh. Does 20 minute walk daily. One coffee daily. No soda  Dyslipidemia on lipitor no side effects  Uses cpap nightly  Sees gyn   Has had more dry mouth recently, no dry eyes, no history of lupus, no new meds, no history of diabetes  On eliquis per cardiology no nsaids with no blood in urine, no blood in stools minimizes caffeine 1 cup of coffee, no significant palpitations, lightheadedness, chest pain with activity  Right hand carpal tunnel symptoms with burning sensation, has been diagnosed with right carpal tunnel many years ago but has started to become more symptomatic, right-hand-dominant    Current Outpatient Medications   Medication Sig Dispense Refill   • ELIQUIS 5 MG Tab TAKE ONE TABLET BY MOUTH TWICE A  tablet 0   • atorvastatin (LIPITOR) 40 MG Tab TAKE ONE TABLET BY MOUTH EVERY EVENING (LIPITOR) 90 Tab 2   • disopyramide (NORPACE) 100 MG Cap TAKE ONE CAPSULE BY MOUTH EVERY 8 HOURS 270 Cap 2   • metoprolol SR (TOPROL XL) 100 MG TABLET SR 24 HR Take 1 Tab by mouth every day. 30 Tab 5   • Multiple Vitamin (MULTI-VITAMIN PO) Take  by mouth.     • linaCLOtide (LINZESS) 290 MCG Cap Take 290 mcg by mouth every day. 30 Cap 3   • Pantothenic Acid 500 MG Tab Take 500 mg by mouth.     • Melatonin 5 MG TABLET DISPERSIBLE Take  by mouth.     • Fluticasone Propionate (FLONASE NA) Spray  in nose.     • Calcium-Vitamin D-Vitamin K (CALCIUM FOR WOMEN PO) Take 1 Each by mouth every day at 6 PM.      • Lansoprazole (PREVACID PO) Take 1 Each by mouth 1 time daily as needed.     • Magnesium 200 MG TABS Take 1 Tab by mouth every day.       No current facility-administered medications for this visit.     abnormal mammogram  4/17/17 mammogram heterogeneously dense breast tissue, ordered screening breast ultrasound  12/7/17 sonocine hypoechoic focus left breast which may represent deep cyst, targeted diagnostic left breast ultrasound recommended  12/14/17 ultrasound left breast diagnostic, benign-appearing solid mass 2:45 position left breast, 9.2 x 9.1 x 8.5 mm, interval 6 month follow-up ultrasound versus diagnostic ultrasound biopsy  12/22/17 ultrasound-guided biopsy left breast mass, pathology benign fibroglandular tissue with usual ductal hyperplasia and focal microcalcifications  1/30/18 ultrasound-guided wire localization biopsy left breast pathology fibrocystic changes, ductal hyperplasia, microcalcifications  2/5/18  surgery note, pathology reviewed benign breast tissue with focal fibrocystic changes, ductal hyperplasia, microcalcifications, recommend annual mammogram, follow-up as needed  6/22/18 mamogram per  gyn postoperative change upper quadrant left breast, no evidence of malignancy, follow-up one year recommended  6/27/19 mammogram per  gyn heterogeneously dense breast tissue     cervical arthritis  3/24/15 MRI cervical spine C4-C5 small disc osteophyte complex, moderate left facet arthropathy, multilevel DJD     ckd  2/19/11 bun 15,creat 1.0,GFR 56  5/14/14 bun 24,creat 1.23,GFR 45  8/2/14 ultrasound renal negative  2/19/15 bun 19,creat 1.1,GFR 50  8/3/15 bun 17,creat 1.2, GFR 43,urine mac<0.5,PTH 27,vit d 37  10/16/15 bun 17,creat 1.1,GFR 47  1/27/16 bun 14,creat 1.2,GFR 45  12/5/16 bun 16,creat 1.0,GFR 52  1/18/19 bun 15,creat 1.0,GFR 52   2/3/20 bum 16,creat 1.13,GFR 49,urine mac<0.7  9/2/20 bun 20,creat 1.1,GFR 50     Dyslipidemia  12/5/16 chol 186,trig 143,hdl 52,ldl  105  11/26/18  vascular medicine note chronic venous stasis changes lower extremities, recheck lipid panel qualify for statin therapy, continue home blood pressure monitoring, consider 24-hour ambulatory blood pressure monitoring, continue metoprolol, continue xarelto use compression stockings  1/18/19 chol 183,trig 160,hdl 45,ldl 106  11/26/19 chol 133,trig 202,hdl 42,ldl 51     gerd on pepcid     History transient global amnesia  1/18/19 chol 183,trig 160,hdl 45,ldl 106  1/18/19 CT head negative  1/19/19 ultrasound carotid less than 50% bilateral stenosis  1/19/19 CT-CTA head with and without postprocessing, negative [place] [facility]  1/18/19 hospital admission, 1/19/19 hospital discharge transient global amnesia, patient was speaking to her son on the phone, and could not recall for anything 2 hours afterwards, has been indicated that patient was confused and repetitive and could not remember talking on the phone, came to the emergency room for evaluation, CT scan head negative, no other symptoms, resolution discharged home the following day  1/29/19 cardiology note, patient still having some issues with short-term memory, will switch to coumadin from xarelto with recent event, referral to neurology, paroxysmal atrial fibrillation burden less than 1%  3/26/19 [facility] neurological note probable transient global amnesia, complete holter monitor per cardiology, EEG ordered and sedimentation rate for headache     Hypertrophic cardiomyopathy  8/14/00 alcohol septal reduction, normal coronaries  4/15/10 echo very small area proximal septal hypertrophy, no significant obstruction  4/16/10 cardiology note [facility] hypertrophic cardiomyopathy on metoprolol 25 mg bid, norpace  mg bid, asa; remains asymptomatic with regards to hypertrophic cardio myopathy  3/18/11 echo normal LV thickness with very focal area localized hypertrophy most basal septum without evidence of obstruction, EF  64%  11/16/14 persantine thallium fixed defect mid and basilar anteroseptal wall and apical septum consistent with scar, hypokinesis septum EF 74%  11/18/14 echo normal LV size and function, grade 2 diastolic dysfunction, EF 60-65%, mild aortic stenosis, mild aortic insufficiency, RVSP 20-25  11/19/14 holter sinus with left bundle branch block, average rate 57, minimum heart rate 44, maximal heart rate 85, PVCs, PACs   12/12/14 ALLEN known post-ablation for hypertrophic cardiomyopathy without obstruction, small amount of remaining myocardium of lower tract does not cause significant outflow gradient, mild aortic insufficiency, moderate central mitral regurgitation, remnant myocardium in the LVOT with mild obstruction, peak outflow gradient 11 (previously 16-18)  4/16/15  cardiology procedure note, dual-chamber pacemaker implantation  10/24/16  of cardiology, atrial fibrillation and hypertrophic cardiomyopathy, controlled on norpace,metoprolol, xarelto  9/26/18 echo normal LV systolic function, EF 55%, mild LVH, grade 1 diastolic dysfunction, evidence of LVOT, septal wall thickness 1.2 cm, consider subaortic membrane, mild to moderate mitral regurgitation, mild aortic insufficiency  10/8/18 cardiology note does not appear to be subaortic membrane on echo, perhaps subaortic thickening, continue to monitor echo of full gradient for recurrence continue oral anticoagulation gradient is low and asymptomatic, xarelto, norpace, lopressor  1/29/19 cardiology note, patient still having some issues with short-term memory, will switch to coumadin from xarelto with recent event, referral to neurology, paroxysmal atrial fibrillation burden less than 1%  2/28/19 cardiology note hypertrophic obstructive cardiomyopathy and paroxysmal atrial fibrillation, sinus on exam, medi-lynx ventricular premature contractions isolated but frequent, asymptomatic, one short episode of paroxysmal atrial fibrillation 1.5 minutes,  continues on warfarin INR 2.0, continues on norpace, increase metoprolol XL to 50 mg follow-up 1 month  4/26/2019 cardiology note hypertrophic obstructive cardiomyopathy, paroxysmal atrial fibrillation, device check, continue coumadin, norpace, follow-up 6 months  10/10/19 cardiology note hypertrophic cardiomyopathy status post septal ablation, pacemaker, paroxysmal atrial fibrillation relatively short episodes, longest episode 49 minutes remainder less than 1 minute in length, remains controlled on toprol, norpace, coumadin per coumadin clinic  10/31/19 echo normal left ventricular function, EF 60%, basal septal wall appears hypokinetic, grade 2 diastolic dysfunction, moderately dilated left atrium volume index 44 mL, mild mitral regurgitation, mild aortic stenosis, mild aortic insufficiency, peak gradient 23  1/7/20 ultrasound carotid less than 50% stenosis right internal carotid, left carotid mild plaque carotid bifurcation     pacemaker  4/16/15  cardiology procedure note, dual-chamber pacemaker implantation     Paroxysmal atrial fibrillation  11/18/14 echo normal LV size and function, grade 2 diastolic dysfunction, EF 60-65%, mild aortic stenosis, mild aortic insufficiency, RVSP 20-25  11/19/14 holter sinus with left bundle branch block, average rate 57, minimum heart rate 44, maximal heart rate 85, PVCs, PACs   12/12/14 ALLEN known post-ablation for hypertrophic cardiomyopathy without obstruction, small amount of remaining myocardium of lower tract does not cause significant outflow gradient, mild aortic insufficiency, moderate central mitral regurgitation, remnant myocardium in the LVOT with mild obstruction, peak outflow gradient 11 (previously 16-18)  11/30/15 cardiology note paroxysmal atrial fibrillation, short atrial fibrillation under 4 minutes  2/22/16 cardiology note minimal atrial fibrillation on dual-chamber pacemaker check  3/24/16 cardiology note minimal atrial fibrillation   10/24/16  cardiology note longer episodes of atrial fibrillation, still at 2.6 hours total on dual-chamber pacemaker review, continues on xarelto  9/26/18 echo normal LV systolic function, EF 55%, mild LVH, grade 1 diastolic dysfunction, evidence of LVOT, septal wall thickness 1.2 cm, consider subaortic membrane, mild to moderate mitral regurgitation, mild aortic insufficiency  10/8/18 cardiology note does not appear to be subaortic membrane on echo, perhaps subaortic thickening, continue to monitor echo of full gradient for recurrence continue oral anticoagulation gradient is low and asymptomatic, xarelto, norpace, lopressor  1/29/19 cardiology note, patient still having some issues with short-term memory, will switch to coumadin from xarelto with recent event, referral to neurology, paroxysmal atrial fibrillation burden less than 1%  2/28/19 cardiology note hypertrophic obstructive cardiomyopathy and paroxysmal atrial fibrillation, sinus on exam, medi-lynx ventricular premature contractions isolated but frequent, asymptomatic, one short episode of paroxysmal atrial fibrillation 1.5 minutes, continues on warfarin INR 2.0, continues on norpace, increase metoprolol XL to 50 mg follow-up 1 month   10/10/19 cardiology note hypertrophic cardiomyopathy status post septal ablation, pacemaker, paroxysmal atrial fibrillation relatively short episodes, longest episode 49 minutes remainder less than 1 minute in length, remains controlled on toprol, norpace, coumadin per coumadin clinic  10/31/19 echo normal left ventricular function, EF 60%, basal septal wall appears hypokinetic, grade 2 diastolic dysfunction, moderately dilated left atrium volume index 44 mL, mild mitral regurgitation, mild aortic stenosis, mild aortic insufficiency, peak gradient 23  1/7/20 ultrasound carotid less than 50% stenosis right internal carotid, left carotid mild plaque carotid bifurcation  8/25/20 cardiology note pacemaker, device check, no arrhythmia  burden, change from coumadin to eliquis follow-up 6 months repeat echo at that time  2/3/21 echo mild LVH, EF 65%, grade 2 diastolic dysfunction, RVSP 30     Preventative health  12/7/09 colon per [facility] negative, repeat 10 years  12/2/16 tdap  12/5/16 vit d 36  2/20/18 pap per gyn  6/27/19 ammogram per  gyn heterogeneously dense breast tissue     sleep apnea  2/3/17 sleep apnea referral pending, OPO ordered  2/14/17 apnea link per [facility], AHI 19.7, low saturation 83, time<90% saturation 199 minutes, time less than 85% saturation 1 minute  2/16/17 sleep consultation, proceed with polysomnography  3/26/17 sleep study duration 430 minutes, AHI 21.3, low saturation 88%, mild to moderate sleep-disordered breathing with significant disruption of sleep continuity, limb movement disorder could be present as well, cpap titration recommended  3/31/17 sleep note ordered autocpap 5-15 nightly, follow-up 6-8 weeks  11/30/18 sleep note on autocpap 5-15  6/4/20 sleep note on autopap 5 to 15  2/3/21 echo mild LVH, EF 65%, grade 2 diastolic dysfunction, RVSP 30     Venous insufficiency  1/22/19 [facility] note venous insufficiency, physical exam does not indicate any evidence of venous insufficiency, patient declines venous ultrasound               Patient Active Problem List   Diagnosis   • S/P colonoscopy   • Hypertrophic obstructive cardiomyopathy (HCC)   • Pacemaker   • Preventative health care   • GERD (gastroesophageal reflux disease)   • Cervical arthritis   • Chronic kidney disease, stage III (moderate)   • Dyslipidemia   • Paroxysmal atrial fibrillation (HCC)   • Sleep apnea   • Abnormal mammogram   • History of transient global amnesia   • Chronic anticoagulation   • Venous insufficiency   • IBS (irritable bowel syndrome)       Depression Screening    Little interest or pleasure in doing things?  0 - not at all  Feeling down, depressed , or hopeless? 0 - not at all  Patient Health Questionnaire Score: 0  "        Health Maintenance Summary                IMM ZOSTER VACCINES Overdue 5/2/2010     PAP SMEAR Overdue 2/20/2021      Done 2/20/2018 PAP IG (IMAGE GUIDED)     Patient has more history with this topic...    MAMMOGRAM Next Due 9/9/2021      Done 9/9/2020 [facility]-SCREENING MAMMO BILAT W/TOMOSYNTHESIS W/CAD     Patient has more history with this topic...    IMM DTaP/Tdap/Td Vaccine Next Due 12/2/2026      Done 12/2/2016 Imm Admin: Tdap Vaccine    COLONOSCOPY Next Due 3/5/2030      Done 3/5/2020 REFERRAL TO GI FOR COLONOSCOPY     Patient has more history with this topic...          Patient Care Team:  Juve Stoddard M.D. as PCP - General (Internal Medicine)  Vibha Chua M.D. (Nephrology)  [facility] as Pharmacist      ROS       Objective:     /86 (BP Location: Right arm, Patient Position: Sitting, BP Cuff Size: Adult)   Pulse 74   Temp 36.6 °C (97.9 °F)   Ht 1.626 m (5' 4\")   Wt 89.4 kg (197 lb)   LMP 01/31/2012   SpO2 94%   BMI 33.81 kg/m²      Physical Exam  Vitals and nursing note reviewed.   Constitutional:       Appearance: Normal appearance.   HENT:      Head: Normocephalic and atraumatic.      Right Ear: External ear normal.      Left Ear: External ear normal.   Eyes:      Conjunctiva/sclera: Conjunctivae normal.   Cardiovascular:      Rate and Rhythm: Normal rate and regular rhythm.      Heart sounds: Normal heart sounds. No murmur.   Pulmonary:      Effort: No respiratory distress.      Breath sounds: Normal breath sounds.   Abdominal:      General: There is no distension.   Musculoskeletal:      Cervical back: Neck supple.   Skin:     General: Skin is warm.   Neurological:      General: No focal deficit present.      Mental Status: She is alert.   Psychiatric:         Mood and Affect: Mood normal.         Behavior: Behavior normal.                 Assessment/Plan:        Assessment  #! Annual exam    #2 pacemaker per cardiology    #3 paroxysmal atrial fibrillation per " cardiology on Eliquis, no complications with anticoagulation    #4 hypertrophic cardiomyopathy sees cardiology    #5 sleep apnea on cpap using nightly    #6 dyslipidemia on lipitor     #7 ckd stage 3a no regular NSAIDs    #8 dry mouth no history of Sjogren's, no oral ulcers    #9 Preventative   12/2/16 tdap  2/20/18 pap per gyn  2/3/20 pneumovax  2/3/20 hep c ab negative  2/3/20 vit d 33  3/5/20 colon per [facility] negative, repeat 10 years  9/9/20 mammogram dense breast tissue recommend screening breast ultrasound  9/11/20 declines sonocine    #10 carpal tunnel right hand     Plan  #1 dexa    #2 labs include autoimmune evaluation dry mouth, continue copious water intake, as well as renal panel    #3 follow up cardiology    #4 She is a candidate for the covid vaccine when qualifies by age    #5 no nsaids on eliquis     #6 repeat NCS right upper extremity     #7 continue CPAP, follow-up pulmonary    #8 follow-up gynecology    #9 mammogram in the fall, bone density ordered to get done at her convenience    #10 check blood pressure periodically and record    #11 up-to-date on colonoscopy    #12 she can also get the Shingrix vaccine but the Covid vaccine right now is our priority, she did not get the vaccine 2 weeks after she gets the Covid vaccine    #13 follow-up 1 year    #14 continue stretching, regular exercise, and weightbearing activity, meditation can be beneficial for stress as well

## 2021-02-26 ENCOUNTER — OFFICE VISIT (OUTPATIENT)
Dept: CARDIOLOGY | Facility: MEDICAL CENTER | Age: 61
End: 2021-02-26
Payer: COMMERCIAL

## 2021-02-26 VITALS
OXYGEN SATURATION: 96 % | BODY MASS INDEX: 33.46 KG/M2 | SYSTOLIC BLOOD PRESSURE: 116 MMHG | DIASTOLIC BLOOD PRESSURE: 74 MMHG | HEIGHT: 64 IN | HEART RATE: 66 BPM | WEIGHT: 196 LBS

## 2021-02-26 DIAGNOSIS — I48.0 PAROXYSMAL ATRIAL FIBRILLATION (HCC): Chronic | ICD-10-CM

## 2021-02-26 DIAGNOSIS — Z95.0 PACEMAKER: ICD-10-CM

## 2021-02-26 PROCEDURE — 93280 PM DEVICE PROGR EVAL DUAL: CPT | Performed by: INTERNAL MEDICINE

## 2021-02-26 PROCEDURE — 99214 OFFICE O/P EST MOD 30 MIN: CPT | Mod: 25 | Performed by: INTERNAL MEDICINE

## 2021-02-26 ASSESSMENT — FIBROSIS 4 INDEX: FIB4 SCORE: 2.02

## 2021-02-26 NOTE — PROGRESS NOTES
Arrhythmia Clinic Note (Established patient)    DOS: 2/26/2021    Chief complaint/Reason for consult: F/u PPM    Interval History:  Patient is a 61 yo F. History of HCM, SSS, s/p PPM on buzz agents and norpace. Last visit she was having issues with maintaining warfarin in therapeutic range and we changed her over to Eliquis which she is doing well with. Still small burden of AF < 1%. Overall doing well. No complaints today.    ROS (+ in BOLD):  General--Negative for fatigue, weight loss or weight gain  Cardiovascular--Negative for CP, orthopnea, PND    Past Medical History:   Diagnosis Date   • Anesthesia     wakes up during procedure (x2)   • Aortic regurgitation    • Arrhythmia, ventricular 2008    Symptomatic PVCs, controlled with medication.   • Asthma     inhaler PRN, only uses when sick    • Atrial fibrillation (HCC)    • Cardiac arrhythmia    • Cervical arthritis 2/8/2016   • Chickenpox    • Chronic kidney disease, stage III (moderate) 2/9/2016   • CTS (carpal tunnel syndrome) 2/28/2011   • Depression    • GERD (gastroesophageal reflux disease)    • Gynecological disorder    • Heart burn    • Heart murmur    • Hemorrhagic disorder (HCC)     on Xarelto    • Hypertr obst cardiomyop 1991    Dr. Bert Vazquez, [address] [address], [place] [address] [phone] fax [phone].  [place] office [phone].  Alchol Septal Reduction 8/00, Coronaries normal.   • Hypertrophic cardiomyopathy (HCC)    • Indigestion    • Influenza    • Pacemaker 2014   • Renal disorder     chronic kidney disease stage 3-  **pt states currently no issue, numbers are stable   • Sleep apnea     uses CPAP   • Snoring        Past Surgical History:   Procedure Laterality Date   • HYSTEROSCOPY WITH VIDEO OPERATIVE  4/30/2018    Procedure: HYSTEROSCOPY WITH VIDEO OPERATIVE;  Surgeon: Noemi Liu M.D.;  Location: SURGERY SAME DAY [facility];  Service: Labor and Delivery   • DILATION AND CURETTAGE  4/30/2018     Procedure: DILATION AND CURETTAGE;  Surgeon: Noemi Liu M.D.;  Location: SURGERY SAME DAY [facility];  Service: Labor and Delivery   • BREAST BIOPSY Left 1/30/2018    Procedure: BREAST BIOPSY- WIRE LOCALIZED;  Surgeon: Vijaya Britt M.D.;  Location: SURGERY SAME DAY [facility];  Service: General   • RECOVERY  4/15/2015    Performed by [facility] at SURGERY PRE-POST PROC UNIT [facility]   • PACEMAKER INSERTION  2015   • RECOVERY  12/12/2014    Performed by [facility] at SURGERY SAME DAY [facility]   • SEPTAL RECONSTRUCTION  2000   • ENDOSCOPY     • PRIMARY C SECTION     • TUBAL COAGULATION LAPAROSCOPIC BILATERAL         Social History     Socioeconomic History   • Marital status:      Spouse name: Not on file   • Number of children: Not on file   • Years of education: Not on file   • Highest education level: Some college, no degree   Occupational History   • Not on file   Tobacco Use   • Smoking status: Never Smoker   • Smokeless tobacco: Never Used   Substance and Sexual Activity   • Alcohol use: Yes     Alcohol/week: 0.6 oz     Types: 1 Standard drinks or equivalent per week     Comment: 3-4 per week   • Drug use: No   • Sexual activity: Yes     Partners: Male     Birth control/protection: Surgical     Comment:    Other Topics Concern   • Not on file   Social History Narrative   • Not on file     Social Determinants of Health     Financial Resource Strain: Low Risk    • Difficulty of Paying Living Expenses: Not hard at all   Food Insecurity: No Food Insecurity   • Worried About Running Out of Food in the Last Year: Never true   • Ran Out of Food in the Last Year: Never true   Transportation Needs: No Transportation Needs   • Lack of Transportation (Medical): No   • Lack of Transportation (Non-Medical): No   Physical Activity: Insufficiently Active   • Days of Exercise per Week: 3 days   • Minutes of Exercise per Session: 30 min   Stress: No Stress Concern Present   •  Feeling of Stress : Only a little   Social Connections: Somewhat Isolated   • Frequency of Communication with Friends and Family: More than three times a week   • Frequency of Social Gatherings with Friends and Family: Never   • Attends Jewish Services: Never   • Active Member of Clubs or Organizations: No   • Attends Club or Organization Meetings: Never   • Marital Status:    Intimate Partner Violence:    • Fear of Current or Ex-Partner:    • Emotionally Abused:    • Physically Abused:    • Sexually Abused:        Family History   Problem Relation Age of Onset   • Heart Disease Mother         CHF   • Hypertension Mother    • Stroke Mother    • Heart Failure Mother    • Other Mother         carotid artery disease,gout   • Cancer Father         AML   • Diabetes Maternal Grandmother    • Cancer Maternal Grandfather         Prostate   • Heart Disease Paternal Grandfather 52         MI    • Sleep Apnea Neg Hx        No Known Allergies    Current Outpatient Medications   Medication Sig Dispense Refill   • ELIQUIS 5 MG Tab TAKE ONE TABLET BY MOUTH TWICE A  tablet 0   • atorvastatin (LIPITOR) 40 MG Tab TAKE ONE TABLET BY MOUTH EVERY EVENING (LIPITOR) 90 Tab 2   • disopyramide (NORPACE) 100 MG Cap TAKE ONE CAPSULE BY MOUTH EVERY 8 HOURS 270 Cap 2   • metoprolol SR (TOPROL XL) 100 MG TABLET SR 24 HR Take 1 Tab by mouth every day. 30 Tab 5   • Multiple Vitamin (MULTI-VITAMIN PO) Take  by mouth.     • linaCLOtide (LINZESS) 290 MCG Cap Take 290 mcg by mouth every day. 30 Cap 3   • Pantothenic Acid 500 MG Tab Take 500 mg by mouth.     • Melatonin 5 MG TABLET DISPERSIBLE Take  by mouth.     • Fluticasone Propionate (FLONASE NA) Spray  in nose.     • Calcium-Vitamin D-Vitamin K (CALCIUM FOR WOMEN PO) Take 1 Each by mouth every day at 6 PM.     • Lansoprazole (PREVACID PO) Take 1 Each by mouth 1 time daily as needed.     • Magnesium 200 MG TABS Take 1 Tab by mouth every day.       No current  "facility-administered medications for this visit.       Physical Exam:  Vitals:    02/26/21 1016   BP: 116/74   BP Location: Left arm   Patient Position: Sitting   BP Cuff Size: Adult   Pulse: 66   SpO2: 96%   Weight: 88.9 kg (196 lb)   Height: 1.626 m (5' 4\")     General appearance: NAD, conversant  HEENT: PERRL, neck is supple with FROM  Lungs: Clear to auscultation, normal respiratory effort  CV: RRR, 3/6 systolic murmur, no JVD  Abdomen: Soft, non-tender with normal bowel sounds  Extremities: No peripheral edema, no clubbing or cyanosis  Skin: No rash, lesions, or ulcers  Psych: Alert and oriented to person, place and time    Data:  Labs reviewed    Prior echo/stress reviewed:  LVEF 65%, no significant gradient    Device check reviewed, RV pacing threshold and impedance creeping up    Impression/Plan:  1. Pacemaker     2. Paroxysmal atrial fibrillation (HCC)       -Repeat echo reviewed, no significant LVOT gradient  -Continue norpace and Toprol  -I discussed that this RV lead will not last forever and discussed options including transvenous extraction, abandoning the lead, or just performing a generator change and using it until no longer working  -Will plan on transvenous extraction and new RV lead once we are closer to [place]  -F/u in 6 mo    Solitario Witt MD    "

## 2021-03-02 ENCOUNTER — HOSPITAL ENCOUNTER (OUTPATIENT)
Dept: LAB | Facility: MEDICAL CENTER | Age: 61
End: 2021-03-02
Attending: INTERNAL MEDICINE
Payer: COMMERCIAL

## 2021-03-02 ENCOUNTER — APPOINTMENT (OUTPATIENT)
Dept: VASCULAR LAB | Facility: MEDICAL CENTER | Age: 61
End: 2021-03-02
Payer: COMMERCIAL

## 2021-03-02 DIAGNOSIS — Z00.00 PREVENTATIVE HEALTH CARE: ICD-10-CM

## 2021-03-02 DIAGNOSIS — N18.31 STAGE 3A CHRONIC KIDNEY DISEASE: ICD-10-CM

## 2021-03-02 LAB
25(OH)D3 SERPL-MCNC: 43 NG/ML (ref 30–100)
ALBUMIN SERPL BCP-MCNC: 4.3 G/DL (ref 3.2–4.9)
ALBUMIN/GLOB SERPL: 1.8 G/DL
ALP SERPL-CCNC: 84 U/L (ref 30–99)
ALT SERPL-CCNC: 35 U/L (ref 2–50)
ANION GAP SERPL CALC-SCNC: 10 MMOL/L (ref 7–16)
APPEARANCE UR: CLEAR
AST SERPL-CCNC: 33 U/L (ref 12–45)
BACTERIA #/AREA URNS HPF: NEGATIVE /HPF
BASOPHILS # BLD AUTO: 0.4 % (ref 0–1.8)
BASOPHILS # BLD: 0.03 K/UL (ref 0–0.12)
BILIRUB SERPL-MCNC: 0.8 MG/DL (ref 0.1–1.5)
BILIRUB UR QL STRIP.AUTO: NEGATIVE
BUN SERPL-MCNC: 13 MG/DL (ref 8–22)
CALCIUM SERPL-MCNC: 9.5 MG/DL (ref 8.5–10.5)
CHLORIDE SERPL-SCNC: 99 MMOL/L (ref 96–112)
CHOLEST SERPL-MCNC: 144 MG/DL (ref 100–199)
CO2 SERPL-SCNC: 26 MMOL/L (ref 20–33)
COLOR UR: YELLOW
CREAT SERPL-MCNC: 0.85 MG/DL (ref 0.5–1.4)
CREAT UR-MCNC: 24.87 MG/DL
EOSINOPHIL # BLD AUTO: 0.1 K/UL (ref 0–0.51)
EOSINOPHIL NFR BLD: 1.4 % (ref 0–6.9)
EPI CELLS #/AREA URNS HPF: NEGATIVE /HPF
ERYTHROCYTE [DISTWIDTH] IN BLOOD BY AUTOMATED COUNT: 41.7 FL (ref 35.9–50)
EST. AVERAGE GLUCOSE BLD GHB EST-MCNC: 108 MG/DL
GLOBULIN SER CALC-MCNC: 2.4 G/DL (ref 1.9–3.5)
GLUCOSE SERPL-MCNC: 86 MG/DL (ref 65–99)
GLUCOSE UR STRIP.AUTO-MCNC: NEGATIVE MG/DL
HBA1C MFR BLD: 5.4 % (ref 4–5.6)
HCT VFR BLD AUTO: 44.6 % (ref 37–47)
HDLC SERPL-MCNC: 48 MG/DL
HGB BLD-MCNC: 14.8 G/DL (ref 12–16)
HYALINE CASTS #/AREA URNS LPF: NORMAL /LPF
IMM GRANULOCYTES # BLD AUTO: 0.01 K/UL (ref 0–0.11)
IMM GRANULOCYTES NFR BLD AUTO: 0.1 % (ref 0–0.9)
KETONES UR STRIP.AUTO-MCNC: NEGATIVE MG/DL
LDLC SERPL CALC-MCNC: 76 MG/DL
LEUKOCYTE ESTERASE UR QL STRIP.AUTO: ABNORMAL
LYMPHOCYTES # BLD AUTO: 3.54 K/UL (ref 1–4.8)
LYMPHOCYTES NFR BLD: 51.3 % (ref 22–41)
MCH RBC QN AUTO: 30.5 PG (ref 27–33)
MCHC RBC AUTO-ENTMCNC: 33.2 G/DL (ref 33.6–35)
MCV RBC AUTO: 91.8 FL (ref 81.4–97.8)
MICRO URNS: ABNORMAL
MICROALBUMIN UR-MCNC: <1.2 MG/DL
MICROALBUMIN/CREAT UR: NORMAL MG/G (ref 0–30)
MONOCYTES # BLD AUTO: 0.38 K/UL (ref 0–0.85)
MONOCYTES NFR BLD AUTO: 5.5 % (ref 0–13.4)
NEUTROPHILS # BLD AUTO: 2.84 K/UL (ref 2–7.15)
NEUTROPHILS NFR BLD: 41.3 % (ref 44–72)
NITRITE UR QL STRIP.AUTO: NEGATIVE
NRBC # BLD AUTO: 0 K/UL
NRBC BLD-RTO: 0 /100 WBC
PH UR STRIP.AUTO: 7 [PH] (ref 5–8)
PHOSPHATE SERPL-MCNC: 3.4 MG/DL (ref 2.5–4.5)
PLATELET # BLD AUTO: 177 K/UL (ref 164–446)
PMV BLD AUTO: 11.6 FL (ref 9–12.9)
POTASSIUM SERPL-SCNC: 4.1 MMOL/L (ref 3.6–5.5)
PROT SERPL-MCNC: 6.7 G/DL (ref 6–8.2)
PROT UR QL STRIP: NEGATIVE MG/DL
PTH-INTACT SERPL-MCNC: 56.7 PG/ML (ref 14–72)
RBC # BLD AUTO: 4.86 M/UL (ref 4.2–5.4)
RBC # URNS HPF: NORMAL /HPF
RBC UR QL AUTO: NEGATIVE
SODIUM SERPL-SCNC: 135 MMOL/L (ref 135–145)
SP GR UR STRIP.AUTO: 1.01
TRIGL SERPL-MCNC: 98 MG/DL (ref 0–149)
TSH SERPL DL<=0.005 MIU/L-ACNC: 1.99 UIU/ML (ref 0.38–5.33)
UROBILINOGEN UR STRIP.AUTO-MCNC: 0.2 MG/DL
WBC # BLD AUTO: 6.9 K/UL (ref 4.8–10.8)
WBC #/AREA URNS HPF: NORMAL /HPF

## 2021-03-02 PROCEDURE — 85025 COMPLETE CBC W/AUTO DIFF WBC: CPT

## 2021-03-02 PROCEDURE — 36415 COLL VENOUS BLD VENIPUNCTURE: CPT

## 2021-03-02 PROCEDURE — 84100 ASSAY OF PHOSPHORUS: CPT

## 2021-03-02 PROCEDURE — 84155 ASSAY OF PROTEIN SERUM: CPT

## 2021-03-02 PROCEDURE — 82570 ASSAY OF URINE CREATININE: CPT

## 2021-03-02 PROCEDURE — 86235 NUCLEAR ANTIGEN ANTIBODY: CPT | Mod: 91

## 2021-03-02 PROCEDURE — 80061 LIPID PANEL: CPT

## 2021-03-02 PROCEDURE — 80053 COMPREHEN METABOLIC PANEL: CPT

## 2021-03-02 PROCEDURE — 81001 URINALYSIS AUTO W/SCOPE: CPT

## 2021-03-02 PROCEDURE — 82043 UR ALBUMIN QUANTITATIVE: CPT

## 2021-03-02 PROCEDURE — 84443 ASSAY THYROID STIM HORMONE: CPT

## 2021-03-02 PROCEDURE — 84165 PROTEIN E-PHORESIS SERUM: CPT

## 2021-03-02 PROCEDURE — 82306 VITAMIN D 25 HYDROXY: CPT

## 2021-03-02 PROCEDURE — 83036 HEMOGLOBIN GLYCOSYLATED A1C: CPT

## 2021-03-02 PROCEDURE — 86038 ANTINUCLEAR ANTIBODIES: CPT

## 2021-03-02 PROCEDURE — 83970 ASSAY OF PARATHORMONE: CPT

## 2021-03-03 ENCOUNTER — TELEPHONE (OUTPATIENT)
Dept: MEDICAL GROUP | Facility: MEDICAL CENTER | Age: 61
End: 2021-03-03

## 2021-03-04 LAB
ENA SS-B IGG SER IA-ACNC: 0 AU/ML (ref 0–40)
NUCLEAR IGG SER QL IA: NORMAL
SSA52 R0ENA AB IGG Q0420: 2 AU/ML (ref 0–40)
SSA60 R0ENA AB IGG Q0419: 0 AU/ML (ref 0–40)

## 2021-03-04 NOTE — TELEPHONE ENCOUNTER
Notified with labs, still awaiting NAVIN, SSA and SSB labs evaluating for dry mouth, remainder the labs are fine, renal function improved, continue atorvastatin, continue vitamin D supplementation

## 2021-03-06 LAB
ALBUMIN SERPL ELPH-MCNC: 4.15 G/DL (ref 3.75–5.01)
ALPHA1 GLOB SERPL ELPH-MCNC: 0.28 G/DL (ref 0.19–0.46)
ALPHA2 GLOB SERPL ELPH-MCNC: 0.62 G/DL (ref 0.48–1.05)
B-GLOBULIN SERPL ELPH-MCNC: 0.6 G/DL (ref 0.48–1.1)
EER PROT ELECT SER Q1092: NORMAL
GAMMA GLOB SERPL ELPH-MCNC: 0.75 G/DL (ref 0.62–1.51)
INTERPRETATION SERPL IFE-IMP: NORMAL
PROT SERPL-MCNC: 6.4 G/DL (ref 6.3–8.2)

## 2021-03-07 ENCOUNTER — TELEPHONE (OUTPATIENT)
Dept: MEDICAL GROUP | Facility: MEDICAL CENTER | Age: 61
End: 2021-03-07

## 2021-03-08 ENCOUNTER — TELEPHONE (OUTPATIENT)
Dept: MEDICAL GROUP | Facility: MEDICAL CENTER | Age: 61
End: 2021-03-08

## 2021-03-08 NOTE — TELEPHONE ENCOUNTER
Please notify the patient that the remainder of her labs show no underlying disease process such as lupus or autoimmune disease that would cause dry mouth.      I did some checking and the Norpace heart medication can cause dry mouth in up to 30% of patients, so that may be the issue regarding her dry mouth, but she should definitely remain on the heart medication.

## 2021-03-08 NOTE — TELEPHONE ENCOUNTER
Pt does not believe that is the issue, she has been on this medication since 2010. Pt wants to to know if there is anything you can do. She will contact her dentist and her cardiologist, but she is unwilling to just let it go.

## 2021-03-08 NOTE — TELEPHONE ENCOUNTER
She can follow-up with her dentist and cardiologist but there is nothing on the blood test to indicate any other disease process causing the dry mouth.

## 2021-03-08 NOTE — TELEPHONE ENCOUNTER
----- Message from Juve Stoddard M.D. sent at 3/7/2021  6:40 PM PST -----  Please notify the patient that the remainder of her labs show no underlying disease process such as lupus or autoimmune disease that would cause dry mouth.      I did some checking and the Norpace heart medication can cause dry mouth in up to 30% of patients, so that may be the issue regarding her dry mouth, but she should definitely remain on the heart medication.

## 2021-03-15 DIAGNOSIS — Z23 NEED FOR VACCINATION: ICD-10-CM

## 2021-03-19 ENCOUNTER — IMMUNIZATION (OUTPATIENT)
Dept: FAMILY PLANNING/WOMEN'S HEALTH CLINIC | Facility: IMMUNIZATION CENTER | Age: 61
End: 2021-03-19
Attending: INTERNAL MEDICINE
Payer: COMMERCIAL

## 2021-03-19 DIAGNOSIS — Z23 NEED FOR VACCINATION: ICD-10-CM

## 2021-03-19 DIAGNOSIS — Z23 ENCOUNTER FOR VACCINATION: Primary | ICD-10-CM

## 2021-03-19 PROCEDURE — 0011A MODERNA SARS-COV-2 VACCINE: CPT

## 2021-03-19 PROCEDURE — 91301 MODERNA SARS-COV-2 VACCINE: CPT

## 2021-03-23 ENCOUNTER — TELEPHONE (OUTPATIENT)
Dept: MEDICAL GROUP | Facility: MEDICAL CENTER | Age: 61
End: 2021-03-23

## 2021-03-23 ENCOUNTER — HOSPITAL ENCOUNTER (OUTPATIENT)
Dept: RADIOLOGY | Facility: MEDICAL CENTER | Age: 61
End: 2021-03-23
Attending: INTERNAL MEDICINE
Payer: COMMERCIAL

## 2021-03-23 DIAGNOSIS — M81.0 POSTMENOPAUSAL BONE LOSS: ICD-10-CM

## 2021-03-23 PROCEDURE — 77080 DXA BONE DENSITY AXIAL: CPT

## 2021-03-23 NOTE — TELEPHONE ENCOUNTER
Please notify the patient that her bone density test is normal.    She has normal bone strength of her hip and back.    She can continue vitamin D supplementation, regular weightbearing exercise, and we can repeat her bone density test in 2 years

## 2021-03-23 NOTE — TELEPHONE ENCOUNTER
----- Message from Juve Stoddard M.D. sent at 3/23/2021  1:11 PM PDT -----  Please notify the patient that her bone density test is normal.    She has normal bone strength of her hip and back.    She can continue vitamin D supplementation, regular weightbearing exercise, and we can repeat her bone density test in 2 years

## 2021-03-27 DIAGNOSIS — I48.0 PAROXYSMAL ATRIAL FIBRILLATION (HCC): ICD-10-CM

## 2021-03-30 RX ORDER — METOPROLOL SUCCINATE 100 MG/1
TABLET, EXTENDED RELEASE ORAL
Qty: 90 TABLET | Refills: 3 | Status: SHIPPED | OUTPATIENT
Start: 2021-03-30 | End: 2022-03-22

## 2021-04-14 ENCOUNTER — TELEPHONE (OUTPATIENT)
Dept: MEDICAL GROUP | Facility: MEDICAL CENTER | Age: 61
End: 2021-04-14

## 2021-04-14 ENCOUNTER — NON-PROVIDER VISIT (OUTPATIENT)
Dept: NEUROLOGY | Facility: MEDICAL CENTER | Age: 61
End: 2021-04-14
Attending: INTERNAL MEDICINE
Payer: COMMERCIAL

## 2021-04-14 DIAGNOSIS — G56.00 CARPAL TUNNEL SYNDROME, UNSPECIFIED LATERALITY: ICD-10-CM

## 2021-04-14 DIAGNOSIS — G56.01 CARPAL TUNNEL SYNDROME ON RIGHT: ICD-10-CM

## 2021-04-14 PROCEDURE — 95909 NRV CNDJ TST 5-6 STUDIES: CPT | Performed by: PSYCHIATRY & NEUROLOGY

## 2021-04-14 PROCEDURE — 95886 MUSC TEST DONE W/N TEST COMP: CPT | Mod: 26,RT | Performed by: PSYCHIATRY & NEUROLOGY

## 2021-04-14 PROCEDURE — 95909 NRV CNDJ TST 5-6 STUDIES: CPT | Mod: 26 | Performed by: PSYCHIATRY & NEUROLOGY

## 2021-04-14 PROCEDURE — 95886 MUSC TEST DONE W/N TEST COMP: CPT | Performed by: PSYCHIATRY & NEUROLOGY

## 2021-04-14 NOTE — PROCEDURES
"NERVE CONDUCTION STUDIES AND ELECTROMYOGRAPHY REPORT  [facility]  04/14/21          IMPRESSION:  This is an abnormal electrodiagnostic study due to evidence:  1. Consistent with a right median neuropathy at the wrist (carpal tunnel syndrome) without denervation of the right APB.  2. Of a possible mild right ulnar neuropathy across the elbow due to mild slowing.    There is no evidence of right cervical radiculopathy.  Recommend clinical correlation.    Petrona Marlow MD  Neurology - Neurophysiology  [facility]        REASON FOR REFERRAL:  Ms. Seda Cagle 60 y.o. referred by Dr. Juve Stoddard for evaluation of pain and weakness in the right hand.  Symptoms have been ongoing for a year and are described as pain in the right wrist with dropping of items. She has some neck pain though this does not radiate down her arm.  Patient is anticoagulated.     Height: 5'4\"  Weight: 188 lbs    Symptom focused neurological exam shows mild weakness with abduction of the right thumb but otherwise normal strength in the right upper extremity.  Biceps reflex is intact.  Sensation is intact in the right hand.      ELECTRODIAGNOSTIC EXAMINATION:  Nerve conduction studies (NCS) and electromyography (EMG) are utilized to evaluate direct or indirect damage to the peripheral nervous system. NCS are performed to measure the nerve(s) response(s) to electrostimulation across a given nerve segment. EMG evaluates the passive and active electrical activity of the muscle(s) in question.  Muscles are innervated by specific peripheral nerves and roots. Often times, several nerves the muscle to be examined in order to determine the presence or absence of the disease process. Furthermore, nerves and muscles may need to be tested in a [id number] comparison, as well as in additional extremities, as this may be crucial in characterizing the extent of the disease process, which may be diffuse or isolated " and of varying degree of severity. The extent of the neurodiagnostic exam is justified as it may help arrive to a proper diagnosis, which ultimately may contribute to better management of the patient. Therefore, the nerves to muscles examined during the study were medically necessary.    Unless otherwise noted, temperature of the extremity(s) study was monitored before and during the examination and remained between 32 and 36 degrees C for the upper extremities, and between 30 and 36 degrees C for the lower extremities. The patient tolerated testing well, without any complications.       NERVE CONDUCTION STUDY SUMMARY:  Selected nerves of the right upper extremity are studied.    Unobtainable right median sensory response.  Abnormal right median motor response due to prolonged distal latency.  Unobtainable right median sensory response with median/ulnar palmar comparison study.  Normal right ulnar sensory response.  Abnormal right ulnar motor response at the abductor digiti minimi due to mild slowing across the elbow.      NEEDLE EMG SUMMARY:  Concentric needle study of selected right upper extremity muscles is performed.     Insertion activity is normal in all muscles sampled.   With activation, there are normal morphology (amplitude/duration) motor unit action potentials firing with normal recruitment in muscles tested.       PATIENT DATA TABLES  Nerve Conduction Studies     Stim Site NR Onset (ms) Norm Onset (ms) O-P Amp (µV) Norm O-P Amp Site1 Site2 Delta-P (ms) Dist (cm) Noah (m/s) Norm Noah (m/s)   Right Median Anti Sensory (2nd Digit)  35.5°C   Wrist *NR  <3.8  >10 Wrist 2nd Digit  14.0  >50   Right Ulnar Anti Sensory (5th Digit)  35.5°C   Wrist    2.2 <3.2 16.2 >5 Wrist 5th Digit 2.8 14.0 50 >50        Stim Site NR Onset (ms) Norm Onset (ms) O-P Amp (mV) Norm O-P Amp Site1 Site2 Delta-0 (ms) Dist (cm) Noah (m/s) Norm Noah (m/s)   Right Median Motor (Abd Poll Brev)   Wrist    *7.4 <4 6.2 >5 Elbow Wrist 4.4 22.0  50 >50   Elbow    11.8  5.9          Right Ulnar Motor (Abd Dig Min)  35.5°C   Wrist    2.1 <3.1 11.8 >7 B Elbow Wrist 2.7 16.0 59 >50   B Elbow    4.8  9.4  A Elbow B Elbow 2.1 10.0 48    A Elbow    6.9  9.2               Stim Site NR Peak (ms) Norm Peak (ms) P-T Amp (µV) Site1 Site2 Delta-P (ms) Norm Delta (ms)   Right Median/Ulnar Palm Comparison (Wrist - 8cm)  35.5°C   Median Palm *NR  <2.3  Median Palm Ulnar Palm  <0.3   Ulnar Palm    1.6 <2.3 33.4                       Electromyography     Side Muscle Nerve Root Ins Act Fibs Psw Amp Dur Poly Recrt Int Pat Comment   Right 1stDorInt Ulnar C8-T1 Nml Nml Nml Nml Nml 0 Nml Nml    Right PronatorTeres Median C6-7 Nml Nml Nml Nml Nml 0 Nml Nml    Right Biceps Musculocut C5-6 Nml Nml Nml Nml Nml 0 Nml Nml    Right Triceps Radial C6-7-8 Nml Nml Nml Nml Nml 0 Nml Nml    Right Deltoid Axillary C5-6 Nml Nml Nml Nml Nml 0 Nml Nml    Right Abd Poll Brev Median C8-T1 Nml Nml Nml Nml Nml 0 Nml Nml

## 2021-04-17 ENCOUNTER — IMMUNIZATION (OUTPATIENT)
Dept: FAMILY PLANNING/WOMEN'S HEALTH CLINIC | Facility: IMMUNIZATION CENTER | Age: 61
End: 2021-04-17
Attending: INTERNAL MEDICINE
Payer: COMMERCIAL

## 2021-04-17 DIAGNOSIS — Z23 ENCOUNTER FOR VACCINATION: Primary | ICD-10-CM

## 2021-04-17 PROCEDURE — 0012A MODERNA SARS-COV-2 VACCINE: CPT | Performed by: INTERNAL MEDICINE

## 2021-04-17 PROCEDURE — 91301 MODERNA SARS-COV-2 VACCINE: CPT | Performed by: INTERNAL MEDICINE

## 2021-04-21 DIAGNOSIS — J34.89 NASAL OBSTRUCTION: ICD-10-CM

## 2021-05-12 DIAGNOSIS — I48.0 PAROXYSMAL ATRIAL FIBRILLATION (HCC): ICD-10-CM

## 2021-05-13 RX ORDER — APIXABAN 5 MG/1
TABLET, FILM COATED ORAL
Qty: 180 TABLET | Refills: 3 | Status: SHIPPED | OUTPATIENT
Start: 2021-05-13 | End: 2022-05-09

## 2021-05-20 ENCOUNTER — PRE-ADMISSION TESTING (OUTPATIENT)
Dept: ADMISSIONS | Facility: MEDICAL CENTER | Age: 61
End: 2021-05-20
Attending: ORTHOPAEDIC SURGERY
Payer: COMMERCIAL

## 2021-05-20 RX ORDER — ACETAMINOPHEN 325 MG/1
650 TABLET ORAL EVERY 4 HOURS PRN
COMMUNITY
End: 2021-08-11

## 2021-05-20 NOTE — PREPROCEDURE INSTRUCTIONS
"Patient called for preadmit appointment: \"Preparing for your Procedure information,\" pain management, patient safety, covid symptoms, self isolating and fasting protocol per anesthesia sheets given to patient with verbal and written instructions given via email. Patient instructed to continue prescribed medications through the day before surgery, instructed to take the following medications the day of surgery per anesthesia protocol: Norpace, Prevacid, Metoprolol, Tylenol. Patient states instructed to stop blood thinner: 5/30. Patient states reaction to previous anesthesia that she woke up twice during anesthesia. Pt reports no s/s of urinary tract infection. Covid appointment scheduled 5/28. Patient educated to call MD's office if any symptoms of Covid appear. Patient understands education and has no other questions or concerns at this time.     Faxed pacemaker form to Dr Witt's office-[facility]. Patient coming in on testing line 5/28.  "

## 2021-05-28 ENCOUNTER — APPOINTMENT (OUTPATIENT)
Dept: ADMISSIONS | Facility: MEDICAL CENTER | Age: 61
End: 2021-05-28
Attending: ORTHOPAEDIC SURGERY
Payer: COMMERCIAL

## 2021-05-28 DIAGNOSIS — Z01.812 PRE-OPERATIVE LABORATORY EXAMINATION: ICD-10-CM

## 2021-05-28 DIAGNOSIS — Z01.810 PRE-OPERATIVE CARDIOVASCULAR EXAMINATION: ICD-10-CM

## 2021-05-28 LAB
ANION GAP SERPL CALC-SCNC: 8 MMOL/L (ref 7–16)
BUN SERPL-MCNC: 16 MG/DL (ref 8–22)
CALCIUM SERPL-MCNC: 9 MG/DL (ref 8.4–10.2)
CHLORIDE SERPL-SCNC: 104 MMOL/L (ref 96–112)
CO2 SERPL-SCNC: 28 MMOL/L (ref 20–33)
CREAT SERPL-MCNC: 1.03 MG/DL (ref 0.5–1.4)
EKG IMPRESSION: NORMAL
ERYTHROCYTE [DISTWIDTH] IN BLOOD BY AUTOMATED COUNT: 39.9 FL (ref 35.9–50)
GLUCOSE SERPL-MCNC: 97 MG/DL (ref 65–99)
HCT VFR BLD AUTO: 44 % (ref 37–47)
HGB BLD-MCNC: 14.5 G/DL (ref 12–16)
MCH RBC QN AUTO: 29.4 PG (ref 27–33)
MCHC RBC AUTO-ENTMCNC: 33 G/DL (ref 33.6–35)
MCV RBC AUTO: 89.2 FL (ref 81.4–97.8)
PLATELET # BLD AUTO: 155 K/UL (ref 164–446)
PMV BLD AUTO: 10.2 FL (ref 9–12.9)
POTASSIUM SERPL-SCNC: 4.5 MMOL/L (ref 3.6–5.5)
RBC # BLD AUTO: 4.93 M/UL (ref 4.2–5.4)
SODIUM SERPL-SCNC: 140 MMOL/L (ref 135–145)
WBC # BLD AUTO: 10.2 K/UL (ref 4.8–10.8)

## 2021-05-28 PROCEDURE — 93005 ELECTROCARDIOGRAM TRACING: CPT

## 2021-05-28 PROCEDURE — 93010 ELECTROCARDIOGRAM REPORT: CPT | Performed by: INTERNAL MEDICINE

## 2021-05-28 PROCEDURE — C9803 HOPD COVID-19 SPEC COLLECT: HCPCS

## 2021-05-28 PROCEDURE — 36415 COLL VENOUS BLD VENIPUNCTURE: CPT

## 2021-05-28 PROCEDURE — U0003 INFECTIOUS AGENT DETECTION BY NUCLEIC ACID (DNA OR RNA); SEVERE ACUTE RESPIRATORY SYNDROME CORONAVIRUS 2 (SARS-COV-2) (CORONAVIRUS DISEASE [COVID-19]), AMPLIFIED PROBE TECHNIQUE, MAKING USE OF HIGH THROUGHPUT TECHNOLOGIES AS DESCRIBED BY CMS-2020-01-R: HCPCS

## 2021-05-28 PROCEDURE — 80048 BASIC METABOLIC PNL TOTAL CA: CPT

## 2021-05-28 PROCEDURE — 85027 COMPLETE CBC AUTOMATED: CPT

## 2021-05-28 PROCEDURE — U0005 INFEC AGEN DETEC AMPLI PROBE: HCPCS

## 2021-05-29 LAB
SARS-COV-2 RNA RESP QL NAA+PROBE: NOTDETECTED
SPECIMEN SOURCE: NORMAL

## 2021-05-31 NOTE — OR NURSING
"Pt was contacted regarding \"Pre-procedural Covid 19 Screening Questions\" and screened negative at this time.     "

## 2021-06-01 ENCOUNTER — ANESTHESIA (OUTPATIENT)
Dept: SURGERY | Facility: MEDICAL CENTER | Age: 61
End: 2021-06-01
Payer: COMMERCIAL

## 2021-06-01 ENCOUNTER — HOSPITAL ENCOUNTER (OUTPATIENT)
Facility: MEDICAL CENTER | Age: 61
End: 2021-06-01
Attending: ORTHOPAEDIC SURGERY | Admitting: ORTHOPAEDIC SURGERY
Payer: COMMERCIAL

## 2021-06-01 ENCOUNTER — ANESTHESIA EVENT (OUTPATIENT)
Dept: SURGERY | Facility: MEDICAL CENTER | Age: 61
End: 2021-06-01
Payer: COMMERCIAL

## 2021-06-01 VITALS
WEIGHT: 191.8 LBS | TEMPERATURE: 97.7 F | DIASTOLIC BLOOD PRESSURE: 75 MMHG | SYSTOLIC BLOOD PRESSURE: 137 MMHG | RESPIRATION RATE: 16 BRPM | HEIGHT: 64 IN | OXYGEN SATURATION: 95 % | BODY MASS INDEX: 32.74 KG/M2 | HEART RATE: 60 BPM

## 2021-06-01 PROCEDURE — A9270 NON-COVERED ITEM OR SERVICE: HCPCS | Performed by: ANESTHESIOLOGY

## 2021-06-01 PROCEDURE — 700102 HCHG RX REV CODE 250 W/ 637 OVERRIDE(OP): Performed by: ANESTHESIOLOGY

## 2021-06-01 PROCEDURE — 160035 HCHG PACU - 1ST 60 MINS PHASE I: Performed by: ORTHOPAEDIC SURGERY

## 2021-06-01 PROCEDURE — 160009 HCHG ANES TIME/MIN: Performed by: ORTHOPAEDIC SURGERY

## 2021-06-01 PROCEDURE — 700105 HCHG RX REV CODE 258: Performed by: ORTHOPAEDIC SURGERY

## 2021-06-01 PROCEDURE — 160046 HCHG PACU - 1ST 60 MINS PHASE II: Performed by: ORTHOPAEDIC SURGERY

## 2021-06-01 PROCEDURE — 700111 HCHG RX REV CODE 636 W/ 250 OVERRIDE (IP): Performed by: ANESTHESIOLOGY

## 2021-06-01 PROCEDURE — 700101 HCHG RX REV CODE 250: Performed by: ORTHOPAEDIC SURGERY

## 2021-06-01 PROCEDURE — 700111 HCHG RX REV CODE 636 W/ 250 OVERRIDE (IP): Performed by: ORTHOPAEDIC SURGERY

## 2021-06-01 PROCEDURE — 160029 HCHG SURGERY MINUTES - 1ST 30 MINS LEVEL 4: Performed by: ORTHOPAEDIC SURGERY

## 2021-06-01 PROCEDURE — 501838 HCHG SUTURE GENERAL: Performed by: ORTHOPAEDIC SURGERY

## 2021-06-01 PROCEDURE — 160048 HCHG OR STATISTICAL LEVEL 1-5: Performed by: ORTHOPAEDIC SURGERY

## 2021-06-01 PROCEDURE — 502576 HCHG PACK, HAND: Performed by: ORTHOPAEDIC SURGERY

## 2021-06-01 PROCEDURE — 160025 RECOVERY II MINUTES (STATS): Performed by: ORTHOPAEDIC SURGERY

## 2021-06-01 PROCEDURE — 160002 HCHG RECOVERY MINUTES (STAT): Performed by: ORTHOPAEDIC SURGERY

## 2021-06-01 RX ORDER — SODIUM CHLORIDE, SODIUM LACTATE, POTASSIUM CHLORIDE, CALCIUM CHLORIDE 600; 310; 30; 20 MG/100ML; MG/100ML; MG/100ML; MG/100ML
INJECTION, SOLUTION INTRAVENOUS CONTINUOUS
Status: DISCONTINUED | OUTPATIENT
Start: 2021-06-01 | End: 2021-06-01 | Stop reason: HOSPADM

## 2021-06-01 RX ORDER — CEFAZOLIN SODIUM 1 G/3ML
INJECTION, POWDER, FOR SOLUTION INTRAMUSCULAR; INTRAVENOUS PRN
Status: DISCONTINUED | OUTPATIENT
Start: 2021-06-01 | End: 2021-06-01 | Stop reason: SURG

## 2021-06-01 RX ORDER — ONDANSETRON 2 MG/ML
4 INJECTION INTRAMUSCULAR; INTRAVENOUS
Status: DISCONTINUED | OUTPATIENT
Start: 2021-06-01 | End: 2021-06-01 | Stop reason: HOSPADM

## 2021-06-01 RX ORDER — CELECOXIB 200 MG/1
200 CAPSULE ORAL ONCE
Status: COMPLETED | OUTPATIENT
Start: 2021-06-01 | End: 2021-06-01

## 2021-06-01 RX ORDER — LIDOCAINE HYDROCHLORIDE 10 MG/ML
INJECTION, SOLUTION INFILTRATION; PERINEURAL
Status: DISCONTINUED | OUTPATIENT
Start: 2021-06-01 | End: 2021-06-01 | Stop reason: HOSPADM

## 2021-06-01 RX ORDER — HALOPERIDOL 5 MG/ML
1 INJECTION INTRAMUSCULAR
Status: DISCONTINUED | OUTPATIENT
Start: 2021-06-01 | End: 2021-06-01 | Stop reason: HOSPADM

## 2021-06-01 RX ORDER — BUPIVACAINE HYDROCHLORIDE 5 MG/ML
INJECTION, SOLUTION EPIDURAL; INTRACAUDAL
Status: DISCONTINUED | OUTPATIENT
Start: 2021-06-01 | End: 2021-06-01 | Stop reason: HOSPADM

## 2021-06-01 RX ORDER — ACETAMINOPHEN 500 MG
1000 TABLET ORAL ONCE
Status: COMPLETED | OUTPATIENT
Start: 2021-06-01 | End: 2021-06-01

## 2021-06-01 RX ORDER — BETAMETHASONE SODIUM PHOSPHATE AND BETAMETHASONE ACETATE 3; 3 MG/ML; MG/ML
INJECTION, SUSPENSION INTRA-ARTICULAR; INTRALESIONAL; INTRAMUSCULAR; SOFT TISSUE
Status: DISCONTINUED | OUTPATIENT
Start: 2021-06-01 | End: 2021-06-01 | Stop reason: HOSPADM

## 2021-06-01 RX ADMIN — FENTANYL CITRATE 50 MCG: 50 INJECTION, SOLUTION INTRAMUSCULAR; INTRAVENOUS at 10:29

## 2021-06-01 RX ADMIN — MIDAZOLAM HYDROCHLORIDE 2 MG: 1 INJECTION, SOLUTION INTRAMUSCULAR; INTRAVENOUS at 10:18

## 2021-06-01 RX ADMIN — SODIUM CHLORIDE, POTASSIUM CHLORIDE, SODIUM LACTATE AND CALCIUM CHLORIDE: 600; 310; 30; 20 INJECTION, SOLUTION INTRAVENOUS at 09:28

## 2021-06-01 RX ADMIN — CEFAZOLIN 2 G: 1 INJECTION, POWDER, FOR SOLUTION INTRAVENOUS at 10:21

## 2021-06-01 RX ADMIN — ACETAMINOPHEN 1000 MG: 500 TABLET ORAL at 09:28

## 2021-06-01 RX ADMIN — FENTANYL CITRATE 50 MCG: 50 INJECTION, SOLUTION INTRAMUSCULAR; INTRAVENOUS at 10:25

## 2021-06-01 RX ADMIN — FENTANYL CITRATE 50 MCG: 50 INJECTION, SOLUTION INTRAMUSCULAR; INTRAVENOUS at 10:21

## 2021-06-01 RX ADMIN — PROPOFOL 50 MG: 10 INJECTION, EMULSION INTRAVENOUS at 10:21

## 2021-06-01 RX ADMIN — CELECOXIB 200 MG: 200 CAPSULE ORAL at 09:28

## 2021-06-01 RX ADMIN — WATER 15 ML: 100 IRRIGANT IRRIGATION at 09:28

## 2021-06-01 ASSESSMENT — PAIN SCALES - GENERAL: PAIN_LEVEL: 0

## 2021-06-01 ASSESSMENT — FIBROSIS 4 INDEX: FIB4 SCORE: 2.2

## 2021-06-01 ASSESSMENT — PAIN DESCRIPTION - PAIN TYPE: TYPE: SURGICAL PAIN

## 2021-06-01 NOTE — ANESTHESIA PREPROCEDURE EVALUATION
62 yo w/ (B)CTS    Relevant Problems   ANESTHESIA   (positive) Sleep apnea      CARDIAC   (positive) Pacemaker   (positive) Paroxysmal atrial fibrillation (HCC)   (positive) Venous insufficiency      GI   (positive) GERD (gastroesophageal reflux disease)      Other   (positive) Cervical arthritis   (positive) Chronic anticoagulation   (positive) Decreased GFR   (positive) Dyslipidemia   (positive) Hypertrophic obstructive cardiomyopathy (HCC)   (positive) IBS (irritable bowel syndrome)     Denies CAD, CVA, HTN, DM, LUNG/LIVER DZ, URI    Physical Exam    Airway   Mallampati: II  TM distance: >3 FB  Neck ROM: full       Cardiovascular   Rhythm: regular  Rate: normal  (-) murmur     Dental - normal exam           Pulmonary   Breath sounds clear to auscultation     Abdominal    Neurological - normal exam                 Anesthesia Plan    ASA 3   ASA physical status 3 criteria: implanted pacemaker    Plan - MAC               Induction: intravenous      Pertinent diagnostic labs and testing reviewed    Informed Consent:    Anesthetic plan and risks discussed with patient.

## 2021-06-01 NOTE — ANESTHESIA TIME REPORT
Anesthesia Start and Stop Event Times     Date Time Event    6/1/2021 1011 Ready for Procedure     1018 Anesthesia Start     1044 Anesthesia Stop        Responsible Staff  06/01/21    Name Role Begin End    Fay Proctor M.D. Anesth 1018 1044        Preop Diagnosis (Free Text):  Pre-op Diagnosis     CARPAL TUNNEL SYNDROME LEFT        Preop Diagnosis (Codes):    Post op Diagnosis  Carpal tunnel syndrome, bilateral      Premium Reason  Non-Premium    Comments:

## 2021-06-01 NOTE — OR SURGEON
Immediate Post OP Note    PreOp Diagnosis: B/L cts      PostOp Diagnosis: r endo ctr, inj l ct ua      Procedure(s):  RELEASE, CARPAL TUNNEL, ENDOSCOPIC - Wound Class: Clean  INJECTION, JOINT, DIAGNOSTIC-CARPAL TUNNEL - Wound Class: Clean    Surgeon(s):  Mike Ospina M.D.    Anesthesiologist/Type of Anesthesia:  Anesthesiologist: Fay Proctor M.D./Sedation    Surgical Staff:  Circulator: Brayan Estevez R.N.  Scrub Person: Jose Alexander    Specimens removed if any:  * No specimens in log *    Estimated Blood Loss: min    Findings: adequate release    Complications: none        6/1/2021 10:51 AM Mike Ospina M.D.

## 2021-06-01 NOTE — ANESTHESIA POSTPROCEDURE EVALUATION
Patient: Seda Cagle    Procedure Summary     Date: 06/01/21 Room / Location:  OR 02 / SURGERY [facility]    Anesthesia Start: 1018 Anesthesia Stop: 1044    Procedures:       RELEASE, CARPAL TUNNEL, ENDOSCOPIC (Right Wrist)      INJECTION, JOINT, DIAGNOSTIC-CARPAL TUNNEL (Left Wrist) Diagnosis: (CARPAL TUNNEL SYNDROME LEFT)    Surgeons: Mike Ospina M.D. Responsible Provider: Fay Proctor M.D.    Anesthesia Type: MAC ASA Status: 3          Final Anesthesia Type: MAC  Last vitals  BP   Blood Pressure: (!) 97/56    Temp   36.2 °C (97.2 °F)    Pulse   60   Resp   16    SpO2   95 %      Anesthesia Post Evaluation    Patient location during evaluation: PACU  Patient participation: complete - patient participated  Level of consciousness: awake  Pain score: 0    Airway patency: patent  Anesthetic complications: no  Cardiovascular status: adequate  Respiratory status: acceptable  Hydration status: acceptable    PONV: none          No complications documented.     Nurse Pain Score: 0 (NPRS)

## 2021-06-01 NOTE — OR NURSING
1039: To PACU from OR via gurney, Awake, supplemental oxygen at 6 lpm via mask, Anesthesia initiated fluid challenge for low BP (see Flowsheet) Plan to keep pt in PACU for full hour per [facility] protocol.    1050: Patient report to ANSELMO GAXIOLA. For break    1105: assumed care from ANSELMO Ricks, Patient resting BP trending upward. Patient using incentive spirometer to increase SpO2.     1120: no changes. Still requires IS to maintain SpO2.    1125: Patient's SpO2 in maintaining now between 92% and 95% Meets criteria to transfer to Stage 2, Report to ANSELMO Marks.    1134: Phase I complete.

## 2021-06-01 NOTE — OR NURSING
1008:  Patient allergies and NPO status verified, home medication reconciliation completed and belongings secured. Patient verbalizes understanding of pain scale, expected course of stay and plan of care. Surgical site verified with patient. IV access established. Sequentials placed on legs. Triple aim completed by CNA.

## 2021-06-01 NOTE — DISCHARGE INSTRUCTIONS
ACTIVITY: Rest and take it easy for the first 24 hours.  A responsible adult is recommended to remain with you during that time.  It is normal to feel sleepy.  We encourage you to not do anything that requires balance, judgment or coordination.    MILD FLU-LIKE SYMPTOMS ARE NORMAL. YOU MAY EXPERIENCE GENERALIZED MUSCLE ACHES, THROAT IRRITATION, HEADACHE AND/OR SOME NAUSEA.    FOR 24 HOURS DO NOT:  Drive, operate machinery or run household appliances.  Drink beer or alcoholic beverages.   Make important decisions or sign legal documents.    SPECIAL INSTRUCTIONS:     Keep dressing clean and dry   Elevate extremity   May remove dressing 5-6 days and shower   Encourage moving all fingers   Weight bearing as tolerated to the right upper extremity     DIET: To avoid nausea, slowly advance diet as tolerated, avoiding spicy or greasy foods for the first day.  Add more substantial food to your diet according to your physician's instructions.  Babies can be fed formula or breast milk as soon as they are hungry.  INCREASE FLUIDS AND FIBER TO AVOID CONSTIPATION.        FOLLOW-UP APPOINTMENT:  A follow-up appointment should be arranged with your doctor; call to schedule.    You should CALL YOUR PHYSICIAN if you develop:  Fever greater than 101 degrees F.  Pain not relieved by medication, or persistent nausea or vomiting.  Excessive bleeding (blood soaking through dressing) or unexpected drainage from the wound.  Extreme redness or swelling around the incision site, drainage of pus or foul smelling drainage.  Inability to urinate or empty your bladder within 8 hours.  Problems with breathing or chest pain.    You should call 911 if you develop problems with breathing or chest pain.  If you are unable to contact your doctor or surgical center, you should go to the nearest emergency room or urgent care center.  Physician's telephone #: [phone]      If any questions arise, call your doctor.  If your doctor is not available,  please feel free to call the Surgical Center at [phone]. The Contact Center is open Monday through Friday 7AM to 5PM and may speak to a nurse at [phone], or toll free at [phone].     A registered nurse may call you a few days after your surgery to see how you are doing after your procedure.    MEDICATIONS: Resume taking daily medication.  Take prescribed pain medication with food.  If no medication is prescribed, you may take non-aspirin pain medication if needed.  PAIN MEDICATION CAN BE VERY CONSTIPATING.  Take a stool softener or laxative such as senokot, pericolace, or milk of magnesia if needed.    Prescription given for Ultram .  No pain medication given in Recovery, okay to take your home prescription when ready.     If your physician has prescribed pain medication that includes Acetaminophen (Tylenol), do not take additional Acetaminophen (Tylenol) while taking the prescribed medication.    Depression / Suicide Risk    As you are discharged from this [facility] facility, it is important to learn how to keep safe from harming yourself.    Recognize the warning signs:  · Abrupt changes in personality, positive or negative- including increase in energy   · Giving away possessions  · Change in eating patterns- significant weight changes-  positive or negative  · Change in sleeping patterns- unable to sleep or sleeping all the time   · Unwillingness or inability to communicate  · Depression  · Unusual sadness, discouragement and loneliness  · Talk of wanting to die  · Neglect of personal appearance   · Rebelliousness- reckless behavior  · Withdrawal from people/activities they love  · Confusion- inability to concentrate     If you or a loved one observes any of these behaviors or has concerns about self-harm, here's what you can do:  · Talk about it- your feelings and reasons for harming yourself  · Remove any means that you might use to hurt yourself (examples: pills, rope, extension cords,  firearm)  · Get professional help from the community (Mental Health, Substance Abuse, psychological counseling)  · Do not be alone:Call your Safe Contact- someone whom you trust who will be there for you.  · Call your local CRISIS HOTLINE 687-1078 or 458-134-9274  · Call your local Children's Mobile Crisis Response Team [place] (328) 966-2030 or www.[employer]King  · Call the toll free National Suicide Prevention Hotlines   · National Suicide Prevention Lifeline 427-317-SMEQ (7725)  · National Hope Line Network 800-SUICIDE (632-6971)

## 2021-06-01 NOTE — OR NURSING
1050-received report from Sourav BRIONES. Patient awake, alert, denies any pain or nausea. She reports some mild tingling in the right hand. Cold pack in place and elevated RUE. Dressing is CDI.     1100-pt given and IS and instructed on its use. She is reaching volumes of 1500. O2 improving, up to 97% on RA at this time. Prior patient was at 88-90%.     1104-called and updated patients  for discharge plan. Pt now reaching volumes of 2,000 on the IS.     1112-report back to Sourav BRIONES.

## 2021-06-02 NOTE — OP REPORT
DATE OF SERVICE:  06/01/2021     PREOPERATIVE DIAGNOSIS:  Bilateral carpal tunnel syndrome.     POSTOPERATIVE DIAGNOSIS:  Bilateral carpal tunnel syndrome.     PROCEDURES:  1.  Right endoscopic carpal tunnel release.  2.  Injection, left carpal canal under anesthesia.     SURGEON:  Mike Ospina MD     ANESTHESIA:  IV sedation with local.     ESTIMATED BLOOD LOSS:  Minimal.     DRAINS:  None.     COMPLICATIONS:  None.     INDICATIONS:  The patient has symptoms of bilateral carpal tunnel syndrome,   felt to benefit from operative release.  Risks, benefits, complications and   alternatives explained to the patient.  All questions were answered.  No   guarantees were given.  Signed consent was obtained.     DESCRIPTION OF PROCEDURE:  The patient was taken to the operating room, laid   supine on the table.  All bony prominences well padded.  Adequate IV sedation   was given.  The right upper extremity was prepped and draped in the usual   sterile fashion with Hibiclens.  The limb was exsanguinated with elevation,   tourniquet raised, total tourniquet time was under 15 minutes. While we were   prepping the right side, I injected the left carpal canal with 2 mL of   Celestone, 1 mL of local.  We then proceeded with the right side.  We marked   for 2 incisions, proximal incision made through skin and subcutaneous tissue   was bluntly dissected.  Probe was placed underneath the transverse carpal   ligament to remove any synovium.  With the wrist in extension, a small   incision made in the palm.  This was then removed and scope sheath inserted in   a similar fashion.  Hand was placed in the hand weaver.  Scope was inserted   proximally and distally, good visualization was probed.  Distal cut was made,   mid portion cut was made, two were connected with a curved cutting knife,   found to be a nice release.  Scope was inserted distally, looking proximally,   again good visualization, again probed.  The remaining cut was  performed with   a curved cutting knife, found to be a nice release along its entirety.  The   scope sheath was removed.  The wound irrigated, closed with nylon in simple   fashion.  Local without epinephrine was injected.  Sterile dressing, Xeroform,   4x4's, Sof-Rol, bias was applied.  All needle, sponge counts were correct.    The patient tolerated the procedure well and was transferred to recovery room   in stable condition.        ______________________________  MD MIKEL PERALTA/ANDERSON/TIM    DD:  06/01/2021 17:49  DT:  06/01/2021 18:21    Job#:  [id number]

## 2021-07-26 DIAGNOSIS — I42.2 HYPERTROPHIC CARDIOMYOPATHY (HCC): ICD-10-CM

## 2021-07-27 RX ORDER — DISOPYRAMIDE PHOSPHATE 100 MG/1
CAPSULE ORAL
Qty: 270 CAPSULE | Refills: 1 | Status: SHIPPED | OUTPATIENT
Start: 2021-07-27 | End: 2022-01-21

## 2021-08-11 ENCOUNTER — OFFICE VISIT (OUTPATIENT)
Dept: CARDIOLOGY | Facility: MEDICAL CENTER | Age: 61
End: 2021-08-11
Payer: COMMERCIAL

## 2021-08-11 VITALS
BODY MASS INDEX: 34.38 KG/M2 | OXYGEN SATURATION: 97 % | HEART RATE: 63 BPM | HEIGHT: 63 IN | WEIGHT: 194 LBS | DIASTOLIC BLOOD PRESSURE: 84 MMHG | SYSTOLIC BLOOD PRESSURE: 116 MMHG

## 2021-08-11 DIAGNOSIS — I48.0 PAROXYSMAL ATRIAL FIBRILLATION (HCC): ICD-10-CM

## 2021-08-11 DIAGNOSIS — Z95.0 PACEMAKER: ICD-10-CM

## 2021-08-11 DIAGNOSIS — R07.89 OTHER CHEST PAIN: ICD-10-CM

## 2021-08-11 DIAGNOSIS — I42.2 HYPERTROPHIC CARDIOMYOPATHY (HCC): ICD-10-CM

## 2021-08-11 PROCEDURE — 99214 OFFICE O/P EST MOD 30 MIN: CPT | Mod: 25 | Performed by: INTERNAL MEDICINE

## 2021-08-11 PROCEDURE — 93288 INTERROG EVL PM/LDLS PM IP: CPT | Performed by: INTERNAL MEDICINE

## 2021-08-11 PROCEDURE — 93000 ELECTROCARDIOGRAM COMPLETE: CPT | Performed by: INTERNAL MEDICINE

## 2021-08-11 ASSESSMENT — FIBROSIS 4 INDEX: FIB4 SCORE: 2.2

## 2021-08-11 NOTE — PROGRESS NOTES
Arrhythmia Clinic Note (Established patient)    DOS: 8/11/2021    Chief complaint/Reason for consult: F/u HCM/PPM    Interval History:  Patient is a 62 yo pt with history of HCM, s/p PPM for symptomatic PAF/PVCs and tachy francia on buzz agents and norpace. Overall AF burden remains low on antiarrhythmic therapy. RV threshold continues to go up along with lead impedance. Saturday she had substernal chest pain, fairly severe, radiated down R arm, associated with nausea. She did not go to ER. She said she went to bed and things were resolved when she wakes up. No further CP.    ROS (+ highlighted in red):  General--Negative for fatigue, weight loss or weight gain  Cardiovascular--Negative for CP, orthopnea, PND    Past Medical History:   Diagnosis Date   • Anesthesia     wakes up during procedure (x2)   • Aortic regurgitation    • Arrhythmia, ventricular 2008    Symptomatic PVCs, controlled with medication.   • Asthma     inhaler PRN, only uses when sick    • Atrial fibrillation (HCC)    • Cardiac arrhythmia    • Cervical arthritis 2/8/2016   • Chickenpox    • Chronic kidney disease, stage III (moderate) (HCC) 2/9/2016   • CTS (carpal tunnel syndrome) 2/28/2011   • Depression    • GERD (gastroesophageal reflux disease)    • Heart burn    • Heart murmur    • Hemorrhagic disorder (HCC)     on Eliquis   • Hypertr obst cardiomyop 1991    Dr. Bert Vazquez, [facility]– [place][note 2]0 [address] [address] [phone] fax [phone].  [place] office [phone].  Alchol Septal Reduction 8/00, Coronaries normal.   • Hypertrophic cardiomyopathy (HCC)    • Indigestion    • Influenza    • Pacemaker 2014   • Renal disorder     chronic kidney disease stage 3-  **pt states currently no issue, numbers are stable   • Sleep apnea     uses CPAP   • Snoring        Past Surgical History:   Procedure Laterality Date   • PB WRIST ARTHROSCOP,RELEASE XVERS LIG Right 6/1/2021    Procedure: RELEASE, CARPAL TUNNEL, ENDOSCOPIC;  Surgeon:  Mike Ospina M.D.;  Location: SURGERY [facility];  Service: Orthopedics   • JOINT INJECTION DIAGNOSTIC Left 6/1/2021    Procedure: INJECTION, JOINT, DIAGNOSTIC-CARPAL TUNNEL;  Surgeon: Mike Ospina M.D.;  Location: SURGERY [facility];  Service: Orthopedics   • HYSTEROSCOPY WITH VIDEO OPERATIVE  4/30/2018    Procedure: HYSTEROSCOPY WITH VIDEO OPERATIVE;  Surgeon: Noemi Liu M.D.;  [facility]: [facility];  Service: Labor and Delivery   • DILATION AND CURETTAGE  4/30/2018    Procedure: DILATION AND CURETTAGE;  Surgeon: Noemi Liu M.D.;  Location: [facility]ERY [facility];  Service: Labor and Delivery   • BREAST BIOPSY Left 1/30/2018    Procedure: BREAST BIOPSY- WIRE LOCALIZED;  Surgeon: Vijaya Britt M.D.;  Location: [facility];  Service: General   • RECOVERY  4/15/2015    Performed by [facility] Surgery at SURGERY [facility]   • PACEMAKER INSERTION  2015   • RECOVERY  12/12/2014    Performed by [facility] at [facility]ERY [facility]   • SEPTAL RECONSTRUCTION  2000   • ENDOSCOPY     • PRIMARY C SECTION     • TUBAL COAGULATION LAPAROSCOPIC BILATERAL         Social History     Socioeconomic History   • Marital status:      Spouse name: Not on file   • Number of children: Not on file   • Years of education: Not on file   • Highest education level: Some college, no degree   Occupational History   • Not on file   Tobacco Use   • Smoking status: Never Smoker   • Smokeless tobacco: Never Used   Vaping Use   • Vaping Use: Never used   Substance and Sexual Activity   • Alcohol use: Not Currently     Alcohol/week: 0.6 oz     Types: 1 Standard drinks or equivalent per week     Comment: 3-4 per week   • Drug use: No   • Sexual activity: Yes     Partners: Male     Birth control/protection: Surgical     Comment:    Other Topics Concern   • Not on file   Social History Narrative   • Not on file     Social Determinants of  Health     Financial Resource Strain: Low Risk    • Difficulty of Paying Living Expenses: Not hard at all   Food Insecurity: No Food Insecurity   • Worried About Running Out of Food in the Last Year: Never true   • Ran Out of Food in the Last Year: Never true   Transportation Needs: No Transportation Needs   • Lack of Transportation (Medical): No   • Lack of Transportation (Non-Medical): No   Physical Activity: Insufficiently Active   • Days of Exercise per Week: 3 days   • Minutes of Exercise per Session: 30 min   Stress: No Stress Concern Present   • Feeling of Stress : Only a little   Social Connections: Moderately Isolated   • Frequency of Communication with Friends and Family: More than three times a week   • Frequency of Social Gatherings with Friends and Family: Never   • Attends Sabianism Services: Never   • Active Member of Clubs or Organizations: No   • Attends Club or Organization Meetings: Never   • Marital Status:    Intimate Partner Violence:    • Fear of Current or Ex-Partner:    • Emotionally Abused:    • Physically Abused:    • Sexually Abused:        Family History   Problem Relation Age of Onset   • Heart Disease Mother         CHF   • Hypertension Mother    • Stroke Mother    • Heart Failure Mother    • Other Mother         carotid artery disease,gout   • Cancer Father         AML   • Diabetes Maternal Grandmother    • Cancer Maternal Grandfather         Prostate   • Heart Disease Paternal Grandfather 52         MI    • Sleep Apnea Neg Hx        No Known Allergies    Current Outpatient Medications   Medication Sig Dispense Refill   • disopyramide (NORPACE) 100 MG Cap TAKE ONE CAPSULE BY MOUTH EVERY 8 HOURS 270 capsule 1   • Probiotic Product (PROBIOTIC-10 PO) Take 1 capsule by mouth every day.     • ELIQUIS 5 MG Tab TAKE ONE TABLET BY MOUTH TWICE A  tablet 3   • metoprolol SR (TOPROL XL) 100 MG TABLET SR 24 HR TAKE ONE TABLET BY MOUTH DAILY (Patient taking differently: Take 100  "mg by mouth every day.) 90 tablet 3   • atorvastatin (LIPITOR) 40 MG Tab TAKE ONE TABLET BY MOUTH EVERY EVENING (LIPITOR) 90 Tab 2   • Multiple Vitamin (MULTI-VITAMIN PO) Take  by mouth every day.     • Pantothenic Acid 500 MG Tab Take 500 mg by mouth every day.     • Melatonin 5 MG TABLET DISPERSIBLE Take  by mouth at bedtime as needed.     • Calcium-Vitamin D-Vitamin K (CALCIUM FOR WOMEN PO) Take 1 Each by mouth every day at 6 PM. Taking in the morning     • Lansoprazole (PREVACID PO) Take 1 Each by mouth 1 time daily as needed.     • Magnesium 200 MG TABS Take 1 Tab by mouth every day.     • linaCLOtide (LINZESS) 290 MCG Cap Take 290 mcg by mouth every day. (Patient not taking: Reported on 5/20/2021) 30 Cap 3     No current facility-administered medications for this visit.       Physical Exam:  Vitals:    08/11/21 1458   BP: 116/84   BP Location: Left arm   Patient Position: Sitting   BP Cuff Size: Adult   Pulse: 63   SpO2: 97%   Weight: 88 kg (194 lb)   Height: 1.6 m (5' 3\")     General appearance: NAD, conversant  HEENT: PERRL, neck is supple with FROM  Lungs: Clear to auscultation, normal respiratory effort  CV: RRR, 3/6 systolic murmur, no JVD  Abdomen: Soft, non-tender with normal bowel sounds  Extremities: No peripheral edema, no clubbing or cyanosis  Skin: No rash, lesions, or ulcers  Psych: Alert and oriented to person, place and time    Data:  Labs reviewed    Prior echo/stress reviewed:  Prior MPI showing equivocal prior infarct    EKG interpreted by me:  A paced, sinus rhythm, LBBB    Impression/Plan:  1. Paroxysmal atrial fibrillation (HCC)  EKG   2. Other chest pain  CT-CTA HEART W/3D IMAGE   3. Hypertrophic cardiomyopathy (HCC)  CT-CTA HEART W/3D IMAGE     -I would like to more definitively exclude obstructive CAD, but I do not think pre-test probability so high as to cath  -I will order coronary CTA  -RV lead will be an issue, f/u in 6 mo, at that time schedule generator change and lead " revision    Solitario Witt MD

## 2021-08-12 PROBLEM — Q30.9 ABNORMAL NASAL SEPTUM: Status: ACTIVE | Noted: 2021-08-12

## 2021-08-12 LAB — EKG IMPRESSION: NORMAL

## 2021-08-19 DIAGNOSIS — Z01.812 PRE-PROCEDURE LAB EXAM: ICD-10-CM

## 2021-08-19 NOTE — PROGRESS NOTES
Order placed for outpatient, non fasting creatinine blood draw to check kidney function within 30 days prior to contrast administration for coronary CTA.

## 2021-09-03 ENCOUNTER — SLEEP CENTER VISIT (OUTPATIENT)
Dept: SLEEP MEDICINE | Facility: MEDICAL CENTER | Age: 61
End: 2021-09-03
Payer: COMMERCIAL

## 2021-09-03 VITALS
HEIGHT: 64 IN | WEIGHT: 193.8 LBS | DIASTOLIC BLOOD PRESSURE: 64 MMHG | RESPIRATION RATE: 16 BRPM | BODY MASS INDEX: 33.09 KG/M2 | SYSTOLIC BLOOD PRESSURE: 136 MMHG | HEART RATE: 72 BPM | OXYGEN SATURATION: 97 %

## 2021-09-03 DIAGNOSIS — G47.33 OBSTRUCTIVE SLEEP APNEA SYNDROME: ICD-10-CM

## 2021-09-03 PROCEDURE — 99213 OFFICE O/P EST LOW 20 MIN: CPT | Performed by: NURSE PRACTITIONER

## 2021-09-03 RX ORDER — FAMOTIDINE 40 MG/1
40 TABLET, FILM COATED ORAL DAILY
COMMUNITY
End: 2022-01-20 | Stop reason: SDUPTHER

## 2021-09-03 RX ORDER — FAMOTIDINE 40 MG/1
TABLET, FILM COATED ORAL
COMMUNITY
Start: 2021-08-12 | End: 2021-11-22

## 2021-09-03 ASSESSMENT — FIBROSIS 4 INDEX: FIB4 SCORE: 2.2

## 2021-09-03 NOTE — PATIENT INSTRUCTIONS
1.  Discussed recall information with the patient.  Also patient's current condition seems to be stable.  She has since stopped using the [employer] device.  She states it was causing dizzy sensations that improved with device cessation.  Review compliance report and it did show an apnea-hypopnea index of 6.9.  Based on the dizziness and the untreated sleep apnea on auto CPAP at 5 to 15 cm/H2O, I recommended monitoring condition for any worsening of A. Fib, or any return of symptoms associated with untreated sleep apnea including morning headaches, palpitations, excessive daytime sleepiness, concentration or memory problems.  If any of these symptoms occur, we can consider proceeding with a plan of either sleep titration study, or O p.o.  Patient advised to reach out via [employer] with any concerns before scheduling next appointment.  If needed I can order testing before follow-up.      Dear Valued Patient,     At [facility], we are committed to providing excellent care in all your health needs. Out of an abundance of caution, we want you to be aware that [employer] is recalling all devices manufactured before April 26, 2021. This may affect patients of our Sleep Medicine program and individuals with chronic respiratory disease using noninvasive ventilation.    To learn more about the medical devices recall please visit: [email or web address].     What has been reported to date?  1) [employer] has received several complaints of black debris or particles within the air flow pathway. The air flow pathway includes the device outlet, humidifier, tubing and mask.  2) Also, [employer] has received reports of the following symptoms that may be related to this degradation process:  • Headaches; Upper airway irritation; Cough; Chest pressure; Sinus infection    What should you do if you are using this device?  1) Lolita your device(s)  on the recall website [email or web address]  a. This website will provide current information regarding this device recall  b. This website will instruct you regarding how to locate device serial number  2) If you do not have internet access you can call to register your device at [phone].    If you have experienced any of the symptoms listed above or if you have seen black debris in the air flow pathway of your device, please call [facility] - Sleep Medicine at [phone]. You may also contact your [employer] if you have questions about your specific machine.    As your trusted healthcare partner, we wanted to bring this to your attention and help guide you in the recall/replacement process.     Sincerely,    Dr. Jonas Suresh  [facility] - Sleep Medicine

## 2021-09-03 NOTE — PROGRESS NOTES
"Chief Complaint   Patient presents with   • Follow-Up     GEORGINA       HPI:  Seda Cagle is a 61 y.o. year old female here today for follow-up on GEORGINA.  Last seen 6/4/2020 by Dr. Weiner.  Per previous note, \"She has a history of hypertrophic cardiomyopathy and atrial fibrillation with septal ablation permanent pacemaker placement.  She is followed by cardiology and remains anticoagulated on rivaroxaban.  An echocardiogram next year demonstrated preserved left ventricular systolic function with an estimated ejection fraction of 60%, grade 2 diastolic dysfunction, mild aortic stenosis and left atrial enlargement.  The right ventricle appeared normal in size and systolic function but right-sided pressures could not be estimated.\"     A home sleep test on February 14, 2017 demonstrated a respiratory event index of 19.7 events per hour with a lowest arterial oxygen saturation of 83%.  Polysomnography on March 26, 2017 confirmed the apnea hypopnea index of 21.3 events per hour with significant hypoxemia.  She was started on auto titrating CPAP at 5 to 15 cm water pressure.    Patient presents today for concerns of [employer] Respironics recall.  Compliance report ending 7/27/2021 was reviewed and does show 96.7% usage with an average time of of 5 hours and 30 minutes with a resultant AHI of 6.9.  Since CPAP machine was ordered 3/31/2017.  Current pressures are 5 to 15 cm/H2O.    Patient states that she was using soap and water to clean her supplies.  She denies any headache upper airway irritation, cough, chest pressure, or sinus infections associated with the recall.  She does state several episodes of dizziness throughout the day while on therapy, which has resolved after discontinuing therapy.  He is also followed by ENT for deviated septum.    Currently, she states she feels great.  She denies any excessive daytime sleepiness, morning headaches, palpitations, concentration or memory problems.  She is " followed by cardiology for A. fib and states no worsening of heart rate.  Her heart rate seems to be well in control, she states.      ROS: As per HPI and otherwise negative if not stated.    Past Medical History:   Diagnosis Date   • Anesthesia     wakes up during procedure (x2)   • Aortic regurgitation    • Arrhythmia, ventricular 2008    Symptomatic PVCs, controlled with medication.   • Asthma     inhaler PRN, only uses when sick    • Atrial fibrillation (HCC)    • Cardiac arrhythmia    • Cervical arthritis 2/8/2016   • Chickenpox    • Chronic kidney disease, stage III (moderate) (HCC) 2/9/2016   • CTS (carpal tunnel syndrome) 2/28/2011   • Depression    • GERD (gastroesophageal reflux disease)    • Heart burn    • Heart murmur    • Hemorrhagic disorder (HCC)     on Eliquis   • Hypertr obst cardiomyop 1991    Dr. Bert Vazquez, [facility] [place] [address] [address] [phone] fax [phone].  [facility] [phone].  Alchol Septal Reduction 8/00, Coronaries normal.   • Hypertrophic cardiomyopathy (HCC)    • Indigestion    • Influenza    • Pacemaker 2014   • Renal disorder     chronic kidney disease stage 3-  **pt states currently no issue, numbers are stable   • Sleep apnea     uses CPAP   • Snoring        Past Surgical History:   Procedure Laterality Date   • PB WRIST ARTHROSCOP,RELEASE XVERS LIG Right 6/1/2021    Procedure: RELEASE, CARPAL TUNNEL, ENDOSCOPIC;  Surgeon: Mike Ospina M.D.;  Location: SURGERY [facility];  Service: Orthopedics   • JOINT INJECTION DIAGNOSTIC Left 6/1/2021    Procedure: INJECTION, JOINT, DIAGNOSTIC-CARPAL TUNNEL;  Surgeon: Mike Ospina M.D.;  Location: SURGERY [facility];  Service: Orthopedics   • HYSTEROSCOPY WITH VIDEO OPERATIVE  4/30/2018    Procedure: HYSTEROSCOPY WITH VIDEO OPERATIVE;  Surgeon: Noemi Liu M.D.;  Location: SURGERY SAME DAY [facility];  Service: Labor and Delivery   • DILATION AND CURETTAGE  4/30/2018    Procedure:  "DILATION AND CURETTAGE;  Surgeon: Noemi Liu M.D.;  Location: SURGERY SAME DAY [facility];  Service: Labor and Delivery   • BREAST BIOPSY Left 2018    Procedure: BREAST BIOPSY- WIRE LOCALIZED;  Surgeon: Vijaya Britt M.D.;  Location: SURGERY SAME DAY [facility];  Service: General   • RECOVERY  4/15/2015    Performed by [facility] at SURGERY PRE-POST PROC UNIT [facility]   • PACEMAKER INSERTION     • RECOVERY  2014    Performed by [facility] at SURGERY SAME DAY [facility]   • SEPTAL RECONSTRUCTION     • ENDOSCOPY     • PRIMARY C SECTION     • TUBAL COAGULATION LAPAROSCOPIC BILATERAL         Family History   Problem Relation Age of Onset   • Heart Disease Mother         CHF   • Hypertension Mother    • Stroke Mother    • Heart Failure Mother    • Other Mother         carotid artery disease,gout   • Cancer Father         AML   • Diabetes Maternal Grandmother    • Cancer Maternal Grandfather         Prostate   • Heart Disease Paternal Grandfather 52         MI    • Sleep Apnea Neg Hx        Allergies as of 2021   • (No Known Allergies)        Vitals:  /64 (BP Location: Left arm, Patient Position: Sitting, BP Cuff Size: Adult)   Pulse 72   Resp 16   Ht 1.626 m (5' 4\")   Wt 87.9 kg (193 lb 12.8 oz)   SpO2 97%     Current medications as of today   Current Outpatient Medications   Medication Sig Dispense Refill   • famotidine (PEPCID) 40 MG Tab      • famotidine (PEPCID) 40 MG Tab Take 40 mg by mouth every day.     • disopyramide (NORPACE) 100 MG Cap TAKE ONE CAPSULE BY MOUTH EVERY 8 HOURS 270 capsule 1   • Probiotic Product (PROBIOTIC-10 PO) Take 1 capsule by mouth every day.     • ELIQUIS 5 MG Tab TAKE ONE TABLET BY MOUTH TWICE A  tablet 3   • metoprolol SR (TOPROL XL) 100 MG TABLET SR 24 HR TAKE ONE TABLET BY MOUTH DAILY (Patient taking differently: Take 100 mg by mouth every day.) 90 tablet 3   • atorvastatin (LIPITOR) 40 MG Tab TAKE ONE TABLET " BY MOUTH EVERY EVENING (LIPITOR) 90 Tab 2   • Multiple Vitamin (MULTI-VITAMIN PO) Take  by mouth every day.     • Pantothenic Acid 500 MG Tab Take 500 mg by mouth every day.     • Melatonin 5 MG TABLET DISPERSIBLE Take  by mouth at bedtime as needed.     • Calcium-Vitamin D-Vitamin K (CALCIUM FOR WOMEN PO) Take 1 Each by mouth every day at 6 PM. Taking in the morning     • Magnesium 200 MG TABS Take 1 Tab by mouth every day.     • Lansoprazole (PREVACID PO) Take 1 Each by mouth 1 time daily as needed.       No current facility-administered medications for this visit.         Physical Exam:   Gen:           Alert and oriented, No apparent distress. Mood and affect appropriate, normal interaction with examiner.  Eyes:          PERRL, EOM intact, sclere white, conjunctive moist.  Ears:          Not examined.   Hearing:     Grossly intact.  Nose:          Normal, no lesions or deformities.  Dentition:    Good dentition.  Oropharynx:   Tongue normal, posterior pharynx without erythema or exudate.  Neck:        Supple, trachea midline, no masses.  Respiratory Effort: No intercostal retractions or use of accessory muscles.   Lung Auscultation:      Clear to auscultation bilaterally; no rales, rhonchi or wheezing.  CV:            Regular rate and rhythm. No murmurs, rubs or gallops.  Abd:           Not examined.   Lymphadenopathy: Not examined.  Gait and Station: Normal.  Digits and Nails: No clubbing, cyanosis, petechiae, or nodes.   Cranial Nerves: II-XII grossly intact.  Skin:        No rashes, lesions or ulcers noted.               Ext:           No cyanosis or edema.      Assessment:  1. Obstructive sleep apnea syndrome         Immunizations:    Flu: 9/28/2020  Pneumovax 23: 2/3/2020  COVID-19: 3/19/2021, 4/17/2020    Plan:  1.  Discussed recall information with the patient.  Also patient's current condition seems to be stable.  She has since stopped using the [employer]en Respironics device.  She states it was causing  dizzy sensations that improved with device cessation.  Review compliance report and it did show an apnea-hypopnea index of 6.9.  Based on the dizziness and the untreated sleep apnea on auto CPAP at 5 to 15 cm/H2O, I recommended monitoring condition for any worsening of A. Fib, or any return of symptoms associated with untreated sleep apnea including morning headaches, palpitations, excessive daytime sleepiness, concentration or memory problems.  If any of these symptoms occur, we can consider proceeding with a plan of either sleep titration study, or O p.o.  Patient advised to reach out via [employer] with any concerns before scheduling next appointment.  If needed I can order testing before follow-up.    Dear Valued Patient,     At [facility], we are committed to providing excellent care in all your health needs. Out of an abundance of caution, we want you to be aware that [employer] is recalling all devices manufactured before April 26, 2021. This may affect patients of our Sleep Medicine program and individuals with chronic respiratory disease using noninvasive ventilation.    To learn more about the medical devices recall please visit: [email or web address].     What has been reported to date?  1) [employer] has received several complaints of black debris or particles within the air flow pathway. The air flow pathway includes the device outlet, humidifier, tubing and mask.  2) Also, [employer] has received reports of the following symptoms that may be related to this degradation process:  • Headaches; Upper airway irritation; Cough; Chest pressure; Sinus infection    What should you do if you are using this device?  1) [place] your device(s) on the recall website www.[employer]oot/src-update  a. This website will provide current information regarding this device recall  b. This website will instruct you regarding how to locate device  serial number  2) If you do not have internet access you can call to register your device at 1 [phone].    If you have experienced any of the symptoms listed above or if you have seen black debris in the air flow pathway of your device, please call [facility] - Sleep Medicine at [phone]. You may also contact your [employer] if you have questions about your specific machine.    As your trusted healthcare partner, we wanted to bring this to your attention and help guide you in the recall/replacement process.     Sincerely,    Dr. Jonas Suresh  [facility] - Sleep Medicine        Please note that this dictation was created using voice recognition software. I have made every reasonable attempt to correct obvious errors, but it is possible there are errors of grammar and possibly content that I did not discover before finalizing the note.

## 2021-09-07 ENCOUNTER — HOSPITAL ENCOUNTER (OUTPATIENT)
Dept: LAB | Facility: MEDICAL CENTER | Age: 61
End: 2021-09-07
Attending: NURSE PRACTITIONER
Payer: COMMERCIAL

## 2021-09-07 DIAGNOSIS — Z01.812 PRE-PROCEDURE LAB EXAM: ICD-10-CM

## 2021-09-07 LAB — CREAT SERPL-MCNC: 1.02 MG/DL (ref 0.5–1.4)

## 2021-09-07 PROCEDURE — 82565 ASSAY OF CREATININE: CPT

## 2021-09-07 PROCEDURE — 36415 COLL VENOUS BLD VENIPUNCTURE: CPT

## 2021-09-21 ENCOUNTER — TELEPHONE (OUTPATIENT)
Dept: MEDICAL GROUP | Facility: MEDICAL CENTER | Age: 61
End: 2021-09-21

## 2021-09-21 ENCOUNTER — HOSPITAL ENCOUNTER (OUTPATIENT)
Dept: RADIOLOGY | Facility: MEDICAL CENTER | Age: 61
End: 2021-09-21
Attending: INTERNAL MEDICINE
Payer: COMMERCIAL

## 2021-09-21 DIAGNOSIS — Z12.31 VISIT FOR SCREENING MAMMOGRAM: ICD-10-CM

## 2021-09-21 PROCEDURE — 77063 BREAST TOMOSYNTHESIS BI: CPT

## 2021-09-21 NOTE — TELEPHONE ENCOUNTER
Please notify the patient that:  (1) her mammogram is negative but does show dense breast tissue which may decrease the accuracy of the mammogram  (2) she may obtain a screening breast ultrasound which could provide further detail  (3) her insurance may not cover the screening breast ultrasound, if that is the case, then the out of pocket cost is approximately $125  (4) please let me know if she is interested in obtaining the screening breast ultrasound

## 2021-09-22 ENCOUNTER — HOSPITAL ENCOUNTER (OUTPATIENT)
Facility: MEDICAL CENTER | Age: 61
End: 2021-09-22
Attending: NURSE PRACTITIONER
Payer: COMMERCIAL

## 2021-09-22 ENCOUNTER — OFFICE VISIT (OUTPATIENT)
Dept: URGENT CARE | Facility: CLINIC | Age: 61
End: 2021-09-22
Payer: COMMERCIAL

## 2021-09-22 ENCOUNTER — TELEPHONE (OUTPATIENT)
Dept: MEDICAL GROUP | Facility: MEDICAL CENTER | Age: 61
End: 2021-09-22

## 2021-09-22 VITALS
RESPIRATION RATE: 16 BRPM | BODY MASS INDEX: 31.24 KG/M2 | HEART RATE: 60 BPM | HEIGHT: 64 IN | DIASTOLIC BLOOD PRESSURE: 80 MMHG | WEIGHT: 183 LBS | SYSTOLIC BLOOD PRESSURE: 118 MMHG | TEMPERATURE: 98.6 F | OXYGEN SATURATION: 96 %

## 2021-09-22 DIAGNOSIS — R53.83 OTHER FATIGUE: ICD-10-CM

## 2021-09-22 DIAGNOSIS — Z20.818 EXPOSURE TO STREP THROAT: ICD-10-CM

## 2021-09-22 LAB
INT CON NEG: NORMAL
INT CON POS: NORMAL
S PYO AG THROAT QL: NEGATIVE

## 2021-09-22 PROCEDURE — U0003 INFECTIOUS AGENT DETECTION BY NUCLEIC ACID (DNA OR RNA); SEVERE ACUTE RESPIRATORY SYNDROME CORONAVIRUS 2 (SARS-COV-2) (CORONAVIRUS DISEASE [COVID-19]), AMPLIFIED PROBE TECHNIQUE, MAKING USE OF HIGH THROUGHPUT TECHNOLOGIES AS DESCRIBED BY CMS-2020-01-R: HCPCS

## 2021-09-22 PROCEDURE — 87880 STREP A ASSAY W/OPTIC: CPT | Performed by: NURSE PRACTITIONER

## 2021-09-22 PROCEDURE — U0005 INFEC AGEN DETEC AMPLI PROBE: HCPCS

## 2021-09-22 PROCEDURE — 99213 OFFICE O/P EST LOW 20 MIN: CPT | Mod: CS | Performed by: NURSE PRACTITIONER

## 2021-09-22 ASSESSMENT — ENCOUNTER SYMPTOMS
MUSCULOSKELETAL NEGATIVE: 1
PSYCHIATRIC NEGATIVE: 1
NEUROLOGICAL NEGATIVE: 1
GASTROINTESTINAL NEGATIVE: 1
EYES NEGATIVE: 1
CARDIOVASCULAR NEGATIVE: 1
RESPIRATORY NEGATIVE: 1

## 2021-09-22 ASSESSMENT — FIBROSIS 4 INDEX: FIB4 SCORE: 2.2

## 2021-09-22 NOTE — PROGRESS NOTES
Subjective:   Seda Cagle is a 61 y.o. female who presents for Pharyngitis (little tired, been a week)       Pharyngitis       Pt presents for evaluation of a new problem, reports fatigue over the past 3 to 4 days.  Patient also has had a recent strep throat exposure by her .  Denies any additional constitutional symptoms or known ill contacts.    Review of Systems   Constitutional: Positive for malaise/fatigue.   HENT: Negative.    Eyes: Negative.    Respiratory: Negative.    Cardiovascular: Negative.    Gastrointestinal: Negative.    Genitourinary: Negative.    Musculoskeletal: Negative.    Skin: Negative.    Neurological: Negative.    Psychiatric/Behavioral: Negative.    All other systems reviewed and are negative.      MEDS:   Current Outpatient Medications:   •  famotidine (PEPCID) 40 MG Tab, , Disp: , Rfl:   •  famotidine (PEPCID) 40 MG Tab, Take 40 mg by mouth every day., Disp: , Rfl:   •  disopyramide (NORPACE) 100 MG Cap, TAKE ONE CAPSULE BY MOUTH EVERY 8 HOURS, Disp: 270 capsule, Rfl: 1  •  ELIQUIS 5 MG Tab, TAKE ONE TABLET BY MOUTH TWICE A DAY, Disp: 180 tablet, Rfl: 3  •  metoprolol SR (TOPROL XL) 100 MG TABLET SR 24 HR, TAKE ONE TABLET BY MOUTH DAILY (Patient taking differently: Take 100 mg by mouth every day.), Disp: 90 tablet, Rfl: 3  •  atorvastatin (LIPITOR) 40 MG Tab, TAKE ONE TABLET BY MOUTH EVERY EVENING (LIPITOR), Disp: 90 Tab, Rfl: 2  •  Multiple Vitamin (MULTI-VITAMIN PO), Take  by mouth every day., Disp: , Rfl:   •  Pantothenic Acid 500 MG Tab, Take 500 mg by mouth every day., Disp: , Rfl:   •  Melatonin 5 MG TABLET DISPERSIBLE, Take  by mouth at bedtime as needed., Disp: , Rfl:   •  Calcium-Vitamin D-Vitamin K (CALCIUM FOR WOMEN PO), Take 1 Each by mouth every day at 6 PM. Taking in the morning, Disp: , Rfl:   •  Magnesium 200 MG TABS, Take 1 Tab by mouth every day., Disp: , Rfl:   •  Probiotic Product (PROBIOTIC-10 PO), Take 1 capsule by mouth every day. (Patient not  "taking: Reported on 9/22/2021), Disp: , Rfl:   •  Lansoprazole (PREVACID PO), Take 1 Each by mouth 1 time daily as needed., Disp: , Rfl:   ALLERGIES: No Known Allergies    Patient's PMH, SocHx, SurgHx, FamHx, Drug allergies and medications were reviewed.     Objective:   /80 (BP Location: Left arm, Patient Position: Sitting, BP Cuff Size: Adult)   Pulse 60   Temp 37 °C (98.6 °F) (Temporal)   Resp 16   Ht 1.626 m (5' 4\")   Wt 83 kg (183 lb)   LMP 01/31/2012 (LMP Unknown)   SpO2 96%   BMI 31.41 kg/m²     Physical Exam  Vitals and nursing note reviewed.   Constitutional:       General: She is awake.      Appearance: Normal appearance. She is well-developed.   HENT:      Head: Normocephalic and atraumatic.      Right Ear: External ear normal.      Left Ear: External ear normal.      Nose: Nose normal.      Mouth/Throat:      Lips: Pink.      Mouth: Mucous membranes are moist.      Pharynx: Oropharynx is clear. Uvula midline.   Eyes:      Extraocular Movements: Extraocular movements intact.      Conjunctiva/sclera: Conjunctivae normal.   Cardiovascular:      Rate and Rhythm: Normal rate and regular rhythm.      Heart sounds: Normal heart sounds.   Pulmonary:      Effort: Pulmonary effort is normal.      Breath sounds: Normal breath sounds and air entry.   Musculoskeletal:         General: Normal range of motion.      Cervical back: Normal range of motion and neck supple.   Skin:     General: Skin is warm and dry.   Neurological:      Mental Status: She is alert and oriented to person, place, and time.   Psychiatric:         Mood and Affect: Mood normal.         Behavior: Behavior normal.         Thought Content: Thought content normal.         Assessment/Plan:   Assessment    1. Exposure to strep throat  - POCT Rapid Strep A- NEGATIVE    2. Other fatigue  - POCT Rapid Strep A  - SARS-CoV-2 PCR (24 hour In-House): Collect NP swab in VTM; Future    Vital signs stable at today's acute urgent care visit. Will " obtain COVID testing.  Advised to home isolate until test results return.  Supportive care options also discussed, to include alternating Tylenol and Advil, cough/cold/flu OTC medications for symptomatic relief, in addition to rest, fluids as tolerated and deep breathing exercises.  Differential diagnosis, natural history, and indications for immediate follow-up were discussed.     Advised the patient to follow-up with the primary care provider for recheck, reevaluation, and/or consideration of further management if necessary. Return to urgent care with any worsening symptoms or if there is no improvement in their current condition. Red flags discussed and indications to immediately call 911 or present to the Emergency Department.  All questions were encouraged and answered to the patient's satisfaction and understanding, and they agree to the plan of care.     I personally reviewed prior external notes and test results pertinent to today's visit.  I have independently reviewed and interpreted all diagnostics ordered during this urgent care acute visit. Time spent evaluating this patient was a minimum of 30 minutes and includes preparing for visit, counseling/education, exam and evaluation, obtaining history, independent interpretation and ordering lab/test/procedures.      Please note that this dictation was created using voice recognition software. I have made a reasonable attempt to correct obvious errors, but I expect that there are errors of grammar and possibly content that I did not discover before finalizing the note.

## 2021-09-22 NOTE — TELEPHONE ENCOUNTER
----- Message from Juve Stoddard M.D. sent at 9/21/2021  4:08 PM PDT -----  Please notify the patient that:  (1) her mammogram is negative but does show dense breast tissue which may decrease the accuracy of the mammogram  (2) she may obtain a screening breast ultrasound which could provide further detail  (3) her insurance may not cover the screening breast ultrasound, if that is the case, then the out of pocket cost is approximately $125  (4) please let me know if she is interested in obtaining the screening breast ultrasound

## 2021-09-23 DIAGNOSIS — R53.83 OTHER FATIGUE: ICD-10-CM

## 2021-09-23 LAB
COVID ORDER STATUS COVID19: NORMAL
SARS-COV-2 RNA RESP QL NAA+PROBE: NOTDETECTED
SPECIMEN SOURCE: NORMAL

## 2021-10-05 ENCOUNTER — HOSPITAL ENCOUNTER (OUTPATIENT)
Dept: RADIOLOGY | Facility: MEDICAL CENTER | Age: 61
End: 2021-10-05
Attending: INTERNAL MEDICINE
Payer: COMMERCIAL

## 2021-10-05 DIAGNOSIS — R07.89 OTHER CHEST PAIN: ICD-10-CM

## 2021-10-05 DIAGNOSIS — I42.2 HYPERTROPHIC CARDIOMYOPATHY (HCC): ICD-10-CM

## 2021-10-05 PROCEDURE — 75574 CT ANGIO HRT W/3D IMAGE: CPT

## 2021-10-05 PROCEDURE — 700117 HCHG RX CONTRAST REV CODE 255: Performed by: INTERNAL MEDICINE

## 2021-10-05 PROCEDURE — 700102 HCHG RX REV CODE 250 W/ 637 OVERRIDE(OP): Performed by: RADIOLOGY

## 2021-10-05 PROCEDURE — A9270 NON-COVERED ITEM OR SERVICE: HCPCS | Performed by: RADIOLOGY

## 2021-10-05 RX ORDER — NITROGLYCERIN 0.4 MG/1
0.4 TABLET SUBLINGUAL
Status: DISCONTINUED | OUTPATIENT
Start: 2021-10-05 | End: 2021-10-06 | Stop reason: HOSPADM

## 2021-10-05 RX ADMIN — NITROGLYCERIN 0.4 MG: 0.4 TABLET, ORALLY DISINTEGRATING SUBLINGUAL at 08:45

## 2021-10-05 RX ADMIN — IOHEXOL 60 ML: 350 INJECTION, SOLUTION INTRAVENOUS at 09:07

## 2021-10-05 NOTE — PROGRESS NOTES
CTA EXAMINATION:    Patient had CTA.  Patient brought to preparation room.   Verbal consent given by patient for PIV and administration of NTG medications by RN.  PIV initiated, 20G R AC    Review of medications, protocol, and NPO status.   Education provided to patient regarding examination.  Patient given baseline 3 lead EK  HR: SR 60 pacing  BP: 143/84  RR 20   96% RA     Patient ready for CT scan  Vitals: 0840  BP:163/83  HR: SR 62 pacing  RR18  93% RA    Administration of Sublingual NTG given per CTA protocol at 0845--See MAR  Vitals: 0845  BP:148/79  HR: SB 54  RR: 18  98% RA    Vitals: 0910  BP:138/65  HR: SR 60 pacing  RR: 20  97% RA    Patient returned to preparation area where post procedure education given. PIV removed, patient provided 2 bottles of water.     Vitals returned to baseline. Patient stated ready to go back. Discharge education provided. Discharged per protocol.     Patient ambulated self, escorted by RN to [facility].

## 2021-11-04 ENCOUNTER — PATIENT MESSAGE (OUTPATIENT)
Dept: SLEEP MEDICINE | Facility: MEDICAL CENTER | Age: 61
End: 2021-11-04

## 2021-11-04 DIAGNOSIS — G47.33 OBSTRUCTIVE SLEEP APNEA SYNDROME: ICD-10-CM

## 2021-11-04 NOTE — TELEPHONE ENCOUNTER
From: Seda Cagle  To: Nurse Practitioner Bari Koo  Sent: 11/4/2021 10:56 AM PDT  Subject: headaches again    Hello,   The last month or so I've been experiencing day long headaches, some dizziness and definite memory problems. Been sleeping great (still without my CPAP). monitoring my water intake, protein etc. Nothing out of line there. Then I remembered our last conversation and I reread my checkout info from my last visit on 9/3. I wore my CPAP that night and last night and I feel a lot better. But not 100%. Still have a very slight headache but I am thinking a lot clearer for sure. Scary, was I starving my brain?  I'm wondering if there is anything new on the [employer] CPAP homefront/recall? I couldn't find anything online. And wondering what I can do to get back to feeling good again.   Thanks for listening!

## 2021-11-19 PROBLEM — R92.8 ABNORMAL MAMMOGRAM: Status: RESOLVED | Noted: 2018-02-22 | Resolved: 2021-11-19

## 2021-11-22 ENCOUNTER — OFFICE VISIT (OUTPATIENT)
Dept: MEDICAL GROUP | Facility: MEDICAL CENTER | Age: 61
End: 2021-11-22

## 2021-11-22 ENCOUNTER — HOSPITAL ENCOUNTER (OUTPATIENT)
Dept: LAB | Facility: MEDICAL CENTER | Age: 61
End: 2021-11-22
Attending: INTERNAL MEDICINE
Payer: COMMERCIAL

## 2021-11-22 VITALS
DIASTOLIC BLOOD PRESSURE: 78 MMHG | HEART RATE: 82 BPM | BODY MASS INDEX: 33.12 KG/M2 | HEIGHT: 64 IN | WEIGHT: 194 LBS | SYSTOLIC BLOOD PRESSURE: 138 MMHG | OXYGEN SATURATION: 96 % | TEMPERATURE: 97.6 F

## 2021-11-22 DIAGNOSIS — Z00.00 PREVENTATIVE HEALTH CARE: ICD-10-CM

## 2021-11-22 DIAGNOSIS — I42.1 HYPERTROPHIC OBSTRUCTIVE CARDIOMYOPATHY (HCC): Chronic | ICD-10-CM

## 2021-11-22 DIAGNOSIS — E55.9 VITAMIN D DEFICIENCY: ICD-10-CM

## 2021-11-22 DIAGNOSIS — E78.5 DYSLIPIDEMIA: ICD-10-CM

## 2021-11-22 DIAGNOSIS — G44.209 TENSION HEADACHE: ICD-10-CM

## 2021-11-22 DIAGNOSIS — I48.0 PAROXYSMAL ATRIAL FIBRILLATION (HCC): Chronic | ICD-10-CM

## 2021-11-22 LAB
ALBUMIN SERPL BCP-MCNC: 4.6 G/DL (ref 3.2–4.9)
ALBUMIN/GLOB SERPL: 2 G/DL
ALP SERPL-CCNC: 97 U/L (ref 30–99)
ALT SERPL-CCNC: 21 U/L (ref 2–50)
ANION GAP SERPL CALC-SCNC: 10 MMOL/L (ref 7–16)
ANISOCYTOSIS BLD QL SMEAR: ABNORMAL
AST SERPL-CCNC: 22 U/L (ref 12–45)
BASOPHILS # BLD AUTO: 0 % (ref 0–1.8)
BASOPHILS # BLD: 0 K/UL (ref 0–0.12)
BILIRUB SERPL-MCNC: 0.6 MG/DL (ref 0.1–1.5)
BUN SERPL-MCNC: 13 MG/DL (ref 8–22)
CALCIUM SERPL-MCNC: 9.6 MG/DL (ref 8.5–10.5)
CHLORIDE SERPL-SCNC: 108 MMOL/L (ref 96–112)
CHOLEST SERPL-MCNC: 134 MG/DL (ref 100–199)
CO2 SERPL-SCNC: 25 MMOL/L (ref 20–33)
CREAT SERPL-MCNC: 1.01 MG/DL (ref 0.5–1.4)
EOSINOPHIL # BLD AUTO: 0.3 K/UL (ref 0–0.51)
EOSINOPHIL NFR BLD: 3.6 % (ref 0–6.9)
ERYTHROCYTE [DISTWIDTH] IN BLOOD BY AUTOMATED COUNT: 39.8 FL (ref 35.9–50)
FASTING STATUS PATIENT QL REPORTED: NORMAL
GLOBULIN SER CALC-MCNC: 2.3 G/DL (ref 1.9–3.5)
GLUCOSE SERPL-MCNC: 99 MG/DL (ref 65–99)
HCT VFR BLD AUTO: 42.9 % (ref 37–47)
HDLC SERPL-MCNC: 45 MG/DL
HGB BLD-MCNC: 14.6 G/DL (ref 12–16)
LDLC SERPL CALC-MCNC: 62 MG/DL
LYMPHOCYTES # BLD AUTO: 4.83 K/UL (ref 1–4.8)
LYMPHOCYTES NFR BLD: 57.5 % (ref 22–41)
MANUAL DIFF BLD: NORMAL
MCH RBC QN AUTO: 30 PG (ref 27–33)
MCHC RBC AUTO-ENTMCNC: 34 G/DL (ref 33.6–35)
MCV RBC AUTO: 88.3 FL (ref 81.4–97.8)
MICROCYTES BLD QL SMEAR: ABNORMAL
MONOCYTES # BLD AUTO: 0.29 K/UL (ref 0–0.85)
MONOCYTES NFR BLD AUTO: 3.5 % (ref 0–13.4)
MORPHOLOGY BLD-IMP: NORMAL
NEUTROPHILS # BLD AUTO: 2.97 K/UL (ref 2–7.15)
NEUTROPHILS NFR BLD: 35.4 % (ref 44–72)
NRBC # BLD AUTO: 0 K/UL
NRBC BLD-RTO: 0 /100 WBC
PLATELET # BLD AUTO: 185 K/UL (ref 164–446)
PLATELET BLD QL SMEAR: NORMAL
PMV BLD AUTO: 10.4 FL (ref 9–12.9)
POTASSIUM SERPL-SCNC: 4.1 MMOL/L (ref 3.6–5.5)
PROT SERPL-MCNC: 6.9 G/DL (ref 6–8.2)
RBC # BLD AUTO: 4.86 M/UL (ref 4.2–5.4)
RBC BLD AUTO: PRESENT
SMUDGE CELLS BLD QL SMEAR: NORMAL
SODIUM SERPL-SCNC: 143 MMOL/L (ref 135–145)
TRIGL SERPL-MCNC: 133 MG/DL (ref 0–149)
TSH SERPL DL<=0.005 MIU/L-ACNC: 2.17 UIU/ML (ref 0.38–5.33)
WBC # BLD AUTO: 8.4 K/UL (ref 4.8–10.8)

## 2021-11-22 PROCEDURE — 84443 ASSAY THYROID STIM HORMONE: CPT

## 2021-11-22 PROCEDURE — 36415 COLL VENOUS BLD VENIPUNCTURE: CPT

## 2021-11-22 PROCEDURE — 82306 VITAMIN D 25 HYDROXY: CPT

## 2021-11-22 PROCEDURE — 85027 COMPLETE CBC AUTOMATED: CPT

## 2021-11-22 PROCEDURE — 99214 OFFICE O/P EST MOD 30 MIN: CPT | Performed by: INTERNAL MEDICINE

## 2021-11-22 PROCEDURE — 80053 COMPREHEN METABOLIC PANEL: CPT

## 2021-11-22 PROCEDURE — 80061 LIPID PANEL: CPT

## 2021-11-22 PROCEDURE — 85007 BL SMEAR W/DIFF WBC COUNT: CPT

## 2021-11-22 RX ORDER — NORTRIPTYLINE HYDROCHLORIDE 10 MG/1
10 CAPSULE ORAL EVERY EVENING
Qty: 30 CAPSULE | Refills: 3 | Status: SHIPPED | OUTPATIENT
Start: 2021-11-22 | End: 2022-02-11 | Stop reason: SDUPTHER

## 2021-11-22 ASSESSMENT — FIBROSIS 4 INDEX: FIB4 SCORE: 2.2

## 2021-11-22 NOTE — PROGRESS NOTES
Subjective     Seda Cagle is a 61 y.o. female who presents with Follow-Up (headache x 6 wks)            HPI     Here for headaches has been having these for the past six weeks, has had more stress over the past six weeks, also had more difficulty sleeping, has sleep apnea and went off her machine until two weeks ago, and resumed the cpap machine two weeks ago.  No tooth grinding.  Has had more stress as well thinking about doing home renovations and perhaps selling their house. Has no falls. Headaches start back of head and neck, feels like a pressure, no vision changes, no trauma, different from migraine headaches. sometimes will notice difficulty with dizziness like a room spinning sensation but coordination is fine but not associated with headaches. No difficulty with swallowing. No radiation down the arms, no sensory changes, some nausea associated with the headaches. The headaches will start any time, not related to activity, body position, meals, exercise, or weather, the headache can last the whole day, but if takes a nap will resolve. Has had migraine headaches in the past but these are different from migraines. Migraine headaches will happen once per week. Did see her eye doctor at [facility] this monday and eyes were fine. Takes tylenol as needed for headaches, no nsaids due to eliquis for atrial fibrillation per cardiology.            Current Outpatient Medications   Medication Sig Dispense Refill   • atorvastatin (LIPITOR) 40 MG Tab TAKE ONE TABLET BY MOUTH EVERY EVENING (LIPITOR) 90 Tablet 2   • famotidine (PEPCID) 40 MG Tab Take 40 mg by mouth every day.     • disopyramide (NORPACE) 100 MG Cap TAKE ONE CAPSULE BY MOUTH EVERY 8 HOURS 270 capsule 1   • Probiotic Product (PROBIOTIC-10 PO) Take 1 Capsule by mouth every day.     • ELIQUIS 5 MG Tab TAKE ONE TABLET BY MOUTH TWICE A  tablet 3   • metoprolol SR (TOPROL XL) 100 MG TABLET SR 24 HR TAKE ONE TABLET BY MOUTH DAILY (Patient taking  differently: Take 100 mg by mouth every day.) 90 tablet 3   • Multiple Vitamin (MULTI-VITAMIN PO) Take  by mouth every day.     • Pantothenic Acid 500 MG Tab Take 500 mg by mouth every day.     • Melatonin 5 MG TABLET DISPERSIBLE Take  by mouth at bedtime as needed.     • Calcium-Vitamin D-Vitamin K (CALCIUM FOR WOMEN PO) Take 1 Each by mouth every day at 6 PM. Taking in the morning     • Magnesium 200 MG TABS Take 1 Tab by mouth every day.     • famotidine (PEPCID) 40 MG Tab        No current facility-administered medications for this visit.       Abnormal nasal septum  8/12/21  ENT note, nasal septum S-shaped curve bows to the left nostril inferiorly, in the right nose superiorly, normal turbinate size and symmetry, does not seem to bother her when awake, recommend humidification and discuss further at follow-up, higher perioperative risk due to cardiovascular disease    S/p carpal tunnel right release  2/25/21 EMG nerve conduction study right upper extremity evaluate carpal tunnel  3/3/21 wbc 6.9 (41%N,51%L),NAVIN negative,SPEP negative  4/14/21 EMG nerve conduction study right upper extremity consistent with right median neuropathy without denervation, possible mild right ulnar neuropathy, no evidence of cervical radiculopathy  6/1/21  orthopedic operative note right endoscopic carpal tunnel release and left carpal tunnel injection under anesthesia  6/14/21  orthopedic note status post right endoscopic carpal tunnel release and left carpal tunnel injection under anesthesia anesthesia, follow-up 8 to 10 weeks   8/25/21 PACO note status post right endoscopic carpal tunnel release follow-up 8 to 10 weeks     cervical arthritis  3/24/15 MRI cervical spine C4-C5 small disc osteophyte complex, moderate left facet arthropathy, multilevel DJD  4/14/21 EMG nerve conduction study right upper extremity consistent with right median neuropathy without denervation, possible mild right ulnar  neuropathy, no evidence of cervical radiculopathy     ckd  2/19/11 bun 15,creat 1.0,GFR 56  5/14/14 bun 24,creat 1.23,GFR 45  8/2/14 ultrasound renal negative  2/19/15 bun 19,creat 1.1,GFR 50  8/3/15 bun 17,creat 1.2, GFR 43,urine mac<0.5,PTH 27,vit d 37  10/16/15 bun 17,creat 1.1,GFR 47  1/27/16 bun 14,creat 1.2,GFR 45  12/5/16 bun 16,creat 1.0,GFR 52  1/18/19 bun 15,creat 1.0,GFR 52   2/3/20 bum 16,creat 1.13,GFR 49,urine mac<0.7  9/2/20 bun 20,creat 1.1,GFR 50  3/3/21 bun 13,creat 0.85,GFR>60,PTH 56,3/3/21 urine mac<1.2,SPEP negative     Dyslipidemia  12/5/16 chol 186,trig 143,hdl 52,ldl 105  11/26/18  vascular medicine note chronic venous stasis changes lower extremities, recheck lipid panel qualify for statin therapy, continue home blood pressure monitoring, consider 24-hour ambulatory blood pressure monitoring, continue metoprolol, continue xarelto use compression stockings  1/18/19 chol 183,trig 160,hdl 45,ldl 106  11/26/19 chol 133,trig 202,hdl 42,ldl 51  3/3/21 chol 144,trig 98,hdl 48,ldl 76 on lipitor 40 mg     gerd on pepcid  2/8/16 on pepcid  8/12/21  ENT note throat symptoms possibly related to reflux, recommend famotidine 40 mg bid and work on lifestyle modification recommended humidification to avoid excessive dryness  10/12/21  ENT note flexible laryngoscopy, moderate interarytenoid and moderate postcricoid edema, most likely cause of sensation in her throat is reflux consistent with laryngeal pharyngeal reflux, work on healthy lifestyle, diet modification, continue famotidine twice daily for 6 to 8 weeks     History transient global amnesia  1/18/19 chol 183,trig 160,hdl 45,ldl 106  1/18/19 CT head negative  1/19/19 ultrasound carotid less than 50% bilateral stenosis  1/19/19 CT-CTA head with and without postprocessing, negative [place]  1/18/19 hospital admission, 1/19/19 hospital discharge transient global amnesia, patient was speaking to her son on  the phone, and could not recall for anything 2 hours afterwards, has been indicated that patient was confused and repetitive and could not remember talking on the phone, came to the emergency room for evaluation, CT scan head negative, no other symptoms, resolution discharged home the following day  1/29/19 cardiology note, patient still having some issues with short-term memory, will switch to coumadin from xarelto with recent event, referral to neurology, paroxysmal atrial fibrillation burden less than 1%  3/26/19 [facility] neurological note probable transient global amnesia, complete holter monitor per cardiology, EEG ordered and sedimentation rate for headache     Hypertrophic cardiomyopathy  8/14/00 alcohol septal reduction, normal coronaries  4/15/10 echo very small area proximal septal hypertrophy, no significant obstruction  4/16/10 cardiology note [facility] hypertrophic cardiomyopathy on metoprolol 25 mg bid, norpace  mg bid, asa; remains asymptomatic with regards to hypertrophic cardio myopathy  3/18/11 echo normal LV thickness with very focal area localized hypertrophy most basal septum without evidence of obstruction, EF 64%  11/16/14 persantine thallium fixed defect mid and basilar anteroseptal wall and apical septum consistent with scar, hypokinesis septum EF 74%  11/18/14 echo normal LV size and function, grade 2 diastolic dysfunction, EF 60-65%, mild aortic stenosis, mild aortic insufficiency, RVSP 20-25  11/19/14 holter sinus with left bundle branch block, average rate 57, minimum heart rate 44, maximal heart rate 85, PVCs, PACs   12/12/14 ALLEN known post-ablation for hypertrophic cardiomyopathy without obstruction, small amount of remaining myocardium of lower tract does not cause significant outflow gradient, mild aortic insufficiency, moderate central mitral regurgitation, remnant myocardium in the LVOT with mild obstruction, peak outflow gradient 11 (previously 16-18)  4/16/15   cardiology procedure note, dual-chamber pacemaker implantation  10/24/16  of cardiology, atrial fibrillation and hypertrophic cardiomyopathy, controlled on norpace,metoprolol, xarelto  9/26/18 echo normal LV systolic function, EF 55%, mild LVH, grade 1 diastolic dysfunction, evidence of LVOT, septal wall thickness 1.2 cm, consider subaortic membrane, mild to moderate mitral regurgitation, mild aortic insufficiency  10/8/18 cardiology note does not appear to be subaortic membrane on echo, perhaps subaortic thickening, continue to monitor echo of full gradient for recurrence continue oral anticoagulation gradient is low and asymptomatic, xarelto, norpace, lopressor  1/29/19 cardiology note, patient still having some issues with short-term memory, will switch to coumadin from xarelto with recent event, referral to neurology, paroxysmal atrial fibrillation burden less than 1%  2/28/19 cardiology note hypertrophic obstructive cardiomyopathy and paroxysmal atrial fibrillation, sinus on exam, medi-lynx ventricular premature contractions isolated but frequent, asymptomatic, one short episode of paroxysmal atrial fibrillation 1.5 minutes, continues on warfarin INR 2.0, continues on norpace, increase metoprolol XL to 50 mg follow-up 1 month  4/26/2019 cardiology note hypertrophic obstructive cardiomyopathy, paroxysmal atrial fibrillation, device check, continue coumadin, norpace, follow-up 6 months  10/10/19 cardiology note hypertrophic cardiomyopathy status post septal ablation, pacemaker, paroxysmal atrial fibrillation relatively short episodes, longest episode 49 minutes remainder less than 1 minute in length, remains controlled on toprol, norpace, coumadin per coumadin clinic  10/31/19 echo normal left ventricular function, EF 60%, basal septal wall appears hypokinetic, grade 2 diastolic dysfunction, moderately dilated left atrium volume index 44 mL, mild mitral regurgitation, mild aortic stenosis, mild  aortic insufficiency, peak gradient 23  1/7/20 ultrasound carotid less than 50% stenosis right internal carotid, left carotid mild plaque carotid bifurcation  8/25/20 cardiology note pacemaker, device check, no arrhythmia burden, change from coumadin to eliquis follow-up 6 months repeat echo at that time  2/3/21 echo mild LVH, EF 65%, grade 2 diastolic dysfunction, RVSP 30  2/26/21  cardiology note pacemaker, paroxysmal atrial fibrillation stable, echo reviewed no significant LVOT gradient, continue Norpace and Toprol discussed options of RV lead replacement including transvenous extraction, implanting the lead, or performing generator change and using it until no longer working, we will plan on transvenous extraction and new RV lead, follow-up 6 months  8/11/21 cardiology note would like to exclude obstructive CAD but do not think pretest probability is high enough to warrant catheterization, will order coronary CTA follow-up 6 months  8/11/21 cardiology note EKG sinus, ordered CTA     pacemaker  4/16/15  cardiology procedure note, dual-chamber pacemaker implantation     Paroxysmal atrial fibrillation  11/18/14 echo normal LV size and function, grade 2 diastolic dysfunction, EF 60-65%, mild aortic stenosis, mild aortic insufficiency, RVSP 20-25  11/19/14 holter sinus with left bundle branch block, average rate 57, minimum heart rate 44, maximal heart rate 85, PVCs, PACs   12/12/14 ALLEN known post-ablation for hypertrophic cardiomyopathy without obstruction, small amount of remaining myocardium of lower tract does not cause significant outflow gradient, mild aortic insufficiency, moderate central mitral regurgitation, remnant myocardium in the LVOT with mild obstruction, peak outflow gradient 11 (previously 16-18)  11/30/15 cardiology note paroxysmal atrial fibrillation, short atrial fibrillation under 4 minutes  2/22/16 cardiology note minimal atrial fibrillation on dual-chamber pacemaker  check  3/24/16 cardiology note minimal atrial fibrillation   10/24/16 cardiology note longer episodes of atrial fibrillation, still at 2.6 hours total on dual-chamber pacemaker review, continues on xarelto  9/26/18 echo normal LV systolic function, EF 55%, mild LVH, grade 1 diastolic dysfunction, evidence of LVOT, septal wall thickness 1.2 cm, consider subaortic membrane, mild to moderate mitral regurgitation, mild aortic insufficiency  10/8/18 cardiology note does not appear to be subaortic membrane on echo, perhaps subaortic thickening, continue to monitor echo of full gradient for recurrence continue oral anticoagulation gradient is low and asymptomatic, xarelto, norpace, lopressor  1/29/19 cardiology note, patient still having some issues with short-term memory, will switch to coumadin from xarelto with recent event, referral to neurology, paroxysmal atrial fibrillation burden less than 1%  2/28/19 cardiology note hypertrophic obstructive cardiomyopathy and paroxysmal atrial fibrillation, sinus on exam, medi-lynx ventricular premature contractions isolated but frequent, asymptomatic, one short episode of paroxysmal atrial fibrillation 1.5 minutes, continues on warfarin INR 2.0, continues on norpace, increase metoprolol XL to 50 mg follow-up 1 month   10/10/19 cardiology note hypertrophic cardiomyopathy status post septal ablation, pacemaker, paroxysmal atrial fibrillation relatively short episodes, longest episode 49 minutes remainder less than 1 minute in length, remains controlled on toprol, norpace, coumadin per coumadin clinic  10/31/19 echo normal left ventricular function, EF 60%, basal septal wall appears hypokinetic, grade 2 diastolic dysfunction, moderately dilated left atrium volume index 44 mL, mild mitral regurgitation, mild aortic stenosis, mild aortic insufficiency, peak gradient 23  1/7/20 ultrasound carotid less than 50% stenosis right internal carotid, left carotid mild plaque carotid  bifurcation  8/25/20 cardiology note pacemaker, device check, no arrhythmia burden, change from coumadin to eliquis follow-up 6 months repeat echo at that time  2/3/21 echo mild LVH, EF 65%, grade 2 diastolic dysfunction, RVSP 30  2/26/21  cardiology note pacemaker, paroxysmal atrial fibrillation stable, echo reviewed no significant LVOT gradient, continue norpace and toprol discussed options of RV lead replacement including transvenous extraction, implanting the lead, or performing generator change and using it until no longer working, we will plan on transvenous extraction and new RV lead, follow-up 6 months  8/11/21 cardiology note would like to exclude obstructive CAD but do not think pretest probability is high enough to warrant catheterization, will order coronary CTA follow-up 6 months  8/11/21 cardiology note EKG sinus, ordered CTA     Preventative health  12/2/16 tdap  2/20/18 pap per gyn  2/3/20 pneumovax  2/3/20 hep c ab negative  3/5/20 colon per [facility] negative, repeat 10 years  9/11/20 declines sonocine  3/3/21 vit d 43  3/3/21 A1c 5.4%  3/23/21 dexa LS-0.4,hip-0.2  4/17/21 covid moderna second  9/21/21 mammogram heterogeneously dense breast tissue offered screening breast ultrasound  10/14/21 flu   10/27/21 covid moderna booster    sleep apnea  2/3/17 sleep apnea referral pending, OPO ordered  2/14/17 apnea link per [facility], AHI 19.7, low saturation 83, time<90% saturation 199 minutes, time less than 85% saturation 1 minute  2/16/17 sleep consultation, proceed with polysomnography  3/26/17 sleep study duration 430 minutes, AHI 21.3, low saturation 88%, mild to moderate sleep-disordered breathing with significant disruption of sleep continuity, limb movement disorder could be present as well, cpap titration recommended  3/31/17 sleep note ordered autocpap 5-15 nightly, follow-up 6-8 weeks  11/30/18 sleep note on autocpap 5-15  6/4/20 sleep note on autopap 5 to 15  2/3/21 echo mild LVH, EF 65%,  "grade 2 diastolic dysfunction, RVSP 30  9/3/21 sleep note on autocpap 5 to 15, compliance report 96.7% usage average time 5 hours 30 minutes AHI 6.9, terrance device recall     Venous insufficiency  1/22/19 [facility] note venous insufficiency, physical exam does not indicate any evidence of venous insufficiency, patient declines venous ultrasound                         Patient Active Problem List   Diagnosis   • Hypertrophic obstructive cardiomyopathy (HCC)   • S/P carpal tunnel release   • Pacemaker   • Preventative health care   • GERD (gastroesophageal reflux disease)   • Cervical arthritis   • Decreased GFR   • Dyslipidemia   • Paroxysmal atrial fibrillation (HCC)   • Sleep apnea   • History of transient global amnesia   • Chronic anticoagulation   • Venous insufficiency   • IBS (irritable bowel syndrome)   • Abnormal nasal septum           Patient Care Team:  Juve Stoddard M.D. as PCP - General (Internal Medicine)  Vibha Chua M.D. (Nephrology)  [facility] as Pharmacist      ROS           Objective     /78 (BP Location: Right arm, Patient Position: Sitting, BP Cuff Size: Adult)   Pulse 82   Temp 36.4 °C (97.6 °F)   Ht 1.626 m (5' 4\")   Wt 88 kg (194 lb)   LMP 01/31/2012 (LMP Unknown)   SpO2 96%   BMI 33.30 kg/m²      Physical Exam  Vitals and nursing note reviewed.   HENT:      Head: Normocephalic and atraumatic.      Right Ear: Tympanic membrane and external ear normal.      Left Ear: Tympanic membrane and external ear normal.   Eyes:      Conjunctiva/sclera: Conjunctivae normal.   Cardiovascular:      Rate and Rhythm: Normal rate and regular rhythm.   Pulmonary:      Effort: Pulmonary effort is normal.      Breath sounds: Normal breath sounds.   Abdominal:      General: There is no distension.   Musculoskeletal:         General: No swelling.      Cervical back: Neck supple.   Skin:     General: Skin is warm.   Neurological:      General: No focal deficit present.    "   Mental Status: She is alert.   Psychiatric:         Mood and Affect: Mood normal.         Behavior: Behavior normal.       Cranial nerves intact, no nystagmus, negative Romberg     No tenderness to palpation over the neck, no evidence of shingles rash posterior neck                Assessment & Plan        Assessment  #1 tension type headache, posterior neck has been happening daily for the past 6 weeks, no associated or exacerbating conditions, does not appear to be temporally related to exercise, weather, perhaps stress is a contributing factor, no focal findings on exam    #2 history of migraine headache typically occurring once per week    #3 cervical arthritis by MRI 2015 no radiculopathy    #4 paroxysmal atrial fibrillation sinus on exam followed by cardiology on Eliquis    #5 CAD by CTA 10/5/21 CT-CTA heart left main no plaque or stenosis, LAD 25% stenosis, circumflex and obtuse marginal patent, RCA, posterior descending and posterior lateral branches patent no stenosis, normal LV size, ascending aorta 3.5 cm    #6 BMI 33.3    #7 sleep apnea on CPAP    #8 dyslipidemia on Lipitor    Plan  #! 10/5/21 CTA cardiac results reviewed     #2 start pamelor 10 mg every night for headaches, can cause daytime drowsiness, discontinue melatonin, monitor for dry eyes, dry mouth, constipation we will try this for tension headache in her migraines, continue avoid NSAIDs    #3 continue CPAP, follow-up sleep apnea group    #4 continue work on regular exercise    #5 continue Eliquis per cardiology, no NSAIDs, Tylenol for headaches    #6 labs    #7 up-to-date on influenza and COVID vaccine     #8 follow up 2 months     #9 she can get the Shingrix vaccine at her pharmacy    #10 continue work on good nutrition program as well as regular exercise

## 2021-11-23 ENCOUNTER — TELEPHONE (OUTPATIENT)
Dept: PEDIATRICS | Facility: PHYSICIAN GROUP | Age: 61
End: 2021-11-23

## 2021-11-23 DIAGNOSIS — D72.820 LYMPHOCYTOSIS: ICD-10-CM

## 2021-11-24 NOTE — TELEPHONE ENCOUNTER
Notified with results, lipid panel remains excellent, continue atorvastatin, vitamin D level pending.  WBC normal but slight elevation lymphocytes, smudge cells noted, will repeat CBC with flow cytometry, order printed to mail to patient.  She can get that done in 2 to 3 weeks.

## 2021-11-25 LAB — 25(OH)D3 SERPL-MCNC: 42 NG/ML (ref 30–80)

## 2021-11-29 DIAGNOSIS — I42.1 HYPERTROPHIC OBSTRUCTIVE CARDIOMYOPATHY (HCC): ICD-10-CM

## 2021-11-29 RX ORDER — DOXYCYCLINE HYCLATE 100 MG
100 TABLET ORAL 2 TIMES DAILY
Qty: 14 TABLET | Refills: 0 | Status: SHIPPED | OUTPATIENT
Start: 2021-11-29 | End: 2022-01-20

## 2021-11-30 ENCOUNTER — NON-PROVIDER VISIT (OUTPATIENT)
Dept: MEDICAL GROUP | Facility: MEDICAL CENTER | Age: 61
End: 2021-11-30
Payer: COMMERCIAL

## 2021-11-30 NOTE — NON-PROVIDER
Seda Cagle is a 61 y.o. female here for a non-provider visit for EKG    If abnormal was an in office provider notified today (if so, indicate provider)? Yes - Dr Stoddard viewed EKG after test run.    Routed to PCP? No

## 2022-01-10 ENCOUNTER — HOSPITAL ENCOUNTER (OUTPATIENT)
Dept: LAB | Facility: MEDICAL CENTER | Age: 62
End: 2022-01-10
Attending: INTERNAL MEDICINE
Payer: COMMERCIAL

## 2022-01-10 DIAGNOSIS — D72.820 LYMPHOCYTOSIS: ICD-10-CM

## 2022-01-10 LAB
BASOPHILS # BLD AUTO: 0 % (ref 0–1.8)
BASOPHILS # BLD: 0 K/UL (ref 0–0.12)
EOSINOPHIL # BLD AUTO: 0.18 K/UL (ref 0–0.51)
EOSINOPHIL NFR BLD: 1.8 % (ref 0–6.9)
ERYTHROCYTE [DISTWIDTH] IN BLOOD BY AUTOMATED COUNT: 41.5 FL (ref 35.9–50)
HCT VFR BLD AUTO: 43.5 % (ref 37–47)
HGB BLD-MCNC: 14.5 G/DL (ref 12–16)
LYMPHOCYTES # BLD AUTO: 5.99 K/UL (ref 1–4.8)
LYMPHOCYTES NFR BLD: 60.5 % (ref 22–41)
MANUAL DIFF BLD: NORMAL
MCH RBC QN AUTO: 29.7 PG (ref 27–33)
MCHC RBC AUTO-ENTMCNC: 33.3 G/DL (ref 33.6–35)
MCV RBC AUTO: 89 FL (ref 81.4–97.8)
MONOCYTES # BLD AUTO: 0.35 K/UL (ref 0–0.85)
MONOCYTES NFR BLD AUTO: 3.5 % (ref 0–13.4)
MORPHOLOGY BLD-IMP: NORMAL
NEUTROPHILS # BLD AUTO: 3.39 K/UL (ref 2–7.15)
NEUTROPHILS NFR BLD: 34.2 % (ref 44–72)
NRBC # BLD AUTO: 0 K/UL
NRBC BLD-RTO: 0 /100 WBC
PLATELET # BLD AUTO: 184 K/UL (ref 164–446)
PLATELET BLD QL SMEAR: NORMAL
PMV BLD AUTO: 10.8 FL (ref 9–12.9)
RBC # BLD AUTO: 4.89 M/UL (ref 4.2–5.4)
RBC BLD AUTO: PRESENT
SMUDGE CELLS BLD QL SMEAR: NORMAL
WBC # BLD AUTO: 9.9 K/UL (ref 4.8–10.8)

## 2022-01-10 PROCEDURE — 88185 FLOWCYTOMETRY/TC ADD-ON: CPT | Mod: 91

## 2022-01-10 PROCEDURE — 80503 PATH CLIN CONSLTJ SF 5-20: CPT

## 2022-01-10 PROCEDURE — 85007 BL SMEAR W/DIFF WBC COUNT: CPT

## 2022-01-10 PROCEDURE — 85027 COMPLETE CBC AUTOMATED: CPT

## 2022-01-10 PROCEDURE — 88184 FLOWCYTOMETRY/ TC 1 MARKER: CPT

## 2022-01-10 PROCEDURE — 36415 COLL VENOUS BLD VENIPUNCTURE: CPT

## 2022-01-11 LAB
PATH REV: NORMAL
PATH REV: NORMAL

## 2022-01-12 ENCOUNTER — TELEPHONE (OUTPATIENT)
Dept: MEDICAL GROUP | Facility: MEDICAL CENTER | Age: 62
End: 2022-01-12

## 2022-01-12 DIAGNOSIS — C91.10 CLL (CHRONIC LYMPHOCYTIC LEUKEMIA) (HCC): ICD-10-CM

## 2022-01-12 LAB
ACUTE LEUKEMIA MARKERS SPEC-IMP: NORMAL
EVENTS COUNTED SPEC: 26 MARKERS
SOURCE 9121: NORMAL

## 2022-01-13 NOTE — TELEPHONE ENCOUNTER
Notified her with results, possible CLL based upon flow cytometry, no anemia, no thrombocytopenia.  Will refer to cancer care specialty group for an evaluation.  She understands and agrees.

## 2022-01-17 DIAGNOSIS — I42.2 HYPERTROPHIC CARDIOMYOPATHY (HCC): ICD-10-CM

## 2022-01-20 ENCOUNTER — OFFICE VISIT (OUTPATIENT)
Dept: MEDICAL GROUP | Facility: MEDICAL CENTER | Age: 62
End: 2022-01-20
Payer: COMMERCIAL

## 2022-01-20 ENCOUNTER — TELEPHONE (OUTPATIENT)
Dept: CARDIOLOGY | Facility: MEDICAL CENTER | Age: 62
End: 2022-01-20

## 2022-01-20 VITALS
HEIGHT: 64 IN | SYSTOLIC BLOOD PRESSURE: 142 MMHG | TEMPERATURE: 97.2 F | DIASTOLIC BLOOD PRESSURE: 86 MMHG | WEIGHT: 196 LBS | HEART RATE: 76 BPM | BODY MASS INDEX: 33.46 KG/M2 | OXYGEN SATURATION: 97 %

## 2022-01-20 DIAGNOSIS — D72.820 LYMPHOCYTOSIS: ICD-10-CM

## 2022-01-20 PROCEDURE — 99213 OFFICE O/P EST LOW 20 MIN: CPT | Performed by: INTERNAL MEDICINE

## 2022-01-20 RX ORDER — FLUTICASONE PROPIONATE 50 MCG
1 SPRAY, SUSPENSION (ML) NASAL DAILY
COMMUNITY
End: 2023-01-30

## 2022-01-20 RX ORDER — FAMOTIDINE 40 MG/1
40 TABLET, FILM COATED ORAL 2 TIMES DAILY
Qty: 180 TABLET | Refills: 3
Start: 2022-01-20 | End: 2022-08-22

## 2022-01-20 ASSESSMENT — PATIENT HEALTH QUESTIONNAIRE - PHQ9: CLINICAL INTERPRETATION OF PHQ2 SCORE: 0

## 2022-01-20 ASSESSMENT — FIBROSIS 4 INDEX: FIB4 SCORE: 1.59

## 2022-01-20 NOTE — TELEPHONE ENCOUNTER
SS      Mark Miller,      This patient called to state she's almost out of the script for disopyramide (NORPACE) 100 MG Cap. Can you please call her as soon as you send over to [facility] for her at [phone].      Thank you,    ALFONSO

## 2022-01-20 NOTE — PROGRESS NOTES
Subjective     Seda Cagle is a 61 y.o. female who presents with labs          HPI     Patient here for follow-up labs.  Recent labs done January 12 showing lymphocytosis blood flow cytometry positive, previous labs had shown  2/3/20 wbc 7.1 (52%N,39%L)  9/1/20 wbc 6.9 (41%N,51%L)  11/22/21 wbc 8.4 (35%N,57%L) many smudge cells and subsequent CBC was ordered with flow cytometry  1/12/22 wbc 9.9 (34%N,60%L),flow cytometry kappa restricted CD5+ B-cell population accounting for 20% of visible leukocytes, phenotypic features most consistent with CLL  Also follow-up with cardiology with atrial fibrillation followed by cardiology, also has pacemaker, remains on Norpace, Eliquis, metoprolol.  We did start Pamelor previously for headache and the headache has resolved.  She still has insomnia even with the nortriptyline at night, although her insomnia has been worsened since she received the news of her recent blood test results.  She keeps active, exercising 10,000-12,000 steps daily, really does not drink much alcohol.  Has good social support with  and family.  Blood pressure typically runs 120/70   Sleep apnea on CPAP using this regularly.    Current Outpatient Medications   Medication Sig Dispense Refill   • doxycycline (VIBRAMYCIN) 100 MG Tab Take 1 Tablet by mouth 2 times a day. 14 Tablet 0   • nortriptyline (PAMELOR) 10 MG Cap Take 1 Capsule by mouth every evening. 30 Capsule 3   • atorvastatin (LIPITOR) 40 MG Tab TAKE ONE TABLET BY MOUTH EVERY EVENING (LIPITOR) 90 Tablet 2   • famotidine (PEPCID) 40 MG Tab Take 40 mg by mouth every day.     • disopyramide (NORPACE) 100 MG Cap TAKE ONE CAPSULE BY MOUTH EVERY 8 HOURS 270 capsule 1   • Probiotic Product (PROBIOTIC-10 PO) Take 1 Capsule by mouth every day.     • ELIQUIS 5 MG Tab TAKE ONE TABLET BY MOUTH TWICE A  tablet 3   • metoprolol SR (TOPROL XL) 100 MG TABLET SR 24 HR TAKE ONE TABLET BY MOUTH DAILY (Patient taking differently: Take 100  mg by mouth every day.) 90 tablet 3   • Multiple Vitamin (MULTI-VITAMIN PO) Take  by mouth every day.     • Pantothenic Acid 500 MG Tab Take 500 mg by mouth every day.     • Melatonin 5 MG TABLET DISPERSIBLE Take  by mouth at bedtime as needed.     • Calcium-Vitamin D-Vitamin K (CALCIUM FOR WOMEN PO) Take 1 Each by mouth every day at 6 PM. Taking in the morning     • Magnesium 200 MG TABS Take 1 Tab by mouth every day.       No current facility-administered medications for this visit.                   Abnormal nasal septum  8/12/21  ENT note, nasal septum S-shaped curve bows to the left nostril inferiorly, in the right nose superiorly, normal turbinate size and symmetry, does not seem to bother her when awake, recommend humidification and discuss further at follow-up, higher perioperative risk due to cardiovascular disease     cervical arthritis  3/24/15 MRI cervical spine C4-C5 small disc osteophyte complex, moderate left facet arthropathy, multilevel DJD  4/14/21 EMG nerve conduction study right upper extremity consistent with right median neuropathy without denervation, possible mild right ulnar neuropathy, no evidence of cervical radiculopathy     ckd  2/19/11 bun 15,creat 1.0,GFR 56  5/14/14 bun 24,creat 1.23,GFR 45  8/2/14 ultrasound renal negative  2/19/15 bun 19,creat 1.1,GFR 50  8/3/15 bun 17,creat 1.2, GFR 43,urine mac<0.5,PTH 27,vit d 37  10/16/15 bun 17,creat 1.1,GFR 47  1/27/16 bun 14,creat 1.2,GFR 45  12/5/16 bun 16,creat 1.0,GFR 52  1/18/19 bun 15,creat 1.0,GFR 52   2/3/20 bum 16,creat 1.13,GFR 49,urine mac<0.7  9/2/20 bun 20,creat 1.1,GFR 50  3/3/21 bun 13,creat 0.85,GFR>60,PTH 56,3/3/21 urine mac<1.2,SPEP negative  11/22/21 bun 13,creat 1.0,GFR 56     Dyslipidemia  12/5/16 chol 186,trig 143,hdl 52,ldl 105  11/26/18  vascular medicine note chronic venous stasis changes lower extremities, recheck lipid panel qualify for statin therapy, continue home blood pressure monitoring,  consider 24-hour ambulatory blood pressure monitoring, continue metoprolol, continue xarelto use compression stockings  1/18/19 chol 183,trig 160,hdl 45,ldl 106  11/26/19 chol 133,trig 202,hdl 42,ldl 51  3/3/21 chol 144,trig 98,hdl 48,ldl 76 on lipitor 40 mg  10/5/21 CT-CTA heart left main no plaque or stenosis, LAD 25% stenosis, circumflex and obtuse marginal patent, RCA, posterior descending and posterior lateral branches patent no stenosis, normal LV size, ascending aorta 3.5 cm  11/22/21 chol 134,trig 133,hdl 45,ldl 62 on lipitor 40 mg     gerd on pepcid  2/8/16 on pepcid  8/12/21  ENT note throat symptoms possibly related to reflux, recommend famotidine 40 mg bid and work on lifestyle modification recommended humidification to avoid excessive dryness  10/12/21  ENT note flexible laryngoscopy, moderate interarytenoid and moderate postcricoid edema, most likely cause of sensation in her throat is reflux consistent with laryngeal pharyngeal reflux, work on healthy lifestyle, diet modification, continue famotidine twice daily for 6 to 8 weeks     History transient global amnesia  1/18/19 chol 183,trig 160,hdl 45,ldl 106  1/18/19 CT head negative  1/19/19 ultrasound carotid less than 50% bilateral stenosis  1/19/19 CT-CTA head with and without postprocessing, negative [place] [facility]  1/18/19 hospital admission, 1/19/19 hospital discharge transient global amnesia, patient was speaking to her son on the phone, and could not recall for anything 2 hours afterwards, has been indicated that patient was confused and repetitive and could not remember talking on the phone, came to the emergency room for evaluation, CT scan head negative, no other symptoms, resolution discharged home the following day  1/29/19 cardiology note, patient still having some issues with short-term memory, will switch to coumadin from xarelto with recent event, referral to neurology, paroxysmal atrial fibrillation burden  less than 1%  3/26/19 [facility] neurological note probable transient global amnesia, complete holter monitor per cardiology, EEG ordered and sedimentation rate for headache     Hypertrophic cardiomyopathy  8/14/00 alcohol septal reduction, normal coronaries  4/15/10 echo very small area proximal septal hypertrophy, no significant obstruction  4/16/10 cardiology note [facility] hypertrophic cardiomyopathy on metoprolol 25 mg bid, norpace  mg bid, asa; remains asymptomatic with regards to hypertrophic cardio myopathy  3/18/11 echo normal LV thickness with very focal area localized hypertrophy most basal septum without evidence of obstruction, EF 64%  11/16/14 persantine thallium fixed defect mid and basilar anteroseptal wall and apical septum consistent with scar, hypokinesis septum EF 74%  11/18/14 echo normal LV size and function, grade 2 diastolic dysfunction, EF 60-65%, mild aortic stenosis, mild aortic insufficiency, RVSP 20-25  11/19/14 holter sinus with left bundle branch block, average rate 57, minimum heart rate 44, maximal heart rate 85, PVCs, PACs   12/12/14 ALLEN known post-ablation for hypertrophic cardiomyopathy without obstruction, small amount of remaining myocardium of lower tract does not cause significant outflow gradient, mild aortic insufficiency, moderate central mitral regurgitation, remnant myocardium in the LVOT with mild obstruction, peak outflow gradient 11 (previously 16-18)  4/16/15  cardiology procedure note, dual-chamber pacemaker implantation  10/24/16  of cardiology, atrial fibrillation and hypertrophic cardiomyopathy, controlled on norpace,metoprolol, xarelto  9/26/18 echo normal LV systolic function, EF 55%, mild LVH, grade 1 diastolic dysfunction, evidence of LVOT, septal wall thickness 1.2 cm, consider subaortic membrane, mild to moderate mitral regurgitation, mild aortic insufficiency  10/8/18 cardiology note does not appear to be subaortic membrane on  echo, perhaps subaortic thickening, continue to monitor echo of full gradient for recurrence continue oral anticoagulation gradient is low and asymptomatic, xarelto, norpace, lopressor  1/29/19 cardiology note, patient still having some issues with short-term memory, will switch to coumadin from xarelto with recent event, referral to neurology, paroxysmal atrial fibrillation burden less than 1%  2/28/19 cardiology note hypertrophic obstructive cardiomyopathy and paroxysmal atrial fibrillation, sinus on exam, medi-lynx ventricular premature contractions isolated but frequent, asymptomatic, one short episode of paroxysmal atrial fibrillation 1.5 minutes, continues on warfarin INR 2.0, continues on norpace, increase metoprolol XL to 50 mg follow-up 1 month  4/26/2019 cardiology note hypertrophic obstructive cardiomyopathy, paroxysmal atrial fibrillation, device check, continue coumadin, norpace, follow-up 6 months  10/10/19 cardiology note hypertrophic cardiomyopathy status post septal ablation, pacemaker, paroxysmal atrial fibrillation relatively short episodes, longest episode 49 minutes remainder less than 1 minute in length, remains controlled on toprol, norpace, coumadin per coumadin clinic  10/31/19 echo normal left ventricular function, EF 60%, basal septal wall appears hypokinetic, grade 2 diastolic dysfunction, moderately dilated left atrium volume index 44 mL, mild mitral regurgitation, mild aortic stenosis, mild aortic insufficiency, peak gradient 23  1/7/20 ultrasound carotid less than 50% stenosis right internal carotid, left carotid mild plaque carotid bifurcation  8/25/20 cardiology note pacemaker, device check, no arrhythmia burden, change from coumadin to eliquis follow-up 6 months repeat echo at that time  2/3/21 echo mild LVH, EF 65%, grade 2 diastolic dysfunction, RVSP 30  2/26/21  cardiology note pacemaker, paroxysmal atrial fibrillation stable, echo reviewed no significant LVOT gradient,  continue Norpace and Toprol discussed options of RV lead replacement including transvenous extraction, implanting the lead, or performing generator change and using it until no longer working, we will plan on transvenous extraction and new RV lead, follow-up 6 months  8/11/21 cardiology note would like to exclude obstructive CAD but do not think pretest probability is high enough to warrant catheterization, will order coronary CTA follow-up 6 months  8/11/21 cardiology note EKG sinus, ordered CTA  10/5/21 CT-CTA heart left main no plaque or stenosis, LAD 25% stenosis, circumflex and obtuse marginal patent, RCA, posterior descending and posterior lateral branches patent no stenosis, normal LV size, ascending aorta 3.5 cm    ibs  2/3/20 trial of linzess 145 mcg  2/25/20 increase to linzess 290 mcg   3/5/20 colon per [facility] negative, repeat 10 years    Lymphocytosis  2/3/20 wbc 7.1 (525N,39%L)  9/1/20 wbc 6.9 (41%N,51%L)  11/22/21 wbc 8.4 (35%N,57%L) many smudge cells  1/12/22 wbc 9.9 (34%N,60%L),flow cytometry kappa restricted CD5+ B-cell population accounting for 20% of visible leukocytes, phenotypic features most consistent with CLL     pacemaker  4/16/15  cardiology procedure note, dual-chamber pacemaker implantation     Paroxysmal atrial fibrillation  11/18/14 echo normal LV size and function, grade 2 diastolic dysfunction, EF 60-65%, mild aortic stenosis, mild aortic insufficiency, RVSP 20-25  11/19/14 holter sinus with left bundle branch block, average rate 57, minimum heart rate 44, maximal heart rate 85, PVCs, PACs   12/12/14 ALLEN known post-ablation for hypertrophic cardiomyopathy without obstruction, small amount of remaining myocardium of lower tract does not cause significant outflow gradient, mild aortic insufficiency, moderate central mitral regurgitation, remnant myocardium in the LVOT with mild obstruction, peak outflow gradient 11 (previously 16-18)  11/30/15 cardiology note paroxysmal atrial  fibrillation, short atrial fibrillation under 4 minutes  2/22/16 cardiology note minimal atrial fibrillation on dual-chamber pacemaker check  3/24/16 cardiology note minimal atrial fibrillation   10/24/16 cardiology note longer episodes of atrial fibrillation, still at 2.6 hours total on dual-chamber pacemaker review, continues on xarelto  9/26/18 echo normal LV systolic function, EF 55%, mild LVH, grade 1 diastolic dysfunction, evidence of LVOT, septal wall thickness 1.2 cm, consider subaortic membrane, mild to moderate mitral regurgitation, mild aortic insufficiency  10/8/18 cardiology note does not appear to be subaortic membrane on echo, perhaps subaortic thickening, continue to monitor echo of full gradient for recurrence continue oral anticoagulation gradient is low and asymptomatic, xarelto, norpace, lopressor  1/29/19 cardiology note, patient still having some issues with short-term memory, will switch to coumadin from xarelto with recent event, referral to neurology, paroxysmal atrial fibrillation burden less than 1%  2/28/19 cardiology note hypertrophic obstructive cardiomyopathy and paroxysmal atrial fibrillation, sinus on exam, medi-lynx ventricular premature contractions isolated but frequent, asymptomatic, one short episode of paroxysmal atrial fibrillation 1.5 minutes, continues on warfarin INR 2.0, continues on norpace, increase metoprolol XL to 50 mg follow-up 1 month   10/10/19 cardiology note hypertrophic cardiomyopathy status post septal ablation, pacemaker, paroxysmal atrial fibrillation relatively short episodes, longest episode 49 minutes remainder less than 1 minute in length, remains controlled on toprol, norpace, coumadin per coumadin clinic  10/31/19 echo normal left ventricular function, EF 60%, basal septal wall appears hypokinetic, grade 2 diastolic dysfunction, moderately dilated left atrium volume index 44 mL, mild mitral regurgitation, mild aortic stenosis, mild aortic insufficiency,  peak gradient 23  1/7/20 ultrasound carotid less than 50% stenosis right internal carotid, left carotid mild plaque carotid bifurcation  8/25/20 cardiology note pacemaker, device check, no arrhythmia burden, change from coumadin to eliquis follow-up 6 months repeat echo at that time  2/3/21 echo mild LVH, EF 65%, grade 2 diastolic dysfunction, RVSP 30  2/26/21  cardiology note pacemaker, paroxysmal atrial fibrillation stable, echo reviewed no significant LVOT gradient, continue norpace and toprol discussed options of RV lead replacement including transvenous extraction, implanting the lead, or performing generator change and using it until no longer working, we will plan on transvenous extraction and new RV lead, follow-up 6 months  8/11/21 cardiology note would like to exclude obstructive CAD but do not think pretest probability is high enough to warrant catheterization, will order coronary CTA follow-up 6 months  8/11/21 cardiology note EKG sinus, ordered CTA  10/5/21 CT-CTA heart left main no plaque or stenosis, LAD 25% stenosis, circumflex and obtuse marginal patent, RCA, posterior descending and posterior lateral branches patent no stenosis, normal LV size, ascending aorta 3.5 cm     Preventative health  12/2/16 tdap  2/20/18 pap per gyn  2/3/20 pneumovax  2/3/20 hep c ab negative  3/5/20 colon per [facility] negative, repeat 10 years  9/11/20 declines sonocine  3/3/21 A1c 5.4%  3/23/21 dexa LS-0.4,hip-0.2  4/17/21 covid moderna second  9/21/21 mammogram heterogeneously dense breast tissue offered screening breast ultrasound  10/14/21 flu   10/27/21 covid moderna booster  11/22/21 vit d 42     sleep apnea  2/3/17 sleep apnea referral pending, OPO ordered  2/14/17 apnea link per [facility], AHI 19.7, low saturation 83, time<90% saturation 199 minutes, time less than 85% saturation 1 minute  2/16/17 sleep consultation, proceed with polysomnography  3/26/17 sleep study duration 430 minutes, AHI 21.3, low  saturation 88%, mild to moderate sleep-disordered breathing with significant disruption of sleep continuity, limb movement disorder could be present as well, cpap titration recommended  3/31/17 sleep note ordered autocpap 5-15 nightly, follow-up 6-8 weeks  11/30/18 sleep note on autocpap 5-15  6/4/20 sleep note on autopap 5 to 15  2/3/21 echo mild LVH, EF 65%, grade 2 diastolic dysfunction, RVSP 30  9/3/21 sleep note on autocpap 5 to 15, compliance report 96.7% usage average time 5 hours 30 minutes AHI 6.9, terrance device recall    S/p carpal tunnel right release  2/25/21 EMG nerve conduction study right upper extremity evaluate carpal tunnel  3/3/21 wbc 6.9 (41%N,51%L),NAVIN negative,SPEP negative  4/14/21 EMG nerve conduction study right upper extremity consistent with right median neuropathy without denervation, possible mild right ulnar neuropathy, no evidence of cervical radiculopathy  6/1/21  orthopedic operative note right endoscopic carpal tunnel release and left carpal tunnel injection under anesthesia  6/14/21  orthopedic note status post right endoscopic carpal tunnel release and left carpal tunnel injection under anesthesia anesthesia, follow-up 8 to 10 weeks   8/25/21 PACO note status post right endoscopic carpal tunnel release follow-up 8 to 10 weeks     Venous insufficiency  1/22/19 [facility] note venous insufficiency, physical exam does not indicate any evidence of venous insufficiency, patient declines venous ultrasound       Patient Active Problem List   Diagnosis   • Hypertrophic obstructive cardiomyopathy (HCC)   • S/P carpal tunnel release   • Pacemaker   • Preventative health care   • GERD (gastroesophageal reflux disease)   • Cervical arthritis   • Decreased GFR   • Dyslipidemia   • Paroxysmal atrial fibrillation (HCC)   • Sleep apnea   • History of transient global amnesia   • Chronic anticoagulation   • Venous insufficiency   • IBS (irritable bowel syndrome)   • Abnormal  nasal septum   • Lymphocytosis     Depression Screening    Little interest or pleasure in doing things?  0 - not at all  Feeling down, depressed , or hopeless? 0 - not at all  Patient Health Questionnaire Score: 0        Patient Care Team:  Juve Stoddard M.D. as PCP - General (Internal Medicine)  Vibha Chua M.D. (Nephrology)  [facility] as Pharmacist      ROS           Objective     LMP 01/31/2012 (LMP Unknown)      Physical Exam  Vitals and nursing note reviewed.   Constitutional:       Appearance: Normal appearance.   HENT:      Head: Normocephalic and atraumatic.      Right Ear: External ear normal.      Left Ear: External ear normal.   Eyes:      Conjunctiva/sclera: Conjunctivae normal.   Cardiovascular:      Rate and Rhythm: Normal rate.      Heart sounds: Normal heart sounds.   Pulmonary:      Effort: Pulmonary effort is normal.      Breath sounds: Normal breath sounds.   Abdominal:      General: There is no distension.   Skin:     General: Skin is warm.   Neurological:      Mental Status: She is alert.   Psychiatric:         Mood and Affect: Mood normal.        Assessment & Plan        Assessment  #1  Lymphocytosis with recent flow cytometry showing possibility of CLL with restricted CD5 positive B-cell population    #2 paroxysmal atrial fibrillation followed by cardiology on Eliquis, metoprolol, Norpace, also has pacemaker    #3 sleep apnea on CPAP    #4 history of tension headache improved on nortriptyline 10 mg, also has had insomnia has tried melatonin previously          Plan  #!  Referral to hematology made, she will schedule an appointment for the initial evaluation    #2  Reviewed the test results with her from January 12 going over especially the CBC and flow cytometry which indicates she may have CLL, and why the referral was made for her to see the hematologist for further information regarding a diagnosis, prognosis, and possible testing which may include a bone  marrow    #3  Continue work on good nutrition, good exercise, sleep hygiene    #4 continue pamelor and consider increasing the nortriptyline dose to 20 mg but check with cardiology because of the potential interaction with Norpace    #5 she will check with the pharmacy about the second shingles vaccine    #6 she is up-to-date on the COVID-vaccine and booster    #7 follow-up with me after she sees hematology oncology she can contact me for questions at any time    #8 follow-up women[facility] wellness for Pap smear

## 2022-01-21 RX ORDER — DISOPYRAMIDE PHOSPHATE 100 MG/1
CAPSULE ORAL
Qty: 270 CAPSULE | Refills: 1 | Status: SHIPPED | OUTPATIENT
Start: 2022-01-21 | End: 2022-05-09

## 2022-01-28 ENCOUNTER — PATIENT MESSAGE (OUTPATIENT)
Dept: CARDIOLOGY | Facility: MEDICAL CENTER | Age: 62
End: 2022-01-28

## 2022-01-28 ENCOUNTER — TELEPHONE (OUTPATIENT)
Dept: CARDIOLOGY | Facility: MEDICAL CENTER | Age: 62
End: 2022-01-28

## 2022-01-28 NOTE — TELEPHONE ENCOUNTER
----- Message from Seda Cagle sent at 1/28/2022  1:31 PM PST -----  Regarding: Norpace Prior Authorization  Mark Howard, I still have HT but my policy changed beginning of the new year.     Member Number [id number]  RxBin [id number]  RxPCN [id number]  RxGrp [facility]    Not sure what you needed but hope that helps.Please let me know if I can be of further help.  Seda

## 2022-01-28 NOTE — TELEPHONE ENCOUNTER
EMANI UREÑA Key: [id number] help? Call us at [phone]  Status  New(Not sent to plan)  Eligibility could not be verified for this patient - patient not found. Please review patient information.  Drug  Disopyramide Phosphate 100MG capsules  Form  [employer] ePA Form  ? There was an error with your request   ?  Eligibility could not be verified for this patient - patient not found. Please review patient information.

## 2022-01-28 NOTE — TELEPHONE ENCOUNTER
Seda Cagle Key: [id number] - Rx #: [id number] help? Call us at [phone]  Status  New(Not sent to plan)  Eligibility could not be verified for this patient - patient not found. Please review patient information.  Drug  Disopyramide Phosphate 100MG capsules  Form  [employer] ePA Form  Original Claim Info  65 Patient Is Not Covered Patient is not covered - SG  ? There was an error with your request   ?  Eligibility could not be verified for this patient - patient not found. Please review patient information.

## 2022-01-28 NOTE — PATIENT COMMUNICATION
ATTEMPTED TO RUN PA UNDER PT GIVEN INFO:  EMANI UREÑA Hurd: [id number] help? Call us at [phone]  Status  New(Not sent to plan)  The patient is not found with the information provided.  Drug  Disopyramide Phosphate 100MG capsules  Form  [employer] Electronic PA Form (2017 NCPDP)  ? There was an error with your request   ?  The patient is not found with the information provided.    SAME MESSAGE

## 2022-01-31 ENCOUNTER — HOSPITAL ENCOUNTER (OUTPATIENT)
Facility: MEDICAL CENTER | Age: 62
End: 2022-01-31
Attending: INTERNAL MEDICINE
Payer: COMMERCIAL

## 2022-01-31 PROCEDURE — 80074 ACUTE HEPATITIS PANEL: CPT

## 2022-01-31 PROCEDURE — 82784 ASSAY IGA/IGD/IGG/IGM EACH: CPT

## 2022-02-01 LAB
HAV IGM SERPL QL IA: NORMAL
HBV CORE IGM SER QL: NORMAL
HBV SURFACE AG SER QL: NORMAL
HCV AB SER QL: NORMAL

## 2022-02-02 LAB — IGG SERPL-MCNC: 873 MG/DL (ref 768–1632)

## 2022-02-08 ENCOUNTER — OFFICE VISIT (OUTPATIENT)
Dept: CARDIOLOGY | Facility: MEDICAL CENTER | Age: 62
End: 2022-02-08
Payer: COMMERCIAL

## 2022-02-08 VITALS
HEART RATE: 66 BPM | RESPIRATION RATE: 12 BRPM | SYSTOLIC BLOOD PRESSURE: 122 MMHG | WEIGHT: 197 LBS | BODY MASS INDEX: 33.63 KG/M2 | DIASTOLIC BLOOD PRESSURE: 62 MMHG | OXYGEN SATURATION: 98 % | HEIGHT: 64 IN

## 2022-02-08 DIAGNOSIS — T82.110D FAILURE OF PACEMAKER LEAD, SUBSEQUENT ENCOUNTER: ICD-10-CM

## 2022-02-08 DIAGNOSIS — I48.0 PAROXYSMAL ATRIAL FIBRILLATION (HCC): ICD-10-CM

## 2022-02-08 PROCEDURE — 93000 ELECTROCARDIOGRAM COMPLETE: CPT | Performed by: INTERNAL MEDICINE

## 2022-02-08 PROCEDURE — 93280 PM DEVICE PROGR EVAL DUAL: CPT | Performed by: INTERNAL MEDICINE

## 2022-02-08 PROCEDURE — 99215 OFFICE O/P EST HI 40 MIN: CPT | Mod: 25 | Performed by: INTERNAL MEDICINE

## 2022-02-08 ASSESSMENT — FIBROSIS 4 INDEX: FIB4 SCORE: 1.59

## 2022-02-08 NOTE — PROGRESS NOTES
Arrhythmia Clinic Note (Established patient)    DOS: 2/8/2022    Chief complaint/Reason for consult: PACs/PVCs    Interval History:  Pt is a 62 yo F. She has a history of symptomatic HCM, PACs/PVCs on antiarrhythmics and buzz agents and SSS s/p PPM. Dual chamber [place] [employer] device with increasing RV threshold and impedance values. Nearing EDIE. Here for device follow-up.    Recently diagnosed with CLL and anxious about this diagnosis. Otherwise no complaints today.    ROS (+ highlighted in red):  General--Negative for fatigue, weight loss or weight gain  Cardiovascular--Negative for CP, orthopnea, PND    Past Medical History:   Diagnosis Date   • Anesthesia     wakes up during procedure (x2)   • Aortic regurgitation    • Arrhythmia, ventricular 2008    Symptomatic PVCs, controlled with medication.   • Asthma     inhaler PRN, only uses when sick    • Atrial fibrillation (HCC)    • Cardiac arrhythmia    • Cervical arthritis 2/8/2016   • Chickenpox    • Chronic kidney disease, stage III (moderate) (HCC) 2/9/2016   • CTS (carpal tunnel syndrome) 2/28/2011   • Depression    • GERD (gastroesophageal reflux disease)    • Heart burn    • Heart murmur    • Hemorrhagic disorder (HCC)     on Eliquis   • Hypertr obst cardiomyop 1991    Dr. Bert Vazquez, [address] [address] [phone] fax [phone].  [place] office [phone].  Alchol Septal Reduction 8/00, Coronaries normal.   • Hypertrophic cardiomyopathy (HCC)    • Indigestion    • Influenza    • Pacemaker 2014   • Renal disorder     chronic kidney disease stage 3-  **pt states currently no issue, numbers are stable   • Sleep apnea     uses CPAP   • Snoring        Past Surgical History:   Procedure Laterality Date   • PB WRIST ARTHROSCOP,RELEASE XVERS LIG Right 6/1/2021    Procedure: RELEASE, CARPAL TUNNEL, ENDOSCOPIC;  Surgeon: Mike Ospina M.D.;  Location: SURGERY [facility];  Service: Orthopedics   • JOINT INJECTION DIAGNOSTIC  Left 6/1/2021    Procedure: INJECTION, JOINT, DIAGNOSTIC-CARPAL TUNNEL;  Surgeon: Mike Ospina M.D.;  Location: SURGERY [facility];  Service: Orthopedics   • HYSTEROSCOPY WITH VIDEO OPERATIVE  4/30/2018    Procedure: HYSTEROSCOPY WITH VIDEO OPERATIVE;  Surgeon: Noemi Liu M.D.;  Location: SURGERY SAME DAY [facility];  Service: Labor and Delivery   • DILATION AND CURETTAGE  4/30/2018    Procedure: DILATION AND CURETTAGE;  Surgeon: Noemi Liu M.D.;  Location: SURGERY SAME DAY [facility];  Service: Labor and Delivery   • BREAST BIOPSY Left 1/30/2018    Procedure: BREAST BIOPSY- WIRE LOCALIZED;  Surgeon: Vijaya Britt M.D.;  Location: SURGERY SAME DAY [facility];  Service: General   • RECOVERY  4/15/2015    Performed by [facility] at SURGERY PRE-POST PROC UNIT [facility]   • PACEMAKER INSERTION  2015   • RECOVERY  12/12/2014    Performed by [facility] at SURGERY SAME DAY [facility]   • SEPTAL RECONSTRUCTION  2000   • ENDOSCOPY     • PRIMARY C SECTION     • TUBAL COAGULATION LAPAROSCOPIC BILATERAL         Social History     Socioeconomic History   • Marital status:      Spouse name: Not on file   • Number of children: Not on file   • Years of education: Not on file   • Highest education level: Some college, no degree   Occupational History   • Not on file   Tobacco Use   • Smoking status: Never Smoker   • Smokeless tobacco: Never Used   Vaping Use   • Vaping Use: Never used   Substance and Sexual Activity   • Alcohol use: Not Currently     Alcohol/week: 0.6 oz     Types: 1 Standard drinks or equivalent per week     Comment: 3-4 per week   • Drug use: No   • Sexual activity: Yes     Partners: Male     Birth control/protection: Surgical     Comment:    Other Topics Concern   • Not on file   Social History Narrative   • Not on file     Social Determinants of Health     Financial Resource Strain: Low Risk    • Difficulty of Paying Living Expenses: Not hard at all    Food Insecurity: No Food Insecurity   • Worried About Running Out of Food in the Last Year: Never true   • Ran Out of Food in the Last Year: Never true   Transportation Needs: No Transportation Needs   • Lack of Transportation (Medical): No   • Lack of Transportation (Non-Medical): No   Physical Activity: Insufficiently Active   • Days of Exercise per Week: 3 days   • Minutes of Exercise per Session: 30 min   Stress: No Stress Concern Present   • Feeling of Stress : Only a little   Social Connections: Moderately Isolated   • Frequency of Communication with Friends and Family: More than three times a week   • Frequency of Social Gatherings with Friends and Family: Never   • Attends [employer] Services: Never   • Active Member of Clubs or Organizations: No   • Attends Club or Organization Meetings: Never   • Marital Status:    Intimate Partner Violence:    • Fear of Current or Ex-Partner: Not on file   • Emotionally Abused: Not on file   • Physically Abused: Not on file   • Sexually Abused: Not on file   Housing Stability: Low Risk    • Unable to Pay for Housing in the Last Year: No   • Number of Places Lived in the Last Year: 1   • Unstable Housing in the Last Year: No       Family History   Problem Relation Age of Onset   • Heart Disease Mother         CHF   • Hypertension Mother    • Stroke Mother    • Heart Failure Mother    • Other Mother         carotid artery disease,gout   • Cancer Father         AML   • Diabetes Maternal Grandmother    • Cancer Maternal Grandfather         Prostate   • Heart Disease Paternal Grandfather 52         MI    • Sleep Apnea Neg Hx        No Known Allergies    Current Outpatient Medications   Medication Sig Dispense Refill   • disopyramide (NORPACE) 100 MG Cap TAKE ONE CAPSULE BY MOUTH EVERY 8 HOURS 270 Capsule 1   • fluticasone (FLONASE) 50 MCG/ACT nasal spray Administer 1 Spray into affected nostril(S) every day.     • famotidine (PEPCID) 40 MG Tab Take 1 Tablet by  "mouth 2 times a day. 180 Tablet 3   • nortriptyline (PAMELOR) 10 MG Cap Take 1 Capsule by mouth every evening. 30 Capsule 3   • atorvastatin (LIPITOR) 40 MG Tab TAKE ONE TABLET BY MOUTH EVERY EVENING (LIPITOR) 90 Tablet 2   • Probiotic Product (PROBIOTIC-10 PO) Take 1 Capsule by mouth every day.     • ELIQUIS 5 MG Tab TAKE ONE TABLET BY MOUTH TWICE A  tablet 3   • metoprolol SR (TOPROL XL) 100 MG TABLET SR 24 HR TAKE ONE TABLET BY MOUTH DAILY (Patient taking differently: Take 100 mg by mouth every day.) 90 tablet 3   • Multiple Vitamin (MULTI-VITAMIN PO) Take  by mouth every day.     • Pantothenic Acid 500 MG Tab Take 500 mg by mouth every day.     • Calcium-Vitamin D-Vitamin K (CALCIUM FOR WOMEN PO) Take 1 Each by mouth every day at 6 PM. Taking in the morning     • Magnesium 200 MG TABS Take 1 Tab by mouth every day.       No current facility-administered medications for this visit.       Physical Exam:  Vitals:    02/08/22 1242   BP: 122/62   BP Location: Left arm   Patient Position: Sitting   BP Cuff Size: Adult   Pulse: 66   Resp: 12   SpO2: 98%   Weight: 89.4 kg (197 lb)   Height: 1.626 m (5' 4\")     General appearance: NAD, conversant  HEENT: PERRL, neck is supple with FROM  Lungs: Clear to auscultation, normal respiratory effort  CV: RRR, no murmurs/rubs/gallops, no JVD  Abdomen: Soft, non-tender with normal bowel sounds  Extremities: No peripheral edema, no clubbing or cyanosis  Skin: No rash, lesions, or ulcers  Psych: Alert and oriented to person, place and time    Data:  Labs reviewed    Prior echo/stress reviewed:  LVEF 65%    EKG interpreted by me:  AV paced    Impression/Plan:  1. Paroxysmal atrial fibrillation (HCC)  EKG   2. Failure of pacemaker lead, subsequent encounter       -Pacemaker nearing EDIE  -Now as good of a time as any to replace generator and go ahead and try to extract failing RV lead transvenously and replace  -Risks/benefits/alternatives discussed, all questions answered and " she is agreeable to proceed  -Will schedule with CT surgery back up     Solitario Witt MD

## 2022-02-09 ENCOUNTER — TELEPHONE (OUTPATIENT)
Dept: CARDIOLOGY | Facility: MEDICAL CENTER | Age: 62
End: 2022-02-09
Payer: COMMERCIAL

## 2022-02-09 DIAGNOSIS — I48.0 PAROXYSMAL ATRIAL FIBRILLATION (HCC): ICD-10-CM

## 2022-02-09 NOTE — TELEPHONE ENCOUNTER
Dr. Witt,    Your first availability for an extraction is April. Is this patient ok to wait until April for this procedure?    Thank You,  Abril

## 2022-02-09 NOTE — TELEPHONE ENCOUNTER
----- Message from Solitario Witt M.D. sent at 2/8/2022  1:33 PM PST -----  Let's plan for lead extraction. [facility], schedule with CT surgery back up.    Solitario

## 2022-02-09 NOTE — TELEPHONE ENCOUNTER
Time held on 4-6-22 for this procedure. OR19 held.  for Rainviviana with CT to check back up availability on this day.

## 2022-02-10 NOTE — TELEPHONE ENCOUNTER
Dr. Witt,    Per this patient - you instructed her to hold her Eliquis 1 day before. Can you please confirm this is what you would like her to do with her Eliquis for this extraction?    Thank You,  Abril

## 2022-02-10 NOTE — TELEPHONE ENCOUNTER
Patient scheduled for extraction on 4-6-22 with Dr. Witt. Patient has been instructed to check in at 6:00 for 7:30 case time. Hold Eliquis 1 day before. Message sent to authorizations. Per MANUEL Hernández available for back up. LM for Antoine with Johan. Emailed perfusion. Emailed Gavino with laser.

## 2022-02-11 PROBLEM — R51.9 HEADACHE: Status: ACTIVE | Noted: 2022-02-11

## 2022-02-11 LAB — EKG IMPRESSION: NORMAL

## 2022-02-11 RX ORDER — NORTRIPTYLINE HYDROCHLORIDE 10 MG/1
20 CAPSULE ORAL NIGHTLY
Qty: 60 CAPSULE | Refills: 1
Start: 2022-02-11 | End: 2022-03-15

## 2022-02-14 NOTE — TELEPHONE ENCOUNTER
----- Message from Oumou Beltran sent at 3/17/2020 10:33 AM PDT -----  Regarding: FW: Non-Urgent Medical Question  Contact: [phone]    ----- Message -----  From: Seda Ma Cagle  Sent: 3/16/2020   8:56 AM PDT  To: Shoshana Lindsey  Subject: Non-Urgent Medical Question                      This message is for your medical assistant, my messages wouldn't let me contact her. My EOB for this appt on Feb 3 says I got 2 shots. I remembered, and the statement of our appt says only one- the Pneumonia Shot. So I'm checking with you first to make sure I'm right before I try to get it cleared up.  Thank you    
Could someone please check on this? please see the previous message.       
Left message for patient to call back office at [phone].      It looks like we only gave her one vaccine at 2/3/20 appt. You did give her a prescription for Shingles to be given at pharmacy. This may be what showed up on patient's end. Patient will need to call billing to further follow up.  
Please see the patient's [employer] message.  She did get the pneumonia shot but I am not sure if she got billed for another vaccine.  Please check with her.  
Pt notified.   
Thank you, please notify the patient.  
verbal cues/2 person assist

## 2022-02-22 PROBLEM — Z87.898 HISTORY OF HEADACHE: Status: ACTIVE | Noted: 2022-02-11

## 2022-02-22 PROBLEM — G47.00 INSOMNIA: Status: ACTIVE | Noted: 2022-02-22

## 2022-02-23 PROBLEM — C91.10 CLL (CHRONIC LYMPHOCYTIC LEUKEMIA) (HCC): Status: ACTIVE | Noted: 2021-11-23

## 2022-03-03 ENCOUNTER — TELEPHONE (OUTPATIENT)
Dept: CARDIOLOGY | Facility: MEDICAL CENTER | Age: 62
End: 2022-03-03
Payer: COMMERCIAL

## 2022-03-03 NOTE — TELEPHONE ENCOUNTER
Device transmitted via home monitor--RV impedance increased above 2000ohms-- safety switch occurred to unipolar--patient scheduled 3/4/22 to program back to bipolar-- may increase impedance threshold or program to monitor only.

## 2022-03-04 ENCOUNTER — NON-PROVIDER VISIT (OUTPATIENT)
Dept: CARDIOLOGY | Facility: MEDICAL CENTER | Age: 62
End: 2022-03-04
Payer: COMMERCIAL

## 2022-03-04 DIAGNOSIS — Z95.0 PACEMAKER: ICD-10-CM

## 2022-03-04 DIAGNOSIS — I49.5 SICK SINUS SYNDROME (HCC): ICD-10-CM

## 2022-03-07 ENCOUNTER — APPOINTMENT (OUTPATIENT)
Dept: SLEEP MEDICINE | Facility: MEDICAL CENTER | Age: 62
End: 2022-03-07
Payer: COMMERCIAL

## 2022-03-22 DIAGNOSIS — I48.0 PAROXYSMAL ATRIAL FIBRILLATION (HCC): ICD-10-CM

## 2022-03-23 ENCOUNTER — OFFICE VISIT (OUTPATIENT)
Dept: DERMATOLOGY | Facility: IMAGING CENTER | Age: 62
End: 2022-03-23
Payer: COMMERCIAL

## 2022-03-23 VITALS — TEMPERATURE: 97.3 F

## 2022-03-23 DIAGNOSIS — L82.1 SK (SEBORRHEIC KERATOSIS): ICD-10-CM

## 2022-03-23 DIAGNOSIS — D18.01 CHERRY ANGIOMA: ICD-10-CM

## 2022-03-23 DIAGNOSIS — D22.9 MULTIPLE NEVI: ICD-10-CM

## 2022-03-23 DIAGNOSIS — Z12.83 SKIN CANCER SCREENING: ICD-10-CM

## 2022-03-23 DIAGNOSIS — L81.4 LENTIGINES: ICD-10-CM

## 2022-03-23 PROCEDURE — 99213 OFFICE O/P EST LOW 20 MIN: CPT | Performed by: NURSE PRACTITIONER

## 2022-03-23 NOTE — PROGRESS NOTES
DERMATOLOGY NOTE  NEW VISIT       Chief complaint: Establish Care and Skin Lesion     EFREM       Hx Dx 01/31/2022 CLL   History of skin cancer: No  History of precancers/actinic keratoses: No  History of biopsies:No  History of blistering/severe sunburns:Yes, Details: teenager   Family history of skin cancer:No  Family history of atypical moles:No      Allergies   Allergen Reactions   • Pamelor [Nortriptyline]      Chest pain        MEDICATIONS:  Medications relevant to specialty reviewed.     REVIEW OF SYSTEMS:   Positive for skin (see HPI)  Negative for fevers and chills       EXAM:  Temp 36.3 °C (97.3 °F)   LMP 01/31/2012 (LMP Unknown)   Constitutional: Well-developed, well-nourished, and in no distress.     A total body skin exam was performed excluding the genitals per patient preference and including the following areas: head (including face), neck, chest, abdomen, groin/buttocks, back, bilateral upper extremities, and bilateral lower extremities with the following pertinent findings listed below and/or in assessment/plan.     -sun exposed skin of trunk and b/l upper, lower extremities and face with scattered clinically benign light brown reticulated macules all of which were morphologically similar and none of which were suspicious for skin cancer today on exam    Multiple tan medium brown skin-colored macules papules scattered over the trunk, face and extremities    Several scattered 1-3mm bright red macules and thin papules on the trunk    few light brown tan and medium brown stuck-on waxy papules scattered on the face, trunk and extremities        IMPRESSION / PLAN:    1. Multiple nevi  - Benign-appearing nature of lesions discussed. Advised to return to clinic for any new or concerning changes.  - ABCDE's of melanoma discussed      2. Cherry angioma  - Benign-appearing nature of lesions discussed. Advised to return to clinic for any new or concerning changes.      3. Lentigines  - Benign-appearing nature  of lesions discussed. Advised to return to clinic for any new or concerning changes.      4. Skin cancer screening  Skin cancer education  - discussed importance of sun protective clothing, eyewear  - discussed importance of daily use of broad spectrum sunscreen with SPF 30 or greater, as well as need for reapplication ~every 2 hours when exposed to UVR  - discussed importance of regular self-exams, ideally once per month, every 12 months exams in clinic  - ABCDE's of melanoma discussed  - patient to bring any new or concerning lesions to my attention  - Patient educational handout provided and reviewed with patient      5. SK (seborrheic keratosis)  - Benign-appearing nature of lesions discussed. Advised to return to clinic for any new or concerning changes.          Please note that this dictation was created using voice recognition software. I have made every reasonable attempt to correct obvious errors, but I expect that there are errors of grammar and possibly content that I did not discover before finalizing the note.      Return to clinic in: Return in about 1 year (around 3/23/2023) for EFREM. and as needed for any new or changing skin lesions.

## 2022-03-25 RX ORDER — METOPROLOL SUCCINATE 100 MG/1
TABLET, EXTENDED RELEASE ORAL
Qty: 90 TABLET | Refills: 3 | Status: ON HOLD
Start: 2022-03-25 | End: 2022-08-04

## 2022-04-04 ENCOUNTER — PRE-ADMISSION TESTING (OUTPATIENT)
Dept: ADMISSIONS | Facility: MEDICAL CENTER | Age: 62
End: 2022-04-04
Attending: INTERNAL MEDICINE
Payer: COMMERCIAL

## 2022-04-04 DIAGNOSIS — Z01.812 PRE-OPERATIVE LABORATORY EXAMINATION: ICD-10-CM

## 2022-04-04 DIAGNOSIS — Z01.810 PRE-OPERATIVE CARDIOVASCULAR EXAMINATION: ICD-10-CM

## 2022-04-04 LAB
ALBUMIN SERPL BCP-MCNC: 4.5 G/DL (ref 3.2–4.9)
ALBUMIN/GLOB SERPL: 2 G/DL
ALP SERPL-CCNC: 88 U/L (ref 30–99)
ALT SERPL-CCNC: 24 U/L (ref 2–50)
ANION GAP SERPL CALC-SCNC: 11 MMOL/L (ref 7–16)
AST SERPL-CCNC: 23 U/L (ref 12–45)
BILIRUB SERPL-MCNC: 0.6 MG/DL (ref 0.1–1.5)
BUN SERPL-MCNC: 15 MG/DL (ref 8–22)
CALCIUM SERPL-MCNC: 9.8 MG/DL (ref 8.5–10.5)
CHLORIDE SERPL-SCNC: 104 MMOL/L (ref 96–112)
CO2 SERPL-SCNC: 25 MMOL/L (ref 20–33)
CREAT SERPL-MCNC: 0.81 MG/DL (ref 0.5–1.4)
ERYTHROCYTE [DISTWIDTH] IN BLOOD BY AUTOMATED COUNT: 40 FL (ref 35.9–50)
GFR SERPLBLD CREATININE-BSD FMLA CKD-EPI: 82 ML/MIN/1.73 M 2
GLOBULIN SER CALC-MCNC: 2.2 G/DL (ref 1.9–3.5)
GLUCOSE SERPL-MCNC: 95 MG/DL (ref 65–99)
HCT VFR BLD AUTO: 44.7 % (ref 37–47)
HGB BLD-MCNC: 15.1 G/DL (ref 12–16)
INR PPP: 1.2 (ref 0.87–1.13)
MCH RBC QN AUTO: 29.8 PG (ref 27–33)
MCHC RBC AUTO-ENTMCNC: 33.8 G/DL (ref 33.6–35)
MCV RBC AUTO: 88.2 FL (ref 81.4–97.8)
PLATELET # BLD AUTO: 153 K/UL (ref 164–446)
PMV BLD AUTO: 11.1 FL (ref 9–12.9)
POTASSIUM SERPL-SCNC: 4.1 MMOL/L (ref 3.6–5.5)
PROT SERPL-MCNC: 6.7 G/DL (ref 6–8.2)
PROTHROMBIN TIME: 14.9 SEC (ref 12–14.6)
RBC # BLD AUTO: 5.07 M/UL (ref 4.2–5.4)
SARS-COV-2 RNA RESP QL NAA+PROBE: NOTDETECTED
SODIUM SERPL-SCNC: 140 MMOL/L (ref 135–145)
SPECIMEN SOURCE: NORMAL
WBC # BLD AUTO: 10.3 K/UL (ref 4.8–10.8)

## 2022-04-04 PROCEDURE — U0005 INFEC AGEN DETEC AMPLI PROBE: HCPCS

## 2022-04-04 PROCEDURE — 80053 COMPREHEN METABOLIC PANEL: CPT

## 2022-04-04 PROCEDURE — U0003 INFECTIOUS AGENT DETECTION BY NUCLEIC ACID (DNA OR RNA); SEVERE ACUTE RESPIRATORY SYNDROME CORONAVIRUS 2 (SARS-COV-2) (CORONAVIRUS DISEASE [COVID-19]), AMPLIFIED PROBE TECHNIQUE, MAKING USE OF HIGH THROUGHPUT TECHNOLOGIES AS DESCRIBED BY CMS-2020-01-R: HCPCS

## 2022-04-04 PROCEDURE — C9803 HOPD COVID-19 SPEC COLLECT: HCPCS

## 2022-04-04 PROCEDURE — 93005 ELECTROCARDIOGRAM TRACING: CPT

## 2022-04-04 PROCEDURE — 85027 COMPLETE CBC AUTOMATED: CPT

## 2022-04-04 PROCEDURE — 36415 COLL VENOUS BLD VENIPUNCTURE: CPT

## 2022-04-04 PROCEDURE — 85610 PROTHROMBIN TIME: CPT

## 2022-04-04 ASSESSMENT — FIBROSIS 4 INDEX: FIB4 SCORE: 1.59

## 2022-04-05 LAB — EKG IMPRESSION: NORMAL

## 2022-04-05 PROCEDURE — 93010 ELECTROCARDIOGRAM REPORT: CPT | Performed by: INTERNAL MEDICINE

## 2022-04-06 ENCOUNTER — APPOINTMENT (OUTPATIENT)
Dept: RADIOLOGY | Facility: MEDICAL CENTER | Age: 62
End: 2022-04-06
Attending: INTERNAL MEDICINE
Payer: COMMERCIAL

## 2022-04-06 ENCOUNTER — APPOINTMENT (OUTPATIENT)
Dept: CARDIOLOGY | Facility: MEDICAL CENTER | Age: 62
End: 2022-04-06
Attending: INTERNAL MEDICINE
Payer: COMMERCIAL

## 2022-04-06 ENCOUNTER — HOSPITAL ENCOUNTER (OUTPATIENT)
Facility: MEDICAL CENTER | Age: 62
End: 2022-04-07
Attending: INTERNAL MEDICINE | Admitting: INTERNAL MEDICINE
Payer: COMMERCIAL

## 2022-04-06 ENCOUNTER — ANESTHESIA EVENT (OUTPATIENT)
Dept: CARDIOLOGY | Facility: MEDICAL CENTER | Age: 62
End: 2022-04-06
Payer: COMMERCIAL

## 2022-04-06 ENCOUNTER — ANESTHESIA (OUTPATIENT)
Dept: CARDIOLOGY | Facility: MEDICAL CENTER | Age: 62
End: 2022-04-06
Payer: COMMERCIAL

## 2022-04-06 DIAGNOSIS — I48.0 PAROXYSMAL ATRIAL FIBRILLATION (HCC): ICD-10-CM

## 2022-04-06 DIAGNOSIS — Z95.0 PACEMAKER: ICD-10-CM

## 2022-04-06 DIAGNOSIS — T82.110D FAILURE OF PACEMAKER LEAD, SUBSEQUENT ENCOUNTER: ICD-10-CM

## 2022-04-06 LAB
ABO + RH BLD: NORMAL
ABO GROUP BLD: NORMAL
BLD GP AB SCN SERPL QL: NORMAL
EKG IMPRESSION: NORMAL
RH BLD: NORMAL

## 2022-04-06 PROCEDURE — 33233 REMOVAL OF PM GENERATOR: CPT | Performed by: INTERNAL MEDICINE

## 2022-04-06 PROCEDURE — 93312 ECHO TRANSESOPHAGEAL: CPT | Mod: 26,59 | Performed by: ANESTHESIOLOGY

## 2022-04-06 PROCEDURE — 700111 HCHG RX REV CODE 636 W/ 250 OVERRIDE (IP): Performed by: ANESTHESIOLOGY

## 2022-04-06 PROCEDURE — 700111 HCHG RX REV CODE 636 W/ 250 OVERRIDE (IP): Performed by: INTERNAL MEDICINE

## 2022-04-06 PROCEDURE — 700101 HCHG RX REV CODE 250: Performed by: ANESTHESIOLOGY

## 2022-04-06 PROCEDURE — A9270 NON-COVERED ITEM OR SERVICE: HCPCS | Performed by: INTERNAL MEDICINE

## 2022-04-06 PROCEDURE — 86900 BLOOD TYPING SEROLOGIC ABO: CPT

## 2022-04-06 PROCEDURE — 93319 3D ECHO IMG CGEN CAR ANOMAL: CPT | Performed by: ANESTHESIOLOGY

## 2022-04-06 PROCEDURE — 160035 HCHG PACU - 1ST 60 MINS PHASE I

## 2022-04-06 PROCEDURE — 33235 REMOVAL PACEMAKER ELECTRODE: CPT | Performed by: INTERNAL MEDICINE

## 2022-04-06 PROCEDURE — 93005 ELECTROCARDIOGRAM TRACING: CPT | Performed by: INTERNAL MEDICINE

## 2022-04-06 PROCEDURE — 00537 ANES CARDIAC EP PROCEDURES: CPT | Performed by: ANESTHESIOLOGY

## 2022-04-06 PROCEDURE — 700105 HCHG RX REV CODE 258: Performed by: INTERNAL MEDICINE

## 2022-04-06 PROCEDURE — 93320 DOPPLER ECHO COMPLETE: CPT | Mod: 26 | Performed by: ANESTHESIOLOGY

## 2022-04-06 PROCEDURE — 700102 HCHG RX REV CODE 250 W/ 637 OVERRIDE(OP): Performed by: INTERNAL MEDICINE

## 2022-04-06 PROCEDURE — 86850 RBC ANTIBODY SCREEN: CPT

## 2022-04-06 PROCEDURE — 33208 INSRT HEART PM ATRIAL & VENT: CPT

## 2022-04-06 PROCEDURE — 33208 INSRT HEART PM ATRIAL & VENT: CPT | Mod: KX | Performed by: INTERNAL MEDICINE

## 2022-04-06 PROCEDURE — 71045 X-RAY EXAM CHEST 1 VIEW: CPT

## 2022-04-06 PROCEDURE — 93312 ECHO TRANSESOPHAGEAL: CPT

## 2022-04-06 PROCEDURE — 96374 THER/PROPH/DIAG INJ IV PUSH: CPT

## 2022-04-06 PROCEDURE — G0378 HOSPITAL OBSERVATION PER HR: HCPCS

## 2022-04-06 PROCEDURE — 96376 TX/PRO/DX INJ SAME DRUG ADON: CPT

## 2022-04-06 PROCEDURE — 93010 ELECTROCARDIOGRAM REPORT: CPT | Mod: 59 | Performed by: INTERNAL MEDICINE

## 2022-04-06 PROCEDURE — 86901 BLOOD TYPING SEROLOGIC RH(D): CPT

## 2022-04-06 PROCEDURE — 96375 TX/PRO/DX INJ NEW DRUG ADDON: CPT

## 2022-04-06 PROCEDURE — 160002 HCHG RECOVERY MINUTES (STAT)

## 2022-04-06 PROCEDURE — 700105 HCHG RX REV CODE 258: Performed by: ANESTHESIOLOGY

## 2022-04-06 PROCEDURE — 160036 HCHG PACU - EA ADDL 30 MINS PHASE I

## 2022-04-06 RX ORDER — MEPERIDINE HYDROCHLORIDE 25 MG/ML
12.5 INJECTION INTRAMUSCULAR; INTRAVENOUS; SUBCUTANEOUS
Status: DISCONTINUED | OUTPATIENT
Start: 2022-04-06 | End: 2022-04-06 | Stop reason: HOSPADM

## 2022-04-06 RX ORDER — SODIUM CHLORIDE, SODIUM LACTATE, POTASSIUM CHLORIDE, CALCIUM CHLORIDE 600; 310; 30; 20 MG/100ML; MG/100ML; MG/100ML; MG/100ML
INJECTION, SOLUTION INTRAVENOUS
Status: DISCONTINUED | OUTPATIENT
Start: 2022-04-06 | End: 2022-04-06 | Stop reason: SURG

## 2022-04-06 RX ORDER — DEXAMETHASONE SODIUM PHOSPHATE 4 MG/ML
INJECTION, SOLUTION INTRA-ARTICULAR; INTRALESIONAL; INTRAMUSCULAR; INTRAVENOUS; SOFT TISSUE PRN
Status: DISCONTINUED | OUTPATIENT
Start: 2022-04-06 | End: 2022-04-06 | Stop reason: SURG

## 2022-04-06 RX ORDER — HYDROMORPHONE HYDROCHLORIDE 1 MG/ML
0.1 INJECTION, SOLUTION INTRAMUSCULAR; INTRAVENOUS; SUBCUTANEOUS
Status: DISCONTINUED | OUTPATIENT
Start: 2022-04-06 | End: 2022-04-06 | Stop reason: HOSPADM

## 2022-04-06 RX ORDER — NORTRIPTYLINE HYDROCHLORIDE 10 MG/1
10 CAPSULE ORAL EVERY EVENING
Status: DISCONTINUED | OUTPATIENT
Start: 2022-04-06 | End: 2022-04-07 | Stop reason: HOSPADM

## 2022-04-06 RX ORDER — ONDANSETRON 2 MG/ML
4 INJECTION INTRAMUSCULAR; INTRAVENOUS
Status: DISCONTINUED | OUTPATIENT
Start: 2022-04-06 | End: 2022-04-06 | Stop reason: HOSPADM

## 2022-04-06 RX ORDER — METOPROLOL SUCCINATE 100 MG/1
100 TABLET, EXTENDED RELEASE ORAL EVERY EVENING
Status: DISCONTINUED | OUTPATIENT
Start: 2022-04-06 | End: 2022-04-07 | Stop reason: HOSPADM

## 2022-04-06 RX ORDER — SODIUM CHLORIDE, SODIUM LACTATE, POTASSIUM CHLORIDE, CALCIUM CHLORIDE 600; 310; 30; 20 MG/100ML; MG/100ML; MG/100ML; MG/100ML
INJECTION, SOLUTION INTRAVENOUS CONTINUOUS
Status: ACTIVE | OUTPATIENT
Start: 2022-04-06 | End: 2022-04-06

## 2022-04-06 RX ORDER — CEFAZOLIN SODIUM 1 G/3ML
INJECTION, POWDER, FOR SOLUTION INTRAMUSCULAR; INTRAVENOUS PRN
Status: DISCONTINUED | OUTPATIENT
Start: 2022-04-06 | End: 2022-04-06 | Stop reason: SURG

## 2022-04-06 RX ORDER — DIPHENHYDRAMINE HYDROCHLORIDE 50 MG/ML
12.5 INJECTION INTRAMUSCULAR; INTRAVENOUS
Status: DISCONTINUED | OUTPATIENT
Start: 2022-04-06 | End: 2022-04-06 | Stop reason: HOSPADM

## 2022-04-06 RX ORDER — DISOPYRAMIDE PHOSPHATE 100 MG/1
100 CAPSULE ORAL EVERY 8 HOURS
Status: DISCONTINUED | OUTPATIENT
Start: 2022-04-06 | End: 2022-04-07 | Stop reason: HOSPADM

## 2022-04-06 RX ORDER — ACETAMINOPHEN 325 MG/1
650 TABLET ORAL
COMMUNITY

## 2022-04-06 RX ORDER — PHENYLEPHRINE HYDROCHLORIDE 10 MG/ML
INJECTION, SOLUTION INTRAMUSCULAR; INTRAVENOUS; SUBCUTANEOUS PRN
Status: DISCONTINUED | OUTPATIENT
Start: 2022-04-06 | End: 2022-04-06 | Stop reason: SURG

## 2022-04-06 RX ORDER — ACETAMINOPHEN 325 MG/1
650 TABLET ORAL EVERY 4 HOURS PRN
Status: DISCONTINUED | OUTPATIENT
Start: 2022-04-06 | End: 2022-04-07 | Stop reason: HOSPADM

## 2022-04-06 RX ORDER — HALOPERIDOL 5 MG/ML
1 INJECTION INTRAMUSCULAR
Status: DISCONTINUED | OUTPATIENT
Start: 2022-04-06 | End: 2022-04-06 | Stop reason: HOSPADM

## 2022-04-06 RX ORDER — HYDROMORPHONE HYDROCHLORIDE 1 MG/ML
0.4 INJECTION, SOLUTION INTRAMUSCULAR; INTRAVENOUS; SUBCUTANEOUS
Status: DISCONTINUED | OUTPATIENT
Start: 2022-04-06 | End: 2022-04-06 | Stop reason: HOSPADM

## 2022-04-06 RX ORDER — MIDAZOLAM HYDROCHLORIDE 1 MG/ML
INJECTION INTRAMUSCULAR; INTRAVENOUS PRN
Status: DISCONTINUED | OUTPATIENT
Start: 2022-04-06 | End: 2022-04-06 | Stop reason: SURG

## 2022-04-06 RX ORDER — SODIUM CHLORIDE, SODIUM LACTATE, POTASSIUM CHLORIDE, CALCIUM CHLORIDE 600; 310; 30; 20 MG/100ML; MG/100ML; MG/100ML; MG/100ML
INJECTION, SOLUTION INTRAVENOUS CONTINUOUS
Status: DISCONTINUED | OUTPATIENT
Start: 2022-04-06 | End: 2022-04-06 | Stop reason: HOSPADM

## 2022-04-06 RX ORDER — ATORVASTATIN CALCIUM 40 MG/1
40 TABLET, FILM COATED ORAL EVERY EVENING
Status: DISCONTINUED | OUTPATIENT
Start: 2022-04-06 | End: 2022-04-07 | Stop reason: HOSPADM

## 2022-04-06 RX ORDER — ONDANSETRON 2 MG/ML
INJECTION INTRAMUSCULAR; INTRAVENOUS PRN
Status: DISCONTINUED | OUTPATIENT
Start: 2022-04-06 | End: 2022-04-06 | Stop reason: SURG

## 2022-04-06 RX ORDER — ACETAMINOPHEN 325 MG/1
325 TABLET ORAL ONCE
Status: DISCONTINUED | OUTPATIENT
Start: 2022-04-06 | End: 2022-04-07 | Stop reason: HOSPADM

## 2022-04-06 RX ORDER — HYDROMORPHONE HYDROCHLORIDE 1 MG/ML
0.2 INJECTION, SOLUTION INTRAMUSCULAR; INTRAVENOUS; SUBCUTANEOUS
Status: DISCONTINUED | OUTPATIENT
Start: 2022-04-06 | End: 2022-04-06 | Stop reason: HOSPADM

## 2022-04-06 RX ADMIN — DEXAMETHASONE SODIUM PHOSPHATE 4 MG: 4 INJECTION, SOLUTION INTRA-ARTICULAR; INTRALESIONAL; INTRAMUSCULAR; INTRAVENOUS; SOFT TISSUE at 10:07

## 2022-04-06 RX ADMIN — CEFAZOLIN 2 G: 10 INJECTION, POWDER, FOR SOLUTION INTRAVENOUS at 22:08

## 2022-04-06 RX ADMIN — SODIUM CHLORIDE, POTASSIUM CHLORIDE, SODIUM LACTATE AND CALCIUM CHLORIDE: 600; 310; 30; 20 INJECTION, SOLUTION INTRAVENOUS at 07:09

## 2022-04-06 RX ADMIN — METOPROLOL SUCCINATE 100 MG: 100 TABLET, EXTENDED RELEASE ORAL at 17:11

## 2022-04-06 RX ADMIN — CEFAZOLIN 2 G: 330 INJECTION, POWDER, FOR SOLUTION INTRAMUSCULAR; INTRAVENOUS at 08:55

## 2022-04-06 RX ADMIN — FENTANYL CITRATE 25 MCG: 50 INJECTION, SOLUTION INTRAMUSCULAR; INTRAVENOUS at 12:46

## 2022-04-06 RX ADMIN — MIDAZOLAM HYDROCHLORIDE 2 MG: 1 INJECTION, SOLUTION INTRAMUSCULAR; INTRAVENOUS at 08:29

## 2022-04-06 RX ADMIN — PHENYLEPHRINE HYDROCHLORIDE 100 MCG: 10 INJECTION INTRAVENOUS at 10:20

## 2022-04-06 RX ADMIN — ACETAMINOPHEN 650 MG: 325 TABLET, FILM COATED ORAL at 17:11

## 2022-04-06 RX ADMIN — CEFAZOLIN 2 G: 10 INJECTION, POWDER, FOR SOLUTION INTRAVENOUS at 17:11

## 2022-04-06 RX ADMIN — PROPOFOL 200 MG: 10 INJECTION, EMULSION INTRAVENOUS at 08:52

## 2022-04-06 RX ADMIN — SUGAMMADEX 200 MG: 100 INJECTION, SOLUTION INTRAVENOUS at 11:34

## 2022-04-06 RX ADMIN — HALOPERIDOL LACTATE 1 MG: 5 INJECTION, SOLUTION INTRAMUSCULAR at 12:18

## 2022-04-06 RX ADMIN — ATORVASTATIN CALCIUM 40 MG: 40 TABLET, FILM COATED ORAL at 17:11

## 2022-04-06 RX ADMIN — FENTANYL CITRATE 50 MCG: 50 INJECTION, SOLUTION INTRAMUSCULAR; INTRAVENOUS at 10:50

## 2022-04-06 RX ADMIN — SODIUM CHLORIDE, POTASSIUM CHLORIDE, SODIUM LACTATE AND CALCIUM CHLORIDE: 600; 310; 30; 20 INJECTION, SOLUTION INTRAVENOUS at 08:28

## 2022-04-06 RX ADMIN — ONDANSETRON 4 MG: 2 INJECTION INTRAMUSCULAR; INTRAVENOUS at 10:06

## 2022-04-06 RX ADMIN — FENTANYL CITRATE 50 MCG: 50 INJECTION, SOLUTION INTRAMUSCULAR; INTRAVENOUS at 10:23

## 2022-04-06 RX ADMIN — ACETAMINOPHEN 650 MG: 325 TABLET, FILM COATED ORAL at 22:07

## 2022-04-06 RX ADMIN — FENTANYL CITRATE 25 MCG: 50 INJECTION, SOLUTION INTRAMUSCULAR; INTRAVENOUS at 12:26

## 2022-04-06 RX ADMIN — ROCURONIUM BROMIDE 30 MG: 10 INJECTION, SOLUTION INTRAVENOUS at 09:04

## 2022-04-06 RX ADMIN — PHENYLEPHRINE HYDROCHLORIDE 100 MCG: 10 INJECTION INTRAVENOUS at 10:33

## 2022-04-06 RX ADMIN — FENTANYL CITRATE 25 MCG: 50 INJECTION, SOLUTION INTRAMUSCULAR; INTRAVENOUS at 12:22

## 2022-04-06 RX ADMIN — NORTRIPTYLINE HYDROCHLORIDE 10 MG: 10 CAPSULE ORAL at 17:11

## 2022-04-06 RX ADMIN — DISOPYRAMIDE PHOSPHATE 100 MG: 100 CAPSULE ORAL at 22:07

## 2022-04-06 RX ADMIN — ROCURONIUM BROMIDE 20 MG: 10 INJECTION, SOLUTION INTRAVENOUS at 10:06

## 2022-04-06 RX ADMIN — ROCURONIUM BROMIDE 20 MG: 10 INJECTION, SOLUTION INTRAVENOUS at 10:51

## 2022-04-06 RX ADMIN — FENTANYL CITRATE 100 MCG: 50 INJECTION, SOLUTION INTRAMUSCULAR; INTRAVENOUS at 09:04

## 2022-04-06 RX ADMIN — ROCURONIUM BROMIDE 50 MG: 10 INJECTION, SOLUTION INTRAVENOUS at 08:55

## 2022-04-06 RX ADMIN — FENTANYL CITRATE 25 MCG: 50 INJECTION, SOLUTION INTRAMUSCULAR; INTRAVENOUS at 12:55

## 2022-04-06 ASSESSMENT — LIFESTYLE VARIABLES
ALCOHOL_USE: YES
DOES PATIENT WANT TO STOP DRINKING: NO
CONSUMPTION TOTAL: NEGATIVE
HAVE YOU EVER FELT YOU SHOULD CUT DOWN ON YOUR DRINKING: NO
TOTAL SCORE: 0
TOTAL SCORE: 0
EVER FELT BAD OR GUILTY ABOUT YOUR DRINKING: NO
HAVE PEOPLE ANNOYED YOU BY CRITICIZING YOUR DRINKING: NO
ON A TYPICAL DAY WHEN YOU DRINK ALCOHOL HOW MANY DRINKS DO YOU HAVE: 1
AVERAGE NUMBER OF DAYS PER WEEK YOU HAVE A DRINK CONTAINING ALCOHOL: 5
HOW MANY TIMES IN THE PAST YEAR HAVE YOU HAD 5 OR MORE DRINKS IN A DAY: 0
EVER HAD A DRINK FIRST THING IN THE MORNING TO STEADY YOUR NERVES TO GET RID OF A HANGOVER: NO
TOTAL SCORE: 0

## 2022-04-06 ASSESSMENT — COGNITIVE AND FUNCTIONAL STATUS - GENERAL
SUGGESTED CMS G CODE MODIFIER MOBILITY: CH
SUGGESTED CMS G CODE MODIFIER DAILY ACTIVITY: CH
DAILY ACTIVITIY SCORE: 24
MOBILITY SCORE: 24

## 2022-04-06 ASSESSMENT — PAIN DESCRIPTION - PAIN TYPE
TYPE: ACUTE PAIN

## 2022-04-06 ASSESSMENT — PATIENT HEALTH QUESTIONNAIRE - PHQ9
1. LITTLE INTEREST OR PLEASURE IN DOING THINGS: NOT AT ALL
SUM OF ALL RESPONSES TO PHQ9 QUESTIONS 1 AND 2: 0
2. FEELING DOWN, DEPRESSED, IRRITABLE, OR HOPELESS: NOT AT ALL
SUM OF ALL RESPONSES TO PHQ9 QUESTIONS 1 AND 2: 0
1. LITTLE INTEREST OR PLEASURE IN DOING THINGS: NOT AT ALL
2. FEELING DOWN, DEPRESSED, IRRITABLE, OR HOPELESS: NOT AT ALL

## 2022-04-06 ASSESSMENT — FIBROSIS 4 INDEX: FIB4 SCORE: 1.87

## 2022-04-06 NOTE — OP REPORT
"[facility]     PROCEDURE(S) PERFORMED:   1) Transvenous dual chamber PPM lead extraction  2) Pacemaker generator removal  3) New permanent pacemaker Implantation    DATE OF SERVICE: 4/6/2022    (S): Solitario Witt MD, Ayden Sutherland MD    ASSISTANT: None    ANESTHESIA: Sam Colon MD    EBL: 250 cc    SPECIMENS: None    INDICATION(S):  Pacemaker at EDIE  Sick sinus syndrome  Pacemaker lead malfunction    COMPLICATION(S): None    DESCRIPTION OF PROCEDURE:  After informed written consent, the patient was brought to the OR 19 in the fasting, unsedated state. The patient was prepped and draped in the usual sterile fashion. The procedure was performed under general anesthesia with ALLEN monitoring. Modified seldinger technique was used to access the R femoral vein. A peel-away 6 Fr sheath was inserted over 0.035\" wire and a stiff 0.035\" wire was advanced to SVC for potential Bridge balloon deployment. A left upper extremity venogram was performed, demonstrating a proximally occluded subclavian vein just distal to the access site and collateral flow around the obstruction. A left infraclavicular incision was made with a scalpel and the pectoral device pocket was created using a combination of blunt dissection and electrocautery. The modified Seldinger technique was used to gain access to the left axillary vein, however we were unable to pass a micropuncture wire pass the obstruction. The RV and RA leads were disconnected from the old generator and the old generator removed. The RV lead was prepped with a [employer] LLD 2 stylet and 2-0 silk was used to tie the insulation. A 14 Fr [employer] laser was advanced down to free the lead. However lead on lead binding prevented us from advancing the laser. The RA lead was prepped with a stiff stylet and traction applied on the RA lead to help with freeing the RV lead. We were able to advance the laser down to lead tip and free the RV lead. The RA lead at this time " "had higher thresholds and blood seen under outer insulation. We decided at this point to go ahead and extract the RA lead as well. LLD 2 stylet and same 14 Fr laser was used to free the RA lead. We retained access through the laser sheath with 0.035\" glidewires. This was exchanged for 7 Fr long peel-away sheath. This was used to introduce the new RV lead.  Under fluoroscopic guidance, the pacemaker leads were introduced into the heart. The ventricular lead was advanced to the RVOT and then lowered into position at the RV apex. The atrial lead was positioned on R atrial appendage. The leads were tested and had satisfactory sensing and pacing parameters. High output ventricular pacing did not produce extracardiac stimulation. The leads were sutured to the underlying pectoral muscle with interrupted silk over a silastic suture sleeve. The device pocket was irrigated with antibiotic solution, inspected, and initially there was persistent bleeding from the access site despite pressure. Dr. Sutherland was asked to assist tying the overlying muscle to tamponade the persistent ooze using vicryl. At this point no bleeding was seen. The leads were connected to the pacemaker pulse generator and the device was inserted into the pocket. The wound was closed with three layers of absorbable sutures. Groin sheaths were pulled and closed with Vascade MVP closure device. Following recovery from sedation, the patient was transferred to a monitored bed in good condition.     EXPLANTED DEVICE AND LEAD INFORMATION:  [place] [employer] Advantio pacemaker generator, serial #[id number]  Guidant right atrial lead serial   #[id number]  Guidant right ventricular lead   serial #[id number]     IMPLANTED DEVICE INFORMATION:  Pulse generator is a [place]  model L311  Serial # [id number]    LEAD INFORMATION:  1)Right atrial lead is a [place] model #7840 , serial #[id number] ,P wave 5.7 millivolts, threshold 0.7 Volts at 0.4 milliseconds, pacing impedance 648 " Ohms.    2)Right ventricular lead is a [place] model #7841 , serial #[id number] ,R wave 18.9 millivolts, threshold 0.4 Volts at 0.4 milliseconds, pacing impedance 816 Ohms.    DEVICE PROGRAMMING:  DDDR 60 -120 ppm    FLUOROSCOPY TIME: 23.9 minutes    LASER TIME: 40 seconds 3730 pulses    IMPRESSIONS:  1. Successful dual chamber PPM transvenous extraction  2. Successful PPM generator removal  3. Successful insertion of new dual chamber PPM    RECOMMENDATIONS:  1. Transfer to monitored bed and bed rest x 2 hours  2. Chest x-ray  3. Device interrogation prior to hospital discharge  4. Followup in device clinic

## 2022-04-06 NOTE — CARE PLAN
The patient is Watcher - Medium risk of patient condition declining or worsening         Progress made toward(s) clinical / shift goals:  Progressing     Problem: Pain - Standard  Goal: Alleviation of pain or a reduction in pain to the patient’s comfort goal  Outcome: Progressing  Note: Pt encouraged to voice feelings of pain, pt medicated per MAR

## 2022-04-06 NOTE — ANESTHESIA PREPROCEDURE EVALUATION
Date/Time: 04/06/22 0800    Scheduled providers: Solitario Witt M.D.; Sam Colon M.D.    Procedure: CL-ICD,PM,BIV LEAD EXTRACTION W/ ANESTHESIA    Diagnosis:       Failure of pacemaker lead, subsequent encounter [T82.110D]      Breakdown (mechanical) of cardiac electrode, subsequent encounter [T82.110D]    Indications: See Associated Dx    Location: [facility] - [facility]          Relevant Problems   ANESTHESIA   (positive) Sleep apnea      NEURO   (positive) History of headache      CARDIAC   (positive) Pacemaker   (positive) Paroxysmal atrial fibrillation (HCC)   (positive) Venous insufficiency      GI   (positive) GERD (gastroesophageal reflux disease)      Other   (positive) CLL (chronic lymphocytic leukemia) (HCC)   (positive) Cervical arthritis       Physical Exam    Airway   Mallampati: II  TM distance: >3 FB  Neck ROM: full       Cardiovascular - normal exam  Rhythm: regular  Rate: normal  (-) murmur     Dental - normal exam           Pulmonary - normal exam  Breath sounds clear to auscultation     Abdominal    Neurological - normal exam                 Anesthesia Plan    ASA 3       Plan - general       Airway plan will be ETT  ALLEN Planned        Induction: intravenous    Postoperative Plan: Postoperative administration of opioids is intended.    Pertinent diagnostic labs and testing reviewed    Informed Consent:    Anesthetic plan and risks discussed with patient.    Use of blood products discussed with: patient whom consented to blood products.

## 2022-04-06 NOTE — PROGRESS NOTES
4 Eyes Skin Assessment Completed by ANSELMO Irizarry and ANSELMO Gonzalez.    Head WDL  Ears WDL  Nose WDL  Mouth WDL  Neck WDL  Breast/Chest Incision  Shoulder Blades WDL  Spine WDL  (R) Arm/Elbow/Hand WDL, arterial line site, dressing clean, dry, and intact   (L) Arm/Elbow/Hand WDL  Abdomen WDL  Groin WDL, r groin site, clean, dry, and intact   Scrotum/Coccyx/Buttocks WDL  (R) Leg WDL  (L) Leg WDL  (R) Heel/Foot/Toe WDL  (L) Heel/Foot/Toe WDL          Devices In Places Tele Box, Pulse Ox and Pacer      Interventions In Place N/A    Possible Skin Injury No    Pictures Uploaded Into Epic N/A  Wound Consult Placed N/A  RN Wound Prevention Protocol Ordered No

## 2022-04-06 NOTE — H&P
EP Pre-procedure History and Physical    Date of service: 4/6/2022    Reason for visit/Chief complaint: PPM malfunction    HPI:   Pt is a 62 yo F. History of SSS and failing RV lead. Generator at [facility]. Here for replacement of generator and insertion of new PPM lead.    Past Medical History:   Diagnosis Date   • Anesthesia     wakes up during procedure (x2)   • Aortic regurgitation    • Arrhythmia, ventricular 2008    Symptomatic PVCs, controlled with medication.   • Asthma     inhaler PRN, only uses when sick    • Atrial fibrillation (HCC)    • Cardiac arrhythmia    • Cervical arthritis 2/8/2016   • Chickenpox    • Chronic kidney disease, stage III (moderate) (HCC) 2/9/2016   • CTS (carpal tunnel syndrome) 2/28/2011   • Depression    • GERD (gastroesophageal reflux disease)    • Heart burn    • Heart murmur    • Hemorrhagic disorder (HCC)     on Eliquis   • Hypertr obst cardiomyop 1991    Dr. Bert Vazquez, [address] [place] [address] [phone] fax [phone].  [facility] [phone].  Alchol Septal Reduction 8/00, Coronaries normal.   • Hypertrophic cardiomyopathy (HCC)    • Indigestion    • Influenza    • Pacemaker 2014   • Renal disorder     chronic kidney disease stage 3-  **pt states currently no issue, numbers are stable   • Sleep apnea     uses CPAP   • Snoring      Past Surgical History:   Procedure Laterality Date   • PB WRIST ARTHROSCOP,RELEASE XVERS LIG Right 6/1/2021    Procedure: RELEASE, CARPAL TUNNEL, ENDOSCOPIC;  Surgeon: Mike Ospina M.D.;  Location: SURGERY [facility];  Service: Orthopedics   • JOINT INJECTION DIAGNOSTIC Left 6/1/2021    Procedure: INJECTION, JOINT, DIAGNOSTIC-CARPAL TUNNEL;  Surgeon: Mike Ospina M.D.;  Location: SURGERY [facility];  Service: Orthopedics   • HYSTEROSCOPY WITH VIDEO OPERATIVE  4/30/2018    Procedure: HYSTEROSCOPY WITH VIDEO OPERATIVE;  Surgeon: Noemi Liu M.D.;  Location: SURGERY SAME DAY [facility];  Service:  "Labor and Delivery   • DILATION AND CURETTAGE  2018    Procedure: DILATION AND CURETTAGE;  Surgeon: Noemi Liu M.D.;  Location: SURGERY SAME DAY [facility];  Service: Labor and Delivery   • BREAST BIOPSY Left 2018    Procedure: BREAST BIOPSY- WIRE LOCALIZED;  Surgeon: Vijaya Britt M.D.;  Location: SURGERY SAME DAY [facility];  Service: General   • RECOVERY  4/15/2015    Performed by [facility] at SURGERY PRE-POST PROC UNIT [facility]   • PACEMAKER INSERTION     • RECOVERY  2014    Performed by [facility] at SURGERY SAME DAY [facility]   • SEPTAL RECONSTRUCTION     • ENDOSCOPY     • PRIMARY C SECTION     • TUBAL COAGULATION LAPAROSCOPIC BILATERAL       Family History   Problem Relation Age of Onset   • Heart Disease Mother         CHF   • Hypertension Mother    • Stroke Mother    • Heart Failure Mother    • Other Mother         carotid artery disease,gout   • Cancer Father         AML   • Diabetes Maternal Grandmother    • Cancer Maternal Grandfather         Prostate   • Heart Disease Paternal Grandfather 52         MI    • Sleep Apnea Neg Hx        Physical Exam:  Vitals:    22 0922 22 0628   BP:  133/75   Pulse:  60   Resp:  15   Temp:  36.4 °C (97.6 °F)   TempSrc:  Temporal   SpO2:  96%   Weight: 89.5 kg (197 lb 5 oz) 89.1 kg (196 lb 6.9 oz)   Height: 1.626 m (5' 4\") 1.626 m (5' 4\")     Gen: NAD, conversant  HEENT: PERRL, EOMI  LUNGS: CTA B, no w/r/r  CV: RRR, no m/r/g, no JVD  Abd: Soft, NT/ND, +BS  Ext: no edema, warm and well perfused    Labs reviewed    EKG interpreted by me:  A paced    Impression/Recs:  1. Failure of pacemaker lead, subsequent encounter  CL-ICD,PM,BIV LEAD EXTRACTION W/ ANESTHESIA    CL-ICD,PM,BIV LEAD EXTRACTION W/ ANESTHESIA   2. Paroxysmal atrial fibrillation (HCC)  EC-ALLEN W/O CONT    EC-ALLEN W/O CONT     -Risks/benefits/alternatives discussed  -All questions answered  -Proceed with extraction/gen change  [place] " MD

## 2022-04-06 NOTE — ANESTHESIA TIME REPORT
Anesthesia Start and Stop Event Times     Date Time Event    4/6/2022 0701 Ready for Procedure     0828 Anesthesia Start     1150 Anesthesia Stop        Responsible Staff  04/06/22    Name Role Begin End    Sam Colon M.D. Anesth 0828 1150        Overtime Reason:  no overtime (within assigned shift)    Comments:

## 2022-04-06 NOTE — OR NURSING
Pt A&O x4. VSS on 1 L O2 via NC. Pt completed 2 hour bedrest. Groin soft, CDI.   Pt sitting upright in gurney, tolerating water and juice.   Pain improved following position change.   Pt's  called and updated.   Waiting on inpt room.

## 2022-04-06 NOTE — ANESTHESIA POSTPROCEDURE EVALUATION
Patient: Seda Cagle    Procedure Summary     Date: 04/06/22 Room / Location: [facility] CATH LAB [facility]    Anesthesia Start: 0828 Anesthesia Stop: 1150    Procedure: CL-ICD,PM,BIV LEAD EXTRACTION W/ ANESTHESIA Diagnosis:       Failure of pacemaker lead, subsequent encounter      Breakdown (mechanical) of cardiac electrode, subsequent encounter      (See Associated Dx)    Scheduled Providers: Solitario Witt M.D.; Sam Colon M.D. Responsible Provider: Sam Colon M.D.    Anesthesia Type: general ASA Status: 3          Final Anesthesia Type: general  Last vitals  BP   Blood Pressure: 133/75    Temp   36.4 °C (97.6 °F)    Pulse   60   Resp   15    SpO2   96 %      Anesthesia Post Evaluation    Patient location during evaluation: PACU  Patient participation: complete - patient participated  Level of consciousness: awake and alert    Airway patency: patent  Anesthetic complications: no  Cardiovascular status: hemodynamically stable  Respiratory status: acceptable  Hydration status: euvolemic    PONV: none          No complications documented.     Nurse Pain Score: 0 (NPRS)

## 2022-04-06 NOTE — OR NURSING
1400 Report from Apolonia BRIONES. Pt tolerating orals  1420 Report to Edie BRIONES.   1433 Pt transferred to T823 with all belongings including glasses, by Apolonia BRIONES without incident.

## 2022-04-06 NOTE — ANESTHESIA PROCEDURE NOTES
Airway    Date/Time: 4/6/2022 8:52 AM  Performed by: Sam Colon M.D.  Authorized by: Sam Colon M.D.     Location:  OR  Urgency:  Elective  Indications for Airway Management:  Anesthesia      Spontaneous Ventilation: absent    Sedation Level:  Deep  Preoxygenated: Yes    Patient Position:  Sniffing  Final Airway Type:  Endotracheal airway  Final Endotracheal Airway:  ETT  Cuffed: Yes    Technique Used for Successful ETT Placement:  Direct laryngoscopy    Insertion Site:  Oral  Blade Type:  Narvaez  Laryngoscope Blade/Videolaryngoscope Blade Size:  2  ETT Size (mm):  7.5  Measured from:  Teeth  ETT to Teeth (cm):  21  Placement Verified by: auscultation and capnometry    Cormack-Lehane Classification:  Grade I - full view of glottis  Number of Attempts at Approach:  1

## 2022-04-07 VITALS
HEIGHT: 64 IN | BODY MASS INDEX: 33.54 KG/M2 | RESPIRATION RATE: 16 BRPM | HEART RATE: 61 BPM | SYSTOLIC BLOOD PRESSURE: 123 MMHG | DIASTOLIC BLOOD PRESSURE: 68 MMHG | WEIGHT: 196.43 LBS | OXYGEN SATURATION: 95 % | TEMPERATURE: 99 F

## 2022-04-07 PROCEDURE — 99024 POSTOP FOLLOW-UP VISIT: CPT | Performed by: NURSE PRACTITIONER

## 2022-04-07 PROCEDURE — 700102 HCHG RX REV CODE 250 W/ 637 OVERRIDE(OP): Performed by: INTERNAL MEDICINE

## 2022-04-07 PROCEDURE — G0378 HOSPITAL OBSERVATION PER HR: HCPCS

## 2022-04-07 PROCEDURE — A9270 NON-COVERED ITEM OR SERVICE: HCPCS | Performed by: INTERNAL MEDICINE

## 2022-04-07 RX ADMIN — DISOPYRAMIDE PHOSPHATE 100 MG: 100 CAPSULE ORAL at 05:33

## 2022-04-07 RX ADMIN — ACETAMINOPHEN 650 MG: 325 TABLET, FILM COATED ORAL at 05:33

## 2022-04-07 NOTE — DISCHARGE PLANNING
HTH/SCP TCN chart review completed. Collaborated with ELIOT Castellanos  prior to meeting with the pt. The most current review of medical record, knowledge of pt's PLOF and social support, LACE+ score of 45 --- were considered.  Mbr is previously independent with ADLs.    Pt seen at bedside, very pleasant . Mbr is S/p  Transvenous dual chamber PPM lead extraction,  Pacemaker generator removal and  New permanent pacemaker Implantation 4/6/2022  ,dressing intact, cardiac monitors in place. Mbr is very pleasant and states she's independent with ADLs and does not anticipate any need for rehab and denies additional assistance with food/housing/transportation  . Introduced TCN program. Provided education regarding differences in post acute resources including IRF, SNF and HH . Discussed HTH/SCP plan benefits including [facility] and [facility] transitional care services. Pt verbalizes understanding. Mbr agrees to [facility] services, sending referral [facility]. Anticipate no additional TCN needs at this time .TCN will continue to follow and collaborate with discharge planning team as additional post acute needs arise. Thank you.     Choice forms obtained: NONE  [facility] introduced ( Y) Referral ( sent)

## 2022-04-07 NOTE — DISCHARGE SUMMARY
Admission date  4/6/22    Discharge date    4/7/2022    Discharge diagnoses    History of sick sinus syndrome and failing RV lead in addition generator at [facility] underwent successful dual-chamber pacemaker transvenous extraction, successful pacemaker generator removal, successful insertion of new dual-chamber pacemaker, [employer] 4/6/2022.    Hypertrophic obstructive cardiomyopathy s/p prior septal ablation maintained on Toprol and Norpace.    GEORGINA, on CPAP.    CKD stage III, currently stable creatinine 0.81    History of [employer] pacemaker implantation 2015 for SSI    PACs /PVCs      PAF    On oral anticoagulation with Eliquis    Consults    none    Procedures 4/6/22  EXPLANTED DEVICE AND LEAD INFORMATION:  [employer] Advantio pacemaker generator, serial #[id number]  Guidant right atrial lead serial   #[id number]  Guidant right ventricular lead   serial #[id number]      IMPLANTED DEVICE INFORMATION:  Pulse generator is a [employer]  model L311  Serial # [id number]     LEAD INFORMATION:  1)Right atrial lead is a [employer] model #7840 , serial #[id number] ,P wave 5.7 millivolts, threshold 0.7 Volts at 0.4 milliseconds, pacing impedance 648 Ohms.     2)Right ventricular lead is a [employer] model #7841 , serial #[id number] ,R wave 18.9 millivolts, threshold 0.4 Volts at 0.4 milliseconds, pacing impedance 816 Ohms.     DEVICE PROGRAMMING:  DDDR 60 -120 ppm     FLUOROSCOPY TIME: 23.9 minutes     LASER TIME: 40 seconds 3730 pulses     IMPRESSIONS:  1. Successful dual chamber PPM transvenous extraction  2. Successful PPM generator removal  3. Successful insertion of new dual chamber PPM      HPI/Hospital course  61-year-old female with history of SSS s/p PPM 2015, HOCM s/p prior septal ablation maintained on Toprol and Norpace, PAF, on Eliquis,CKD III, GEORGINA on CPAP, notable for failing RV lead alsp generator at [facility] underwent successful dual-chamber ppm extraction and generator removal with successful insertion of new dual-chamber ppm  BS  4/6/2022.  Recent diagnosis of CLL 1/31/2022.    4/7/22  at bedside, ambulated in the room asymptomatic.  Device was interrogated this morning and functioning well.  Pocket site uncomplicated, new dressing applied, instructions in detail given for post procedure pacemaker care, patient verbalized understanding.  She is stable for discharge with close follow-up in device clinic for wound check and device interrogation.      Discharge meds       Medication List      CHANGE how you take these medications      Instructions   atorvastatin 40 MG Tabs  What changed: See the new instructions.  Commonly known as: LIPITOR   TAKE ONE TABLET BY MOUTH EVERY EVENING (LIPITOR)     Eliquis 5mg Tabs  What changed: how much to take  Generic drug: apixaban   TAKE ONE TABLET BY MOUTH TWICE A DAY     metoprolol  MG Tb24  What changed: when to take this  Commonly known as: TOPROL XL   TAKE ONE TABLET BY MOUTH DAILY     nortriptyline 10 MG Caps  What changed: when to take this  Commonly known as: PAMELOR   TAKE ONE CAPSULE BY MOUTH EVERY EVENING        CONTINUE taking these medications      Instructions   acetaminophen 325 MG Tabs  Commonly known as: Tylenol   Take 325 mg by mouth one time.  Dose: 325 mg     CALCIUM FOR WOMEN PO   Take 1 Each by mouth every day.  Dose: 1 Each     disopyramide 100 MG Caps  Commonly known as: NORPACE   TAKE ONE CAPSULE BY MOUTH EVERY 8 HOURS     famotidine 40 MG Tabs  Commonly known as: PEPCID   Take 1 Tablet by mouth 2 times a day.  Dose: 40 mg     fluticasone 50 MCG/ACT nasal spray  Commonly known as: FLONASE   Administer 1 Spray into affected nostril(S) every day.  Dose: 1 Spray     Magnesium 200 MG Tabs   Take 1 Tab by mouth every day.  Dose: 1 Tablet     MULTI-VITAMIN PO   Take  by mouth every day.     Pantothenic Acid 500 MG Tabs   Take 500 mg by mouth every day.  Dose: 500 mg     PROBIOTIC-10 PO   Take 1 Capsule by mouth every day.  Dose: 1 Capsule            Labs reviewed    CBC in  good order aside from platelet count 153, CMP in excellent order.    Discharge instructions  Follow-up appointments with in device clinic for wound check and device interrogation on 4/14/2022 at 10:30 AM.    PPM restrictions reviewed with patient. Do not raise affected arm above head or behind back for six weeks. May remove arm sling after 24 hrs, please wear if trouble remembering arm limitations and prn at night. No heavy lifting/pushing for six weeks. No driving for first week. No showers first week. Keep dressing on, clean, and dry until sees us in follow up. Wound care reviewed. Instructed to report s/s of infection such as warmth/redness/drainage/swelling at site or fever/chills.  ICD therapy reviewed with patient:  Patient verbalizes understanding.

## 2022-04-07 NOTE — DISCHARGE INSTRUCTIONS
Discharge Instructions    Discharged to home by car with relative. Discharged via wheelchair, hospital escort: Yes.  Special equipment needed: Not Applicable    Be sure to schedule a follow-up appointment with your primary care doctor or any specialists as instructed.     Discharge Plan:   Diet Plan: Discussed  Activity Level: Discussed  Confirmed Follow up Appointment: Appointment Scheduled  Confirmed Symptoms Management: Discussed  Medication Reconciliation Updated: Yes    I understand that a diet low in cholesterol, fat, and sodium is recommended for good health. Unless I have been given specific instructions below for another diet, I accept this instruction as my diet prescription.   Other diet:     Special Instructions: None    · Is patient discharged on Warfarin / Coumadin?   No     Depression / Suicide Risk    As you are discharged from this [facility] facility, it is important to learn how to keep safe from harming yourself.    Recognize the warning signs:  · Abrupt changes in personality, positive or negative- including increase in energy   · Giving away possessions  · Change in eating patterns- significant weight changes-  positive or negative  · Change in sleeping patterns- unable to sleep or sleeping all the time   · Unwillingness or inability to communicate  · Depression  · Unusual sadness, discouragement and loneliness  · Talk of wanting to die  · Neglect of personal appearance   · Rebelliousness- reckless behavior  · Withdrawal from people/activities they love  · Confusion- inability to concentrate     If you or a loved one observes any of these behaviors or has concerns about self-harm, here's what you can do:  · Talk about it- your feelings and reasons for harming yourself  · Remove any means that you might use to hurt yourself (examples: pills, rope, extension cords, firearm)  · Get professional help from the community (Mental Health, Substance Abuse, psychological counseling)  · Do not be  alone:Call your Safe Contact- someone whom you trust who will be there for you.  · Call your local CRISIS HOTLINE 879-1011 or 790-954-3950  · Call your local Children's Mobile Crisis Response Team [place] (646) 900-0827 or www.[employer]  · Call the toll free National Suicide Prevention Hotlines   · National Suicide Prevention Lifeline 033-290-CJEX (7621)  · National Hope Line Network 800-SUICIDE (142-2144)    Pacemaker Implantation, Adult, Care After  This sheet gives you information about how to care for yourself after your procedure. Your health care provider may also give you more specific instructions. If you have problems or questions, contact your health care provider.  What can I expect after the procedure?  After the procedure, it is common to have:  · Mild pain.  · Slight bruising.  · Some swelling over the incision.  · A slight bump over the skin where the device was placed. Sometimes, it is possible to feel the device under the skin. This is normal.  Follow these instructions at home:  Medicines  · Take over-the-counter and prescription medicines only as told by your health care provider.  · If you were prescribed an antibiotic medicine, take it as told by your health care provider. Do not stop taking the antibiotic even if you start to feel better.  Wound care    · Do not remove the bandage on your chest until directed to do so by your health care provider.  · After your bandage is removed, you may see pieces of tape called skin adhesive strips over the area where the cut was made (incision site). Let them fall off on their own.  · Check the incision site every day to make sure it is not infected, bleeding, or starting to pull apart.  · Do not use lotions or ointments near the incision site unless directed to do so.  · Keep the incision area clean and dry for 2-3 days after the procedure or as directed by your health care provider. It takes several weeks for the incision site to completely  heal.  · Do not take baths, swim, or use a hot tub for 7-10 days or as otherwise directed by your health care provider.  Activity  · Do not drive or use heavy machinery while taking prescription pain medicine.  · Do not drive for 24 hours if you were given a medicine to help you relax (sedative).  · Check with your health care provider before you start to drive or play sports.  · Avoid sudden jerking, pulling, or chopping movements that pull your upper arm far away from your body. Avoid these movements for at least 6 weeks or as long as told by your health care provider.  · Do not lift your upper arm above your shoulders for at least 6 weeks or as long as told by your health care provider. This means no tennis, golf, or swimming.  · You may go back to work when your health care provider says it is okay.  Pacemaker care  · You may be shown how to transfer data from your pacemaker through the phone to your health care provider.  · Always let all health care providers know about your pacemaker before you have any medical procedures or tests.  · Wear a medical ID bracelet or necklace stating that you have a pacemaker. Carry a pacemaker ID card with you at all times.  · Your pacemaker battery will last for 5-15 years. Routine checks by your health care provider will let the health care provider know when the battery is starting to run down. The pacemaker will need to be replaced when the battery starts to run down.  · Do not use amateur radio equipment or electric welding torches. Other electrical devices are safe to use, including power tools, lawn mowers, and speakers. If you are unsure of whether something is safe to use, ask your health care provider.  · When using your cell phone, hold it to the ear opposite the pacemaker. Do not leave your cell phone in a pocket over the pacemaker.  · Avoid places or objects that have a strong electric or magnetic field, including:  ? Airport security day. When at the airport,  let officials know that you have a pacemaker.  ? Power plants.  ? Large electrical generators.  ? Radiofrequency transmission towers, such as cell phone and radio towers.  General instructions  · Weigh yourself every day. If you suddenly gain weight, fluid may be building up in your body.  · Keep all follow-up visits as told by your health care provider. This is important.  Contact a health care provider if:  · You gain weight suddenly.  · Your legs or feet swell.  · It feels like your heart is fluttering or skipping beats (heart palpitations).  · You have chills or a fever.  · You have more redness, swelling, or pain around your incisions.  · You have more fluid or blood coming from your incisions.  · Your incisions feel warm to the touch.  · You have pus or a bad smell coming from your incisions.  Get help right away if:  · You have chest pain.  · You have trouble breathing or are short of breath.  · You become extremely tired.  · You are light-headed or you faint.  This information is not intended to replace advice given to you by your health care provider. Make sure you discuss any questions you have with your health care provider.  Document Released: 07/07/2006 Document Revised: 11/30/2018 Document Reviewed: 09/29/2017  [employer] Patient Education © 2020 [employer]      Discharge Instructions    Discharged to home by car with relative. Discharged via wheelchair, hospital escort: Yes.  Special equipment needed: Not Applicable    Be sure to schedule a follow-up appointment with your primary care doctor or any specialists as instructed.     Discharge Plan:   Diet Plan: Discussed  Activity Level: Discussed  Confirmed Follow up Appointment: Appointment Scheduled  Confirmed Symptoms Management: Discussed  Medication Reconciliation Updated: Yes    I understand that a diet low in cholesterol, fat, and sodium is recommended for good health. Unless I have been given specific instructions below for another diet, I  accept this instruction as my diet prescription.   Other diet: Cardiac    Special Instructions: None    · Is patient discharged on Warfarin / Coumadin?   No     Depression / Suicide Risk    As you are discharged from this [facility] facility, it is important to learn how to keep safe from harming yourself.    Recognize the warning signs:  · Abrupt changes in personality, positive or negative- including increase in energy   · Giving away possessions  · Change in eating patterns- significant weight changes-  positive or negative  · Change in sleeping patterns- unable to sleep or sleeping all the time   · Unwillingness or inability to communicate  · Depression  · Unusual sadness, discouragement and loneliness  · Talk of wanting to die  · Neglect of personal appearance   · Rebelliousness- reckless behavior  · Withdrawal from people/activities they love  · Confusion- inability to concentrate     If you or a loved one observes any of these behaviors or has concerns about self-harm, here's what you can do:  · Talk about it- your feelings and reasons for harming yourself  · Remove any means that you might use to hurt yourself (examples: pills, rope, extension cords, firearm)  · Get professional help from the community (Mental Health, Substance Abuse, psychological counseling)  · Do not be alone:Call your Safe Contact- someone whom you trust who will be there for you.  · Call your local CRISIS HOTLINE 523-6314 or 163-837-3519  · Call your local Children's Mobile Crisis Response Team [place] (836) 261-4430 or www.[employer]  · Call the toll free National Suicide Prevention Hotlines   · National Suicide Prevention Lifeline 751-602-XJIR (8618)  · National Hope Line Network 800-SUICIDE (998-8195)

## 2022-04-07 NOTE — PROGRESS NOTES
Patient alert and oriented x4 with no complaints of pain. Pacer insertion site and removal site are clean dry and intact with dressing in place.   Transferring to [facility] for education.

## 2022-04-07 NOTE — PROGRESS NOTES
Arrive to dc lounge via wheelchair. A/O,no complaints. DC instructions reviewed w/ pt. Verbalized understanding.

## 2022-04-14 ENCOUNTER — NON-PROVIDER VISIT (OUTPATIENT)
Dept: CARDIOLOGY | Facility: MEDICAL CENTER | Age: 62
End: 2022-04-14
Payer: COMMERCIAL

## 2022-04-14 DIAGNOSIS — I42.1 HYPERTROPHIC OBSTRUCTIVE CARDIOMYOPATHY (HCC): Chronic | ICD-10-CM

## 2022-04-14 DIAGNOSIS — Z95.0 PACEMAKER: ICD-10-CM

## 2022-04-14 PROCEDURE — 93280 PM DEVICE PROGR EVAL DUAL: CPT | Performed by: INTERNAL MEDICINE

## 2022-04-22 ENCOUNTER — TELEPHONE (OUTPATIENT)
Dept: CARDIOLOGY | Facility: MEDICAL CENTER | Age: 62
End: 2022-04-22
Payer: COMMERCIAL

## 2022-04-22 NOTE — TELEPHONE ENCOUNTER
SS    Patient called and states that she has a new pace maker and this morning she has been in AFIB. Patient would like a c/b please advise.    Thank you,  Del LEÓN.

## 2022-04-23 ENCOUNTER — APPOINTMENT (OUTPATIENT)
Dept: RADIOLOGY | Facility: MEDICAL CENTER | Age: 62
End: 2022-04-23
Attending: EMERGENCY MEDICINE
Payer: COMMERCIAL

## 2022-04-23 ENCOUNTER — HOSPITAL ENCOUNTER (EMERGENCY)
Facility: MEDICAL CENTER | Age: 62
End: 2022-04-24
Attending: EMERGENCY MEDICINE
Payer: COMMERCIAL

## 2022-04-23 DIAGNOSIS — I48.92 ATRIAL FLUTTER, UNSPECIFIED TYPE (HCC): ICD-10-CM

## 2022-04-23 LAB
ALBUMIN SERPL BCP-MCNC: 4.5 G/DL (ref 3.2–4.9)
ALBUMIN/GLOB SERPL: 1.8 G/DL
ALP SERPL-CCNC: 111 U/L (ref 30–99)
ALT SERPL-CCNC: 19 U/L (ref 2–50)
ANION GAP SERPL CALC-SCNC: 13 MMOL/L (ref 7–16)
ANISOCYTOSIS BLD QL SMEAR: ABNORMAL
AST SERPL-CCNC: 24 U/L (ref 12–45)
BASOPHILS # BLD AUTO: 0 % (ref 0–1.8)
BASOPHILS # BLD: 0 K/UL (ref 0–0.12)
BILIRUB SERPL-MCNC: 0.3 MG/DL (ref 0.1–1.5)
BUN SERPL-MCNC: 16 MG/DL (ref 8–22)
CALCIUM SERPL-MCNC: 9.9 MG/DL (ref 8.5–10.5)
CHLORIDE SERPL-SCNC: 103 MMOL/L (ref 96–112)
CO2 SERPL-SCNC: 24 MMOL/L (ref 20–33)
CREAT SERPL-MCNC: 1.02 MG/DL (ref 0.5–1.4)
EOSINOPHIL # BLD AUTO: 0.3 K/UL (ref 0–0.51)
EOSINOPHIL NFR BLD: 2.6 % (ref 0–6.9)
ERYTHROCYTE [DISTWIDTH] IN BLOOD BY AUTOMATED COUNT: 43.3 FL (ref 35.9–50)
GFR SERPLBLD CREATININE-BSD FMLA CKD-EPI: 62 ML/MIN/1.73 M 2
GLOBULIN SER CALC-MCNC: 2.5 G/DL (ref 1.9–3.5)
GLUCOSE SERPL-MCNC: 103 MG/DL (ref 65–99)
HCT VFR BLD AUTO: 41.5 % (ref 37–47)
HGB BLD-MCNC: 13.5 G/DL (ref 12–16)
LYMPHOCYTES # BLD AUTO: 7 K/UL (ref 1–4.8)
LYMPHOCYTES NFR BLD: 61.4 % (ref 22–41)
MANUAL DIFF BLD: NORMAL
MCH RBC QN AUTO: 29.4 PG (ref 27–33)
MCHC RBC AUTO-ENTMCNC: 32.5 G/DL (ref 33.6–35)
MCV RBC AUTO: 90.4 FL (ref 81.4–97.8)
MICROCYTES BLD QL SMEAR: ABNORMAL
MONOCYTES # BLD AUTO: 0.5 K/UL (ref 0–0.85)
MONOCYTES NFR BLD AUTO: 4.4 % (ref 0–13.4)
MORPHOLOGY BLD-IMP: NORMAL
NEUTROPHILS # BLD AUTO: 3.6 K/UL (ref 2–7.15)
NEUTROPHILS NFR BLD: 31.6 % (ref 44–72)
NRBC # BLD AUTO: 0 K/UL
NRBC BLD-RTO: 0 /100 WBC
PLATELET # BLD AUTO: 229 K/UL (ref 164–446)
PLATELET BLD QL SMEAR: NORMAL
PMV BLD AUTO: 10.2 FL (ref 9–12.9)
POLYCHROMASIA BLD QL SMEAR: NORMAL
POTASSIUM SERPL-SCNC: 3.8 MMOL/L (ref 3.6–5.5)
PROT SERPL-MCNC: 7 G/DL (ref 6–8.2)
RBC # BLD AUTO: 4.59 M/UL (ref 4.2–5.4)
RBC BLD AUTO: PRESENT
SMUDGE CELLS BLD QL SMEAR: NORMAL
SODIUM SERPL-SCNC: 140 MMOL/L (ref 135–145)
TROPONIN T SERPL-MCNC: 17 NG/L (ref 6–19)
WBC # BLD AUTO: 11.4 K/UL (ref 4.8–10.8)

## 2022-04-23 PROCEDURE — 80053 COMPREHEN METABOLIC PANEL: CPT

## 2022-04-23 PROCEDURE — 71045 X-RAY EXAM CHEST 1 VIEW: CPT

## 2022-04-23 PROCEDURE — 84484 ASSAY OF TROPONIN QUANT: CPT

## 2022-04-23 PROCEDURE — 99284 EMERGENCY DEPT VISIT MOD MDM: CPT

## 2022-04-23 PROCEDURE — 93005 ELECTROCARDIOGRAM TRACING: CPT | Performed by: EMERGENCY MEDICINE

## 2022-04-23 PROCEDURE — 85025 COMPLETE CBC W/AUTO DIFF WBC: CPT

## 2022-04-23 PROCEDURE — 93005 ELECTROCARDIOGRAM TRACING: CPT

## 2022-04-23 PROCEDURE — 36415 COLL VENOUS BLD VENIPUNCTURE: CPT

## 2022-04-23 PROCEDURE — 85007 BL SMEAR W/DIFF WBC COUNT: CPT

## 2022-04-23 ASSESSMENT — FIBROSIS 4 INDEX: FIB4 SCORE: 1.87

## 2022-04-24 VITALS
WEIGHT: 197.53 LBS | TEMPERATURE: 98 F | OXYGEN SATURATION: 95 % | HEIGHT: 64 IN | RESPIRATION RATE: 16 BRPM | BODY MASS INDEX: 33.72 KG/M2 | DIASTOLIC BLOOD PRESSURE: 95 MMHG | SYSTOLIC BLOOD PRESSURE: 164 MMHG | HEART RATE: 67 BPM

## 2022-04-24 LAB — EKG IMPRESSION: NORMAL

## 2022-04-24 NOTE — DISCHARGE INSTRUCTIONS
Please discuss the following findings with your regular doctor:  Labs Reviewed   CBC WITH DIFFERENTIAL - Abnormal; Notable for the following components:       Result Value    WBC 11.4 (*)     MCHC 32.5 (*)     Neutrophils-Polys 31.60 (*)     Lymphocytes 61.40 (*)     Lymphs (Absolute) 7.00 (*)     All other components within normal limits   COMP METABOLIC PANEL - Abnormal; Notable for the following components:    Glucose 103 (*)     Alkaline Phosphatase 111 (*)     All other components within normal limits   TROPONIN   ESTIMATED GFR   DIFFERENTIAL MANUAL   PERIPHERAL SMEAR REVIEW   PLATELET ESTIMATE   MORPHOLOGY      DX-CHEST-PORTABLE (1 VIEW)   Final Result      Stable cardiac silhouette enlargement without edema identified. Dual-chamber pacer in place

## 2022-04-24 NOTE — ED PROVIDER NOTES
ED Provider Note    Scribed for Antoine Ly M.D. by Bia Adams. 4/23/2022  10:29 PM    Primary care provider: Juve Stoddard M.D.  Means of arrival: Walk in  History obtained from: patient  History limited by: None    CHIEF COMPLAINT  Chief Complaint   Patient presents with   • Palpitations              HPI  Seda Cagle is a 61 y.o. female who presents to the Emergency Department for palpitations. Per the patient, she woke up yesterday to fluttering in her chest that was accompanied by chest pain and an elevated heart rate and appeared to persist for 7-8 hours before receding. Then today, she experienced these symptoms again for 2.5 hours, prompting her to come to the ED. Prior to this, she has occasionally woken up in the middle of the night with no apparent cause and is unsure if this is a result of palpitations. The patient denies hematochezia, hematemesis, fever, cough, headache, congestion, vision changes, nausea, vomiting, diarrhea, hemoptysis, or leg swelling, and notes she had a new pacemaker placed on 4/6/22. No exacerbating or alleviating factors were reported. She also denies drug use or recent accidents or injuries. She is on Eliquis, metoprolol SR, and Norpace, none of which she has missed or had changes in dosages, and was recently diagnosed with leukemia. Her cardiologist is Dr. Witt.     REVIEW OF SYSTEMS  Pertinent positives include: palpitations, chest pain. Pertinent negatives include: hematochezia, hematemesis, fever, cough, headache, congestion, vision changes, nausea, vomiting, diarrhea, hemoptysis, or leg swelling. See history of present illness. All other systems are negative.     PAST MEDICAL HISTORY   has a past medical history of Anesthesia, Aortic regurgitation, Arrhythmia, ventricular (2008), Asthma, Atrial fibrillation (HCC), Cardiac arrhythmia, Cervical arthritis (2/8/2016), Chickenpox, Chronic kidney disease, stage III (moderate) (HCC) (2/9/2016), CTS (carpal  tunnel syndrome) (2011), Depression, GERD (gastroesophageal reflux disease), Heart burn, Heart murmur, Hemorrhagic disorder (HCC), Hypertr obst cardiomyop (), Hypertrophic cardiomyopathy (), Indigestion, Influenza, Pacemaker (), Renal disorder, Sleep apnea, and Snoring.    SURGICAL HISTORY   has a past surgical history that includes primary c section; tubal coagulation laparoscopic bilateral; recovery (2014); recovery (4/15/2015); pacemaker insertion (); septal reconstruction (); endoscopy; breast biopsy (Left, 2018); hysteroscopy with video operative (2018); dilation and curettage (2018); wrist arthroscop,release xvers lig (Right, 2021); and joint injection diagnostic (Left, 2021).    SOCIAL HISTORY  Social History     Tobacco Use   • Smoking status: Never Smoker   • Smokeless tobacco: Never Used   Vaping Use   • Vaping Use: Never used   Substance Use Topics   • Alcohol use: Not Currently     Alcohol/week: 0.6 oz     Types: 1 Standard drinks or equivalent per week     Comment: 3-4 per week   • Drug use: No      Social History     Substance and Sexual Activity   Drug Use No       FAMILY HISTORY  Family History   Problem Relation Age of Onset   • Heart Disease Mother         CHF   • Hypertension Mother    • Stroke Mother    • Heart Failure Mother    • Other Mother         carotid artery disease,gout   • Cancer Father         AML   • Diabetes Maternal Grandmother    • Cancer Maternal Grandfather         Prostate   • Heart Disease Paternal Grandfather 52         MI    • Sleep Apnea Neg Hx        CURRENT MEDICATIONS  Home Medications     Reviewed by Marilyn Miller R.N. (Registered Nurse) on 22 at   Med List Status: Partial   Medication Last Dose Status   acetaminophen (TYLENOL) 325 MG Tab  Active   atorvastatin (LIPITOR) 40 MG Tab  Active   Calcium-Vitamin D-Vitamin K (CALCIUM FOR WOMEN PO)  Active   disopyramide (NORPACE) 100 MG Cap  Active   ELIQUIS 5  "MG Tab  Active   famotidine (PEPCID) 40 MG Tab  Active   fluticasone (FLONASE) 50 MCG/ACT nasal spray  Active   Magnesium 200 MG TABS  Active   metoprolol SR (TOPROL XL) 100 MG TABLET SR 24 HR  Active   Multiple Vitamin (MULTI-VITAMIN PO)  Active   nortriptyline (PAMELOR) 10 MG Cap  Active   Pantothenic Acid 500 MG Tab  Active   Probiotic Product (PROBIOTIC-10 PO)  Active                ALLERGIES  No Known Allergies    PHYSICAL EXAM  VITAL SIGNS: BP (!) 171/111   Pulse 75   Temp 37.4 °C (99.4 °F) (Temporal)   Resp 16   Ht 1.626 m (5' 4\")   Wt 89.6 kg (197 lb 8.5 oz)   LMP 01/31/2012 (LMP Unknown)   SpO2 96%   BMI 33.91 kg/m²     Constitutional: Alert in no apparent distress.  HENT: No signs of trauma, Bilateral external ears normal, Nose normal. Uvula midline.   Eyes: Pupils are equal and reactive, Conjunctiva normal, Non-icteric.   Neck: Normal range of motion, No tenderness, Supple, No stridor.   Lymphatic: No lymphadenopathy noted.   Cardiovascular: Regular rate and rhythm, no murmurs.   Thorax & Lungs: Well appearing 4 cm left anterior chest post op wound, Normal breath sounds, No respiratory distress, No wheezing, No chest tenderness.   Abdomen:  Soft, No tenderness, No peritoneal signs, No masses, No pulsatile masses.   Skin: Warm, Dry, No erythema, No rash.   Back: No bony tenderness, No CVA tenderness.   Extremities: Intact distal pulses, No edema, No tenderness, No cyanosis.  Musculoskeletal: Good range of motion in all major joints. No tenderness to palpation or major deformities noted.   Neurologic: Alert , Normal motor function, Normal sensory function, No focal deficits noted.   Psychiatric: Affect normal, Judgment normal, Mood normal.     DIAGNOSTIC STUDIES / PROCEDURES    LABS  Labs Reviewed   CBC WITH DIFFERENTIAL - Abnormal; Notable for the following components:       Result Value    WBC 11.4 (*)     MCHC 32.5 (*)     Neutrophils-Polys 31.60 (*)     Lymphocytes 61.40 (*)     Lymphs " (Absolute) 7.00 (*)     All other components within normal limits   COMP METABOLIC PANEL - Abnormal; Notable for the following components:    Glucose 103 (*)     Alkaline Phosphatase 111 (*)     All other components within normal limits   TROPONIN   ESTIMATED GFR   DIFFERENTIAL MANUAL   PERIPHERAL SMEAR REVIEW   PLATELET ESTIMATE   MORPHOLOGY      All labs reviewed by me.    EKG   Report   Date Value Ref Range Status   2022       [facility] Emergency Dept.    Test Date:  2022  Pt Name:    EMANI UREÑA           Department: ER  MRN:        [id number]                      Room:  Gender:     Female                       Technician: [id number]  :        1960                   Requested By:ER TRIAGE PROTOCOL  Order #:    [id number]                    Reading MD: Antione Ly MD    Measurements  Intervals                                Axis  Rate:       75                           P:          56  ND:         201                          QRS:        -44  QRSD:       161                          T:          115  QT:         462  QTc:        518    Interpretive Statements  Sinus rhythm  Multiform ventricular premature complexes  Left bundle branch block  ST elevation secondary to IVCD  Compared to ECG 2022 15:00:09  Ventricular premature complex(es) now present  Left bundle-branch block now present  ST (T wave) deviation now present  Ventricular-paced complex(es) or rhythm no longer present  Electronically Signed On 2022 0:02:52 PDT by Antoine Ly MD               RADIOLOGY  DX-CHEST-PORTABLE (1 VIEW)   Final Result      Stable cardiac silhouette enlargement without edema identified. Dual-chamber pacer in place        The radiologist's interpretation of all radiological studies have been reviewed by me.    COURSE & MEDICAL DECISION MAKING  Nursing notes, VS, PMSFHx reviewed in chart.    61 y.o. female p/w chief complaint of palpitations.    10:29 PM Patient seen and  examined at bedside. Ordered DX-chest, estimated GFR, differential manual, peripheral smear review, platelet estimated, morphology, CBC w/diff, CMP, troponin, and an EKG to evaluate.     I verified that the patient was wearing a mask and I was wearing appropriate PPE every time I entered the room. The patient's mask was on the patient at all times during my encounter except for a brief view of the oropharynx.     The differential diagnoses include but are not limited to:     #palpitations, chest pain    Pacemaker interrogated; the patient had intermittent A-flutter from 0137-6182 on 4/22/22 and 2.5 hours of a-flutter today starting at 1730 with periods of RVR and rate as high as 130s.   Pacemaker does not cardiovert or overdrive.     No unilateral leg swelling  No hemoptysis  No personal or family hx of DVT or PE  No persistent shortness of breath    CBC negative for significant anemia/leukocytosis.  CMP negative for significant electrolyte abnormality.  Troponin/EKG (interpreted by me) without acute ischemia    HEART SCORE: 1    Given intermittent pain with episodes of a-flutter for 2 days and no STEMI on ECG and negative troponin, doubt additional benefit in repeat troponin or ECG.     Hx and physical exam not c/w pericarditis, no e/o pericardial effusion on US    11:33 PM Patient re-evaluated at bedside. Discussed the patient's condition and plan to consult with Dr. Witt. Patient's labs and radiology results discussed. Patient verbalizes understanding and agreement to this plan of care.     11:44 PM Paged Dr. iWtt for consult.     11:54 PM I discussed the patient's case and the above findings with Dr. Maya (Cardiology) who recommends the patient call to schedule follow up in outpatient clinic.     11:56 PM Patient re-evaluated at bedside. Patient reports feeling no pain at this time. Discussed the patient's condition and treatment plan, including my conversation with Dr. Maya and plans for discharge. Patient's labs  and radiology results discussed. Gave discharge instructions and return precautions.The patient understood and is comfortable with discharge. At this time, the patient was given the opportunity to ask questions.     The patient will return for new or worsening symptoms and is stable at the time of discharge.    The patient is referred to a primary physician for blood pressure management, diabetic screening, and for all other preventative health concerns.    DISPOSITION:  Patient will be discharged home in stable condition.    FOLLOW UP:  Juve Stoddard M.D.  [address]  [address]  [facility] [id number] [phone]    In 3 days      [facility] Emergency Dept  [address]  [facility] [address] [phone]    If symptoms worsen    Solitario Witt M.D.  [address]  [address]  [facility] [id number] [phone]      Arlene I spoke with Dr. Maya, on call for Dr. Witt regarding your flutter and he recommended calling to schedule follow up on Monday      FINAL IMPRESSION  1. Atrial flutter, unspecified type (HCC)          Bia BELL ([place]ose), am scribing for, and in the presence of, Antoine Ly M.D..    Electronically signed by: Bia Adams ([place]), 4/23/2022    Antoine BELL M.D. personally performed the services described in this documentation, as scribed by Bia Adams in my presence, and it is both accurate and complete.    C    The note accurately reflects work and decisions made by me.  Antoine Ly M.D.  4/24/2022  2:39 AM

## 2022-04-24 NOTE — ED NOTES
Pacemaker interrogate complete. RN called [employer] pacer hotline, they state that they have received the report and a tech is reviewing it. They will call back after review.

## 2022-04-24 NOTE — ED TRIAGE NOTES
Seda Cagle  61 y.o. female    Chief Complaint   Patient presents with   • Palpitations              Pt arrives with complaints of palpitations and elevated HR that began today 1700 while walking dog. Pt states HR up to 130's at home. Pt believes she had another episode yesterday, lasted for 8 hours. Hx pacemaker replacement on 4/6.      Pt denies SOB/CP/dizziness on triage. States she does have intermittent substernal chest pain that occurs when HR elevates.     Vitals:    04/23/22 2009   BP: (!) 171/111   Pulse: (!) 120   Resp: 16   Temp: 37.4 °C (99.4 °F)   SpO2: 96%       Triage process explained to patient, apologized for wait time, and returned to lobby.  Pt informed to notify staff of any change in condition.

## 2022-04-24 NOTE — ED NOTES
Antoine from [employer] called back.  Reports that pacemaker is functioning as designed without any apparent issues.  Reports that on 4/22/22, patient had intermittent A-Flutter from 0945 - 1000.  Reports that on 4/23/22, patient had 2.5 hours of A-Flutter starting at 1730. States that this episode had periods of RVR with VRate as high as 130s.  Information relayed to Dr Ly.

## 2022-04-27 ENCOUNTER — OFFICE VISIT (OUTPATIENT)
Dept: CARDIOLOGY | Facility: MEDICAL CENTER | Age: 62
End: 2022-04-27
Payer: COMMERCIAL

## 2022-04-27 VITALS
HEART RATE: 52 BPM | BODY MASS INDEX: 32.61 KG/M2 | RESPIRATION RATE: 14 BRPM | HEIGHT: 64 IN | WEIGHT: 191 LBS | DIASTOLIC BLOOD PRESSURE: 82 MMHG | OXYGEN SATURATION: 97 % | SYSTOLIC BLOOD PRESSURE: 112 MMHG

## 2022-04-27 DIAGNOSIS — I48.0 PAROXYSMAL ATRIAL FIBRILLATION (HCC): Chronic | ICD-10-CM

## 2022-04-27 DIAGNOSIS — Z79.01 CHRONIC ANTICOAGULATION: ICD-10-CM

## 2022-04-27 DIAGNOSIS — G47.33 OBSTRUCTIVE SLEEP APNEA SYNDROME: Chronic | ICD-10-CM

## 2022-04-27 DIAGNOSIS — I48.0 PAF (PAROXYSMAL ATRIAL FIBRILLATION) (HCC): ICD-10-CM

## 2022-04-27 PROCEDURE — 99214 OFFICE O/P EST MOD 30 MIN: CPT | Mod: 25 | Performed by: INTERNAL MEDICINE

## 2022-04-27 PROCEDURE — 93000 ELECTROCARDIOGRAM COMPLETE: CPT | Mod: 59 | Performed by: INTERNAL MEDICINE

## 2022-04-27 PROCEDURE — 93280 PM DEVICE PROGR EVAL DUAL: CPT | Performed by: INTERNAL MEDICINE

## 2022-04-27 ASSESSMENT — FIBROSIS 4 INDEX: FIB4 SCORE: 1.47

## 2022-04-27 NOTE — PROGRESS NOTES
Arrhythmia Clinic Note (Established patient)    DOS: 4/27/2022    Chief complaint/Reason for consult: F/u PPM    Interval History:  Pt is a 62 yo F. History of HCM and s/p dual chamber PPM with failing RV lead. Underwent extraction of dual chamber device and re-implant of new dual chamber [employer] system. She has a history of PAF and symptomatic PVCs controlled with norpace. Over weekend came in with AFL that was quite symptomatic. Resolved on its own over weekend and has been okay last 2 days. Here for device check.    ROS (+ highlighted in red):  General--Negative for fatigue, weight loss or weight gain  Cardiovascular--Negative for CP, orthopnea, PND    Past Medical History:   Diagnosis Date   • Anesthesia     wakes up during procedure (x2)   • Aortic regurgitation    • Arrhythmia, ventricular 2008    Symptomatic PVCs, controlled with medication.   • Asthma     inhaler PRN, only uses when sick    • Atrial fibrillation (HCC)    • Cardiac arrhythmia    • Cervical arthritis 2/8/2016   • Chickenpox    • Chronic kidney disease, stage III (moderate) (HCC) 2/9/2016   • CTS (carpal tunnel syndrome) 2/28/2011   • Depression    • GERD (gastroesophageal reflux disease)    • Heart burn    • Heart murmur    • Hemorrhagic disorder (HCC)     on Eliquis   • Hypertr obst cardiomyop 1991    Dr. Bert Vazquez, [facility] [address] [address] [phone] fax [phone].  [facility] [phone].  Alchol Septal Reduction 8/00, Coronaries normal.   • Hypertrophic cardiomyopathy (HCC)    • Indigestion    • Influenza    • Pacemaker 2014   • Renal disorder     chronic kidney disease stage 3-  **pt states currently no issue, numbers are stable   • Sleep apnea     uses CPAP   • Snoring        Past Surgical History:   Procedure Laterality Date   • PB WRIST ARTHROSCOP,RELEASE XVERS LIG Right 6/1/2021    Procedure: RELEASE, CARPAL TUNNEL, ENDOSCOPIC;  Surgeon: Mike Ospina M.D.;  Location: SURGERY [facility];  Service: Orthopedics   • JOINT INJECTION DIAGNOSTIC Left 6/1/2021    Procedure: INJECTION, JOINT, DIAGNOSTIC-CARPAL TUNNEL;  Surgeon: Mike Ospina M.D.;  Location: SURGERY [facility];  Service: Orthopedics   • HYSTEROSCOPY WITH VIDEO OPERATIVE  4/30/2018    Procedure: HYSTEROSCOPY WITH VIDEO OPERATIVE;  Surgeon: Noemi Liu M.D.;  Location: SURGERY SAME DAY [facility];  Service: Labor and Delivery   • DILATION AND CURETTAGE  4/30/2018    Procedure: DILATION AND CURETTAGE;  Surgeon: Noemi Liu M.D.;  Location: SURGERY SAME DAY [facility] ORS;  Service: Labor and Delivery   • BREAST BIOPSY Left 1/30/2018    Procedure: BREAST BIOPSY- WIRE LOCALIZED;  Surgeon: Vijaya Britt M.D.;  Location: SURGERY SAME DAY [facility] ORS;  Service: General   • RECOVERY  4/15/2015    Performed by [facility] at SURGERY PRE-POST PROC UNIT [facility]   • PACEMAKER INSERTION  2015   • RECOVERY  12/12/2014    Performed by [facility] at SURGERY SAME DAY [facility] ORS   • SEPTAL RECONSTRUCTION  2000   • ENDOSCOPY     • PRIMARY C SECTION     • TUBAL COAGULATION LAPAROSCOPIC BILATERAL         Social History     Socioeconomic History   • Marital status:      Spouse name: Not on file   • Number of children: Not on file   • Years of education: Not on file   • Highest education level: Some college, no degree   Occupational History   • Not on file   Tobacco Use   • Smoking status: Never Smoker   • Smokeless tobacco: Never Used   Vaping Use   • Vaping Use: Never used   Substance and Sexual Activity   • Alcohol use: Not Currently     Alcohol/week: 0.6 oz     Types: 1 Standard drinks or equivalent per week     Comment: 3-4 per week   • Drug use: No   • Sexual activity: Yes     Partners: Male     Birth control/protection: Surgical     Comment:    Other Topics Concern   • Not on file   Social History Narrative   • Not on file     Social Determinants of Health     Financial Resource Strain: Low Risk     • Difficulty of Paying Living Expenses: Not hard at all   Food Insecurity: No Food Insecurity   • Worried About Running Out of Food in the Last Year: Never true   • Ran Out of Food in the Last Year: Never true   Transportation Needs: No Transportation Needs   • Lack of Transportation (Medical): No   • Lack of Transportation (Non-Medical): No   Physical Activity: Sufficiently Active   • Days of Exercise per Week: 7 days   • Minutes of Exercise per Session: 60 min   Stress: No Stress Concern Present   • Feeling of Stress : Only a little   Social Connections: Moderately Integrated   • Frequency of Communication with Friends and Family: Three times a week   • Frequency of Social Gatherings with Friends and Family: Once a week   • Attends Tenriism Services: Never   • Active Member of Clubs or Organizations: Yes   • Attends Club or Organization Meetings: More than 4 times per year   • Marital Status:    Intimate Partner Violence: Not on file   Housing Stability: Low Risk    • Unable to Pay for Housing in the Last Year: No   • Number of Places Lived in the Last Year: 1   • Unstable Housing in the Last Year: No       Family History   Problem Relation Age of Onset   • Heart Disease Mother         CHF   • Hypertension Mother    • Stroke Mother    • Heart Failure Mother    • Other Mother         carotid artery disease,gout   • Cancer Father         AML   • Diabetes Maternal Grandmother    • Cancer Maternal Grandfather         Prostate   • Heart Disease Paternal Grandfather 52         MI    • Sleep Apnea Neg Hx        No Known Allergies    Current Outpatient Medications   Medication Sig Dispense Refill   • metoprolol SR (TOPROL XL) 100 MG TABLET SR 24 HR TAKE ONE TABLET BY MOUTH DAILY (Patient taking differently: Take 100 mg by mouth every evening.) 90 Tablet 3   • nortriptyline (PAMELOR) 10 MG Cap TAKE ONE CAPSULE BY MOUTH EVERY EVENING (Patient taking differently: Take 10 mg by mouth every evening.) 90 Capsule 1  "  • disopyramide (NORPACE) 100 MG Cap TAKE ONE CAPSULE BY MOUTH EVERY 8 HOURS (Patient taking differently: Take 100 mg by mouth every 8 hours.) 270 Capsule 1   • fluticasone (FLONASE) 50 MCG/ACT nasal spray Administer 1 Spray into affected nostril(S) every day.     • famotidine (PEPCID) 40 MG Tab Take 1 Tablet by mouth 2 times a day. 180 Tablet 3   • atorvastatin (LIPITOR) 40 MG Tab TAKE ONE TABLET BY MOUTH EVERY EVENING (LIPITOR) (Patient taking differently: Take 40 mg by mouth every evening.) 90 Tablet 2   • Probiotic Product (PROBIOTIC-10 PO) Take 1 Capsule by mouth every day.     • ELIQUIS 5 MG Tab TAKE ONE TABLET BY MOUTH TWICE A DAY (Patient taking differently: Take 5 mg by mouth 2 times a day.) 180 tablet 3   • Multiple Vitamin (MULTI-VITAMIN PO) Take  by mouth every day.     • Pantothenic Acid 500 MG Tab Take 500 mg by mouth every day.     • Calcium-Vitamin D-Vitamin K (CALCIUM FOR WOMEN PO) Take 1 Each by mouth every day.     • Magnesium 200 MG TABS Take 1 Tab by mouth every day.     • acetaminophen (TYLENOL) 325 MG Tab Take 325 mg by mouth one time.       No current facility-administered medications for this visit.       Physical Exam:  Vitals:    04/27/22 1339   BP: 112/82   BP Location: Left arm   Patient Position: Sitting   BP Cuff Size: Adult   Pulse: (!) 52   Resp: 14   SpO2: 97%   Weight: 86.6 kg (191 lb)   Height: 1.626 m (5' 4\")     General appearance: NAD, conversant  HEENT: PERRL, neck is supple with FROM  Lungs: Clear to auscultation, normal respiratory effort  CV: RRR, 3/6 systolic murmur, no JVD  Abdomen: Soft, non-tender with normal bowel sounds  Extremities: No peripheral edema, no clubbing or cyanosis  Skin: No rash, lesions, or ulcers  Psych: Alert and oriented to person, place and time    Data:  Labs reviewed    Prior echo/stress reviewed:  HCM    EKG interpreted by me:  Intermittent A pacing, PVCs    Impression/Plan:  1. PAF (paroxysmal atrial fibrillation) (HCC)  EKG   2. Obstructive " sleep apnea syndrome     3. Paroxysmal atrial fibrillation (HCC)     4. Chronic anticoagulation       -She is having a higher PVCs and atrial arrhythmia burden since the extraction and new implant  -New device functioning appropriately  -I would like to give it a few weeks before having to change up her antiarrhythmic regimen entirely from the current norpace  -F/u in 4 weeks    Solitario Witt MD

## 2022-05-01 LAB — EKG IMPRESSION: NORMAL

## 2022-05-07 DIAGNOSIS — I48.0 PAROXYSMAL ATRIAL FIBRILLATION (HCC): ICD-10-CM

## 2022-05-07 DIAGNOSIS — I42.2 HYPERTROPHIC CARDIOMYOPATHY (HCC): ICD-10-CM

## 2022-05-10 RX ORDER — APIXABAN 5 MG/1
TABLET, FILM COATED ORAL
Qty: 180 TABLET | Refills: 3 | Status: SHIPPED | OUTPATIENT
Start: 2022-05-10 | End: 2023-05-17

## 2022-05-10 RX ORDER — DISOPYRAMIDE PHOSPHATE 100 MG/1
CAPSULE ORAL
Qty: 270 CAPSULE | Refills: 3 | Status: ON HOLD
Start: 2022-05-10 | End: 2022-08-04

## 2022-05-17 ENCOUNTER — NON-PROVIDER VISIT (OUTPATIENT)
Dept: CARDIOLOGY | Facility: MEDICAL CENTER | Age: 62
End: 2022-05-17
Payer: COMMERCIAL

## 2022-05-17 DIAGNOSIS — Z95.0 PACEMAKER: ICD-10-CM

## 2022-05-17 DIAGNOSIS — I49.5 SICK SINUS SYNDROME (HCC): ICD-10-CM

## 2022-05-17 DIAGNOSIS — I42.1 HYPERTROPHIC OBSTRUCTIVE CARDIOMYOPATHY (HCC): Chronic | ICD-10-CM

## 2022-05-17 PROCEDURE — 93280 PM DEVICE PROGR EVAL DUAL: CPT | Performed by: INTERNAL MEDICINE

## 2022-05-19 ENCOUNTER — PHARMACY VISIT (OUTPATIENT)
Dept: PHARMACY | Facility: MEDICAL CENTER | Age: 62
End: 2022-05-19
Payer: COMMERCIAL

## 2022-05-19 PROCEDURE — RXMED WILLOW AMBULATORY MEDICATION CHARGE: Performed by: INTERNAL MEDICINE

## 2022-05-19 RX ORDER — CX-024414 0.2 MG/ML
0.25 INJECTION, SUSPENSION INTRAMUSCULAR
Qty: 0.25 ML | Refills: 0 | Status: SHIPPED | OUTPATIENT
Start: 2022-05-19 | End: 2022-08-22

## 2022-05-27 ENCOUNTER — OFFICE VISIT (OUTPATIENT)
Dept: CARDIOLOGY | Facility: MEDICAL CENTER | Age: 62
End: 2022-05-27
Payer: COMMERCIAL

## 2022-05-27 VITALS
SYSTOLIC BLOOD PRESSURE: 120 MMHG | OXYGEN SATURATION: 98 % | DIASTOLIC BLOOD PRESSURE: 82 MMHG | HEIGHT: 64 IN | BODY MASS INDEX: 33.29 KG/M2 | HEART RATE: 60 BPM | WEIGHT: 195 LBS | RESPIRATION RATE: 14 BRPM

## 2022-05-27 DIAGNOSIS — I48.0 PAROXYSMAL ATRIAL FIBRILLATION (HCC): ICD-10-CM

## 2022-05-27 DIAGNOSIS — Z95.0 PACEMAKER: ICD-10-CM

## 2022-05-27 DIAGNOSIS — Z79.01 CHRONIC ANTICOAGULATION: ICD-10-CM

## 2022-05-27 PROCEDURE — 93000 ELECTROCARDIOGRAM COMPLETE: CPT | Performed by: INTERNAL MEDICINE

## 2022-05-27 PROCEDURE — 99024 POSTOP FOLLOW-UP VISIT: CPT | Performed by: INTERNAL MEDICINE

## 2022-05-27 ASSESSMENT — FIBROSIS 4 INDEX: FIB4 SCORE: 1.49

## 2022-05-27 NOTE — PROGRESS NOTES
Arrhythmia Clinic Note (Established patient)    DOS: 5/27/2022    Chief complaint/Reason for consult: F/u HCM    Interval History:  Pt is a 63 yo F. She has a history of HCM. Has history of pACs turned more into PAF and PVCs. Sinus node dysfunction s/p dual chamber PPM. Recently underwent lead extraction and re-implantation with higher RV thresholds and impedance. Last month or two AF burden is going up. Main symptom is lightheadedness and palpitations.    ROS (+ highlighted in red):  General--Negative for fatigue, weight loss or weight gain  Cardiovascular--Negative for CP, orthopnea, PND    Past Medical History:   Diagnosis Date   • Anesthesia     wakes up during procedure (x2)   • Aortic regurgitation    • Arrhythmia, ventricular 2008    Symptomatic PVCs, controlled with medication.   • Asthma     inhaler PRN, only uses when sick    • Atrial fibrillation (HCC)    • Cardiac arrhythmia    • Cervical arthritis 2/8/2016   • Chickenpox    • Chronic kidney disease, stage III (moderate) (HCC) 2/9/2016   • CTS (carpal tunnel syndrome) 2/28/2011   • Depression    • GERD (gastroesophageal reflux disease)    • Heart burn    • Heart murmur    • Hemorrhagic disorder (HCC)     on Eliquis   • Hypertr obst cardiomyop 1991    Dr. Bert Vazquez, [address] [address] [phone] fax [phone].  [facility] [phone].  Alchol Septal Reduction 8/00, Coronaries normal.   • Hypertrophic cardiomyopathy (HCC)    • Indigestion    • Influenza    • Pacemaker 2014   • Renal disorder     chronic kidney disease stage 3-  **pt states currently no issue, numbers are stable   • Sleep apnea     uses CPAP   • Snoring        Past Surgical History:   Procedure Laterality Date   • PB WRIST ARTHROSCOP,RELEASE XVERS LIG Right 6/1/2021    Procedure: RELEASE, CARPAL TUNNEL, ENDOSCOPIC;  Surgeon: Mike Ospina M.D.;  Location: SURGERY [facility];  Service: Orthopedics   • JOINT INJECTION DIAGNOSTIC Left  6/1/2021    Procedure: INJECTION, JOINT, DIAGNOSTIC-CARPAL TUNNEL;  Surgeon: Mike Ospina M.D.;  Location: SURGERY [facility];  Service: Orthopedics   • HYSTEROSCOPY WITH VIDEO OPERATIVE  4/30/2018    Procedure: HYSTEROSCOPY WITH VIDEO OPERATIVE;  Surgeon: Noemi Liu M.D.;  Location: SURGERY SAME DAY [facility];  Service: Labor and Delivery   • DILATION AND CURETTAGE  4/30/2018    Procedure: DILATION AND CURETTAGE;  Surgeon: Noemi Liu M.D.;  Location: SURGERY SAME DAY [facility];  Service: Labor and Delivery   • BREAST BIOPSY Left 1/30/2018    Procedure: BREAST BIOPSY- WIRE LOCALIZED;  Surgeon: Vijaya Britt M.D.;  Location: SURGERY SAME DAY [facility];  Service: General   • RECOVERY  4/15/2015    Performed by Recoveryonly Surgery at SURGERY PRE-POST PROC UNIT [facility]   • PACEMAKER INSERTION  2015   • RECOVERY  12/12/2014    Performed by Ir-Recovery Surgery at SURGERY SAME DAY [facility]   • SEPTAL RECONSTRUCTION  2000   • ENDOSCOPY     • PRIMARY C SECTION     • TUBAL COAGULATION LAPAROSCOPIC BILATERAL         Social History     Socioeconomic History   • Marital status:      Spouse name: Not on file   • Number of children: Not on file   • Years of education: Not on file   • Highest education level: Some college, no degree   Occupational History   • Not on file   Tobacco Use   • Smoking status: Never Smoker   • Smokeless tobacco: Never Used   Vaping Use   • Vaping Use: Never used   Substance and Sexual Activity   • Alcohol use: Not Currently     Alcohol/week: 0.6 oz     Types: 1 Standard drinks or equivalent per week     Comment: 3-4 per week   • Drug use: No   • Sexual activity: Yes     Partners: Male     Birth control/protection: Surgical     Comment:    Other Topics Concern   • Not on file   Social History Narrative   • Not on file     Social Determinants of Health     Financial Resource Strain: Low Risk    • Difficulty of Paying Living Expenses: Not hard at all   Food  Insecurity: No Food Insecurity   • Worried About Running Out of Food in the Last Year: Never true   • Ran Out of Food in the Last Year: Never true   Transportation Needs: No Transportation Needs   • Lack of Transportation (Medical): No   • Lack of Transportation (Non-Medical): No   Physical Activity: Sufficiently Active   • Days of Exercise per Week: 7 days   • Minutes of Exercise per Session: 60 min   Stress: No Stress Concern Present   • Feeling of Stress : Only a little   Social Connections: Moderately Integrated   • Frequency of Communication with Friends and Family: Three times a week   • Frequency of Social Gatherings with Friends and Family: Once a week   • Attends Mormon Services: Never   • Active Member of Clubs or Organizations: Yes   • Attends Club or Organization Meetings: More than 4 times per year   • Marital Status:    Intimate Partner Violence: Not on file   Housing Stability: Low Risk    • Unable to Pay for Housing in the Last Year: No   • Number of Places Lived in the Last Year: 1   • Unstable Housing in the Last Year: No       Family History   Problem Relation Age of Onset   • Heart Disease Mother         CHF   • Hypertension Mother    • Stroke Mother    • Heart Failure Mother    • Other Mother         carotid artery disease,gout   • Cancer Father         AML   • Diabetes Maternal Grandmother    • Cancer Maternal Grandfather         Prostate   • Heart Disease Paternal Grandfather 52         MI    • Sleep Apnea Neg Hx        No Known Allergies    Current Outpatient Medications   Medication Sig Dispense Refill   • COVID-19 mRNA vaccine, Moderna, (MODERNA COVID-19 VACCINE) 100 MCG/0.5ML Suspension injection Inject 0.25 mL into the shoulder, thigh, or buttocks. 0.25 mL 0   • ELIQUIS 5 MG Tab TAKE ONE TABLET BY MOUTH TWICE A  Tablet 3   • disopyramide (NORPACE) 100 MG Cap TAKE ONE CAPSULE BY MOUTH EVERY 8 HOURS 270 Capsule 3   • metoprolol SR (TOPROL XL) 100 MG TABLET SR 24 HR TAKE  "ONE TABLET BY MOUTH DAILY (Patient taking differently: Take 100 mg by mouth every evening.) 90 Tablet 3   • nortriptyline (PAMELOR) 10 MG Cap TAKE ONE CAPSULE BY MOUTH EVERY EVENING (Patient taking differently: Take 10 mg by mouth every evening.) 90 Capsule 1   • fluticasone (FLONASE) 50 MCG/ACT nasal spray Administer 1 Spray into affected nostril(S) every day.     • famotidine (PEPCID) 40 MG Tab Take 1 Tablet by mouth 2 times a day. 180 Tablet 3   • atorvastatin (LIPITOR) 40 MG Tab TAKE ONE TABLET BY MOUTH EVERY EVENING (LIPITOR) (Patient taking differently: Take 40 mg by mouth every evening.) 90 Tablet 2   • Probiotic Product (PROBIOTIC-10 PO) Take 1 Capsule by mouth every day.     • Multiple Vitamin (MULTI-VITAMIN PO) Take  by mouth every day.     • Pantothenic Acid 500 MG Tab Take 500 mg by mouth every day.     • Calcium-Vitamin D-Vitamin K (CALCIUM FOR WOMEN PO) Take 1 Each by mouth every day.     • Magnesium 200 MG TABS Take 1 Tab by mouth every day.     • acetaminophen (TYLENOL) 325 MG Tab Take 325 mg by mouth one time.       No current facility-administered medications for this visit.       Physical Exam:  Vitals:    05/27/22 1030   BP: 120/82   BP Location: Left arm   Patient Position: Sitting   BP Cuff Size: Adult   Pulse: 60   Resp: 14   SpO2: 98%   Weight: 88.5 kg (195 lb)   Height: 1.626 m (5' 4\")     General appearance: NAD, conversant  HEENT: PERRL, neck is supple with FROM  Lungs: Clear to auscultation, normal respiratory effort  CV: RRR, no murmurs/rubs/gallops, no JVD  Abdomen: Soft, non-tender with normal bowel sounds  Extremities: No peripheral edema, no clubbing or cyanosis  Skin: No rash, lesions, or ulcers  Psych: Alert and oriented to person, place and time    Data:  Labs reviewed    Prior echo/stress reviewed:  LVEF 65%, LVH    EKG interpreted by me:  A paced, LBBB    Impression/Plan:  1. Paroxysmal atrial fibrillation (HCC)  EKG   2. Pacemaker     3. Chronic anticoagulation       -Device " interrogation reviewed  -Rising sustained AF episodes, shortest ~5 hours, as recently as 5/19  -Options discussed  -Will increase norpace to 200 TID for now, plan for PV isolation next available    Solitario Witt MD

## 2022-06-02 ENCOUNTER — TELEPHONE (OUTPATIENT)
Dept: CARDIOLOGY | Facility: MEDICAL CENTER | Age: 62
End: 2022-06-02
Payer: COMMERCIAL

## 2022-06-02 NOTE — TELEPHONE ENCOUNTER
Patient scheduled for afib ablation on 8-4-22 with Dr. Witt. Patient has been instructed to check in at 11:00 for 1:00 case time. Message sent to authorizations. VANESSA Schultz with [employer]. Emailed [employer].

## 2022-06-02 NOTE — TELEPHONE ENCOUNTER
----- Message from Solitario Witt M.D. sent at 6/2/2022  2:41 PM PDT -----  No meds to hold    ----- Message -----  From: Josué Hidalgo Ass't  Sent: 6/2/2022   2:01 PM PDT  To: Solitario Witt M.D.    Great! Any medications she needs to hold for the afib ablation?

## 2022-06-17 LAB — EKG IMPRESSION: NORMAL

## 2022-06-27 ENCOUNTER — OFFICE VISIT (OUTPATIENT)
Dept: CARDIOLOGY | Facility: MEDICAL CENTER | Age: 62
End: 2022-06-27
Payer: COMMERCIAL

## 2022-06-27 VITALS
HEIGHT: 64 IN | RESPIRATION RATE: 14 BRPM | DIASTOLIC BLOOD PRESSURE: 84 MMHG | WEIGHT: 197 LBS | BODY MASS INDEX: 33.63 KG/M2 | OXYGEN SATURATION: 95 % | SYSTOLIC BLOOD PRESSURE: 122 MMHG | HEART RATE: 60 BPM

## 2022-06-27 DIAGNOSIS — I48.0 PAROXYSMAL ATRIAL FIBRILLATION (HCC): ICD-10-CM

## 2022-06-27 DIAGNOSIS — Z95.0 CARDIAC PACEMAKER IN SITU: ICD-10-CM

## 2022-06-27 DIAGNOSIS — I42.1 HYPERTROPHIC OBSTRUCTIVE CARDIOMYOPATHY (HCC): Chronic | ICD-10-CM

## 2022-06-27 DIAGNOSIS — I44.7 LBBB (LEFT BUNDLE BRANCH BLOCK): ICD-10-CM

## 2022-06-27 PROCEDURE — 99024 POSTOP FOLLOW-UP VISIT: CPT | Performed by: INTERNAL MEDICINE

## 2022-06-27 PROCEDURE — 93280 PM DEVICE PROGR EVAL DUAL: CPT | Performed by: INTERNAL MEDICINE

## 2022-06-27 RX ORDER — METOPROLOL SUCCINATE 50 MG/1
50 TABLET, EXTENDED RELEASE ORAL DAILY
Qty: 30 TABLET | Refills: 5 | Status: SHIPPED
Start: 2022-06-27 | End: 2022-11-16

## 2022-06-27 RX ORDER — DISOPYRAMIDE PHOSPHATE 150 MG/1
150 CAPSULE ORAL 3 TIMES DAILY
Qty: 90 CAPSULE | Refills: 5 | Status: SHIPPED | OUTPATIENT
Start: 2022-06-27 | End: 2023-01-03 | Stop reason: SDUPTHER

## 2022-06-27 ASSESSMENT — FIBROSIS 4 INDEX: FIB4 SCORE: 1.49

## 2022-06-27 NOTE — PROGRESS NOTES
Arrhythmia Clinic Note (Established patient)    DOS: 6/27/2022    Chief complaint/Reason for consult: F/u PPM/AF    Interval History:  Pt is a 61 yo F. She has a history of HCM, SSS and paroxysmal AV block s/p dual chamber [place] [facility] PPM. At [facility] thresholds had been chronically elevated and we ended up replacing entire system (old gen/leads were extracted). Last visit AF burden had gone up and symptomatic. We planned for PV isolation but in the interim increased norpace dosage. She says more tired on the higher dose. Wants to see if she can reduce dosage. Her AF burden back down to 0%.    ROS (+ highlighted in red):  General--Negative for fatigue, weight loss or weight gain  Cardiovascular--Negative for CP, orthopnea, PND    Past Medical History:   Diagnosis Date   • Anesthesia     wakes up during procedure (x2)   • Aortic regurgitation    • Arrhythmia, ventricular 2008    Symptomatic PVCs, controlled with medication.   • Asthma     inhaler PRN, only uses when sick    • Atrial fibrillation (HCC)    • Cardiac arrhythmia    • Cervical arthritis 2/8/2016   • Chickenpox    • Chronic kidney disease, stage III (moderate) (HCC) 2/9/2016   • CTS (carpal tunnel syndrome) 2/28/2011   • Depression    • GERD (gastroesophageal reflux disease)    • Heart burn    • Heart murmur    • Hemorrhagic disorder (HCC)     on Eliquis   • Hypertr obst cardiomyop 1991    Dr. Bert Vazquez, [facility] [address] [address] [phone] fax [phone].  [place] office [phone].  Alchol Septal Reduction 8/00, Coronaries normal.   • Hypertrophic cardiomyopathy (HCC)    • Indigestion    • Influenza    • Pacemaker 2014   • Renal disorder     chronic kidney disease stage 3-  **pt states currently no issue, numbers are stable   • Sleep apnea     uses CPAP   • Snoring        Past Surgical History:   Procedure Laterality Date   • PB WRIST ARTHROSCOP,RELEASE XVERS LIG Right 6/1/2021    Procedure: RELEASE, CARPAL TUNNEL,  ENDOSCOPIC;  Surgeon: Mike Ospina M.D.;  Location: SURGERY [facility];  Service: Orthopedics   • JOINT INJECTION DIAGNOSTIC Left 6/1/2021    Procedure: INJECTION, JOINT, DIAGNOSTIC-CARPAL TUNNEL;  Surgeon: Mike Ospina M.D.;  Location: SURGERY [facility];  Service: Orthopedics   • HYSTEROSCOPY WITH VIDEO OPERATIVE  4/30/2018    Procedure: HYSTEROSCOPY WITH VIDEO OPERATIVE;  Surgeon: Noemi Liu M.D.;  Location: SURGERY SAME DAY [facility];  Service: Labor and Delivery   • DILATION AND CURETTAGE  4/30/2018    Procedure: DILATION AND CURETTAGE;  Surgeon: Noemi Liu M.D.;  Location: SURGERY SAME DAY [facility];  Service: Labor and Delivery   • BREAST BIOPSY Left 1/30/2018    Procedure: BREAST BIOPSY- WIRE LOCALIZED;  Surgeon: Vijaya Britt M.D.;  Location: SURGERY SAME DAY [facility];  Service: General   • RECOVERY  4/15/2015    Performed by Recoveryonly Surgery at SURGERY PRE-POST PROC [facility]   • PACEMAKER INSERTION  2015   • RECOVERY  12/12/2014    Performed by Ir-Recovery Surgery at SURGERY SAME DAY [facility]   • SEPTAL RECONSTRUCTION  2000   • ENDOSCOPY     • PRIMARY C SECTION     • TUBAL COAGULATION LAPAROSCOPIC BILATERAL         Social History     Socioeconomic History   • Marital status:      Spouse name: Not on file   • Number of children: Not on file   • Years of education: Not on file   • Highest education level: Some college, no degree   Occupational History   • Not on file   Tobacco Use   • Smoking status: Never Smoker   • Smokeless tobacco: Never Used   Vaping Use   • Vaping Use: Never used   Substance and Sexual Activity   • Alcohol use: Not Currently     Alcohol/week: 0.6 oz     Types: 1 Standard drinks or equivalent per week     Comment: 3-4 per week   • Drug use: No   • Sexual activity: Yes     Partners: Male     Birth control/protection: Surgical     Comment:    Other Topics Concern   • Not on file   Social History Narrative   • Not on file      Social Determinants of Health     Financial Resource Strain: Low Risk    • Difficulty of Paying Living Expenses: Not hard at all   Food Insecurity: No Food Insecurity   • Worried About Running Out of Food in the Last Year: Never true   • Ran Out of Food in the Last Year: Never true   Transportation Needs: No Transportation Needs   • Lack of Transportation (Medical): No   • Lack of Transportation (Non-Medical): No   Physical Activity: Sufficiently Active   • Days of Exercise per Week: 7 days   • Minutes of Exercise per Session: 60 min   Stress: No Stress Concern Present   • Feeling of Stress : Only a little   Social Connections: Moderately Integrated   • Frequency of Communication with Friends and Family: Three times a week   • Frequency of Social Gatherings with Friends and Family: Once a week   • Attends Anabaptism Services: Never   • Active Member of Clubs or Organizations: Yes   • Attends Club or Organization Meetings: More than 4 times per year   • Marital Status:    Intimate Partner Violence: Not on file   Housing Stability: Low Risk    • Unable to Pay for Housing in the Last Year: No   • Number of Places Lived in the Last Year: 1   • Unstable Housing in the Last Year: No       Family History   Problem Relation Age of Onset   • Heart Disease Mother         CHF   • Hypertension Mother    • Stroke Mother    • Heart Failure Mother    • Other Mother         carotid artery disease,gout   • Cancer Father         AML   • Diabetes Maternal Grandmother    • Cancer Maternal Grandfather         Prostate   • Heart Disease Paternal Grandfather 52         MI    • Sleep Apnea Neg Hx        No Known Allergies    Current Outpatient Medications   Medication Sig Dispense Refill   • disopyramide (NORPACE) 150 MG Cap Take 1 Capsule by mouth in the morning, at noon, and at bedtime. 90 Capsule 5   • metoprolol SR (TOPROL XL) 50 MG TABLET SR 24 HR Take 1 Tablet by mouth every day. 30 Tablet 5   • COVID-19 mRNA vaccine,  "Moderna, (MODERNA COVID-19 VACCINE) 100 MCG/0.5ML Suspension injection Inject 0.25 mL into the shoulder, thigh, or buttocks. 0.25 mL 0   • ELIQUIS 5 MG Tab TAKE ONE TABLET BY MOUTH TWICE A  Tablet 3   • disopyramide (NORPACE) 100 MG Cap TAKE ONE CAPSULE BY MOUTH EVERY 8 HOURS (Patient taking differently: 200 mg.) 270 Capsule 3   • metoprolol SR (TOPROL XL) 100 MG TABLET SR 24 HR TAKE ONE TABLET BY MOUTH DAILY (Patient taking differently: Take 100 mg by mouth every evening.) 90 Tablet 3   • nortriptyline (PAMELOR) 10 MG Cap TAKE ONE CAPSULE BY MOUTH EVERY EVENING (Patient taking differently: Take 10 mg by mouth every evening.) 90 Capsule 1   • fluticasone (FLONASE) 50 MCG/ACT nasal spray Administer 1 Spray into affected nostril(S) every day.     • famotidine (PEPCID) 40 MG Tab Take 1 Tablet by mouth 2 times a day. 180 Tablet 3   • atorvastatin (LIPITOR) 40 MG Tab TAKE ONE TABLET BY MOUTH EVERY EVENING (LIPITOR) (Patient taking differently: Take 40 mg by mouth every evening.) 90 Tablet 2   • Probiotic Product (PROBIOTIC-10 PO) Take 1 Capsule by mouth every day.     • Multiple Vitamin (MULTI-VITAMIN PO) Take  by mouth every day.     • Pantothenic Acid 500 MG Tab Take 500 mg by mouth every day.     • Calcium-Vitamin D-Vitamin K (CALCIUM FOR WOMEN PO) Take 1 Each by mouth every day.     • Magnesium 200 MG TABS Take 1 Tab by mouth every day.     • acetaminophen (TYLENOL) 325 MG Tab Take 325 mg by mouth one time.       No current facility-administered medications for this visit.       Physical Exam:  Vitals:    06/27/22 1336   BP: 122/84   BP Location: Left arm   Patient Position: Sitting   BP Cuff Size: Adult   Pulse: 60   Resp: 14   SpO2: 95%   Weight: 89.4 kg (197 lb)   Height: 1.626 m (5' 4\")     General appearance: NAD, conversant  HEENT: PERRL, neck is supple with FROM  Lungs: Clear to auscultation, normal respiratory effort  CV: RRR, no murmurs/rubs/gallops, no JVD  Abdomen: Soft, non-tender with normal bowel " sounds  Extremities: No peripheral edema, no clubbing or cyanosis  Skin: No rash, lesions, or ulcers  Psych: Alert and oriented to person, place and time    Data:  Labs reviewed    Prior echo/stress reviewed:  Pacing leads present. ALLEN used to monitor lead extraction and   replacement.   Mild MR.   Trileaflet aortic valve. Mild AI. mobile ecodensity below valve similar   to aortic membrane. S/p alcohol .   Ablation of septum for HOCM.  Gradient measured across outflow tract.   Peak 15 and mean 7.   Structurally normal tricuspid valve without significant stenosis or   regurgitation.   Structurally normal pulmonic valve without significant stenosis or   regurgitation.   Tiny effusion before the procedure and no change in effusion post op.   EF=normal.     EKG interpreted by me:  Atrial paced, LBBB    Impression/Plan:  1. Paroxysmal atrial fibrillation (HCC)  EKG   2. Hypertrophic obstructive cardiomyopathy (HCC)     3. LBBB (left bundle branch block)     4. Cardiac pacemaker in situ       -Still planning for ablation in August  -In the interim lower Metop to 50 and norpace to 150 TID  -Device functioning appropriately    Solitario Witt MD

## 2022-07-05 ENCOUNTER — NON-PROVIDER VISIT (OUTPATIENT)
Dept: CARDIOLOGY | Facility: MEDICAL CENTER | Age: 62
End: 2022-07-05

## 2022-07-06 NOTE — CARDIAC REMOTE MONITOR - SCAN
Device transmission reviewed. Device demonstrated appropriate function.       Electronically Signed by: Chele Maloney M.D.    7/16/2022  11:30 AM

## 2022-08-01 ENCOUNTER — PRE-ADMISSION TESTING (OUTPATIENT)
Dept: ADMISSIONS | Facility: MEDICAL CENTER | Age: 62
End: 2022-08-01
Attending: INTERNAL MEDICINE
Payer: COMMERCIAL

## 2022-08-01 DIAGNOSIS — Z01.810 PRE-OPERATIVE CARDIOVASCULAR EXAMINATION: ICD-10-CM

## 2022-08-01 DIAGNOSIS — Z01.812 PRE-OPERATIVE LABORATORY EXAMINATION: ICD-10-CM

## 2022-08-01 LAB
ALBUMIN SERPL BCP-MCNC: 4.4 G/DL (ref 3.2–4.9)
ALBUMIN/GLOB SERPL: 2.1 G/DL
ALP SERPL-CCNC: 100 U/L (ref 30–99)
ALT SERPL-CCNC: 29 U/L (ref 2–50)
ANION GAP SERPL CALC-SCNC: 10 MMOL/L (ref 7–16)
ANISOCYTOSIS BLD QL SMEAR: ABNORMAL
AST SERPL-CCNC: 34 U/L (ref 12–45)
BASOPHILS # BLD AUTO: 0 % (ref 0–1.8)
BASOPHILS # BLD: 0 K/UL (ref 0–0.12)
BILIRUB SERPL-MCNC: 0.7 MG/DL (ref 0.1–1.5)
BUN SERPL-MCNC: 14 MG/DL (ref 8–22)
CALCIUM SERPL-MCNC: 9.3 MG/DL (ref 8.5–10.5)
CHLORIDE SERPL-SCNC: 106 MMOL/L (ref 96–112)
CO2 SERPL-SCNC: 22 MMOL/L (ref 20–33)
CREAT SERPL-MCNC: 1 MG/DL (ref 0.5–1.4)
EKG IMPRESSION: NORMAL
EOSINOPHIL # BLD AUTO: 0 K/UL (ref 0–0.51)
EOSINOPHIL NFR BLD: 0 % (ref 0–6.9)
ERYTHROCYTE [DISTWIDTH] IN BLOOD BY AUTOMATED COUNT: 40.1 FL (ref 35.9–50)
GFR SERPLBLD CREATININE-BSD FMLA CKD-EPI: 64 ML/MIN/1.73 M 2
GLOBULIN SER CALC-MCNC: 2.1 G/DL (ref 1.9–3.5)
GLUCOSE SERPL-MCNC: 97 MG/DL (ref 65–99)
HCT VFR BLD AUTO: 45.1 % (ref 37–47)
HGB BLD-MCNC: 15.1 G/DL (ref 12–16)
INR PPP: 1.14 (ref 0.87–1.13)
LYMPHOCYTES # BLD AUTO: 9.36 K/UL (ref 1–4.8)
LYMPHOCYTES NFR BLD: 80 % (ref 22–41)
MANUAL DIFF BLD: NORMAL
MCH RBC QN AUTO: 28.7 PG (ref 27–33)
MCHC RBC AUTO-ENTMCNC: 33.5 G/DL (ref 33.6–35)
MCV RBC AUTO: 85.6 FL (ref 81.4–97.8)
MICROCYTES BLD QL SMEAR: ABNORMAL
MONOCYTES # BLD AUTO: 0.2 K/UL (ref 0–0.85)
MONOCYTES NFR BLD AUTO: 1.7 % (ref 0–13.4)
MORPHOLOGY BLD-IMP: NORMAL
NEUTROPHILS # BLD AUTO: 2.14 K/UL (ref 2–7.15)
NEUTROPHILS NFR BLD: 18.3 % (ref 44–72)
NRBC # BLD AUTO: 0.02 K/UL
NRBC BLD-RTO: 0.2 /100 WBC
PLATELET # BLD AUTO: 161 K/UL (ref 164–446)
PLATELET BLD QL SMEAR: NORMAL
PMV BLD AUTO: 10.2 FL (ref 9–12.9)
POIKILOCYTOSIS BLD QL SMEAR: NORMAL
POLYCHROMASIA BLD QL SMEAR: NORMAL
POTASSIUM SERPL-SCNC: 4.3 MMOL/L (ref 3.6–5.5)
PROT SERPL-MCNC: 6.5 G/DL (ref 6–8.2)
PROTHROMBIN TIME: 14.4 SEC (ref 12–14.6)
RBC # BLD AUTO: 5.27 M/UL (ref 4.2–5.4)
RBC BLD AUTO: PRESENT
SODIUM SERPL-SCNC: 138 MMOL/L (ref 135–145)
STOMATOCYTES BLD QL SMEAR: NORMAL
VARIANT LYMPHS BLD QL SMEAR: NORMAL
WBC # BLD AUTO: 11.7 K/UL (ref 4.8–10.8)

## 2022-08-01 PROCEDURE — 85007 BL SMEAR W/DIFF WBC COUNT: CPT

## 2022-08-01 PROCEDURE — 93005 ELECTROCARDIOGRAM TRACING: CPT

## 2022-08-01 PROCEDURE — 85610 PROTHROMBIN TIME: CPT

## 2022-08-01 PROCEDURE — 85025 COMPLETE CBC W/AUTO DIFF WBC: CPT

## 2022-08-01 PROCEDURE — 93010 ELECTROCARDIOGRAM REPORT: CPT | Performed by: INTERNAL MEDICINE

## 2022-08-01 PROCEDURE — 80053 COMPREHEN METABOLIC PANEL: CPT

## 2022-08-01 PROCEDURE — 36415 COLL VENOUS BLD VENIPUNCTURE: CPT

## 2022-08-01 ASSESSMENT — FIBROSIS 4 INDEX: FIB4 SCORE: 1.49

## 2022-08-04 ENCOUNTER — APPOINTMENT (OUTPATIENT)
Dept: CARDIOLOGY | Facility: MEDICAL CENTER | Age: 62
End: 2022-08-04
Attending: INTERNAL MEDICINE
Payer: COMMERCIAL

## 2022-08-04 ENCOUNTER — ANESTHESIA EVENT (OUTPATIENT)
Dept: CARDIOLOGY | Facility: MEDICAL CENTER | Age: 62
End: 2022-08-04
Payer: COMMERCIAL

## 2022-08-04 ENCOUNTER — HOSPITAL ENCOUNTER (OUTPATIENT)
Facility: MEDICAL CENTER | Age: 62
End: 2022-08-04
Attending: INTERNAL MEDICINE | Admitting: INTERNAL MEDICINE
Payer: COMMERCIAL

## 2022-08-04 ENCOUNTER — ANESTHESIA (OUTPATIENT)
Dept: CARDIOLOGY | Facility: MEDICAL CENTER | Age: 62
End: 2022-08-04
Payer: COMMERCIAL

## 2022-08-04 VITALS
OXYGEN SATURATION: 92 % | RESPIRATION RATE: 17 BRPM | BODY MASS INDEX: 33.84 KG/M2 | TEMPERATURE: 97.7 F | DIASTOLIC BLOOD PRESSURE: 61 MMHG | SYSTOLIC BLOOD PRESSURE: 123 MMHG | WEIGHT: 198.19 LBS | HEART RATE: 67 BPM | HEIGHT: 64 IN

## 2022-08-04 DIAGNOSIS — I48.0 PAROXYSMAL ATRIAL FIBRILLATION (HCC): ICD-10-CM

## 2022-08-04 LAB
ACT BLD: 295 SEC (ref 74–137)
ACT BLD: 382 SEC (ref 74–137)
EKG IMPRESSION: NORMAL

## 2022-08-04 PROCEDURE — 160002 HCHG RECOVERY MINUTES (STAT)

## 2022-08-04 PROCEDURE — 160046 HCHG PACU - 1ST 60 MINS PHASE II

## 2022-08-04 PROCEDURE — 93312 ECHO TRANSESOPHAGEAL: CPT | Mod: 26 | Performed by: INTERNAL MEDICINE

## 2022-08-04 PROCEDURE — 85347 COAGULATION TIME ACTIVATED: CPT

## 2022-08-04 PROCEDURE — 93656 COMPRE EP EVAL ABLTJ ATR FIB: CPT | Performed by: INTERNAL MEDICINE

## 2022-08-04 PROCEDURE — 700111 HCHG RX REV CODE 636 W/ 250 OVERRIDE (IP): Performed by: ANESTHESIOLOGY

## 2022-08-04 PROCEDURE — 160035 HCHG PACU - 1ST 60 MINS PHASE I

## 2022-08-04 PROCEDURE — 00537 ANES CARDIAC EP PROCEDURES: CPT | Performed by: ANESTHESIOLOGY

## 2022-08-04 PROCEDURE — 93005 ELECTROCARDIOGRAM TRACING: CPT | Performed by: INTERNAL MEDICINE

## 2022-08-04 PROCEDURE — 93312 ECHO TRANSESOPHAGEAL: CPT

## 2022-08-04 PROCEDURE — 700111 HCHG RX REV CODE 636 W/ 250 OVERRIDE (IP)

## 2022-08-04 PROCEDURE — 93010 ELECTROCARDIOGRAM REPORT: CPT | Mod: 59 | Performed by: INTERNAL MEDICINE

## 2022-08-04 PROCEDURE — 700101 HCHG RX REV CODE 250: Performed by: ANESTHESIOLOGY

## 2022-08-04 PROCEDURE — 700105 HCHG RX REV CODE 258: Performed by: INTERNAL MEDICINE

## 2022-08-04 PROCEDURE — C1894 INTRO/SHEATH, NON-LASER: HCPCS

## 2022-08-04 PROCEDURE — 160036 HCHG PACU - EA ADDL 30 MINS PHASE I

## 2022-08-04 RX ORDER — ONDANSETRON 2 MG/ML
4 INJECTION INTRAMUSCULAR; INTRAVENOUS
Status: DISCONTINUED | OUTPATIENT
Start: 2022-08-04 | End: 2022-08-04 | Stop reason: HOSPADM

## 2022-08-04 RX ORDER — CEFAZOLIN SODIUM 1 G/3ML
INJECTION, POWDER, FOR SOLUTION INTRAMUSCULAR; INTRAVENOUS PRN
Status: DISCONTINUED | OUTPATIENT
Start: 2022-08-04 | End: 2022-08-04 | Stop reason: SURG

## 2022-08-04 RX ORDER — PROTAMINE SULFATE 10 MG/ML
INJECTION, SOLUTION INTRAVENOUS
Status: COMPLETED
Start: 2022-08-04 | End: 2022-08-04

## 2022-08-04 RX ORDER — HEPARIN SODIUM 200 [USP'U]/100ML
INJECTION, SOLUTION INTRAVENOUS
Status: COMPLETED
Start: 2022-08-04 | End: 2022-08-04

## 2022-08-04 RX ORDER — OMEPRAZOLE 20 MG/1
20 CAPSULE, DELAYED RELEASE ORAL DAILY
Qty: 30 CAPSULE | Refills: 0 | Status: SHIPPED | OUTPATIENT
Start: 2022-08-04 | End: 2022-09-12

## 2022-08-04 RX ORDER — HEPARIN SODIUM 1000 [USP'U]/ML
INJECTION, SOLUTION INTRAVENOUS; SUBCUTANEOUS
Status: COMPLETED
Start: 2022-08-04 | End: 2022-08-04

## 2022-08-04 RX ORDER — HALOPERIDOL 5 MG/ML
1 INJECTION INTRAMUSCULAR
Status: DISCONTINUED | OUTPATIENT
Start: 2022-08-04 | End: 2022-08-04 | Stop reason: HOSPADM

## 2022-08-04 RX ORDER — PHENYLEPHRINE HYDROCHLORIDE 10 MG/ML
INJECTION, SOLUTION INTRAMUSCULAR; INTRAVENOUS; SUBCUTANEOUS PRN
Status: DISCONTINUED | OUTPATIENT
Start: 2022-08-04 | End: 2022-08-04 | Stop reason: SURG

## 2022-08-04 RX ORDER — SUCRALFATE 1 G/1
1 TABLET ORAL
Qty: 56 TABLET | Refills: 0 | Status: SHIPPED | OUTPATIENT
Start: 2022-08-04 | End: 2022-08-18

## 2022-08-04 RX ORDER — SODIUM CHLORIDE, SODIUM LACTATE, POTASSIUM CHLORIDE, CALCIUM CHLORIDE 600; 310; 30; 20 MG/100ML; MG/100ML; MG/100ML; MG/100ML
INJECTION, SOLUTION INTRAVENOUS CONTINUOUS
Status: DISCONTINUED | OUTPATIENT
Start: 2022-08-04 | End: 2022-08-04 | Stop reason: HOSPADM

## 2022-08-04 RX ORDER — TRAMADOL HYDROCHLORIDE 50 MG/1
50 TABLET ORAL EVERY 6 HOURS PRN
Status: CANCELLED | OUTPATIENT
Start: 2022-08-04

## 2022-08-04 RX ORDER — PHENYLEPHRINE HCL IN 0.9% NACL 0.5 MG/5ML
SYRINGE (ML) INTRAVENOUS
Status: DISCONTINUED
Start: 2022-08-04 | End: 2022-08-04 | Stop reason: HOSPADM

## 2022-08-04 RX ORDER — DIPHENHYDRAMINE HYDROCHLORIDE 50 MG/ML
12.5 INJECTION INTRAMUSCULAR; INTRAVENOUS
Status: DISCONTINUED | OUTPATIENT
Start: 2022-08-04 | End: 2022-08-04 | Stop reason: HOSPADM

## 2022-08-04 RX ORDER — ONDANSETRON 2 MG/ML
INJECTION INTRAMUSCULAR; INTRAVENOUS PRN
Status: DISCONTINUED | OUTPATIENT
Start: 2022-08-04 | End: 2022-08-04 | Stop reason: SURG

## 2022-08-04 RX ORDER — SODIUM CHLORIDE, SODIUM LACTATE, POTASSIUM CHLORIDE, CALCIUM CHLORIDE 600; 310; 30; 20 MG/100ML; MG/100ML; MG/100ML; MG/100ML
INJECTION, SOLUTION INTRAVENOUS CONTINUOUS
Status: ACTIVE | OUTPATIENT
Start: 2022-08-04 | End: 2022-08-04

## 2022-08-04 RX ORDER — LIDOCAINE HYDROCHLORIDE 20 MG/ML
INJECTION, SOLUTION EPIDURAL; INFILTRATION; INTRACAUDAL; PERINEURAL PRN
Status: DISCONTINUED | OUTPATIENT
Start: 2022-08-04 | End: 2022-08-04 | Stop reason: SURG

## 2022-08-04 RX ORDER — MIDAZOLAM HYDROCHLORIDE 1 MG/ML
INJECTION INTRAMUSCULAR; INTRAVENOUS
Status: COMPLETED
Start: 2022-08-04 | End: 2022-08-04

## 2022-08-04 RX ADMIN — ONDANSETRON 4 MG: 2 INJECTION INTRAMUSCULAR; INTRAVENOUS at 13:07

## 2022-08-04 RX ADMIN — ROCURONIUM BROMIDE 40 MG: 10 INJECTION, SOLUTION INTRAVENOUS at 13:07

## 2022-08-04 RX ADMIN — SODIUM CHLORIDE, POTASSIUM CHLORIDE, SODIUM LACTATE AND CALCIUM CHLORIDE: 600; 310; 30; 20 INJECTION, SOLUTION INTRAVENOUS at 11:54

## 2022-08-04 RX ADMIN — LIDOCAINE HYDROCHLORIDE 100 MG: 20 INJECTION, SOLUTION EPIDURAL; INFILTRATION; INTRACAUDAL at 13:07

## 2022-08-04 RX ADMIN — PROPOFOL 150 MG: 10 INJECTION, EMULSION INTRAVENOUS at 13:07

## 2022-08-04 RX ADMIN — EPHEDRINE SULFATE 10 MG: 50 INJECTION INTRAMUSCULAR; INTRAVENOUS; SUBCUTANEOUS at 14:25

## 2022-08-04 RX ADMIN — FENTANYL CITRATE 100 MCG: 50 INJECTION, SOLUTION INTRAMUSCULAR; INTRAVENOUS at 13:07

## 2022-08-04 RX ADMIN — SUGAMMADEX 200 MG: 100 INJECTION, SOLUTION INTRAVENOUS at 14:37

## 2022-08-04 RX ADMIN — PHENYLEPHRINE HYDROCHLORIDE 100 MCG: 10 INJECTION INTRAVENOUS at 14:20

## 2022-08-04 RX ADMIN — PHENYLEPHRINE HYDROCHLORIDE 100 MCG: 10 INJECTION INTRAVENOUS at 14:32

## 2022-08-04 RX ADMIN — CEFAZOLIN 2 G: 330 INJECTION, POWDER, FOR SOLUTION INTRAMUSCULAR; INTRAVENOUS at 13:07

## 2022-08-04 RX ADMIN — EPHEDRINE SULFATE 10 MG: 50 INJECTION INTRAMUSCULAR; INTRAVENOUS; SUBCUTANEOUS at 14:28

## 2022-08-04 RX ADMIN — MIDAZOLAM 2 MG: 1 INJECTION INTRAMUSCULAR; INTRAVENOUS at 13:07

## 2022-08-04 RX ADMIN — HEPARIN SODIUM: 1000 INJECTION, SOLUTION INTRAVENOUS; SUBCUTANEOUS at 13:23

## 2022-08-04 RX ADMIN — GLYCOPYRROLATE 0.4 MG: 0.2 INJECTION INTRAMUSCULAR; INTRAVENOUS at 13:39

## 2022-08-04 RX ADMIN — PROTAMINE SULFATE 40 MG: 10 INJECTION, SOLUTION INTRAVENOUS at 14:27

## 2022-08-04 RX ADMIN — HEPARIN SODIUM 6000 UNITS: 200 INJECTION, SOLUTION INTRAVENOUS at 12:56

## 2022-08-04 ASSESSMENT — PAIN SCALES - GENERAL: PAIN_LEVEL: 0

## 2022-08-04 ASSESSMENT — FIBROSIS 4 INDEX: FIB4 SCORE: 2.43

## 2022-08-04 NOTE — ANESTHESIA POSTPROCEDURE EVALUATION
Patient: Seda Cagle    Procedure Summary     Date: 08/04/22 Room / Location: [facility] CATH [facility]    Anesthesia Start: 1248 Anesthesia Stop: 1509    Procedure: CL-EP ABLATION ATRIAL FIBRILLATION Diagnosis:       Paroxysmal atrial fibrillation (HCC)      Paroxysmal atrial fibrillation      (See Associated Dx)    Scheduled Providers: Solitario Witt M.D.; Triston Mendosa M.D. Responsible Provider: Oren Campos M.D.    Anesthesia Type: general ASA Status: 3          Final Anesthesia Type: general  Last vitals  BP   Blood Pressure: 135/64    Temp   36.2 °C (97.2 °F)    Pulse   66   Resp   17    SpO2   98 %      Anesthesia Post Evaluation    Patient location during evaluation: PACU  Patient participation: complete - patient participated  Level of consciousness: awake and alert  Pain score: 0    Airway patency: patent  Anesthetic complications: no  Cardiovascular status: hemodynamically stable  Respiratory status: acceptable  Hydration status: euvolemic    PONV: none          There were no known complications for this encounter.     Nurse Pain Score: 0 (NPRS)

## 2022-08-04 NOTE — ANESTHESIA TIME REPORT
Anesthesia Start and Stop Event Times     Date Time Event    8/4/2022 1247 Ready for Procedure     1248 Anesthesia Start     1509 Anesthesia Stop        Responsible Staff  08/04/22    Name Role Begin End    Oren Campos M.D. Anesth 1248 1509        Overtime Reason:  overtime    Comments:

## 2022-08-04 NOTE — H&P
EP Pre-procedure History and Physical    Date of service: 8/4/2022    Reason for visit/Chief complaint: PAF    HPI:   Pt is a 63 yo F. History of HCM and PAF on norpace with occasional rare breakthrough. Here for PV isolation.    Past Medical History:   Diagnosis Date   • Anesthesia     wakes up during procedure (x2)   • Aortic regurgitation    • Arrhythmia, ventricular 2008    Symptomatic PVCs, controlled with medication.   • Asthma     inhaler PRN, only uses when sick    • Atrial fibrillation (HCC)    • Awareness under anesthesia    • Cardiac arrhythmia    • Cervical arthritis 02/08/2016   • Chickenpox    • Chronic kidney disease, stage III (moderate) (HCC) 02/09/2016   • CTS (carpal tunnel syndrome) 02/28/2011   • Depression    • GERD (gastroesophageal reflux disease)    • Heart burn    • Heart murmur    • Hemorrhagic disorder (HCC)     on Eliquis   • Hypertr obst cardiomyop 1991    Dr. Bert Vazquez, [address] [place] [address] [phone] fax [phone].  [facility] [phone].  Alchol Septal Reduction 8/00, Coronaries normal.   • Hypertrophic cardiomyopathy (HCC)    • Indigestion    • Influenza    • Pacemaker 2014   • Renal disorder     chronic kidney disease stage 3-  **pt states currently no issue, numbers are stable   • Sleep apnea     uses CPAP   • Snoring      Past Surgical History:   Procedure Laterality Date   • PACEMAKER INSERTION  04/2022   • PB WRIST ARTHROSCOP,RELEASE XVERS LIG Right 06/01/2021    Procedure: RELEASE, CARPAL TUNNEL, ENDOSCOPIC;  Surgeon: Mike Ospina M.D.;  Location: SURGERY [facility];  Service: Orthopedics   • JOINT INJECTION DIAGNOSTIC Left 06/01/2021    Procedure: INJECTION, JOINT, DIAGNOSTIC-CARPAL TUNNEL;  Surgeon: Mike Ospina M.D.;  Location: [facility];  Service: Orthopedics   • HYSTEROSCOPY WITH VIDEO OPERATIVE  04/30/2018    Procedure: HYSTEROSCOPY WITH VIDEO OPERATIVE;  Surgeon: Noemi Liu M.D.;  Location:  "SURGERY SAME DAY [facility];  Service: Labor and Delivery   • DILATION AND CURETTAGE  2018    Procedure: DILATION AND CURETTAGE;  Surgeon: Noemi Liu M.D.;  Location: SURGERY SAME DAY [facility];  Service: Labor and Delivery   • BREAST BIOPSY Left 2018    Procedure: BREAST BIOPSY- WIRE LOCALIZED;  Surgeon: Vijaya Britt M.D.;  Location: SURGERY SAME DAY [facility];  Service: General   • RECOVERY  04/15/2015    Performed by Recoveryonly Surgery at SURGERY PRE[facility]POST PROC [facility]   • PACEMAKER INSERTION     • RECOVERY  2014    Performed by Ir-Recovery Surgery at SURGERY SAME DAY [facility]   • SEPTAL RECONSTRUCTION     • ENDOSCOPY     • PRIMARY C SECTION     • TUBAL COAGULATION LAPAROSCOPIC BILATERAL       Family History   Problem Relation Age of Onset   • Heart Disease Mother         CHF   • Hypertension Mother    • Stroke Mother    • Heart Failure Mother    • Other Mother         carotid artery disease,gout   • Cancer Father         AML   • Diabetes Maternal Grandmother    • Cancer Maternal Grandfather         Prostate   • Heart Disease Paternal Grandfather 52         MI    • Sleep Apnea Neg Hx          Physical Exam:  Vitals:    22 0955 22 1101 22 1104   BP:  (!) 165/79 (!) 164/83   Pulse:  60    Resp:  18    Temp:  36.3 °C (97.3 °F)    TempSrc:  Temporal    SpO2:  96%    Weight: 91.1 kg (200 lb 13.4 oz) 89.9 kg (198 lb 3.1 oz)    Height: 1.626 m (5' 4\") 1.626 m (5' 4\")      Gen: NAD, conversant  HEENT: PERRL, EOMI  LUNGS: CTA B, no w/r/r  CV: RRR, no m/r/g, no JVD  Abd: Soft, NT/ND, +BS  Ext: no edema, warm and well perfused    Labs reviewed    EKG interpreted by me:  A paced, LBBB    Impression/Recs:  1. Paroxysmal atrial fibrillation (HCC)  CL-EP ABLATION ATRIAL FIBRILLATION    CL-EP ABLATION ATRIAL FIBRILLATION    EC-ALLEN W/O CONT    EC-ALLEN W/O CONT    CANCELED: EC-ALLEN W/ CONT    CANCELED: EC-ALLEN W/ CONT     -Risks/benefits/alternatives " discussed  -All questions answered  -Proceed with AF ablation  Solitario Witt MD

## 2022-08-04 NOTE — ANESTHESIA PREPROCEDURE EVALUATION
Date/Time: 08/04/22 1300    Scheduled providers: Solitario Witt M.D.; Triston Mendosa M.D.    Procedure: CL-EP ABLATION ATRIAL FIBRILLATION    Diagnosis:       Paroxysmal atrial fibrillation (HCC) [I48.0]      Paroxysmal atrial fibrillation [I48.0]    Indications: See Associated Dx    Location: [facility] CATH [facility] - [place]          Relevant Problems   ANESTHESIA   (positive) Sleep apnea      NEURO   (positive) History of headache      CARDIAC   (positive) Pacemaker   (positive) Paroxysmal atrial fibrillation (HCC)   (positive) Venous insufficiency      GI   (positive) GERD (gastroesophageal reflux disease)      Other   (positive) CLL (chronic lymphocytic leukemia) (HCC)   (positive) Cervical arthritis       Physical Exam    Airway   Mallampati: II  TM distance: >3 FB  Neck ROM: full       Cardiovascular - normal exam  Rhythm: regular  Rate: normal  (-) murmur     Dental - normal exam           Pulmonary - normal exam  Breath sounds clear to auscultation     Abdominal    Neurological - normal exam                 Anesthesia Plan    ASA 3   ASA physical status 3 criteria: implanted pacemaker    Plan - general       Airway plan will be ETT          Induction: intravenous    Postoperative Plan: Postoperative administration of opioids is intended.    Pertinent diagnostic labs and testing reviewed    Informed Consent:    Anesthetic plan and risks discussed with patient.    Use of blood products discussed with: patient whom consented to blood products.

## 2022-08-04 NOTE — OR NURSING
Pt arrived from Cardiac procedure room at 1453, pt identification verified by team. Pt placed on all monitors with alarms audible. Report and care of pt received. Pt changed into hospital gown and warm blankets provided. Pt assessment complete, right groin is soft, dressing (gauze and tegaderm) in place and CDI. Oral airway in place upon arrival to PACU, removed at 1500. Pt aware of 2 hr bedrest requirement.

## 2022-08-04 NOTE — ANESTHESIA PROCEDURE NOTES
Airway    Date/Time: 8/4/2022 1:07 PM  Performed by: Oren Campos M.D.  Authorized by: Oren Campos M.D.     Location:  OR  Urgency:  Elective  Indications for Airway Management:  Anesthesia      Spontaneous Ventilation: absent    Sedation Level:  Deep  Preoxygenated: Yes    Patient Position:  Sniffing  Final Airway Type:  Endotracheal airway  Final Endotracheal Airway:  ETT  Cuffed: Yes    Technique Used for Successful ETT Placement:  Video laryngoscopy    Insertion Site:  Oral  Blade Type:  Narvaez  Laryngoscope Blade/Videolaryngoscope Blade Size:  2  ETT Size (mm):  7.0  Measured from:  Teeth  ETT to Teeth (cm):  22  Placement Verified by: auscultation and capnometry    Cormack-Lehane Classification:  Grade I - full view of glottis  Number of Attempts at Approach:  1

## 2022-08-05 NOTE — OR NURSING
Pt's VSS; denies N/V; denies pain. Dressing CDI to R femoral site. D/c orders received. IV dc'd. Pt changed into clothing with assistance. Pt up and ambulated to BR, steady gait, voided adequately. Discharge instructions given as well as pain management handout; pt and family verbalized understanding and questions answered. Patient states ready to d/c home. Prescriptions e-sent to preferred pharmacy. Pt dc'd in w/c with RN in stable condition.

## 2022-08-16 NOTE — OP REPORT
Electrophysiology Procedure Note    Procedure(s) Performed:   1) Atrial fibrillation ablation  2) Kelly-procedural device interrogation    Indication(s):  Paroxysmal atrial fibrillation    Physician(s): Solitario Witt M.D.     Resident/Assistant(s): None     Anesthesia: General endotracheal anesthetic with Dr. Oren Campos     Specimen(s) Removed: None     Estimated Blood Loss:  30cc     Complications:  None     Description of Procedure:   After informed written consent, the patient was brought to the EP lab in the fasting, non-sedated state. ALLEN was done prior to AF ablation and results dictated separately. The pacemaker was interrogated and programmed to VVI demand pacing for the duration of the procedure. The patient was prepped and draped in the usual sterile fashion. Femoral venous access was obtained using the modified Seldinger technique. In the right femoral vein, 2 sheaths (8,8,10 Fr) were inserted over 0.035” guidewires. A deflectable decapolar catheter was advanced to the CS position. An intracardiac echo (ICE) catheter was advanced to the right atrium. ICE was used to identify the atrial septum, left atrial appendage, and pulmonary veins and mode the anatomic structures to superimpose on our 3D electroanatomic map using CARTOSound. During the procedure, ICE was utilized to localize the ablation catheter, monitor for thrombus formation, and to exclude pericardial effusion. Intravenous heparin was administered to maintain -350 seconds. Double transseptal left heart catheterization was performed under intracardiac echo and hemodynamic guidance. A [place] needle was inserted into the 8.5 Fr sheaths ([employer] Torflex) for transseptal puncture and placement of sheaths in the left atrium. Mapping of the pulmonary veins and left atrium was performed using CARTO3 FAM and a deflectable multipolar mapping catheter (Reality JockeyaRay). We next exchanged for a medium curve Biosense Droidhen. Wide antral circumferential  ablation was performed using an 3.5 mm deflectable irrigated force contact catheter (Biosense ST SF) at primarily 30-40 W power starting from the L sided veins and then proceeding along to the RSPV and then finally the RIPV. Bidirectional pulmonary vein antrum isolation was verified at the end of the procedure by pacing inside the vein and confirming exit block along with absence of local PV signals on the PentaRay. At the end of the procedure, heparin was reversed with protamine, the catheter and sheaths were removed, and hemostasis was achieved by manual compression and Vascade MVP. Pacemaker was reprogrammed to original settings Following recovery from anesthesia, the patient was transferred to the PACU in good condition.       Total ablation time: 913 seconds    Fluoroscopy time: 2.3 minutes     Electrophysiologic Findings:    1. Sinus  1001 ms, HV 50 ms. AVBCL = 410 ms.    Impressions:    1. Paroxysmal atrial fibrillation.    2. Successful RF ablation pulmonary vein isolation procedure.       Recommendations:  1. Resume anticoagulation.  2. Start carafate BID x 2 weeks and daily PPI x 1 month.  3. Transfer to [facility] bedrest.

## 2022-08-17 ENCOUNTER — HOSPITAL ENCOUNTER (OUTPATIENT)
Dept: LAB | Facility: MEDICAL CENTER | Age: 62
End: 2022-08-17
Attending: INTERNAL MEDICINE
Payer: COMMERCIAL

## 2022-08-17 LAB
ALBUMIN SERPL BCP-MCNC: 4.6 G/DL (ref 3.2–4.9)
ALBUMIN/GLOB SERPL: 1.8 G/DL
ALP SERPL-CCNC: 115 U/L (ref 30–99)
ALT SERPL-CCNC: 28 U/L (ref 2–50)
ANION GAP SERPL CALC-SCNC: 11 MMOL/L (ref 7–16)
AST SERPL-CCNC: 23 U/L (ref 12–45)
BASOPHILS # BLD AUTO: 0 % (ref 0–1.8)
BASOPHILS # BLD: 0 K/UL (ref 0–0.12)
BILIRUB SERPL-MCNC: 0.4 MG/DL (ref 0.1–1.5)
BUN SERPL-MCNC: 19 MG/DL (ref 8–22)
CALCIUM SERPL-MCNC: 9.6 MG/DL (ref 8.5–10.5)
CHLORIDE SERPL-SCNC: 103 MMOL/L (ref 96–112)
CO2 SERPL-SCNC: 26 MMOL/L (ref 20–33)
CREAT SERPL-MCNC: 1.2 MG/DL (ref 0.5–1.4)
EOSINOPHIL # BLD AUTO: 0 K/UL (ref 0–0.51)
EOSINOPHIL NFR BLD: 0 % (ref 0–6.9)
ERYTHROCYTE [DISTWIDTH] IN BLOOD BY AUTOMATED COUNT: 40.8 FL (ref 35.9–50)
FASTING STATUS PATIENT QL REPORTED: NORMAL
GFR SERPLBLD CREATININE-BSD FMLA CKD-EPI: 51 ML/MIN/1.73 M 2
GLOBULIN SER CALC-MCNC: 2.6 G/DL (ref 1.9–3.5)
GLUCOSE SERPL-MCNC: 82 MG/DL (ref 65–99)
HCT VFR BLD AUTO: 45.8 % (ref 37–47)
HGB BLD-MCNC: 15.5 G/DL (ref 12–16)
LDH SERPL L TO P-CCNC: 260 U/L (ref 107–266)
LYMPHOCYTES # BLD AUTO: 9.75 K/UL (ref 1–4.8)
LYMPHOCYTES NFR BLD: 75 % (ref 22–41)
MANUAL DIFF BLD: NORMAL
MCH RBC QN AUTO: 28.9 PG (ref 27–33)
MCHC RBC AUTO-ENTMCNC: 33.8 G/DL (ref 33.6–35)
MCV RBC AUTO: 85.4 FL (ref 81.4–97.8)
MONOCYTES # BLD AUTO: 0.44 K/UL (ref 0–0.85)
MONOCYTES NFR BLD AUTO: 3.4 % (ref 0–13.4)
MORPHOLOGY BLD-IMP: NORMAL
NEUTROPHILS # BLD AUTO: 2.81 K/UL (ref 2–7.15)
NEUTROPHILS NFR BLD: 21.6 % (ref 44–72)
NRBC # BLD AUTO: 0.02 K/UL
NRBC BLD-RTO: 0.2 /100 WBC
PLATELET # BLD AUTO: 205 K/UL (ref 164–446)
PLATELET BLD QL SMEAR: NORMAL
PMV BLD AUTO: 10 FL (ref 9–12.9)
POTASSIUM SERPL-SCNC: 4.4 MMOL/L (ref 3.6–5.5)
PROT SERPL-MCNC: 7.2 G/DL (ref 6–8.2)
RBC # BLD AUTO: 5.36 M/UL (ref 4.2–5.4)
RBC BLD AUTO: NORMAL
SODIUM SERPL-SCNC: 140 MMOL/L (ref 135–145)
WBC # BLD AUTO: 13 K/UL (ref 4.8–10.8)

## 2022-08-17 PROCEDURE — 85025 COMPLETE CBC W/AUTO DIFF WBC: CPT

## 2022-08-17 PROCEDURE — 80053 COMPREHEN METABOLIC PANEL: CPT

## 2022-08-17 PROCEDURE — 36415 COLL VENOUS BLD VENIPUNCTURE: CPT

## 2022-08-17 PROCEDURE — 85007 BL SMEAR W/DIFF WBC COUNT: CPT

## 2022-08-17 PROCEDURE — 83615 LACTATE (LD) (LDH) ENZYME: CPT

## 2022-08-18 LAB — EKG IMPRESSION: NORMAL

## 2022-08-22 ENCOUNTER — OFFICE VISIT (OUTPATIENT)
Dept: MEDICAL GROUP | Facility: MEDICAL CENTER | Age: 62
End: 2022-08-22
Payer: COMMERCIAL

## 2022-08-22 DIAGNOSIS — Z12.31 ENCOUNTER FOR SCREENING MAMMOGRAM FOR BREAST CANCER: ICD-10-CM

## 2022-08-22 DIAGNOSIS — I48.0 PAROXYSMAL ATRIAL FIBRILLATION (HCC): Chronic | ICD-10-CM

## 2022-08-22 DIAGNOSIS — I42.1 HYPERTROPHIC OBSTRUCTIVE CARDIOMYOPATHY (HCC): Chronic | ICD-10-CM

## 2022-08-22 DIAGNOSIS — Z00.00 PREVENTATIVE HEALTH CARE: Chronic | ICD-10-CM

## 2022-08-22 DIAGNOSIS — Z87.898 HISTORY OF HEADACHE: ICD-10-CM

## 2022-08-22 DIAGNOSIS — Z23 NEED FOR PNEUMOCOCCAL VACCINATION: ICD-10-CM

## 2022-08-22 DIAGNOSIS — R92.30 DENSE BREAST: ICD-10-CM

## 2022-08-22 DIAGNOSIS — R92.2 DENSE BREAST: ICD-10-CM

## 2022-08-22 PROCEDURE — 90677 PCV20 VACCINE IM: CPT | Performed by: INTERNAL MEDICINE

## 2022-08-22 PROCEDURE — 99214 OFFICE O/P EST MOD 30 MIN: CPT | Mod: 25 | Performed by: INTERNAL MEDICINE

## 2022-08-22 PROCEDURE — 90471 IMMUNIZATION ADMIN: CPT | Performed by: INTERNAL MEDICINE

## 2022-08-22 ASSESSMENT — FIBROSIS 4 INDEX: FIB4 SCORE: 1.31

## 2022-08-22 NOTE — PROGRESS NOTES
Subjective     Seda Cagle is a 62 y.o. female who presents with Head Ache and Follow-Up            HPI  Patient here for follow-up headache, previously had a headache we started her on nortriptyline her headache improved, headache then recurred but since her atrial fibrillation ablation and being put on Prilosec after the procedure her headache has resolved.  She remains on the nortriptyline.  No acid regurgitation on Prilosec, she was on the Pepcid for laryngal pharyngeal edema seen by ENT on nasolaryngoscopy.  No current reflux on omeprazole. S/p ablation for atrial fibrillation pulmonary vein isolation was given carafate x 2 weeks but stopped that as it did cause nausea, and nausea improved after stopping carafate. Still on prilosec 20 mg in am and has been off the pepcid since she started the prilosec and carafate.   Remains on metoprolol and eliquis with no nsaids, no GI upset   Has had two episodes since the ablation, has kardia monitor and had a.fib rate 130 for a few minutes and last night heart rate increased to 104, otherwise no recurrence.  CLL followed by hematology oncology most recent WBC 13, followed every 6 months by hematology  No etoh  No caffeine  Medications, allergies, medical history, surgical history, social history, family history  reviewed and updated          Current Outpatient Medications   Medication Sig Dispense Refill    atorvastatin (LIPITOR) 40 MG Tab TAKE ONE TABLET BY MOUTH EVERY EVENING (LIPITOR) 90 Tablet 3    omeprazole (PRILOSEC) 20 MG delayed-release capsule Take 1 Capsule by mouth every day. 30 Capsule 0    disopyramide (NORPACE) 150 MG Cap Take 1 Capsule by mouth in the morning, at noon, and at bedtime. 90 Capsule 5    metoprolol SR (TOPROL XL) 50 MG TABLET SR 24 HR Take 1 Tablet by mouth every day. (Patient taking differently: Take 50 mg by mouth every evening.) 30 Tablet 5    COVID-19 mRNA vaccine, Moderna, (MODERNA COVID-19 VACCINE) 100 MCG/0.5ML  Suspension injection Inject 0.25 mL into the shoulder, thigh, or buttocks. 0.25 mL 0    ELIQUIS 5 MG Tab TAKE ONE TABLET BY MOUTH TWICE A DAY (Patient taking differently: Take 5 mg by mouth 2 times a day.) 180 Tablet 3    acetaminophen (TYLENOL) 325 MG Tab Take 650 mg by mouth 1 time a day as needed. Indications: Pain      nortriptyline (PAMELOR) 10 MG Cap TAKE ONE CAPSULE BY MOUTH EVERY EVENING (Patient taking differently: Take 10 mg by mouth every evening.) 90 Capsule 1    fluticasone (FLONASE) 50 MCG/ACT nasal spray Administer 1 Spray into affected nostril(S) every day.      famotidine (PEPCID) 40 MG Tab Take 1 Tablet by mouth 2 times a day. 180 Tablet 3    Probiotic Product (PROBIOTIC-10 PO) Take 1 Capsule by mouth every day.      Multiple Vitamin (MULTI-VITAMIN PO) Take 1 Tablet by mouth every day.      Pantothenic Acid 500 MG Tab Take 500 mg by mouth every day.      Calcium-Vitamin D-Vitamin K (CALCIUM FOR WOMEN PO) Take 1 Each by mouth every day.      Magnesium 200 MG TABS Take 1 Tab by mouth every day.       No current facility-administered medications for this visit.       Abnormal nasal septum  8/12/21  ENT note, nasal septum S-shaped curve bows to the left nostril inferiorly, in the right nose superiorly, normal turbinate size and symmetry, does not seem to bother her when awake, recommend humidification and discuss further at follow-up, higher perioperative risk due to cardiovascular disease     cll  2/3/20 wbc 7.1 (52%N,39%L)  9/1/20 wbc 6.9 (41%N,51%L)  11/22/21 wbc 8.4 (35%N,57%L) many smudge cells  1/12/22 wbc 9.9 (34%N,60%L),flow cytometry kappa restricted CD5+ B-cell population accounting for 20% of visible leukocytes, phenotypic features most consistent with CLL  2/23/22  oncology consultation, CLL with FISH panel DEL 13+,DEL 17-showing favorable prognosis, counseled patient on benign nature of CLL with no cytopenia or physical evidence of lymphadenopathy or  hepatosplenomegaly, discussed watchful waiting follow-up 3 months and repeat cbc every 6 months  5/18/22  oncology note no cytopenia, no splenomegaly, hepatomegaly, lymphadenopathy, favorable prognostic markers, follow-up 6 months  8/17/22 wbc 13.0 (18%N,80%L)    Dyslipidemia  12/5/16 chol 186,trig 143,hdl 52,ldl 105  11/26/18  vascular medicine note chronic venous stasis changes lower extremities, recheck lipid panel qualify for statin therapy, continue home blood pressure monitoring, consider 24-hour ambulatory blood pressure monitoring, continue metoprolol, continue xarelto use compression stockings  1/18/19 chol 183,trig 160,hdl 45,ldl 106  11/26/19 chol 133,trig 202,hdl 42,ldl 51  3/3/21 chol 144,trig 98,hdl 48,ldl 76 on lipitor 40 mg  10/5/21 CT-CTA heart left main no plaque or stenosis, LAD 25% stenosis, circumflex and obtuse marginal patent, RCA, posterior descending and posterior lateral branches patent no stenosis, normal LV size, ascending aorta 3.5 cm  11/22/21 chol 134,trig 133,hdl 45,ldl 62 on lipitor 40 mg     gerd on pepcid  2/8/16 on pepcid  8/12/21  ENT note throat symptoms possibly related to reflux, recommend famotidine 40 mg bid and work on lifestyle modification recommended humidification to avoid excessive dryness  10/12/21  ENT note flexible laryngoscopy, moderate interarytenoid and moderate postcricoid edema, most likely cause of sensation in her throat is reflux consistent with laryngeal pharyngeal reflux, work on healthy lifestyle, diet modification, continue famotidine twice daily for 6 to 8 weeks  1/20/22 on pepcid 40 mg bid     neck  arthritis  3/24/15 MRI cervical spine C4-C5 small disc osteophyte complex, moderate left facet arthropathy, multilevel DJD  4/14/21 EMG nerve conduction study right upper extremity consistent with right median neuropathy without denervation, possible mild right ulnar neuropathy, no evidence of cervical  radiculopathy    history headache  11/22/21 trial of pamelor for tension headache and monitor for norpace interaction, also consider ct head no improvement  1/20/22 still with insomnia, nortriptyline 10 mg, consider going to 20 mg, need to check with cardiology first because of potential interaction with norpace  2/11/22 increase nortriptylline to 20 mg ok per cardiology see patient mychart note   2/22/22 off nortriptyline due to chest pain  3/15/22 resuming nortriptyline for sleep as melatonin caused headache     History transient global amnesia  1/18/19 chol 183,trig 160,hdl 45,ldl 106  1/18/19 CT head negative  1/19/19 ultrasound carotid less than 50% bilateral stenosis  1/19/19 CT-CTA head with and without postprocessing, negative [place] of [place]  1/18/19 hospital admission, 1/19/19 hospital discharge transient global amnesia, patient was speaking to her son on the phone, and could not recall for anything 2 hours afterwards, has been indicated that patient was confused and repetitive and could not remember talking on the phone, came to the emergency room for evaluation, CT scan head negative, no other symptoms, resolution discharged home the following day  1/29/19 cardiology note, patient still having some issues with short-term memory, will switch to coumadin from xarelto with recent event, referral to neurology, paroxysmal atrial fibrillation burden less than 1%  3/26/19 [facility] neurological note probable transient global amnesia, complete holter monitor per cardiology, EEG ordered and sedimentation rate for headache     Hypertrophic cardiomyopathy  8/14/00 alcohol septal reduction, normal coronaries  4/15/10 echo very small area proximal septal hypertrophy, no significant obstruction  4/16/10 cardiology note [facility] hypertrophic cardiomyopathy on metoprolol 25 mg bid, norpace  mg bid, asa; remains asymptomatic with regards to hypertrophic cardio myopathy  3/18/11 echo normal LV thickness  with very focal area localized hypertrophy most basal septum without evidence of obstruction, EF 64%  11/16/14 persantine thallium fixed defect mid and basilar anteroseptal wall and apical septum consistent with scar, hypokinesis septum EF 74%  11/18/14 echo normal LV size and function, grade 2 diastolic dysfunction, EF 60-65%, mild aortic stenosis, mild aortic insufficiency, RVSP 20-25  11/19/14 holter sinus with left bundle branch block, average rate 57, minimum heart rate 44, maximal heart rate 85, PVCs, PACs   12/12/14 ALLEN known post-ablation for hypertrophic cardiomyopathy without obstruction, small amount of remaining myocardium of lower tract does not cause significant outflow gradient, mild aortic insufficiency, moderate central mitral regurgitation, remnant myocardium in the LVOT with mild obstruction, peak outflow gradient 11 (previously 16-18)  4/16/15  cardiology procedure note, dual-chamber pacemaker implantation  10/24/16  of cardiology, atrial fibrillation and hypertrophic cardiomyopathy, controlled on norpace,metoprolol, xarelto  9/26/18 echo normal LV systolic function, EF 55%, mild LVH, grade 1 diastolic dysfunction, evidence of LVOT, septal wall thickness 1.2 cm, consider subaortic membrane, mild to moderate mitral regurgitation, mild aortic insufficiency  10/8/18 cardiology note does not appear to be subaortic membrane on echo, perhaps subaortic thickening, continue to monitor echo of full gradient for recurrence continue oral anticoagulation gradient is low and asymptomatic, xarelto, norpace, lopressor  1/29/19 cardiology note, patient still having some issues with short-term memory, will switch to coumadin from xarelto with recent event, referral to neurology, paroxysmal atrial fibrillation burden less than 1%  2/28/19 cardiology note hypertrophic obstructive cardiomyopathy and paroxysmal atrial fibrillation, sinus on exam, medi-lynx ventricular premature contractions isolated  but frequent, asymptomatic, one short episode of paroxysmal atrial fibrillation 1.5 minutes, continues on warfarin INR 2.0, continues on norpace, increase metoprolol XL to 50 mg follow-up 1 month  4/26/2019 cardiology note hypertrophic obstructive cardiomyopathy, paroxysmal atrial fibrillation, device check, continue coumadin, norpace, follow-up 6 months  10/10/19 cardiology note hypertrophic cardiomyopathy status post septal ablation, pacemaker, paroxysmal atrial fibrillation relatively short episodes, longest episode 49 minutes remainder less than 1 minute in length, remains controlled on toprol, norpace, coumadin per coumadin clinic  10/31/19 echo normal left ventricular function, EF 60%, basal septal wall appears hypokinetic, grade 2 diastolic dysfunction, moderately dilated left atrium volume index 44 mL, mild mitral regurgitation, mild aortic stenosis, mild aortic insufficiency, peak gradient 23  1/7/20 ultrasound carotid less than 50% stenosis right internal carotid, left carotid mild plaque carotid bifurcation  8/25/20 cardiology note pacemaker, device check, no arrhythmia burden, change from coumadin to eliquis follow-up 6 months repeat echo at that time  2/3/21 echo mild LVH, EF 65%, grade 2 diastolic dysfunction, RVSP 30  2/26/21  cardiology note pacemaker, paroxysmal atrial fibrillation stable, echo reviewed no significant LVOT gradient, continue Norpace and Toprol discussed options of RV lead replacement including transvenous extraction, implanting the lead, or performing generator change and using it until no longer working, we will plan on transvenous extraction and new RV lead, follow-up 6 months  8/11/21 cardiology note would like to exclude obstructive CAD but do not think pretest probability is high enough to warrant catheterization, will order coronary CTA follow-up 6 months  8/11/21 cardiology note EKG sinus, ordered CTA  10/5/21 CT-CTA heart left main no plaque or stenosis, LAD 25%  stenosis, circumflex and obtuse marginal patent, RCA, posterior descending and posterior lateral branches patent no stenosis, normal LV size, ascending aorta 3.5 cm  2/8/22 cardiology note pacemaker nearing EDIE, we will go ahead and extracted failing RV lead transvenously and replace, patient agreeable to proceed  4/6/22  cardiology procedure note transvenous dual-chamber PPM lead extraction, pacemaker generator removal, new permanent pacemaker implantation pulse generator boston model L311 serial # [id number]  4/27/22  cardiology note having higher PVCs and atrial arrhythmia burden since extraction and new implant, device functioning appropriately, follow-up 4 weeks  5/27/22  cardiology note device interrogation, increasing sustained atrial fibrillation episodes up to 5 hours, options discussed, increase norpace to 200 mg tid for now, plan pulmonary vein isolation next available  6/27/22  cardiology note plan for ablation August, lower metoprolol to 50 mg bid and norpace to 150 mg tid  8/4/22 transesophageal echo prior to ablation preserved LV function, left atrial enlargement, mild MR and mild to moderate aortic insufficiency, no evidence of thrombus  8/14/22  cardiology EPS noted, successful RF ablation pulmonary vein isolation procedure, resume anticoagulation, start carafate bid x 2 weeks and PPI x one month     ibs  2/3/20 trial of linzess 145 mcg  2/25/20 increase to linzess 290 mcg   3/5/20 colon per [facility] negative, repeat 10 years    insomnia   11/22/21 trial of pamelor for tension headache and monitor for norpace interaction, also consider ct head no improvement  2/22/22 off nortriptyline due to chest pain, will try melatonin for sleep    neck arthritis  3/24/15 MRI cervical spine C4-C5 small disc osteophyte complex, moderate left facet arthropathy, multilevel DJD  4/14/21 EMG nerve conduction study right upper extremity consistent with right median neuropathy without denervation,  possible mild right ulnar neuropathy, no evidence of cervical radiculopathy     pacemaker  4/16/15  cardiology procedure note, dual-chamber pacemaker implantation  2/8/22 cardiology note pacemaker nearing EDIE, we will go ahead and extracted failing RV lead transvenously and replace, patient agreeable to proceed  4/6/22  cardiology procedure note transvenous dual-chamber PPM lead extraction, pacemaker generator removal, new permanent pacemaker implantation pulse generator boston model L311 serial # [id number]     Paroxysmal atrial fibrillation  11/18/14 echo normal LV size and function, grade 2 diastolic dysfunction, EF 60-65%, mild aortic stenosis, mild aortic insufficiency, RVSP 20-25  11/19/14 holter sinus with left bundle branch block, average rate 57, minimum heart rate 44, maximal heart rate 85, PVCs, PACs   12/12/14 ALLEN known post-ablation for hypertrophic cardiomyopathy without obstruction, small amount of remaining myocardium of lower tract does not cause significant outflow gradient, mild aortic insufficiency, moderate central mitral regurgitation, remnant myocardium in the LVOT with mild obstruction, peak outflow gradient 11 (previously 16-18)  11/30/15 cardiology note paroxysmal atrial fibrillation, short atrial fibrillation under 4 minutes  2/22/16 cardiology note minimal atrial fibrillation on dual-chamber pacemaker check  3/24/16 cardiology note minimal atrial fibrillation   10/24/16 cardiology note longer episodes of atrial fibrillation, still at 2.6 hours total on dual-chamber pacemaker review, continues on xarelto  9/26/18 echo normal LV systolic function, EF 55%, mild LVH, grade 1 diastolic dysfunction, evidence of LVOT, septal wall thickness 1.2 cm, consider subaortic membrane, mild to moderate mitral regurgitation, mild aortic insufficiency  10/8/18 cardiology note does not appear to be subaortic membrane on echo, perhaps subaortic thickening, continue to monitor echo of full gradient  for recurrence continue oral anticoagulation gradient is low and asymptomatic, xarelto, norpace, lopressor  1/29/19 cardiology note, patient still having some issues with short-term memory, will switch to coumadin from xarelto with recent event, referral to neurology, paroxysmal atrial fibrillation burden less than 1%  2/28/19 cardiology note hypertrophic obstructive cardiomyopathy and paroxysmal atrial fibrillation, sinus on exam, medi-lynx ventricular premature contractions isolated but frequent, asymptomatic, one short episode of paroxysmal atrial fibrillation 1.5 minutes, continues on warfarin INR 2.0, continues on norpace, increase metoprolol XL to 50 mg follow-up 1 month   10/10/19 cardiology note hypertrophic cardiomyopathy status post septal ablation, pacemaker, paroxysmal atrial fibrillation relatively short episodes, longest episode 49 minutes remainder less than 1 minute in length, remains controlled on toprol, norpace, coumadin per coumadin clinic  10/31/19 echo normal left ventricular function, EF 60%, basal septal wall appears hypokinetic, grade 2 diastolic dysfunction, moderately dilated left atrium volume index 44 mL, mild mitral regurgitation, mild aortic stenosis, mild aortic insufficiency, peak gradient 23  1/7/20 ultrasound carotid less than 50% stenosis right internal carotid, left carotid mild plaque carotid bifurcation  8/25/20 cardiology note pacemaker, device check, no arrhythmia burden, change from coumadin to eliquis follow-up 6 months repeat echo at that time  2/3/21 echo mild LVH, EF 65%, grade 2 diastolic dysfunction, RVSP 30  2/26/21  cardiology note pacemaker, paroxysmal atrial fibrillation stable, echo reviewed no significant LVOT gradient, continue norpace and toprol discussed options of RV lead replacement including transvenous extraction, implanting the lead, or performing generator change and using it until no longer working, we will plan on transvenous extraction and new  RV lead, follow-up 6 months  8/11/21 cardiology note would like to exclude obstructive CAD but do not think pretest probability is high enough to warrant catheterization, will order coronary CTA follow-up 6 months  8/11/21 cardiology note EKG sinus, ordered CTA  10/5/21 CT-CTA heart left main no plaque or stenosis, LAD 25% stenosis, circumflex and obtuse marginal patent, RCA, posterior descending and posterior lateral branches patent no stenosis, normal LV size, ascending aorta 3.5 cm  2/8/22 cardiology note pacemaker nearing EDIE, we will go ahead and extracted failing RV lead transvenously and replace, patient agreeable to proceed  4/6/22  cardiology procedure note transvenous dual-chamber PPM lead extraction, pacemaker generator removal, new permanent pacemaker implantation pulse generator Xiant model L311 serial # [id number]  4/27/22  cardiology note having higher PVCs and atrial arrhythmia burden since extraction and new implant, device functioning appropriately, follow-up 4 weeks  5/27/22  cardiology note device interrogation, increasing sustained atrial fibrillation episodes up to 5 hours, options discussed, increase norpace to 200 mg tid for now, plan pulmonary vein isolation next available  6/27/22  cardiology note plan for ablation August, lower metoprolol to 50 mg bid and norpace to 150 mg tid  8/4/22 transesophageal echo prior to ablation preserved LV function, left atrial enlargement, mild MR and mild to moderate aortic insufficiency, no evidence of thrombus  8/14/22  cardiology EPS noted, successful RF ablation pulmonary vein isolation procedure, resume anticoagulation, start carafate bid x 2 weeks and PPI x one month     Preventative health  12/2/16 tdap  2/20/18 pap per gyn  2/3/20 pneumovax  2/3/20 hep c ab negative  3/5/20 colon per [facility] negative, repeat 10 years  9/11/20 declines sonocine  3/3/21 A1c 5.4%  3/23/21 dexa LS-0.4,hip-0.2  9/21/21 mammogram heterogeneously  dense breast tissue offered screening breast ultrasound  11/22/21 vit d 42  5/19/22 covid 4th    sleep apnea  2/3/17 sleep apnea referral pending, OPO ordered  2/14/17 apnea link per [facility], AHI 19.7, low saturation 83, time<90% saturation 199 minutes, time less than 85% saturation 1 minute  2/16/17 sleep consultation, proceed with polysomnography  3/26/17 sleep study duration 430 minutes, AHI 21.3, low saturation 88%, mild to moderate sleep-disordered breathing with significant disruption of sleep continuity, limb movement disorder could be present as well, cpap titration recommended  3/31/17 sleep note ordered autocpap 5-15 nightly, follow-up 6-8 weeks  11/30/18 sleep note on autocpap 5-15  6/4/20 sleep note on autopap 5 to 15  2/3/21 echo mild LVH, EF 65%, grade 2 diastolic dysfunction, RVSP 30  9/3/21 sleep note on autocpap 5 to 15, compliance report 96.7% usage average time 5 hours 30 minutes AHI 6.9, terrance device recall     S/p carpal tunnel right release  2/25/21 EMG nerve conduction study right upper extremity evaluate carpal tunnel  3/3/21 wbc 6.9 (41%N,51%L),NAVIN negative,SPEP negative  4/14/21 EMG nerve conduction study right upper extremity consistent with right median neuropathy without denervation, possible mild right ulnar neuropathy, no evidence of cervical radiculopathy  6/1/21  orthopedic operative note right endoscopic carpal tunnel release and left carpal tunnel injection under anesthesia  6/14/21  orthopedic note status post right endoscopic carpal tunnel release and left carpal tunnel injection under anesthesia anesthesia, follow-up 8 to 10 weeks   8/25/21 PACO note status post right endoscopic carpal tunnel release follow-up 8 to 10 weeks     Venous insufficiency  1/22/19 [facility] note venous insufficiency, physical exam does not indicate any evidence of venous insufficiency, patient declines venous ultrasound    Patient Active Problem List   Diagnosis     "Hypertrophic obstructive cardiomyopathy (HCC)    S/P carpal tunnel release    Pacemaker    Preventative health care    GERD (gastroesophageal reflux disease)    Neck arthritis    Decreased GFR    Dyslipidemia    Paroxysmal atrial fibrillation (HCC)    Sleep apnea    History of transient global amnesia    Chronic anticoagulation    Venous insufficiency    IBS (irritable bowel syndrome)    Abnormal nasal septum    CLL (chronic lymphocytic leukemia) (HCC)    History of headache    Insomnia                Patient Care Team:  Juve Stoddard M.D. as PCP - General (Internal Medicine)  Vibha Chua M.D. (Nephrology)  [facility] as Pharmacist      ROS           Objective     BP (P) 136/72 (BP Location: Right arm, Patient Position: Sitting, BP Cuff Size: Adult)   Pulse (P) 63   Temp (P) 36.4 °C (97.6 °F)   Ht (P) 1.626 m (5' 4\")   Wt (P) 89.8 kg (198 lb)   LMP 01/31/2012 (LMP Unknown)   SpO2 (P) 96%   BMI (P) 33.99 kg/m²      Physical Exam  Vitals and nursing note reviewed.   Constitutional:       Appearance: Normal appearance.   HENT:      Head: Normocephalic and atraumatic.      Right Ear: External ear normal.      Left Ear: External ear normal.   Eyes:      Conjunctiva/sclera: Conjunctivae normal.   Cardiovascular:      Rate and Rhythm: Normal rate and regular rhythm.   Pulmonary:      Effort: Pulmonary effort is normal.      Breath sounds: Normal breath sounds.   Abdominal:      General: There is no distension.   Neurological:      General: No focal deficit present.      Mental Status: She is alert.   Psychiatric:         Mood and Affect: Mood normal.         Behavior: Behavior normal.                           Assessment & Plan        Assessment  #!  Status post atrial fibrillation ablation, has had 2 episodes of atrial fibrillation brief in nature, most recent last night heart rate did not go above 105, remains on Metroprolol, Norpace, Eliquis per cardiology    #2 laryngal pharyngeal " reflux, currently on Prilosec she was changed to Prilosec after the atrial fibrillation ablation and she will be discontinuing the Prilosec on September 5, has been on Pepcid previously    #3 headache resolved after ablation also resolved timing wise after she was changed from Pepcid to Prilosec, the headache also had improvement with nortriptyline 20 mg     #4 BMI 33.9    #5 CLL followed by hematology oncology      Plan  #!  Okay to discontinue omeprazole on September 5 and change back to Pepcid monitoring for headache recurrence or heartburn recurrence should that happen she will let me know when she can go back on omeprazole, discussed risks and benefits of long-term omeprazole, should she stay on that long-term we will need to monitor her vitamin levels, if symptoms are stable on Pepcid she can continue on that for the laryngal pharyngeal reflux    #2 discontinue nortriptyline monitor for headache recurrence, monitor for worsening sleep or mood    #3 follow-up cardiology, continue check blood pressure and heart rate regularly    #4 mammogram and screening breast ultrasound ordered after September 23    #5 follow-up hematology oncology     #6 pneumonia 20 vaccine today    #7 follow-up 6 months    #8 she can send me a MyChart update with regards to her symptoms off the omeprazole and nortriptyline or if she has any questions about the medication regimen

## 2022-09-12 RX ORDER — FAMOTIDINE 40 MG/1
40 TABLET, FILM COATED ORAL 2 TIMES DAILY
Qty: 180 TABLET | Refills: 3 | Status: SHIPPED | OUTPATIENT
Start: 2022-09-12 | End: 2023-03-15

## 2022-09-12 RX ORDER — NORTRIPTYLINE HYDROCHLORIDE 10 MG/1
10 CAPSULE ORAL EVERY EVENING
Qty: 90 CAPSULE | Refills: 3 | Status: SHIPPED | OUTPATIENT
Start: 2022-09-12 | End: 2023-08-29

## 2022-09-22 ENCOUNTER — OFFICE VISIT (OUTPATIENT)
Dept: CARDIOLOGY | Facility: MEDICAL CENTER | Age: 62
End: 2022-09-22
Payer: COMMERCIAL

## 2022-09-22 VITALS
SYSTOLIC BLOOD PRESSURE: 126 MMHG | HEART RATE: 67 BPM | BODY MASS INDEX: 33.97 KG/M2 | RESPIRATION RATE: 16 BRPM | HEIGHT: 64 IN | DIASTOLIC BLOOD PRESSURE: 72 MMHG | WEIGHT: 199 LBS | OXYGEN SATURATION: 95 %

## 2022-09-22 DIAGNOSIS — Z95.0 PACEMAKER: ICD-10-CM

## 2022-09-22 DIAGNOSIS — I48.0 PAROXYSMAL ATRIAL FIBRILLATION (HCC): ICD-10-CM

## 2022-09-22 DIAGNOSIS — Z98.890 H/O CARDIAC RADIOFREQUENCY ABLATION: ICD-10-CM

## 2022-09-22 DIAGNOSIS — D68.318 CIRCULATING ANTICOAGULANTS (HCC): ICD-10-CM

## 2022-09-22 DIAGNOSIS — G47.33 OBSTRUCTIVE SLEEP APNEA SYNDROME: Chronic | ICD-10-CM

## 2022-09-22 DIAGNOSIS — I42.1 HYPERTROPHIC OBSTRUCTIVE CARDIOMYOPATHY (HCC): Chronic | ICD-10-CM

## 2022-09-22 PROBLEM — Z79.01 CHRONIC ANTICOAGULATION: Status: RESOLVED | Noted: 2019-01-29 | Resolved: 2022-09-22

## 2022-09-22 PROCEDURE — 93000 ELECTROCARDIOGRAM COMPLETE: CPT | Performed by: NURSE PRACTITIONER

## 2022-09-22 PROCEDURE — 99214 OFFICE O/P EST MOD 30 MIN: CPT | Performed by: NURSE PRACTITIONER

## 2022-09-22 ASSESSMENT — ENCOUNTER SYMPTOMS
DIZZINESS: 0
ORTHOPNEA: 0
CHILLS: 0
WHEEZING: 0
SENSORY CHANGE: 0
COUGH: 0
TINGLING: 0
FOCAL WEAKNESS: 0
NAUSEA: 0
SPEECH CHANGE: 0
HEMOPTYSIS: 0
DOUBLE VISION: 0
PALPITATIONS: 1
DIARRHEA: 0
SPUTUM PRODUCTION: 0
HEARTBURN: 0
BLOOD IN STOOL: 0
LOSS OF CONSCIOUSNESS: 0
WEIGHT LOSS: 0
BLURRED VISION: 0
SHORTNESS OF BREATH: 0
VOMITING: 0
TREMORS: 0
HEADACHES: 0
ABDOMINAL PAIN: 0
FEVER: 0
PND: 0
PSYCHIATRIC NEGATIVE: 1

## 2022-09-22 ASSESSMENT — FIBROSIS 4 INDEX: FIB4 SCORE: 1.31

## 2022-09-22 NOTE — PROGRESS NOTES
Chief Complaint   Patient presents with    Atrial Fibrillation     Fv Dx: Paroxysmal atrial fibrillation        Subjective     Seda Cagle is a 62 y.o. female who presents today for procedural follow-up after ablation of paroxysmal atrial fibrillation by Dr. Solitario porras 8/4/2022.  Procedure completed with pulmonary vein isolation    She follows with Dr. Porras for EP.  Past medical history additionally significant for hypertrophic obstructive cardiomyopathy S/P prior septal ablation maintained on Toprol and norpace, paroxysmal AF, [place] dual chamber PPM for which she underwent entire new system replacement (old generator and leads were extracted secondary to chronically elevated thresholds on the leads), CKD III, and GEORGINA.      Today in follow-up she states that she is starting to feel improved post ablation.  First few weeks or so were rough with increased fatigue/generalized recovery from anesthesia and procedure.  She reports some few episodes of possible atrial fibrillation.  Denies chest pain, shortness of breath, lower extremity edema.  No presyncope or syncope.      Past Medical History:   Diagnosis Date    Anesthesia     wakes up during procedure (x2)    Aortic regurgitation     Arrhythmia, ventricular 2008    Symptomatic PVCs, controlled with medication.    Asthma     inhaler PRN, only uses when sick     Atrial fibrillation (HCC)     Awareness under anesthesia     Cardiac arrhythmia     Cervical arthritis 02/08/2016    Chickenpox     Chronic kidney disease, stage III (moderate) (HCC) 02/09/2016    CTS (carpal tunnel syndrome) 02/28/2011    Depression     GERD (gastroesophageal reflux disease)     Heart burn     Heart murmur     Hemorrhagic disorder (HCC)     on Eliquis    Hypertr obst cardiomyop 1991    Dr. Bert Vazquez, [address] [address] [phone] fax [phone].  [facility] [phone].  Alchol Septal Reduction 8/00, Coronaries normal.     Hypertrophic cardiomyopathy (HCC)     Indigestion     Influenza     Pacemaker 2014    Renal disorder     chronic kidney disease stage 3-  **pt states currently no issue, numbers are stable    Sleep apnea     uses CPAP    Snoring      Past Surgical History:   Procedure Laterality Date    PACEMAKER INSERTION  04/2022    PB WRIST ARTHROSCOP,RELEASE XVERS LIG Right 06/01/2021    Procedure: RELEASE, CARPAL TUNNEL, ENDOSCOPIC;  Surgeon: Mike Ospina M.D.;  Location: SURGERY [facility];  Service: Orthopedics    JOINT INJECTION DIAGNOSTIC Left 06/01/2021    Procedure: INJECTION, JOINT, DIAGNOSTIC-CARPAL TUNNEL;  Surgeon: Mike Ospina M.D.;  Location: SURGERY [facility];  Service: Orthopedics    HYSTEROSCOPY WITH VIDEO OPERATIVE  04/30/2018    Procedure: HYSTEROSCOPY WITH VIDEO OPERATIVE;  Surgeon: Noemi Liu M.D.;  Location: SURGERY SAME DAY [facility];  Service: Labor and Delivery    DILATION AND CURETTAGE  04/30/2018    Procedure: DILATION AND CURETTAGE;  Surgeon: Noemi Liu M.D.;  Location: SURGERY SAME DAY [facility];  Service: Labor and Delivery    BREAST BIOPSY Left 01/30/2018    Procedure: BREAST BIOPSY- WIRE LOCALIZED;  Surgeon: Vijaya Britt M.D.;  Location: SURGERY SAME DAY [facility];  Service: General    RECOVERY  04/15/2015    Performed by Recoveryonly Surgery at SURGERY PRE-POST PROC UNIT [facility]    PACEMAKER INSERTION  2015    RECOVERY  12/12/2014    Performed by Ir-Recovery Surgery at SURGERY SAME DAY [facility]    SEPTAL RECONSTRUCTION  2000    ENDOSCOPY      PRIMARY C SECTION      TUBAL COAGULATION LAPAROSCOPIC BILATERAL       Family History   Problem Relation Age of Onset    Heart Disease Mother         CHF    Hypertension Mother     Stroke Mother     Heart Failure Mother     Other Mother         carotid artery disease,gout    Cancer Father         AML    Diabetes Maternal Grandmother     Cancer Maternal Grandfather         Prostate    Heart Disease Paternal Grandfather  52         MI     Sleep Apnea Neg Hx      Social History     Socioeconomic History    Marital status:      Spouse name: Not on file    Number of children: Not on file    Years of education: Not on file    Highest education level: Some college, no degree   Occupational History    Not on file   Tobacco Use    Smoking status: Never    Smokeless tobacco: Never   Vaping Use    Vaping Use: Never used   Substance and Sexual Activity    Alcohol use: Yes     Alcohol/week: 0.6 oz     Types: 1 Standard drinks or equivalent per week     Comment: 2-3 per week    Drug use: No    Sexual activity: Yes     Partners: Male     Birth control/protection: Surgical     Comment:    Other Topics Concern    Not on file   Social History Narrative    Not on file     Social Determinants of Health     Financial Resource Strain: Low Risk     Difficulty of Paying Living Expenses: Not hard at all   Food Insecurity: No Food Insecurity    Worried About Running Out of Food in the Last Year: Never true    Ran Out of Food in the Last Year: Never true   Transportation Needs: No Transportation Needs    Lack of Transportation (Medical): No    Lack of Transportation (Non-Medical): No   Physical Activity: Sufficiently Active    Days of Exercise per Week: 7 days    Minutes of Exercise per Session: 60 min   Stress: No Stress Concern Present    Feeling of Stress : Only a little   Social Connections: Moderately Integrated    Frequency of Communication with Friends and Family: Three times a week    Frequency of Social Gatherings with Friends and Family: Once a week    Attends Orthodoxy Services: Never    Active Member of Clubs or Organizations: Yes    Attends Club or Organization Meetings: More than 4 times per year    Marital Status:    Intimate Partner Violence: Not on file   Housing Stability: Low Risk     Unable to Pay for Housing in the Last Year: No    Number of Places Lived in the Last Year: 1    Unstable Housing in the Last Year:  No     No Known Allergies  Outpatient Encounter Medications as of 9/22/2022   Medication Sig Dispense Refill    nortriptyline (PAMELOR) 10 MG Cap Take 1 Capsule by mouth every evening. 90 Capsule 3    famotidine (PEPCID) 40 MG Tab Take 1 Tablet by mouth 2 times a day. 180 Tablet 3    disopyramide (NORPACE) 150 MG Cap Take 1 Capsule by mouth in the morning, at noon, and at bedtime. 90 Capsule 5    metoprolol SR (TOPROL XL) 50 MG TABLET SR 24 HR Take 1 Tablet by mouth every day. (Patient taking differently: Take 50 mg by mouth every evening.) 30 Tablet 5    ELIQUIS 5 MG Tab TAKE ONE TABLET BY MOUTH TWICE A DAY (Patient taking differently: Take 5 mg by mouth 2 times a day.) 180 Tablet 3    acetaminophen (TYLENOL) 325 MG Tab Take 650 mg by mouth 1 time a day as needed. Indications: Pain      fluticasone (FLONASE) 50 MCG/ACT nasal spray Administer 1 Spray into affected nostril(S) every day.      Probiotic Product (PROBIOTIC-10 PO) Take 1 Capsule by mouth every day.      Multiple Vitamin (MULTI-VITAMIN PO) Take 1 Tablet by mouth every day.      Pantothenic Acid 500 MG Tab Take 500 mg by mouth every day.      Calcium-Vitamin D-Vitamin K (CALCIUM FOR WOMEN PO) Take 1 Each by mouth every day.      Magnesium 200 MG TABS Take 1 Tab by mouth every day.       No facility-administered encounter medications on file as of 9/22/2022.     Review of Systems   Constitutional:  Positive for malaise/fatigue. Negative for chills, fever and weight loss.   HENT:  Negative for congestion, nosebleeds and tinnitus.    Eyes:  Negative for blurred vision and double vision.   Respiratory:  Negative for cough, hemoptysis, sputum production, shortness of breath and wheezing.    Cardiovascular:  Positive for palpitations (few). Negative for chest pain, orthopnea, leg swelling and PND.   Gastrointestinal:  Negative for abdominal pain, blood in stool, diarrhea, heartburn, nausea and vomiting.   Skin:  Negative for rash.   Neurological:  Negative for  dizziness, tingling, tremors, sensory change, speech change, focal weakness, loss of consciousness and headaches.   Psychiatric/Behavioral: Negative.              Objective   Vitals:    09/22/22 0834   BP: 126/72   Pulse: 67   Resp: 16   SpO2: 95%   '    Physical Exam  Constitutional:       Appearance: Normal appearance.   HENT:      Head: Normocephalic and atraumatic.      Mouth/Throat:      Mouth: Mucous membranes are moist.      Pharynx: Oropharynx is clear.   Eyes:      Extraocular Movements: Extraocular movements intact.      Conjunctiva/sclera: Conjunctivae normal.      Pupils: Pupils are equal, round, and reactive to light.   Cardiovascular:      Rate and Rhythm: Normal rate and regular rhythm.      Pulses: Normal pulses.   Pulmonary:      Effort: Pulmonary effort is normal.      Breath sounds: Normal breath sounds. No wheezing, rhonchi or rales.   Chest:      Comments: Left chest PPM  Musculoskeletal:      Cervical back: Normal range of motion and neck supple.      Right lower leg: No edema.      Left lower leg: No edema.   Skin:     General: Skin is warm and dry.      Capillary Refill: Capillary refill takes 2 to 3 seconds.   Neurological:      Mental Status: She is alert and oriented to person, place, and time.   Psychiatric:         Mood and Affect: Mood normal.         Behavior: Behavior normal.         Thought Content: Thought content normal.         Judgment: Judgment normal.     Cardiac procedures/imaging, reviewed per self  EKG 9/22/2022  Atrial paced, left bundle branch block.    Echo 8/4/2022  CONCLUSIONS  ALLEN done prior to AF ablation. Preserved LV function. LA enlargement.   Mild MR and mild to moderate AI. No evidence of LA thrombus. Intact   interatrial septum.           Assessment & Plan     1. Paroxysmal atrial fibrillation (HCC)  EKG      2. H/O cardiac radiofrequency ablation 8/4/22 Dr Witt        3. Pacemaker        4. Hypertrophic obstructive cardiomyopathy (HCC)        5. Circulating  anticoagulants (HCC)        6. Obstructive sleep apnea syndrome            Medical Decision Making: Today's Assessment/Status/Plan:   1.  Paroxysmal atrial fibrillation  2 status post ablation  - Status post ablation 8/4/2022 with pulmonary vein isolation for paroxysmal atrial fibrillation by Dr. Witt.    -Twelve-lead EKG today reveals atrial paced ventricular sensed rhythm with left bundle branch block.  Device interrogation reveals a few episodes of atrial fibrillation post ablation last occurring August 30.  Total time approximately 1.7 hours.  -We discussed healing expectations post ablation and timeframe today.    3.  [place] dual-chamber pacemaker  -Device interrogated per self today and found to have normal working order of device/stable lead parameters.  -She is atrial paced 73% V paced 19%.  Battery longevity is estimated to be 8 years.  See device flow sheet for details.    4.  Hypertrophic obstructive cardiomyopathy  - Status post septal ablation.  No reports of presyncope or syncope.  Echo in 2/2/2021 with stable findings.  - Continue current doses of Toprol and Norpace    5.  Use of anticoagulation  - Currently anticoagulated with Eliquis she will continue.    6.  Obstructive sleep apnea  - Continues to use CPAP.  Follow-up with sleep medicine as directed.     Return to clinic in approximately 2 months, sooner if clinical condition changes.

## 2022-10-04 ENCOUNTER — NON-PROVIDER VISIT (OUTPATIENT)
Dept: CARDIOLOGY | Facility: MEDICAL CENTER | Age: 62
End: 2022-10-04
Payer: COMMERCIAL

## 2022-10-04 ENCOUNTER — TELEPHONE (OUTPATIENT)
Dept: MEDICAL GROUP | Facility: MEDICAL CENTER | Age: 62
End: 2022-10-04

## 2022-10-04 ENCOUNTER — HOSPITAL ENCOUNTER (OUTPATIENT)
Dept: RADIOLOGY | Facility: MEDICAL CENTER | Age: 62
End: 2022-10-04
Attending: INTERNAL MEDICINE
Payer: COMMERCIAL

## 2022-10-04 DIAGNOSIS — Z12.31 ENCOUNTER FOR SCREENING MAMMOGRAM FOR BREAST CANCER: ICD-10-CM

## 2022-10-04 PROCEDURE — 93294 REM INTERROG EVL PM/LDLS PM: CPT | Performed by: INTERNAL MEDICINE

## 2022-10-04 PROCEDURE — 77063 BREAST TOMOSYNTHESIS BI: CPT

## 2022-10-04 NOTE — TELEPHONE ENCOUNTER
----- Message from Juve Stoddard M.D. sent at 10/4/2022  3:06 PM PDT -----  Please notify the patient that:  (1) her mammogram is negative but does show dense breast tissue which may decrease the accuracy of the mammogram  (2) she may obtain a screening breast ultrasound which could provide further detail  (3) her insurance may not cover the screening breast ultrasound, if that is the case, then the out of pocket cost is approximately $125  (4) please let me know if she is interested in obtaining the screening breast ultrasound

## 2022-10-04 NOTE — CARDIAC REMOTE MONITOR - SCAN
Device transmission reviewed. Device demonstrated appropriate function.       Electronically Signed by: Solitario Witt M.D.    10/4/2022  1:21 PM

## 2022-10-17 ENCOUNTER — PHARMACY VISIT (OUTPATIENT)
Dept: PHARMACY | Facility: MEDICAL CENTER | Age: 62
End: 2022-10-17
Payer: COMMERCIAL

## 2022-10-17 PROCEDURE — RXMED WILLOW AMBULATORY MEDICATION CHARGE: Performed by: INTERNAL MEDICINE

## 2022-10-25 LAB — EKG IMPRESSION: NORMAL

## 2022-11-01 ENCOUNTER — HOSPITAL ENCOUNTER (OUTPATIENT)
Dept: LAB | Facility: MEDICAL CENTER | Age: 62
End: 2022-11-01
Attending: INTERNAL MEDICINE
Payer: COMMERCIAL

## 2022-11-01 LAB
ANISOCYTOSIS BLD QL SMEAR: ABNORMAL
BASOPHILS # BLD AUTO: 0 % (ref 0–1.8)
BASOPHILS # BLD: 0 K/UL (ref 0–0.12)
EOSINOPHIL # BLD AUTO: 0.19 K/UL (ref 0–0.51)
EOSINOPHIL NFR BLD: 1.7 % (ref 0–6.9)
ERYTHROCYTE [DISTWIDTH] IN BLOOD BY AUTOMATED COUNT: 43.2 FL (ref 35.9–50)
HCT VFR BLD AUTO: 45.3 % (ref 37–47)
HGB BLD-MCNC: 15 G/DL (ref 12–16)
LYMPHOCYTES # BLD AUTO: 6.85 K/UL (ref 1–4.8)
LYMPHOCYTES NFR BLD: 62.8 % (ref 22–41)
MANUAL DIFF BLD: NORMAL
MCH RBC QN AUTO: 29.5 PG (ref 27–33)
MCHC RBC AUTO-ENTMCNC: 33.1 G/DL (ref 33.6–35)
MCV RBC AUTO: 89 FL (ref 81.4–97.8)
MICROCYTES BLD QL SMEAR: ABNORMAL
MONOCYTES # BLD AUTO: 0.45 K/UL (ref 0–0.85)
MONOCYTES NFR BLD AUTO: 4.1 % (ref 0–13.4)
MORPHOLOGY BLD-IMP: NORMAL
NEUTROPHILS # BLD AUTO: 3.42 K/UL (ref 2–7.15)
NEUTROPHILS NFR BLD: 31.4 % (ref 44–72)
NRBC # BLD AUTO: 0 K/UL
NRBC BLD-RTO: 0 /100 WBC
PLATELET # BLD AUTO: 188 K/UL (ref 164–446)
PLATELET BLD QL SMEAR: NORMAL
PMV BLD AUTO: 10.9 FL (ref 9–12.9)
RBC # BLD AUTO: 5.09 M/UL (ref 4.2–5.4)
RBC BLD AUTO: PRESENT
WBC # BLD AUTO: 10.9 K/UL (ref 4.8–10.8)

## 2022-11-01 PROCEDURE — 36415 COLL VENOUS BLD VENIPUNCTURE: CPT

## 2022-11-01 PROCEDURE — 83615 LACTATE (LD) (LDH) ENZYME: CPT

## 2022-11-01 PROCEDURE — 80053 COMPREHEN METABOLIC PANEL: CPT

## 2022-11-01 PROCEDURE — 85007 BL SMEAR W/DIFF WBC COUNT: CPT

## 2022-11-01 PROCEDURE — 85025 COMPLETE CBC W/AUTO DIFF WBC: CPT

## 2022-11-02 LAB
ALBUMIN SERPL BCP-MCNC: 4.4 G/DL (ref 3.2–4.9)
ALBUMIN/GLOB SERPL: 1.8 G/DL
ALP SERPL-CCNC: 110 U/L (ref 30–99)
ALT SERPL-CCNC: 31 U/L (ref 2–50)
ANION GAP SERPL CALC-SCNC: 10 MMOL/L (ref 7–16)
AST SERPL-CCNC: 30 U/L (ref 12–45)
BILIRUB SERPL-MCNC: 0.4 MG/DL (ref 0.1–1.5)
BUN SERPL-MCNC: 16 MG/DL (ref 8–22)
CALCIUM SERPL-MCNC: 9.5 MG/DL (ref 8.5–10.5)
CHLORIDE SERPL-SCNC: 105 MMOL/L (ref 96–112)
CO2 SERPL-SCNC: 27 MMOL/L (ref 20–33)
CREAT SERPL-MCNC: 1.05 MG/DL (ref 0.5–1.4)
GFR SERPLBLD CREATININE-BSD FMLA CKD-EPI: 60 ML/MIN/1.73 M 2
GLOBULIN SER CALC-MCNC: 2.4 G/DL (ref 1.9–3.5)
GLUCOSE SERPL-MCNC: 79 MG/DL (ref 65–99)
LDH SERPL L TO P-CCNC: 269 U/L (ref 107–266)
POTASSIUM SERPL-SCNC: 4.2 MMOL/L (ref 3.6–5.5)
PROT SERPL-MCNC: 6.8 G/DL (ref 6–8.2)
SODIUM SERPL-SCNC: 142 MMOL/L (ref 135–145)

## 2022-11-10 ENCOUNTER — TELEPHONE (OUTPATIENT)
Dept: CARDIOLOGY | Facility: MEDICAL CENTER | Age: 62
End: 2022-11-10
Payer: COMMERCIAL

## 2022-11-10 NOTE — TELEPHONE ENCOUNTER
Received patient complaint from [facility] that patient was notified that ablation on 8-4-2022 was not covered by [facility].  [facility] filed on behalf of patient for assistance with billing questions.

## 2022-11-16 ENCOUNTER — OFFICE VISIT (OUTPATIENT)
Dept: CARDIOLOGY | Facility: MEDICAL CENTER | Age: 62
End: 2022-11-16
Payer: COMMERCIAL

## 2022-11-16 VITALS
BODY MASS INDEX: 34.15 KG/M2 | HEIGHT: 64 IN | RESPIRATION RATE: 14 BRPM | OXYGEN SATURATION: 97 % | DIASTOLIC BLOOD PRESSURE: 68 MMHG | HEART RATE: 68 BPM | WEIGHT: 200 LBS | SYSTOLIC BLOOD PRESSURE: 138 MMHG

## 2022-11-16 DIAGNOSIS — Z95.0 PACEMAKER: ICD-10-CM

## 2022-11-16 DIAGNOSIS — I48.0 PAROXYSMAL ATRIAL FIBRILLATION (HCC): Chronic | ICD-10-CM

## 2022-11-16 DIAGNOSIS — I49.5 SSS (SICK SINUS SYNDROME) (HCC): ICD-10-CM

## 2022-11-16 PROCEDURE — 99214 OFFICE O/P EST MOD 30 MIN: CPT | Mod: 25 | Performed by: INTERNAL MEDICINE

## 2022-11-16 PROCEDURE — 93280 PM DEVICE PROGR EVAL DUAL: CPT | Performed by: INTERNAL MEDICINE

## 2022-11-16 PROCEDURE — 93000 ELECTROCARDIOGRAM COMPLETE: CPT | Mod: 59 | Performed by: INTERNAL MEDICINE

## 2022-11-16 RX ORDER — ATORVASTATIN CALCIUM 40 MG/1
TABLET, FILM COATED ORAL
COMMUNITY
Start: 2022-11-04 | End: 2023-07-18

## 2022-11-16 RX ORDER — METOPROLOL SUCCINATE 25 MG/1
25 TABLET, EXTENDED RELEASE ORAL DAILY
Qty: 30 TABLET | Refills: 5 | Status: SHIPPED | OUTPATIENT
Start: 2022-11-16 | End: 2023-06-27

## 2022-11-16 ASSESSMENT — FIBROSIS 4 INDEX: FIB4 SCORE: 1.78

## 2022-11-16 NOTE — PROGRESS NOTES
Arrhythmia Clinic Note (Established patient)    DOS: 11/16/2022    Chief complaint/Reason for consult: F/u PAF/PPM    Interval History:  Pt is a 61 yo F. She has a history of HCM, s/p PPM [place] [employer] device, PAF with increasing burden on BB/norpace combination.  Underwent successful PV isolation with me in mid August. Had a couple of short bursts of AF in October lasting total < 2 minutes, otherwise free of PAF episodes. Feeling good. Back to exercising. Wants to decrease her BB if possible.    ROS (+ highlighted in red):  General--Negative for fatigue, weight loss or weight gain  Cardiovascular--Negative for CP, orthopnea, PND    Past Medical History:   Diagnosis Date    Anesthesia     wakes up during procedure (x2)    Aortic regurgitation     Arrhythmia, ventricular 2008    Symptomatic PVCs, controlled with medication.    Asthma     inhaler PRN, only uses when sick     Atrial fibrillation (HCC)     Awareness under anesthesia     Cardiac arrhythmia     Cervical arthritis 02/08/2016    Chickenpox     Chronic kidney disease, stage III (moderate) (HCC) 02/09/2016    CTS (carpal tunnel syndrome) 02/28/2011    Depression     GERD (gastroesophageal reflux disease)     Heart burn     Heart murmur     Hemorrhagic disorder (HCC)     on Eliquis    Hypertr obst cardiomyop 1991    Dr. Bert Vazquez, [address] [facility] [address], [place] [address] [phone] fax [phone].  [place] office [phone].  Alchol Septal Reduction 8/00, Coronaries normal.    Hypertrophic cardiomyopathy (HCC)     Indigestion     Influenza     Pacemaker 2014    Renal disorder     chronic kidney disease stage 3-  **pt states currently no issue, numbers are stable    Sleep apnea     uses CPAP    Snoring        Past Surgical History:   Procedure Laterality Date    PACEMAKER INSERTION  04/2022    PB WRIST ARTHROSCOP,RELEASE XVERS LIG Right 06/01/2021    Procedure: RELEASE, CARPAL TUNNEL, ENDOSCOPIC;  Surgeon: Mike Ospina M.D.;  Location:  SURGERY [facility];  Service: Orthopedics    JOINT INJECTION DIAGNOSTIC Left 06/01/2021    Procedure: INJECTION, JOINT, DIAGNOSTIC-CARPAL TUNNEL;  Surgeon: Mike Ospina M.D.;  Location: SURGERY [facility];  Service: Orthopedics    HYSTEROSCOPY WITH VIDEO OPERATIVE  04/30/2018    Procedure: HYSTEROSCOPY WITH VIDEO OPERATIVE;  Surgeon: Noemi Liu M.D.;  Location: SURGERY SAME DAY [facility];  Service: Labor and Delivery    DILATION AND CURETTAGE  04/30/2018    Procedure: DILATION AND CURETTAGE;  Surgeon: Noemi Liu M.D.;  Location: SURGERY SAME DAY [facility];  Service: Labor and Delivery    BREAST BIOPSY Left 01/30/2018    Procedure: BREAST BIOPSY- WIRE LOCALIZED;  Surgeon: Vijaya Britt M.D.;  Location: SURGERY SAME DAY [facility];  Service: General    RECOVERY  04/15/2015    Performed by Recoveryonly Surgery at SURGERY PRE-POST PROC UNIT [facility]    PACEMAKER INSERTION  2015    RECOVERY  12/12/2014    Performed by Ir-Recovery Surgery at SURGERY SAME DAY [facility]    SEPTAL RECONSTRUCTION  2000    ENDOSCOPY      PRIMARY C SECTION      TUBAL COAGULATION LAPAROSCOPIC BILATERAL         Social History     Socioeconomic History    Marital status:      Spouse name: Not on file    Number of children: Not on file    Years of education: Not on file    Highest education level: Some college, no degree   Occupational History    Not on file   Tobacco Use    Smoking status: Never    Smokeless tobacco: Never   Vaping Use    Vaping Use: Never used   Substance and Sexual Activity    Alcohol use: Yes     Alcohol/week: 0.6 oz     Types: 1 Standard drinks or equivalent per week     Comment: 2-3 per week    Drug use: No    Sexual activity: Yes     Partners: Male     Birth control/protection: Surgical     Comment:    Other Topics Concern    Not on file   Social History Narrative    Not on file     Social Determinants of Health     Financial Resource Strain: Low Risk     Difficulty of Paying  Living Expenses: Not hard at all   Food Insecurity: No Food Insecurity    Worried About Running Out of Food in the Last Year: Never true    Ran Out of Food in the Last Year: Never true   Transportation Needs: No Transportation Needs    Lack of Transportation (Medical): No    Lack of Transportation (Non-Medical): No   Physical Activity: Sufficiently Active    Days of Exercise per Week: 7 days    Minutes of Exercise per Session: 60 min   Stress: No Stress Concern Present    Feeling of Stress : Only a little   Social Connections: Moderately Integrated    Frequency of Communication with Friends and Family: Three times a week    Frequency of Social Gatherings with Friends and Family: Once a week    Attends Protestant Services: Never    Active Member of Clubs or Organizations: Yes    Attends Club or Organization Meetings: More than 4 times per year    Marital Status:    Intimate Partner Violence: Not on file   Housing Stability: Low Risk     Unable to Pay for Housing in the Last Year: No    Number of Places Lived in the Last Year: 1    Unstable Housing in the Last Year: No       Family History   Problem Relation Age of Onset    Heart Disease Mother         CHF    Hypertension Mother     Stroke Mother     Heart Failure Mother     Other Mother         carotid artery disease,gout    Cancer Father         AML    Diabetes Maternal Grandmother     Cancer Maternal Grandfather         Prostate    Heart Disease Paternal Grandfather 52         MI     Sleep Apnea Neg Hx        No Known Allergies    Current Outpatient Medications   Medication Sig Dispense Refill    atorvastatin (LIPITOR) 40 MG Tab       nortriptyline (PAMELOR) 10 MG Cap Take 1 Capsule by mouth every evening. 90 Capsule 3    famotidine (PEPCID) 40 MG Tab Take 1 Tablet by mouth 2 times a day. 180 Tablet 3    disopyramide (NORPACE) 150 MG Cap Take 1 Capsule by mouth in the morning, at noon, and at bedtime. 90 Capsule 5    metoprolol SR (TOPROL XL) 50 MG TABLET  "SR 24 HR Take 1 Tablet by mouth every day. (Patient taking differently: Take 1 Tablet by mouth every evening.) 30 Tablet 5    ELIQUIS 5 MG Tab TAKE ONE TABLET BY MOUTH TWICE A DAY (Patient taking differently: Take 1 Tablet by mouth 2 times a day.) 180 Tablet 3    acetaminophen (TYLENOL) 325 MG Tab Take 2 Tablets by mouth 1 time a day as needed. Indications: Pain      fluticasone (FLONASE) 50 MCG/ACT nasal spray Administer 1 Spray into affected nostril(S) every day.      Probiotic Product (PROBIOTIC-10 PO) Take 1 Capsule by mouth every day.      Multiple Vitamin (MULTI-VITAMIN PO) Take 1 Tablet by mouth every day.      Pantothenic Acid 500 MG Tab Take 1 Tablet by mouth every day.      Calcium-Vitamin D-Vitamin K (CALCIUM FOR WOMEN PO) Take 1 Each by mouth every day.      Magnesium 200 MG TABS Take 1 Tab by mouth every day.      COVID-19mRNA Bival Vac Moderna (MODERNA COVID-19 BIVALENT) 50 MCG/0.5ML Suspension injection Inject  into the shoulder, thigh, or buttocks. (Patient not taking: Reported on 11/16/2022) 0.5 mL 0     No current facility-administered medications for this visit.       Physical Exam:  Vitals:    11/16/22 1355   BP: 138/68   BP Location: Left arm   Patient Position: Sitting   BP Cuff Size: Adult   Pulse: 68   Resp: 14   SpO2: 97%   Weight: 90.7 kg (200 lb)   Height: 1.626 m (5' 4\")     General appearance: NAD, conversant  HEENT: PERRL, neck is supple with FROM  Lungs: Clear to auscultation, normal respiratory effort  CV: RRR, no murmurs/rubs/gallops, no JVD  Abdomen: Soft, non-tender with normal bowel sounds  Extremities: No peripheral edema, no clubbing or cyanosis  Skin: No rash, lesions, or ulcers  Psych: Alert and oriented to person, place and time    Data:  Labs reviewed    Prior echo/stress reviewed:  Preserved function, enlarged LA    EKG interpreted by me:  A paced, LBBB    Impression/Plan:  1. Paroxysmal atrial fibrillation (HCC)  EKG      2. Pacemaker          -Device interrogation " reviewed, working appropriately ~80% A paced  -Will start walking down her BB/norpace  -Lowering Metop to 25 long acting today  -She is s/p septal ablation, can probably come down on norpace if doing well  -F/u in 6 mo    Solitario Witt MD

## 2022-11-17 LAB — EKG IMPRESSION: NORMAL

## 2022-11-28 ENCOUNTER — PATIENT MESSAGE (OUTPATIENT)
Dept: CARDIOLOGY | Facility: MEDICAL CENTER | Age: 62
End: 2022-11-28
Payer: COMMERCIAL

## 2022-12-14 ENCOUNTER — TELEPHONE (OUTPATIENT)
Dept: CARDIOLOGY | Facility: MEDICAL CENTER | Age: 62
End: 2022-12-14
Payer: COMMERCIAL

## 2022-12-15 NOTE — PATIENT COMMUNICATION
I was able to s/w pt today. I informed her that I have filed 2 [employer] events on her behalf and that the Patient  has also escalated this concern to [employer] for review. Pt states she s/w [facility] and they gave her a timeline of ~ 12- until they would cb. I will forward this complaint to Erik Alexis, Fernando and  with [facility] for additional assistance. Pt appreciative for the cb.

## 2023-01-03 ENCOUNTER — NON-PROVIDER VISIT (OUTPATIENT)
Dept: CARDIOLOGY | Facility: MEDICAL CENTER | Age: 63
End: 2023-01-03
Payer: COMMERCIAL

## 2023-01-03 DIAGNOSIS — I48.0 PAROXYSMAL ATRIAL FIBRILLATION (HCC): Chronic | ICD-10-CM

## 2023-01-03 PROCEDURE — 93294 REM INTERROG EVL PM/LDLS PM: CPT | Performed by: INTERNAL MEDICINE

## 2023-01-03 RX ORDER — DISOPYRAMIDE PHOSPHATE 150 MG/1
150 CAPSULE ORAL 3 TIMES DAILY
Qty: 270 CAPSULE | Refills: 2 | Status: SHIPPED | OUTPATIENT
Start: 2023-01-03 | End: 2023-09-12

## 2023-01-03 NOTE — TELEPHONE ENCOUNTER
Is the patient due for a refill? No    Was the patient seen the past year? Yes    Date of last office visit: 06/27/22    Does the patient have an upcoming appointment?  Yes   If yes, When? 5/22/23    Provider to refill:SS    Does the patients insurance require a 100 day supply?  No

## 2023-01-03 NOTE — CARDIAC REMOTE MONITOR - SCAN
Device transmission reviewed. Device demonstrated appropriate function.       Electronically Signed by: Cehle Maloney M.D.    1/3/2023  4:53 PM

## 2023-01-05 DIAGNOSIS — I48.0 PAROXYSMAL ATRIAL FIBRILLATION (HCC): Chronic | ICD-10-CM

## 2023-01-06 RX ORDER — DISOPYRAMIDE PHOSPHATE 150 MG/1
150 CAPSULE ORAL 3 TIMES DAILY
Qty: 270 CAPSULE | Refills: 2 | OUTPATIENT
Start: 2023-01-06

## 2023-01-06 NOTE — TELEPHONE ENCOUNTER
Is the patient due for a refill? No    disopyramide (NORPACE) 150 MG Cap 270 Capsule 2/2 1/3/2023

## 2023-01-27 PROBLEM — D68.318 CIRCULATING ANTICOAGULANTS (HCC): Status: RESOLVED | Noted: 2022-09-22 | Resolved: 2023-01-27

## 2023-01-30 ENCOUNTER — OFFICE VISIT (OUTPATIENT)
Dept: MEDICAL GROUP | Facility: MEDICAL CENTER | Age: 63
End: 2023-01-30
Payer: COMMERCIAL

## 2023-01-30 VITALS
SYSTOLIC BLOOD PRESSURE: 124 MMHG | OXYGEN SATURATION: 97 % | DIASTOLIC BLOOD PRESSURE: 74 MMHG | BODY MASS INDEX: 33.63 KG/M2 | TEMPERATURE: 97.6 F | HEIGHT: 64 IN | WEIGHT: 197 LBS | HEART RATE: 64 BPM

## 2023-01-30 DIAGNOSIS — G47.30 SLEEP APNEA, UNSPECIFIED TYPE: ICD-10-CM

## 2023-01-30 DIAGNOSIS — I48.0 PAROXYSMAL ATRIAL FIBRILLATION (HCC): Chronic | ICD-10-CM

## 2023-01-30 DIAGNOSIS — R94.4 DECREASED GFR: ICD-10-CM

## 2023-01-30 DIAGNOSIS — R05.9 COUGH, UNSPECIFIED TYPE: ICD-10-CM

## 2023-01-30 DIAGNOSIS — Z00.00 PREVENTATIVE HEALTH CARE: ICD-10-CM

## 2023-01-30 DIAGNOSIS — I42.1 HYPERTROPHIC OBSTRUCTIVE CARDIOMYOPATHY (HCC): Chronic | ICD-10-CM

## 2023-01-30 PROBLEM — J30.9 ALLERGIC RHINITIS: Status: ACTIVE | Noted: 2023-01-30

## 2023-01-30 PROCEDURE — 99214 OFFICE O/P EST MOD 30 MIN: CPT | Performed by: INTERNAL MEDICINE

## 2023-01-30 RX ORDER — IPRATROPIUM BROMIDE 21 UG/1
2 SPRAY, METERED NASAL 2 TIMES DAILY
Qty: 30 ML | Refills: 3 | Status: SHIPPED | OUTPATIENT
Start: 2023-01-30 | End: 2023-02-16

## 2023-01-30 ASSESSMENT — PATIENT HEALTH QUESTIONNAIRE - PHQ9: CLINICAL INTERPRETATION OF PHQ2 SCORE: 0

## 2023-01-30 ASSESSMENT — FIBROSIS 4 INDEX: FIB4 SCORE: 1.78

## 2023-01-30 NOTE — PROGRESS NOTES
Subjective     Seda Cagle is a 62 y.o. female who presents with Cough            HPI      Here for cough for the past two months, initially had a sore throat which resolved, did a COVID test that was negative, around the same time she discontinued Flonase secondary to dry mouth, cough does not seem to be predominant during a certain time of day, she did notice that 1 time she had a coughing spasm after being outdoors and walking and used her 's rescue inhaler and that helped, but normally does not notice that the cough is worse with weather changes or activity, can be productive, no blood, no associated chest pain, no sinus headache, no sinus pressure but does have some postnasal drip and hoarseness, history of reflux of PPI currently on Pepcid, no history of asthma, the frequency and intensity of the coughing has decreased, now has a cough 3-4 times a day, not related particular to nighttime although she states she may notice it when she lays down, not related to meals.  No tobacco.  No associated shortness of breath.  Blood pressure and heart rate has been stable currently on metoprolol 25 mg daily, she tried going up to 50 mg when she noticed a bit more skipping sensations, status post ablation for atrial fibrillation, however she has backed down to 25 mg once a day.  Blood pressure 120/60 and heart rate 60s. Has started to do elliptical 5-6 times per week 20 minutes per session with no associated chest pain, shortness of breath, palpitations, cough or wheezing.  Remains on Eliquis, no NSAIDs, no bruising or bleeding.  Pacemaker check per cardiology.  CLL stable no recurrent infections followed by hematology oncology.  Dyslipidemia on Lipitor.  Sleep apnea still using CPAP nightly  [facility] but has not had an appointment recently  No childhood history of asthma        Current Outpatient Medications   Medication Sig Dispense Refill     disopyramide (NORPACE) 150 MG Cap Take 1 Capsule by mouth in the morning, at noon, and at bedtime. 270 Capsule 2    atorvastatin (LIPITOR) 40 MG Tab       metoprolol SR (TOPROL XL) 25 MG TABLET SR 24 HR Take 1 Tablet by mouth every day. 30 Tablet 5    nortriptyline (PAMELOR) 10 MG Cap Take 1 Capsule by mouth every evening. 90 Capsule 3    famotidine (PEPCID) 40 MG Tab Take 1 Tablet by mouth 2 times a day. 180 Tablet 3    ELIQUIS 5 MG Tab TAKE ONE TABLET BY MOUTH TWICE A DAY (Patient taking differently: Take 1 Tablet by mouth 2 times a day.) 180 Tablet 3    acetaminophen (TYLENOL) 325 MG Tab Take 2 Tablets by mouth 1 time a day as needed. Indications: Pain      fluticasone (FLONASE) 50 MCG/ACT nasal spray Administer 1 Spray into affected nostril(S) every day.      Probiotic Product (PROBIOTIC-10 PO) Take 1 Capsule by mouth every day.      Multiple Vitamin (MULTI-VITAMIN PO) Take 1 Tablet by mouth every day.      Pantothenic Acid 500 MG Tab Take 1 Tablet by mouth every day.      Calcium-Vitamin D-Vitamin K (CALCIUM FOR WOMEN PO) Take 1 Each by mouth every day.      Magnesium 200 MG TABS Take 1 Tab by mouth every day.       No current facility-administered medications for this visit.                  Abnormal nasal septum  8/12/21  ENT note, nasal septum S-shaped curve bows to the left nostril inferiorly, in the right nose superiorly, normal turbinate size and symmetry, does not seem to bother her when awake, recommend humidification and discuss further at follow-up, higher perioperative risk due to cardiovascular disease     cll  2/3/20 wbc 7.1 (52%N,39%L)  9/1/20 wbc 6.9 (41%N,51%L)  11/22/21 wbc 8.4 (35%N,57%L) many smudge cells  1/12/22 wbc 9.9 (34%N,60%L),flow cytometry kappa restricted CD5+ B-cell population accounting for 20% of visible leukocytes, phenotypic features most consistent with CLL  2/23/22  oncology consultation, CLL with FISH panel DEL 13+,DEL 17-showing favorable prognosis,  counseled patient on benign nature of CLL with no cytopenia or physical evidence of lymphadenopathy or hepatosplenomegaly, discussed watchful waiting follow-up 3 months and repeat cbc every 6 months  5/18/22  oncology note no cytopenia, no splenomegaly, hepatomegaly, lymphadenopathy, favorable prognostic markers, follow-up 6 months  8/17/22 wbc 13.0 (18%N,80%L)  11/1/22 wbc 10.9 (31%N,62%L),  11/9/22  oncology note stable continue to monitor    dec gfr  2/19/11 bun 15,creat 1.0,GFR 56  5/14/14 bun 24,creat 1.23,GFR 45  8/2/14 ultrasound renal negative  2/19/15 bun 19,creat 1.1,GFR 50  8/3/15 bun 17,creat 1.2, GFR 43,urine mac<0.5,PTH 27,vit d 37  10/16/15 bun 17,creat 1.1,GFR 47  1/27/16 bun 14,creat 1.2,GFR 45  12/5/16 bun 16,creat 1.0,GFR 52  1/18/19 bun 15,creat 1.0,GFR 52   2/3/20 bum 16,creat 1.13,GFR 49,urine mac<0.7  9/2/20 bun 20,creat 1.1,GFR 50  3/3/21 bun 13,creat 0.85,GFR>60,PTH 56, urine mac<1.2,SPEP negative  11/22/21 bun 13,creat 1.0,GFR 56  11/1/22  bun 16,creat 1.05,GFR 60    Dyslipidemia  12/5/16 chol 186,trig 143,hdl 52,ldl 105  11/26/18  vascular medicine note chronic venous stasis changes lower extremities, recheck lipid panel qualify for statin therapy, continue home blood pressure monitoring, consider 24-hour ambulatory blood pressure monitoring, continue metoprolol, continue xarelto use compression stockings  1/18/19 chol 183,trig 160,hdl 45,ldl 106  11/26/19 chol 133,trig 202,hdl 42,ldl 51  3/3/21 chol 144,trig 98,hdl 48,ldl 76 on lipitor 40 mg  10/5/21 CT-CTA heart left main no plaque or stenosis, LAD 25% stenosis, circumflex and obtuse marginal patent, RCA, posterior descending and posterior lateral branches patent no stenosis, normal LV size, ascending aorta 3.5 cm  11/22/21 chol 134,trig 133,hdl 45,ldl 62 on lipitor 40 mg     gerd on pepcid  2/8/16 on pepcid  8/12/21  ENT note throat symptoms possibly related to reflux, recommend  famotidine 40 mg bid and work on lifestyle modification recommended humidification to avoid excessive dryness  10/12/21  ENT note flexible laryngoscopy, moderate interarytenoid and moderate postcricoid edema, most likely cause of sensation in her throat is reflux consistent with laryngeal pharyngeal reflux, work on healthy lifestyle, diet modification, continue famotidine twice daily for 6 to 8 weeks  1/20/22 on pepcid 40 mg bid   8/22/22 on prilosec after ablation will stop on 9/4/22 and go back to pepcid, had headache that started when she went off pepcid but that was when she had ablation so monitor for recurrent headache on pepcid or if breakthrough heartburn on pepcid then will need to go back to omeprazole if that happens  9/12/22 on pepcid 40 mg     neck  arthritis  3/24/15 MRI cervical spine C4-C5 small disc osteophyte complex, moderate left facet arthropathy, multilevel DJD  4/14/21 EMG nerve conduction study right upper extremity consistent with right median neuropathy without denervation, possible mild right ulnar neuropathy, no evidence of cervical radiculopathy     history headache  11/22/21 trial of pamelor for tension headache and monitor for norpace interaction, also consider ct head no improvement  1/20/22 still with insomnia, nortriptyline 10 mg, consider going to 20 mg, need to check with cardiology first because of potential interaction with norpace  2/11/22 increase nortriptylline to 20 mg ok per cardiology see patient mychart note   2/22/22 off nortriptyline due to chest pain  3/15/22 resuming nortriptyline for sleep as melatonin caused headache  8/22/22 stop nortriptyline as headache improved after ablation  9/12/22 back on pamelor 10 mg     History transient global amnesia  1/18/19 chol 183,trig 160,hdl 45,ldl 106  1/18/19 CT head negative  1/19/19 ultrasound carotid less than 50% bilateral stenosis  1/19/19 CT-CTA head with and without postprocessing, negative [facility]  1/18/19 hospital admission, 1/19/19 hospital discharge transient global amnesia, patient was speaking to her son on the phone, and could not recall for anything 2 hours afterwards, has been indicated that patient was confused and repetitive and could not remember talking on the phone, came to the emergency room for evaluation, CT scan head negative, no other symptoms, resolution discharged home the following day  1/29/19 cardiology note, patient still having some issues with short-term memory, will switch to coumadin from xarelto with recent event, referral to neurology, paroxysmal atrial fibrillation burden less than 1%  3/26/19 [facility] neurological note probable transient global amnesia, complete holter monitor per cardiology, EEG ordered and sedimentation rate for headache     Hypertrophic cardiomyopathy  8/14/00 alcohol septal reduction, normal coronaries  4/15/10 echo very small area proximal septal hypertrophy, no significant obstruction  4/16/10 cardiology note [facility] hypertrophic cardiomyopathy on metoprolol 25 mg bid, norpace  mg bid, asa; remains asymptomatic with regards to hypertrophic cardio myopathy  3/18/11 echo normal LV thickness with very focal area localized hypertrophy most basal septum without evidence of obstruction, EF 64%  11/16/14 persantine thallium fixed defect mid and basilar anteroseptal wall and apical septum consistent with scar, hypokinesis septum EF 74%  11/18/14 echo normal LV size and function, grade 2 diastolic dysfunction, EF 60-65%, mild aortic stenosis, mild aortic insufficiency, RVSP 20-25  11/19/14 holter sinus with left bundle branch block, average rate 57, minimum heart rate 44, maximal heart rate 85, PVCs, PACs   12/12/14 ALLEN known post-ablation for hypertrophic cardiomyopathy without obstruction, small amount of remaining myocardium of lower tract does not cause significant outflow gradient, mild aortic insufficiency, moderate central mitral  regurgitation, remnant myocardium in the LVOT with mild obstruction, peak outflow gradient 11 (previously 16-18)  4/16/15  cardiology procedure note, dual-chamber pacemaker implantation  10/24/16  of cardiology, atrial fibrillation and hypertrophic cardiomyopathy, controlled on norpace,metoprolol, xarelto  9/26/18 echo normal LV systolic function, EF 55%, mild LVH, grade 1 diastolic dysfunction, evidence of LVOT, septal wall thickness 1.2 cm, consider subaortic membrane, mild to moderate mitral regurgitation, mild aortic insufficiency  10/8/18 cardiology note does not appear to be subaortic membrane on echo, perhaps subaortic thickening, continue to monitor echo of full gradient for recurrence continue oral anticoagulation gradient is low and asymptomatic, xarelto, norpace, lopressor  1/29/19 cardiology note, patient still having some issues with short-term memory, will switch to coumadin from xarelto with recent event, referral to neurology, paroxysmal atrial fibrillation burden less than 1%  2/28/19 cardiology note hypertrophic obstructive cardiomyopathy and paroxysmal atrial fibrillation, sinus on exam, medi-lynx ventricular premature contractions isolated but frequent, asymptomatic, one short episode of paroxysmal atrial fibrillation 1.5 minutes, continues on warfarin INR 2.0, continues on norpace, increase metoprolol XL to 50 mg follow-up 1 month  4/26/2019 cardiology note hypertrophic obstructive cardiomyopathy, paroxysmal atrial fibrillation, device check, continue coumadin, norpace, follow-up 6 months  10/10/19 cardiology note hypertrophic cardiomyopathy status post septal ablation, pacemaker, paroxysmal atrial fibrillation relatively short episodes, longest episode 49 minutes remainder less than 1 minute in length, remains controlled on toprol, norpace, coumadin per coumadin clinic  10/31/19 echo normal left ventricular function, EF 60%, basal septal wall appears hypokinetic, grade 2  diastolic dysfunction, moderately dilated left atrium volume index 44 mL, mild mitral regurgitation, mild aortic stenosis, mild aortic insufficiency, peak gradient 23  1/7/20 ultrasound carotid less than 50% stenosis right internal carotid, left carotid mild plaque carotid bifurcation  8/25/20 cardiology note pacemaker, device check, no arrhythmia burden, change from coumadin to eliquis follow-up 6 months repeat echo at that time  2/3/21 echo mild LVH, EF 65%, grade 2 diastolic dysfunction, RVSP 30  2/26/21  cardiology note pacemaker, paroxysmal atrial fibrillation stable, echo reviewed no significant LVOT gradient, continue Norpace and Toprol discussed options of RV lead replacement including transvenous extraction, implanting the lead, or performing generator change and using it until no longer working, we will plan on transvenous extraction and new RV lead, follow-up 6 months  8/11/21 cardiology note would like to exclude obstructive CAD but do not think pretest probability is high enough to warrant catheterization, will order coronary CTA follow-up 6 months  8/11/21 cardiology note EKG sinus, ordered CTA  10/5/21 CT-CTA heart left main no plaque or stenosis, LAD 25% stenosis, circumflex and obtuse marginal patent, RCA, posterior descending and posterior lateral branches patent no stenosis, normal LV size, ascending aorta 3.5 cm  2/8/22 cardiology note pacemaker nearing EDIE, we will go ahead and extracted failing RV lead transvenously and replace, patient agreeable to proceed  4/6/22  cardiology procedure note transvenous dual-chamber PPM lead extraction, pacemaker generator removal, new permanent pacemaker implantation pulse generator boston model L311 serial # [id number]  4/27/22  cardiology note having higher PVCs and atrial arrhythmia burden since extraction and new implant, device functioning appropriately, follow-up 4 weeks  5/27/22  cardiology note device interrogation, increasing  sustained atrial fibrillation episodes up to 5 hours, options discussed, increase norpace to 200 mg tid for now, plan pulmonary vein isolation next available  6/27/22  cardiology note plan for ablation August, lower metoprolol to 50 mg bid and norpace to 150 mg tid  8/4/22 transesophageal echo prior to ablation preserved LV function, left atrial enlargement, mild MR and mild to moderate aortic insufficiency, no evidence of thrombus  8/14/22  cardiology EPS noted, successful RF ablation pulmonary vein isolation procedure, resume anticoagulation, start carafate bid x 2 weeks and PPI x one month  11/16/22 cardiology note device interrogation 80% paced, start walking down beta-blocker and norpace, decrease metoprolol to 25 mg, can decrease norpace if doing well follow-up 6 months     ibs  2/3/20 trial of linzess 145 mcg  2/25/20 increase to linzess 290 mcg   3/5/20 colon per [facility] negative, repeat 10 years  9/12/22 back on pamelor 10 mg     insomnia   11/22/21 trial of pamelor for tension headache and monitor for norpace interaction, also consider ct head no improvement  2/22/22 off nortriptyline due to chest pain, will try melatonin for sleep  9/12/22 back on pamelor 10 mg     neck arthritis  3/24/15 MRI cervical spine C4-C5 small disc osteophyte complex, moderate left facet arthropathy, multilevel DJD  4/14/21 EMG nerve conduction study right upper extremity consistent with right median neuropathy without denervation, possible mild right ulnar neuropathy, no evidence of cervical radiculopathy     pacemaker  4/16/15  cardiology procedure note, dual-chamber pacemaker implantation  2/8/22 cardiology note pacemaker nearing EDIE, we will go ahead and extracted failing RV lead transvenously and replace, patient agreeable to proceed  4/6/22  cardiology procedure note transvenous dual-chamber PPM lead extraction, pacemaker generator removal, new permanent pacemaker implantation pulse generator boston  model L311 serial # [id number]  11/16/22 cardiology note device interrogation 80% paced     Paroxysmal atrial fibrillation  11/18/14 echo normal LV size and function, grade 2 diastolic dysfunction, EF 60-65%, mild aortic stenosis, mild aortic insufficiency, RVSP 20-25  11/19/14 holter sinus with left bundle branch block, average rate 57, minimum heart rate 44, maximal heart rate 85, PVCs, PACs   12/12/14 ALLEN known post-ablation for hypertrophic cardiomyopathy without obstruction, small amount of remaining myocardium of lower tract does not cause significant outflow gradient, mild aortic insufficiency, moderate central mitral regurgitation, remnant myocardium in the LVOT with mild obstruction, peak outflow gradient 11 (previously 16-18)  11/30/15 cardiology note paroxysmal atrial fibrillation, short atrial fibrillation under 4 minutes  2/22/16 cardiology note minimal atrial fibrillation on dual-chamber pacemaker check  3/24/16 cardiology note minimal atrial fibrillation   10/24/16 cardiology note longer episodes of atrial fibrillation, still at 2.6 hours total on dual-chamber pacemaker review, continues on xarelto  9/26/18 echo normal LV systolic function, EF 55%, mild LVH, grade 1 diastolic dysfunction, evidence of LVOT, septal wall thickness 1.2 cm, consider subaortic membrane, mild to moderate mitral regurgitation, mild aortic insufficiency  10/8/18 cardiology note does not appear to be subaortic membrane on echo, perhaps subaortic thickening, continue to monitor echo of full gradient for recurrence continue oral anticoagulation gradient is low and asymptomatic, xarelto, norpace, lopressor  1/29/19 cardiology note, patient still having some issues with short-term memory, will switch to coumadin from xarelto with recent event, referral to neurology, paroxysmal atrial fibrillation burden less than 1%  2/28/19 cardiology note hypertrophic obstructive cardiomyopathy and paroxysmal atrial fibrillation, sinus on exam,  medi-lynx ventricular premature contractions isolated but frequent, asymptomatic, one short episode of paroxysmal atrial fibrillation 1.5 minutes, continues on warfarin INR 2.0, continues on norpace, increase metoprolol XL to 50 mg follow-up 1 month   10/10/19 cardiology note hypertrophic cardiomyopathy status post septal ablation, pacemaker, paroxysmal atrial fibrillation relatively short episodes, longest episode 49 minutes remainder less than 1 minute in length, remains controlled on toprol, norpace, coumadin per coumadin clinic  10/31/19 echo normal left ventricular function, EF 60%, basal septal wall appears hypokinetic, grade 2 diastolic dysfunction, moderately dilated left atrium volume index 44 mL, mild mitral regurgitation, mild aortic stenosis, mild aortic insufficiency, peak gradient 23  1/7/20 ultrasound carotid less than 50% stenosis right internal carotid, left carotid mild plaque carotid bifurcation  8/25/20 cardiology note pacemaker, device check, no arrhythmia burden, change from coumadin to eliquis follow-up 6 months repeat echo at that time  2/3/21 echo mild LVH, EF 65%, grade 2 diastolic dysfunction, RVSP 30  2/26/21  cardiology note pacemaker, paroxysmal atrial fibrillation stable, echo reviewed no significant LVOT gradient, continue norpace and toprol discussed options of RV lead replacement including transvenous extraction, implanting the lead, or performing generator change and using it until no longer working, we will plan on transvenous extraction and new RV lead, follow-up 6 months  8/11/21 cardiology note would like to exclude obstructive CAD but do not think pretest probability is high enough to warrant catheterization, will order coronary CTA follow-up 6 months  8/11/21 cardiology note EKG sinus, ordered CTA  10/5/21 CT-CTA heart left main no plaque or stenosis, LAD 25% stenosis, circumflex and obtuse marginal patent, RCA, posterior descending and posterior lateral branches patent  no stenosis, normal LV size, ascending aorta 3.5 cm  2/8/22 cardiology note pacemaker nearing EDIE, we will go ahead and extracted failing RV lead transvenously and replace, patient agreeable to proceed  4/6/22  cardiology procedure note transvenous dual-chamber PPM lead extraction, pacemaker generator removal, new permanent pacemaker implantation pulse generator boston model L311 serial # [id number]  4/27/22  cardiology note having higher PVCs and atrial arrhythmia burden since extraction and new implant, device functioning appropriately, follow-up 4 weeks  5/27/22  cardiology note device interrogation, increasing sustained atrial fibrillation episodes up to 5 hours, options discussed, increase norpace to 200 mg tid for now, plan pulmonary vein isolation next available  6/27/22  cardiology note plan for ablation August, lower metoprolol to 50 mg bid and norpace to 150 mg tid  8/4/22 transesophageal echo prior to ablation preserved LV function, left atrial enlargement, mild MR and mild to moderate aortic insufficiency, no evidence of thrombus  8/14/22  cardiology EPS noted, successful RF ablation pulmonary vein isolation procedure, resume anticoagulation, start carafate bid x 2 weeks and PPI x one month  11/16/22 cardiology note device interrogation 80% paced, start walking down beta-blocker and norpace, decrease metoprolol to 25 mg, can decrease norpace if doing well follow-up 6 months     Preventative health  12/2/16 tdap  2/20/18 pap per gyn  2/3/20 pneumovax  2/3/20 hep c ab negative  3/5/20 colon per [facility] negative, repeat 10 years  9/11/20 declines sonocine  3/3/21 A1c 5.4%  3/23/21 dexa LS-0.4,hip-0.2  11/22/21 vit d 42  5/19/22 covid 4th  8/22/22 prevnar 20  9/12/22 flu  10/4/22 mammogram heterogeneously dense breast tissue offered screening breast ultrasound  10/17/22 bivalent     sleep apnea  2/3/17 sleep apnea referral pending, OPO ordered  2/14/17 apnea link per [facility], AHI  19.7, low saturation 83, time<90% saturation 199 minutes, time less than 85% saturation 1 minute  2/16/17 sleep consultation, proceed with polysomnography  3/26/17 sleep study duration 430 minutes, AHI 21.3, low saturation 88%, mild to moderate sleep-disordered breathing with significant disruption of sleep continuity, limb movement disorder could be present as well, cpap titration recommended  3/31/17 sleep note ordered autocpap 5-15 nightly, follow-up 6-8 weeks  11/30/18 sleep note on autocpap 5-15  6/4/20 sleep note on autopap 5 to 15  2/3/21 echo mild LVH, EF 65%, grade 2 diastolic dysfunction, RVSP 30  9/3/21 sleep note on autocpap 5 to 15, compliance report 96.7% usage average time 5 hours 30 minutes AHI 6.9, terrance device recall     S/p carpal tunnel right release  2/25/21 EMG nerve conduction study right upper extremity evaluate carpal tunnel  3/3/21 wbc 6.9 (41%N,51%L),NAVIN negative,SPEP negative  4/14/21 EMG nerve conduction study right upper extremity consistent with right median neuropathy without denervation, possible mild right ulnar neuropathy, no evidence of cervical radiculopathy  6/1/21  orthopedic operative note right endoscopic carpal tunnel release and left carpal tunnel injection under anesthesia  6/14/21  orthopedic note status post right endoscopic carpal tunnel release and left carpal tunnel injection under anesthesia anesthesia, follow-up 8 to 10 weeks   8/25/21 PACO note status post right endoscopic carpal tunnel release follow-up 8 to 10 weeks    Venous insufficiency  1/22/19 [facility] note venous insufficiency, physical exam does not indicate any evidence of venous insufficiency, patient declines venous ultrasound                                   Patient Active Problem List   Diagnosis    Hypertrophic obstructive cardiomyopathy (HCC)    S/P carpal tunnel release    Pacemaker    Preventative health care    GERD (gastroesophageal reflux disease)    Neck arthritis     "Decreased GFR    Dyslipidemia    Paroxysmal atrial fibrillation (HCC)    Sleep apnea    History of transient global amnesia    Venous insufficiency    IBS (irritable bowel syndrome)    Abnormal nasal septum    CLL (chronic lymphocytic leukemia) (HCC)    History of headache    Insomnia    H/O cardiac radiofrequency ablation 8/4/22 Dr Witt     Depression Screening  Little interest or pleasure in doing things?  0 - not at all  Feeling down, depressed , or hopeless? 0 - not at all  Patient Health Questionnaire Score: 0                  Patient Care Team:  Juve Stoddard M.D. as PCP - General (Internal Medicine)  Vibha Chua M.D. (Nephrology)  [facility] as Pharmacist      ROS           Objective     /74 (BP Location: Right arm, Patient Position: Sitting, BP Cuff Size: Adult)   Pulse 64   Temp 36.4 °C (97.6 °F)   Ht 1.626 m (5' 4\")   Wt 89.4 kg (197 lb)   LMP 01/31/2012 (LMP Unknown)   BMI 33.81 kg/m²      Physical Exam  Vitals and nursing note reviewed.   Constitutional:       Appearance: Normal appearance.   HENT:      Head: Normocephalic and atraumatic.      Right Ear: Tympanic membrane, ear canal and external ear normal.      Left Ear: Tympanic membrane, ear canal and external ear normal.      Nose: Congestion present.      Mouth/Throat:      Mouth: Mucous membranes are moist.      Pharynx: No oropharyngeal exudate.   Eyes:      Conjunctiva/sclera: Conjunctivae normal.   Cardiovascular:      Rate and Rhythm: Normal rate.      Heart sounds: Normal heart sounds.   Pulmonary:      Effort: Pulmonary effort is normal.      Breath sounds: Normal breath sounds.   Abdominal:      General: There is no distension.   Musculoskeletal:         General: No swelling.   Skin:     Coloration: Skin is not jaundiced.      Findings: No bruising.   Neurological:      General: No focal deficit present.      Mental Status: She is alert.   Psychiatric:         Mood and Affect: Mood normal.         " Behavior: Behavior normal.                         Assessment & Plan     Assessment    #1 cough x2 months, lungs are clear, normal saturation, initially had a sore throat COVID-negative at that time, seems to be improving as far as intensity and frequency, consider postinfectious cough, timing wise discontinuation of Flonase around the same time coinciding with cough possibly a postnasal drip component from allergic rhinitis, also consider reflux on Pepcid previously she had been on PPI    #2 paroxysmal atrial fibrillation status post ablation by cardiology in August, stable on Norpace, metoprolol, Eliquis    #3 chronic anticoagulation with Eliquis no NSAIDs, no bleeding issues    #4 sleep apnea on CPAP has a new machine and is using this consistently    #5 CLL stage 0 followed by hematology oncology most recent labs 11/1/22 wbc 10.9 (31%N,62%L),, most recent office note 11/9/22  oncology note stable continue to monitor    #6 BMI 33.8 obesity has started an exercise program    #7 history of hypertrophic cardiomyopathy, no mention on last ALLEN in August of LVOT    Plan   #1 chest x-ray    #2 trial of Atrovent nasal    #3 if cough persist and x-ray negative consider titrating from H2 blocker to PPI, consider PFTs    #4 continue check blood pressure regular, follow-up cardiology    #5 continue her exercise program that she has started, continue good nutrition program limiting sweets and processed foods    #6 follow-up sleep apnea referral placed    #7 follow-up gynecology    #8 she can get the shingles vaccine at the pharmacy    #9 due for for preventative labs ordered she can have that done with her next CBC from hematology oncology

## 2023-02-03 ENCOUNTER — HOSPITAL ENCOUNTER (OUTPATIENT)
Dept: RADIOLOGY | Facility: MEDICAL CENTER | Age: 63
End: 2023-02-03
Attending: INTERNAL MEDICINE
Payer: COMMERCIAL

## 2023-02-03 ENCOUNTER — TELEPHONE (OUTPATIENT)
Dept: MEDICAL GROUP | Facility: MEDICAL CENTER | Age: 63
End: 2023-02-03
Payer: COMMERCIAL

## 2023-02-03 DIAGNOSIS — R05.9 COUGH, UNSPECIFIED TYPE: ICD-10-CM

## 2023-02-03 PROCEDURE — 71046 X-RAY EXAM CHEST 2 VIEWS: CPT

## 2023-02-03 RX ORDER — ALBUTEROL SULFATE 90 UG/1
2 AEROSOL, METERED RESPIRATORY (INHALATION) EVERY 4 HOURS PRN
Qty: 1 EACH | Refills: 3 | Status: SHIPPED | OUTPATIENT
Start: 2023-02-03

## 2023-02-09 ENCOUNTER — OFFICE VISIT (OUTPATIENT)
Dept: SLEEP MEDICINE | Facility: MEDICAL CENTER | Age: 63
End: 2023-02-09
Payer: COMMERCIAL

## 2023-02-09 VITALS
DIASTOLIC BLOOD PRESSURE: 64 MMHG | HEIGHT: 64 IN | BODY MASS INDEX: 34.09 KG/M2 | HEART RATE: 66 BPM | WEIGHT: 199.7 LBS | SYSTOLIC BLOOD PRESSURE: 122 MMHG | RESPIRATION RATE: 16 BRPM | OXYGEN SATURATION: 97 %

## 2023-02-09 DIAGNOSIS — G47.33 OBSTRUCTIVE SLEEP APNEA SYNDROME: ICD-10-CM

## 2023-02-09 PROCEDURE — 99213 OFFICE O/P EST LOW 20 MIN: CPT | Performed by: NURSE PRACTITIONER

## 2023-02-09 ASSESSMENT — FIBROSIS 4 INDEX: FIB4 SCORE: 1.78

## 2023-02-09 NOTE — PROGRESS NOTES
Chief Complaint   Patient presents with    Apnea       HPI:  Seda Cagle is a 62 y.o. year old female here today for follow-up on GEORGINA.  Last seen by me on 9/3/2021.  Patient has a history of hypertrophic cardiomyopathy and atrial fib with septal ablation and pacemaker placement.  She is followed by cardiology.  In regards to sleep apnea, she underwent a home sleep study on February 14, 2017 which showed an DUONG of 19.7.  Repeat polysomnogram showed AHI of 21.3.  She was started on auto CPAP at 5 to 15 cm/H2O.    Was previously on a recall of Carmen RespirDacudas device.  She received a DreamStation 2 replacement device on 10/1/2022.  She has been using since.  She purchases her replacement supplies out of pocket through [employer].  She is currently using a nasal cushion and denies any difficulty with mask fit or pressures.  She gets between 5 to 7 hours of sleep and does have occasional difficulty falling asleep.  She notes improved sleep quality with CPAP use.  Her energy levels during the day are significantly increased.  Currently she denies any excessive daytime sleepiness, morning headaches, palpitations, concentration or memory problems.    Patient did not bring her device or ST card.  Therefore proper compliance report was unable to be reviewed.  She did have a report that was used via her phone antonio.  60-day report shows 96.7% use with an average time of 4 hours and 45 minutes.  There is an elevation in AHI, but I am unable to ascertain whether or not this is treatment emergent central apnea or what the causes.  Antonio does show proper mask fit.    Patient will bring in device tomorrow for proper compliance download and review.      ROS: As per HPI and otherwise negative if not stated.    Past Medical History:   Diagnosis Date    Anesthesia     wakes up during procedure (x2)    Aortic regurgitation     Arrhythmia, ventricular 2008    Symptomatic PVCs, controlled with medication.    Asthma     inhaler  PRN, only uses when sick     Atrial fibrillation (HCC)     Awareness under anesthesia     Cardiac arrhythmia     Cervical arthritis 02/08/2016    Chickenpox     Chronic kidney disease, stage III (moderate) (HCC) 02/09/2016    CTS (carpal tunnel syndrome) 02/28/2011    Depression     GERD (gastroesophageal reflux disease)     Heart burn     Heart murmur     Hemorrhagic disorder (HCC)     on Eliquis    Hypertr obst cardiomyop 1991    Dr. Bert Vazquez, [address] [facility], [address], [place] [address] [phone] fax [phone].  [facility] [phone].  Alchol Septal Reduction 8/00, Coronaries normal.    Hypertrophic cardiomyopathy (HCC)     Indigestion     Influenza     Pacemaker 2014    Renal disorder     chronic kidney disease stage 3-  **pt states currently no issue, numbers are stable    Sleep apnea     uses CPAP    Snoring        Past Surgical History:   Procedure Laterality Date    PACEMAKER INSERTION  04/2022    PB WRIST ARTHROSCOP,RELEASE XVERS LIG Right 06/01/2021    Procedure: RELEASE, CARPAL TUNNEL, ENDOSCOPIC;  Surgeon: Mike Ospina M.D.;  Location: SURGERY [facility];  Service: Orthopedics    JOINT INJECTION DIAGNOSTIC Left 06/01/2021    Procedure: INJECTION, JOINT, DIAGNOSTIC-CARPAL TUNNEL;  Surgeon: Mike Ospina M.D.;  Location: SURGERY [facility];  Service: Orthopedics    HYSTEROSCOPY WITH VIDEO OPERATIVE  04/30/2018    Procedure: HYSTEROSCOPY WITH VIDEO OPERATIVE;  Surgeon: Noemi Liu M.D.;  Location: SURGERY SAME DAY [facility];  Service: Labor and Delivery    DILATION AND CURETTAGE  04/30/2018    Procedure: DILATION AND CURETTAGE;  Surgeon: Noemi Liu M.D.;  Location: SURGERY SAME DAY [facility];  Service: Labor and Delivery    BREAST BIOPSY Left 01/30/2018    Procedure: BREAST BIOPSY- WIRE LOCALIZED;  Surgeon: Vijaya Britt M.D.;  Location: SURGERY SAME DAY [facility];  Service: General    RECOVERY  04/15/2015    Performed by [facility] at  "SURGERY PRE-POST PROC UNIT [facility]    PACEMAKER INSERTION  2015    RECOVERY  2014    Performed by [facility] at SURGERY SAME DAY [facility]    SEPTAL RECONSTRUCTION      ENDOSCOPY      PRIMARY C SECTION      TUBAL COAGULATION LAPAROSCOPIC BILATERAL         Family History   Problem Relation Age of Onset    Heart Disease Mother         CHF    Hypertension Mother     Stroke Mother     Heart Failure Mother     Other Mother         carotid artery disease,gout    Cancer Father         AML    Diabetes Maternal Grandmother     Cancer Maternal Grandfather         Prostate    Heart Disease Paternal Grandfather 52         MI     Sleep Apnea Neg Hx        Allergies as of 2023    (No Known Allergies)        Vitals:  /64 (BP Location: Left arm, Patient Position: Sitting, BP Cuff Size: Large adult)   Pulse 66   Resp 16   Ht 1.626 m (5' 4\")   Wt 90.6 kg (199 lb 11.2 oz)   SpO2 97%     Current medications as of today   Current Outpatient Medications   Medication Sig Dispense Refill    albuterol 108 (90 Base) MCG/ACT Aero Soln inhalation aerosol Inhale 2 Puffs every four hours as needed for Shortness of Breath. 1 Each 3    ipratropium (ATROVENT) 0.03 % Solution Administer 2 Sprays into affected nostril(S) 2 times a day. 30 mL 3    disopyramide (NORPACE) 150 MG Cap Take 1 Capsule by mouth in the morning, at noon, and at bedtime. 270 Capsule 2    atorvastatin (LIPITOR) 40 MG Tab       metoprolol SR (TOPROL XL) 25 MG TABLET SR 24 HR Take 1 Tablet by mouth every day. 30 Tablet 5    nortriptyline (PAMELOR) 10 MG Cap Take 1 Capsule by mouth every evening. 90 Capsule 3    famotidine (PEPCID) 40 MG Tab Take 1 Tablet by mouth 2 times a day. 180 Tablet 3    ELIQUIS 5 MG Tab TAKE ONE TABLET BY MOUTH TWICE A DAY (Patient taking differently: Take 5 mg by mouth 2 times a day.) 180 Tablet 3    acetaminophen (TYLENOL) 325 MG Tab Take 2 Tablets by mouth 1 time a day as needed. Indications: Pain      Multiple " Vitamin (MULTI-VITAMIN PO) Take 1 Tablet by mouth every day.      Pantothenic Acid 500 MG Tab Take 1 Tablet by mouth every day.      Calcium-Vitamin D-Vitamin K (CALCIUM FOR WOMEN PO) Take 1 Each by mouth every day.      Magnesium 200 MG TABS Take 1 Tab by mouth every day.      Probiotic Product (PROBIOTIC-10 PO) Take 1 Capsule by mouth every day.       No current facility-administered medications for this visit.         Physical Exam:   Gen:           Alert and oriented, No apparent distress. Mood and affect appropriate, normal interaction with examiner.  Eyes:          PERRL, EOM intact, sclere white, conjunctive moist.  Ears:          Not examined.   Hearing:     Grossly intact.  Nose:          Normal, no lesions or deformities.  Dentition:    Good dentition.  Oropharynx:   Tongue normal, posterior pharynx without erythema or exudate.  Neck:        Supple, trachea midline, no masses.  Respiratory Effort: No intercostal retractions or use of accessory muscles.   Lung Auscultation:      Clear to auscultation bilaterally; no rales, rhonchi or wheezing.  CV:            Regular rate and rhythm. No murmurs, rubs or gallops.  Abd:           Not examined.   Lymphadenopathy: Not examined.  Gait and Station: Normal.  Digits and Nails: No clubbing, cyanosis, petechiae, or nodes.   Cranial Nerves: II-XII grossly intact.  Skin:        No rashes, lesions or ulcers noted.               Ext:           No cyanosis or edema.      Assessment:  1. Obstructive sleep apnea syndrome            Plan:  Presents for annual GEORGINA compliance.  Patient is using her machine nightly and does note improved sleep quality with CPAP use.  Compliance is not available to be reviewed.  Patient does have elevated AHI on DreamStation 2.  Patient will bring in device tomorrow for compliance download and review.  Does note overall improved sleep quality and control of symptoms with CPAP use.  Recommended continuing use.  If there is evidence of treatment  emergent central sleep apnea and adjustments can be made, can make adjustments tomorrow.  Otherwise patient may benefit from titration study.  I am encouraged by resolution of symptoms and at minimum patient should follow-up in 1 year, but can be seen sooner if needed.    Please note that this dictation was created using voice recognition software. I have made every reasonable attempt to correct obvious errors, but it is possible there are errors of grammar and possibly content that I did not discover before finalizing the note.

## 2023-02-17 ENCOUNTER — TELEPHONE (OUTPATIENT)
Dept: MEDICAL GROUP | Facility: MEDICAL CENTER | Age: 63
End: 2023-02-17
Payer: COMMERCIAL

## 2023-02-17 RX ORDER — MONTELUKAST SODIUM 10 MG/1
10 TABLET ORAL DAILY
Qty: 30 TABLET | Refills: 3 | Status: SHIPPED
Start: 2023-02-17 | End: 2023-02-28 | Stop reason: SDUPTHER

## 2023-02-28 RX ORDER — MONTELUKAST SODIUM 10 MG/1
10 TABLET ORAL DAILY
Qty: 90 TABLET | Refills: 1 | Status: SHIPPED | OUTPATIENT
Start: 2023-02-28 | End: 2023-05-22

## 2023-03-02 ENCOUNTER — APPOINTMENT (RX ONLY)
Dept: URBAN - METROPOLITAN AREA CLINIC 4 | Facility: CLINIC | Age: 63
Setting detail: DERMATOLOGY
End: 2023-03-02

## 2023-03-02 DIAGNOSIS — L66.12 FRONTAL FIBROSING ALOPECIA: ICD-10-CM

## 2023-03-02 DIAGNOSIS — Z71.89 OTHER SPECIFIED COUNSELING: ICD-10-CM

## 2023-03-02 DIAGNOSIS — L57.0 ACTINIC KERATOSIS: ICD-10-CM

## 2023-03-02 DIAGNOSIS — L66.8 OTHER CICATRICIAL ALOPECIA: ICD-10-CM

## 2023-03-02 PROCEDURE — ? PRESCRIPTION

## 2023-03-02 PROCEDURE — ? COUNSELING

## 2023-03-02 PROCEDURE — ? ADDITIONAL NOTES

## 2023-03-02 PROCEDURE — 99204 OFFICE O/P NEW MOD 45 MIN: CPT

## 2023-03-02 RX ORDER — FLUOROURACIL 2 G/40G
1 CREAM TOPICAL BID
Qty: 40 | Refills: 0 | Status: ERX | COMMUNITY
Start: 2023-03-02

## 2023-03-02 RX ADMIN — FLUOROURACIL 1: 2 CREAM TOPICAL at 00:00

## 2023-03-02 ASSESSMENT — LOCATION ZONE DERM
LOCATION ZONE: SCALP
LOCATION ZONE: FACE

## 2023-03-02 ASSESSMENT — LOCATION SIMPLE DESCRIPTION DERM
LOCATION SIMPLE: LEFT FOREHEAD
LOCATION SIMPLE: RIGHT EYEBROW
LOCATION SIMPLE: FRONTAL SCALP
LOCATION SIMPLE: LEFT EYEBROW

## 2023-03-02 ASSESSMENT — LOCATION DETAILED DESCRIPTION DERM
LOCATION DETAILED: MEDIAL FRONTAL SCALP
LOCATION DETAILED: LEFT CENTRAL EYEBROW
LOCATION DETAILED: RIGHT CENTRAL EYEBROW
LOCATION DETAILED: LEFT MEDIAL FOREHEAD

## 2023-03-02 NOTE — PROCEDURE: ADDITIONAL NOTES
Detail Level: Detailed
Render Risk Assessment In Note?: no
Additional Notes: Recommended thyroid check with PCP.  If rash continues would like her to return as soon as possible to assess.

## 2023-03-07 DIAGNOSIS — L65.9 ALOPECIA: ICD-10-CM

## 2023-03-21 ENCOUNTER — APPOINTMENT (OUTPATIENT)
Dept: RADIOLOGY | Facility: MEDICAL CENTER | Age: 63
End: 2023-03-21
Attending: EMERGENCY MEDICINE
Payer: COMMERCIAL

## 2023-03-21 ENCOUNTER — HOSPITAL ENCOUNTER (EMERGENCY)
Facility: MEDICAL CENTER | Age: 63
End: 2023-03-21
Attending: EMERGENCY MEDICINE
Payer: COMMERCIAL

## 2023-03-21 VITALS
HEART RATE: 60 BPM | BODY MASS INDEX: 33.27 KG/M2 | DIASTOLIC BLOOD PRESSURE: 70 MMHG | SYSTOLIC BLOOD PRESSURE: 161 MMHG | OXYGEN SATURATION: 94 % | WEIGHT: 194.89 LBS | RESPIRATION RATE: 17 BRPM | HEIGHT: 64 IN | TEMPERATURE: 97.3 F

## 2023-03-21 DIAGNOSIS — M25.512 ACUTE PAIN OF LEFT SHOULDER: ICD-10-CM

## 2023-03-21 LAB
ALBUMIN SERPL BCP-MCNC: 4.2 G/DL (ref 3.2–4.9)
ALBUMIN/GLOB SERPL: 1.4 G/DL
ALP SERPL-CCNC: 111 U/L (ref 30–99)
ALT SERPL-CCNC: 27 U/L (ref 2–50)
ANION GAP SERPL CALC-SCNC: 13 MMOL/L (ref 7–16)
AST SERPL-CCNC: 26 U/L (ref 12–45)
BASOPHILS # BLD AUTO: 0 % (ref 0–1.8)
BASOPHILS # BLD: 0 K/UL (ref 0–0.12)
BILIRUB SERPL-MCNC: 0.7 MG/DL (ref 0.1–1.5)
BUN SERPL-MCNC: 17 MG/DL (ref 8–22)
CALCIUM ALBUM COR SERPL-MCNC: 9.4 MG/DL (ref 8.5–10.5)
CALCIUM SERPL-MCNC: 9.6 MG/DL (ref 8.5–10.5)
CHLORIDE SERPL-SCNC: 102 MMOL/L (ref 96–112)
CO2 SERPL-SCNC: 23 MMOL/L (ref 20–33)
CREAT SERPL-MCNC: 0.82 MG/DL (ref 0.5–1.4)
EKG IMPRESSION: NORMAL
EOSINOPHIL # BLD AUTO: 0.13 K/UL (ref 0–0.51)
EOSINOPHIL NFR BLD: 0.9 % (ref 0–6.9)
ERYTHROCYTE [DISTWIDTH] IN BLOOD BY AUTOMATED COUNT: 41.1 FL (ref 35.9–50)
GFR SERPLBLD CREATININE-BSD FMLA CKD-EPI: 80 ML/MIN/1.73 M 2
GLOBULIN SER CALC-MCNC: 3.1 G/DL (ref 1.9–3.5)
GLUCOSE SERPL-MCNC: 90 MG/DL (ref 65–99)
HCT VFR BLD AUTO: 44.2 % (ref 37–47)
HGB BLD-MCNC: 15.2 G/DL (ref 12–16)
LYMPHOCYTES # BLD AUTO: 9.22 K/UL (ref 1–4.8)
LYMPHOCYTES NFR BLD: 64 % (ref 22–41)
MANUAL DIFF BLD: NORMAL
MCH RBC QN AUTO: 30 PG (ref 27–33)
MCHC RBC AUTO-ENTMCNC: 34.4 G/DL (ref 33.6–35)
MCV RBC AUTO: 87.4 FL (ref 81.4–97.8)
MONOCYTES # BLD AUTO: 0.37 K/UL (ref 0–0.85)
MONOCYTES NFR BLD AUTO: 2.6 % (ref 0–13.4)
MORPHOLOGY BLD-IMP: NORMAL
NEUTROPHILS # BLD AUTO: 4.68 K/UL (ref 2–7.15)
NEUTROPHILS NFR BLD: 32.5 % (ref 44–72)
NRBC # BLD AUTO: 0 K/UL
NRBC BLD-RTO: 0 /100 WBC
PLATELET # BLD AUTO: 189 K/UL (ref 164–446)
PLATELET BLD QL SMEAR: NORMAL
PMV BLD AUTO: 10 FL (ref 9–12.9)
POTASSIUM SERPL-SCNC: 3.9 MMOL/L (ref 3.6–5.5)
PROT SERPL-MCNC: 7.3 G/DL (ref 6–8.2)
RBC # BLD AUTO: 5.06 M/UL (ref 4.2–5.4)
RBC BLD AUTO: PRESENT
SMUDGE CELLS BLD QL SMEAR: NORMAL
SODIUM SERPL-SCNC: 138 MMOL/L (ref 135–145)
TROPONIN T SERPL-MCNC: 15 NG/L (ref 6–19)
WBC # BLD AUTO: 14.4 K/UL (ref 4.8–10.8)

## 2023-03-21 PROCEDURE — 700102 HCHG RX REV CODE 250 W/ 637 OVERRIDE(OP): Performed by: EMERGENCY MEDICINE

## 2023-03-21 PROCEDURE — 93005 ELECTROCARDIOGRAM TRACING: CPT

## 2023-03-21 PROCEDURE — 93005 ELECTROCARDIOGRAM TRACING: CPT | Performed by: EMERGENCY MEDICINE

## 2023-03-21 PROCEDURE — 99284 EMERGENCY DEPT VISIT MOD MDM: CPT

## 2023-03-21 PROCEDURE — 80053 COMPREHEN METABOLIC PANEL: CPT

## 2023-03-21 PROCEDURE — 700101 HCHG RX REV CODE 250: Performed by: EMERGENCY MEDICINE

## 2023-03-21 PROCEDURE — A9270 NON-COVERED ITEM OR SERVICE: HCPCS | Performed by: EMERGENCY MEDICINE

## 2023-03-21 PROCEDURE — 71045 X-RAY EXAM CHEST 1 VIEW: CPT

## 2023-03-21 PROCEDURE — 36415 COLL VENOUS BLD VENIPUNCTURE: CPT

## 2023-03-21 PROCEDURE — 84484 ASSAY OF TROPONIN QUANT: CPT

## 2023-03-21 PROCEDURE — 85025 COMPLETE CBC W/AUTO DIFF WBC: CPT

## 2023-03-21 PROCEDURE — 85007 BL SMEAR W/DIFF WBC COUNT: CPT

## 2023-03-21 RX ORDER — LIDOCAINE 50 MG/G
1 PATCH TOPICAL EVERY 24 HOURS
Qty: 10 PATCH | Refills: 0 | Status: SHIPPED | OUTPATIENT
Start: 2023-03-21 | End: 2023-05-22

## 2023-03-21 RX ORDER — CYCLOBENZAPRINE HCL 10 MG
10 TABLET ORAL ONCE
Status: COMPLETED | OUTPATIENT
Start: 2023-03-21 | End: 2023-03-21

## 2023-03-21 RX ORDER — LIDOCAINE 50 MG/G
1 PATCH TOPICAL EVERY 24 HOURS
Status: DISCONTINUED | OUTPATIENT
Start: 2023-03-21 | End: 2023-03-21 | Stop reason: HOSPADM

## 2023-03-21 RX ORDER — ORPHENADRINE CITRATE 100 MG/1
100 TABLET, EXTENDED RELEASE ORAL 2 TIMES DAILY PRN
Qty: 20 TABLET | Refills: 0 | Status: SHIPPED | OUTPATIENT
Start: 2023-03-21 | End: 2023-05-22

## 2023-03-21 RX ADMIN — LIDOCAINE 1 PATCH: 50 PATCH TOPICAL at 17:51

## 2023-03-21 RX ADMIN — CYCLOBENZAPRINE 10 MG: 10 TABLET, FILM COATED ORAL at 18:43

## 2023-03-21 ASSESSMENT — HEART SCORE
HISTORY: SLIGHTLY SUSPICIOUS
TROPONIN: LESS THAN OR EQUAL TO NORMAL LIMIT
RISK FACTORS: 1-2 RISK FACTORS
AGE: 45-64

## 2023-03-21 ASSESSMENT — FIBROSIS 4 INDEX: FIB4 SCORE: 1.78

## 2023-03-21 NOTE — ED TRIAGE NOTES
Ambulatory to triage with   Chief Complaint   Patient presents with    Shoulder Pain    Chest Pain     Developed L shoulder pain 3 days ago that began to radiate to left chest today. States she took tylenol and ibuprofen with no relief of pain. EKG complete. Protocol ordered.

## 2023-03-22 NOTE — ED PROVIDER NOTES
ER Provider Note    Scribed for Segun Turner M.d. by Katie Valenzuela. 3/21/2023  5:30 PM    Primary Care Provider: Juve Stoddard M.D.    CHIEF COMPLAINT  Chief Complaint   Patient presents with    Shoulder Pain    Chest Pain     LIMITATION TO HISTORY   Select: : None    HPI/ROS  OUTSIDE HISTORIAN(S):  None    EXTERNAL RECORDS REVIEWED  Outpatient records show close follow-up with primary care and cardiology.    Seda Cagle is a 62 y.o. female who presents to the ED for for evaluation of left shoulder pain onset Friday. Patient reports that her left shoulder discomfort began on Friday. On Saturday, she was moving boxes and the discomfort became exacerbated. Patient used a massage gun on the area and worsened the pain. Last night, she took two Advil; she said that she is not supposed to take these because she is on blood thinners and other medication. Seda also endorses hypertension last night. Then today, the left shoulder pain radiated to left chest pain. She has associated left neck pain, but she denies fever. No alleviating factors were reported. Patient states that her exercise tolerance is the same with no chest discomfort; she uses a elliptical. She has no exertional symptoms.       PAST MEDICAL HISTORY  Past Medical History:   Diagnosis Date    Anesthesia     wakes up during procedure (x2)    Aortic regurgitation     Arrhythmia, ventricular 2008    Symptomatic PVCs, controlled with medication.    Asthma     inhaler PRN, only uses when sick     Atrial fibrillation (HCC)     Awareness under anesthesia     Cardiac arrhythmia     Cervical arthritis 02/08/2016    Chickenpox     Chronic kidney disease, stage III (moderate) (HCC) 02/09/2016    CTS (carpal tunnel syndrome) 02/28/2011    Depression     GERD (gastroesophageal reflux disease)     Heart burn     Heart murmur     Hemorrhagic disorder (HCC)     on Eliquis    Hypertr obst cardiomyop 1991    Dr. Bert Vazquez, [address],  [place] [address] [phone] fax [phone].  [facility] [phone].  Alchol Septal Reduction 8/00, Coronaries normal.    Hypertrophic cardiomyopathy (HCC)     Indigestion     Influenza     Pacemaker 2014    Renal disorder     chronic kidney disease stage 3-  **pt states currently no issue, numbers are stable    Sleep apnea     uses CPAP    Snoring        SURGICAL HISTORY  Past Surgical History:   Procedure Laterality Date    PACEMAKER INSERTION  04/2022    PB WRIST ARTHROSCOP,RELEASE XVERS LIG Right 06/01/2021    Procedure: RELEASE, CARPAL TUNNEL, ENDOSCOPIC;  Surgeon: Mike Ospina M.D.;  Location: SURGERY [facility];  Service: Orthopedics    JOINT INJECTION DIAGNOSTIC Left 06/01/2021    Procedure: INJECTION, JOINT, DIAGNOSTIC-CARPAL TUNNEL;  Surgeon: Mike Ospina M.D.;  Location: SURGERY [facility];  Service: Orthopedics    HYSTEROSCOPY WITH VIDEO OPERATIVE  04/30/2018    Procedure: HYSTEROSCOPY WITH VIDEO OPERATIVE;  Surgeon: Noemi Liu M.D.;  Location: SURGERY SAME DAY [facility];  Service: Labor and Delivery    DILATION AND CURETTAGE  04/30/2018    Procedure: DILATION AND CURETTAGE;  Surgeon: Noemi Liu M.D.;  Location: SURGERY SAME DAY [facility];  Service: Labor and Delivery    BREAST BIOPSY Left 01/30/2018    Procedure: BREAST BIOPSY- WIRE LOCALIZED;  Surgeon: iVjaya Britt M.D.;  Location: SURGERY SAME DAY [facility];  Service: General    RECOVERY  04/15/2015    Performed by [facility] at SURGERY PRE-POST PROC UNIT C    PACEMAKER INSERTION  2015    RECOVERY  12/12/2014    Performed by [facility] at SURGERY SAME DAY [facility]    SEPTAL RECONSTRUCTION  2000    ENDOSCOPY      PRIMARY C SECTION      TUBAL COAGULATION LAPAROSCOPIC BILATERAL         FAMILY HISTORY  Family History   Problem Relation Age of Onset    Heart Disease Mother         CHF    Hypertension Mother     Stroke Mother     Heart Failure Mother     Other Mother   "       carotid artery disease,gout    Cancer Father         AML    Diabetes Maternal Grandmother     Cancer Maternal Grandfather         Prostate    Heart Disease Paternal Grandfather 52         MI     Sleep Apnea Neg Hx        SOCIAL HISTORY   reports that she has never smoked. She has never used smokeless tobacco. She reports current alcohol use of about 0.6 oz per week. She reports that she does not use drugs.    CURRENT MEDICATIONS  Previous Medications    ACETAMINOPHEN (TYLENOL) 325 MG TAB    Take 2 Tablets by mouth 1 time a day as needed. Indications: Pain    ALBUTEROL 108 (90 BASE) MCG/ACT AERO SOLN INHALATION AEROSOL    Inhale 2 Puffs every four hours as needed for Shortness of Breath.    ATORVASTATIN (LIPITOR) 40 MG TAB        CALCIUM-VITAMIN D-VITAMIN K (CALCIUM FOR WOMEN PO)    Take 1 Each by mouth every day.    DISOPYRAMIDE (NORPACE) 150 MG CAP    Take 1 Capsule by mouth in the morning, at noon, and at bedtime.    ELIQUIS 5 MG TAB    TAKE ONE TABLET BY MOUTH TWICE A DAY    MAGNESIUM 200 MG TABS    Take 1 Tab by mouth every day.    METOPROLOL SR (TOPROL XL) 25 MG TABLET SR 24 HR    Take 1 Tablet by mouth every day.    MONTELUKAST (SINGULAIR) 10 MG TAB    Take 1 Tablet by mouth every day.    MULTIPLE VITAMIN (MULTI-VITAMIN PO)    Take 1 Tablet by mouth every day.    NORTRIPTYLINE (PAMELOR) 10 MG CAP    Take 1 Capsule by mouth every evening.    OMEPRAZOLE (PRILOSEC) 20 MG DELAYED-RELEASE CAPSULE    Take 1 Capsule by mouth every day.    PANTOTHENIC ACID 500 MG TAB    Take 1 Tablet by mouth every day.    PROBIOTIC PRODUCT (PROBIOTIC-10 PO)    Take 1 Capsule by mouth every day.       ALLERGIES  Patient has no known allergies.      PHYSICAL EXAM  VITAL SIGNS: BP (!) 189/100 Comment: Left arm  Pulse 72   Temp 36.8 °C (98.2 °F) (Temporal)   Resp 18   Ht 1.626 m (5' 4\")   Wt 88.4 kg (194 lb 14.2 oz)   LMP 2012 (LMP Unknown)   SpO2 98%   BMI 33.45 kg/m²   Pulse ox interpretation: I interpret this " pulse ox as normal.  Constitutional: Alert in no apparent distress.  HENT: No signs of trauma, Bilateral external ears normal, Nose normal.   Eyes: Pupils are equal and reactive, Conjunctiva normal, Non-icteric.   Neck: Normal range of motion, Supple, No stridor.    Cardiovascular: Normal peripheral perfusion  Thorax & Lungs: Unlabored respirations, equal chest expansion, no accessory muscle use  Abdomen: Non-distended  Skin:  No erythema, No rash.   Back: Normal alignment and ROM  Extremities: No gross deformity  Musculoskeletal: Reproducible thoracic paraspinous muscle tenderness on left. Good range of motion in all major joints.   Neurologic: Alert, Normal motor function, No focal deficits noted.   Psychiatric: Affect normal, Judgment normal, Mood normal.      DIAGNOSTIC STUDIES & PROCEDURES    Labs:   Results for orders placed or performed during the hospital encounter of 03/21/23   CBC with Differential   Result Value Ref Range    WBC 14.4 (H) 4.8 - 10.8 K/uL    RBC 5.06 4.20 - 5.40 M/uL    Hemoglobin 15.2 12.0 - 16.0 g/dL    Hematocrit 44.2 37.0 - 47.0 %    MCV 87.4 81.4 - 97.8 fL    MCH 30.0 27.0 - 33.0 pg    MCHC 34.4 33.6 - 35.0 g/dL    RDW 41.1 35.9 - 50.0 fL    Platelet Count 189 164 - 446 K/uL    MPV 10.0 9.0 - 12.9 fL    Neutrophils-Polys 32.50 (L) 44.00 - 72.00 %    Lymphocytes 64.00 (H) 22.00 - 41.00 %    Monocytes 2.60 0.00 - 13.40 %    Eosinophils 0.90 0.00 - 6.90 %    Basophils 0.00 0.00 - 1.80 %    Nucleated RBC 0.00 /100 WBC    Neutrophils (Absolute) 4.68 2.00 - 7.15 K/uL    Lymphs (Absolute) 9.22 (H) 1.00 - 4.80 K/uL    Monos (Absolute) 0.37 0.00 - 0.85 K/uL    Eos (Absolute) 0.13 0.00 - 0.51 K/uL    Baso (Absolute) 0.00 0.00 - 0.12 K/uL    NRBC (Absolute) 0.00 K/uL   Complete Metabolic Panel (CMP)   Result Value Ref Range    Sodium 138 135 - 145 mmol/L    Potassium 3.9 3.6 - 5.5 mmol/L    Chloride 102 96 - 112 mmol/L    Co2 23 20 - 33 mmol/L    Anion Gap 13.0 7.0 - 16.0    Glucose 90 65 - 99  mg/dL    Bun 17 8 - 22 mg/dL    Creatinine 0.82 0.50 - 1.40 mg/dL    Calcium 9.6 8.5 - 10.5 mg/dL    AST(SGOT) 26 12 - 45 U/L    ALT(SGPT) 27 2 - 50 U/L    Alkaline Phosphatase 111 (H) 30 - 99 U/L    Total Bilirubin 0.7 0.1 - 1.5 mg/dL    Albumin 4.2 3.2 - 4.9 g/dL    Total Protein 7.3 6.0 - 8.2 g/dL    Globulin 3.1 1.9 - 3.5 g/dL    A-G Ratio 1.4 g/dL   Troponins NOW   Result Value Ref Range    Troponin T 15 6 - 19 ng/L   CORRECTED CALCIUM   Result Value Ref Range    Correct Calcium 9.4 8.5 - 10.5 mg/dL   ESTIMATED GFR   Result Value Ref Range    GFR (CKD-EPI) 80 >60 mL/min/1.73 m 2   PERIPHERAL SMEAR REVIEW   Result Value Ref Range    Peripheral Smear Review see below    PLATELET ESTIMATE   Result Value Ref Range    Plt Estimation Normal    MORPHOLOGY   Result Value Ref Range    RBC Morphology Present     Smudge Cells Moderate    DIFFERENTIAL MANUAL   Result Value Ref Range    Manual Diff Status PERFORMED    EKG (NOW)   Result Value Ref Range    Report       [facility] Emergency Dept.    Test Date:  2023  Pt Name:    EMANI UREÑA           Department: ER  MRN:        [id number]                      Room:  Gender:     Female                       Technician: [id number]  :        1960                   Requested By:ER TRIAGE PROTOCOL  Order #:    [id number]                    Reading MD: KARMEN VENTURA MD    Measurements  Intervals                                Axis  Rate:       66                           P:          8  NE:         205                          QRS:        -39  QRSD:       169                          T:          129  QT:         491  QTc:        515    Interpretive Statements  Sinus rhythm  Left bundle branch block  Baseline wander in lead(s) V3,V4,V5,V6  Compared to ECG 2022 14:08:13  Atrial-paced complex(es) or rhythm no longer present  Ventricular-paced complex(es) or rhythm no longer present  Electronically Signed On 3- 17:32:44 PDT by KARMEN TOLEDO  MD LORENZO         All labs reviewed by me.    EKG:   I have independently interpreted this EKG     Radiology:   The attending Emergency Physician has independently interpreted the diagnostic imaging associated with this visit and is awaiting the final reading from the radiologist, which will be displayed below.    Preliminary interpretation is a follows: No obvious infiltrate.  Radiologist interpretation:     DX-CHEST-PORTABLE (1 VIEW)   Final Result      No acute cardiopulmonary disease.           COURSE & MEDICAL DECISION MAKING    ED Observation Status? Yes; I am placing the patient in to an observation status due to a diagnostic uncertainty as well as therapeutic intensity. Patient placed in observation status at 5:47 PM, 3/21/2023.     Observation plan is as follows: Laboratory imaging evaluation, reassessment.    Upon Reevaluation, the patient's condition has: Improved; and will be discharged.    Patient discharged from ED Observation status at 6:29 PM (Time) 3/21/2023. (Date).     INITIAL ASSESSMENT AND PLAN  Care Narrative:       5:30 PM - Patient seen and evaluated at bedside. Seda Cagle is a 62 y.o. female who presents with left shoulder pain.  Her tenderness is actually thoracic paraspinous tenderness with some additional muscle tenderness just medial to the left scapula.  Very low suspicion that this is a cardiac condition, given the reproducible tenderness.  Patient will be treated with lidoderm patch for her symptoms. Ordered DX-chest, Troponin, CBC with diff, CMP, and EKG to evaluate. She understands and agrees to the plan of care. Differential diagnoses include but are not limited to: ACS vs Pneumonia vs Musculoskeletal pain.     6:29 PM - Patient was reevaluated at bedside. Discussed lab and radiology results with the patient. I discussed plan for discharge and follow up as outlined below.  We discussed that this does appear most likely to be musculoskeletal pain, but she should  return if she is getting worse instead of gradually better with supportive care measures.  We discussed how to appropriately measure on track blood pressure, and that she should schedule follow-up to discuss it with her cardiologist, but also discussed that stress and discomfort such as being in the ER, having physical pain, and worrying about the cause of her symptoms will tend to drive her blood pressure higher. the patient is stable for discharge at this time and will return for any new or worsening symptoms. Patient verbalizes understanding and support with my plan for discharge.     ADDITIONAL PROBLEM LIST AND DISPOSITION  Hypertension.               DISPOSITION AND DISCUSSIONS  I have discussed management of the patient with the following physicians and POP's: None    Discussion of management with other QHP or appropriate source(s): None     Escalation of care considered, and ultimately not performed: acute inpatient care management, however at this time, the patient is most appropriate for outpatient management.    Barriers to care at this time, including but not limited to:  None .     Decision tools and prescription drugs considered including, but not limited to: Antibiotics  , Antivirals  , and Pain Medications , over-the-counter medications seem most appropriate for supportive care .     The patient will return for new or worsening symptoms and is stable at the time of discharge.    The patient is referred to a primary physician for blood pressure management, diabetic screening, and for all other preventative health concerns.      DISPOSITION:  Patient will be discharged home in stable condition.    FOLLOW UP:  [facility], Emergency Dept  [address] [id number] [phone]    If symptoms worsen    Solitario Witt M.D.  [address]   [address]  [facility] [id number] [phone]    Schedule an appointment as soon as possible for a visit         OUTPATIENT  MEDICATIONS:  New Prescriptions    LIDOCAINE (LIDODERM) 5 % PATCH    Place 1 Patch on the skin every 24 hours.    ORPHENADRINE (NORFLEX) 100 MG TABLET    Take 1 Tablet by mouth 2 times a day as needed (shoulder/back pain) for up to 20 doses.         FINAL IMPRESSION   1. Acute pain of left shoulder         Katie BELL (Scribe), am scribing for, and in the presence of, Segun Turner M.D..    Electronically signed by: Katie Valenzuela (Scribe), 3/21/2023    ISegun M.D. personally performed the services described in this documentation, as scribed by Katie Valenzuela in my presence, and it is both accurate and complete.    The note accurately reflects work and decisions made by me.  Segun Turner M.D.  3/22/2023  3:21 PM

## 2023-03-22 NOTE — DISCHARGE INSTRUCTIONS
We did not see any sign of a dangerous cause of your pain.  Please continue all your home medications, use the additional medication we have prescribed, and schedule a follow-up with cardiology.  Return here for worsening or new symptoms or new concerns in the meantime.

## 2023-04-04 ENCOUNTER — NON-PROVIDER VISIT (OUTPATIENT)
Dept: CARDIOLOGY | Facility: MEDICAL CENTER | Age: 63
End: 2023-04-04
Payer: COMMERCIAL

## 2023-04-04 PROCEDURE — 93294 REM INTERROG EVL PM/LDLS PM: CPT | Performed by: INTERNAL MEDICINE

## 2023-04-04 NOTE — CARDIAC REMOTE MONITOR - SCAN
Device transmission reviewed. Device demonstrated appropriate function.       Electronically Signed by: Hayes Maya M.D.    4/6/2023  8:01 PM

## 2023-05-02 ENCOUNTER — HOSPITAL ENCOUNTER (OUTPATIENT)
Dept: LAB | Facility: MEDICAL CENTER | Age: 63
End: 2023-05-02
Attending: INTERNAL MEDICINE
Payer: COMMERCIAL

## 2023-05-02 DIAGNOSIS — Z00.00 PREVENTATIVE HEALTH CARE: ICD-10-CM

## 2023-05-02 DIAGNOSIS — L65.9 ALOPECIA: ICD-10-CM

## 2023-05-02 LAB
25(OH)D3 SERPL-MCNC: 33 NG/ML (ref 30–100)
ALBUMIN SERPL BCP-MCNC: 4.3 G/DL (ref 3.2–4.9)
ALBUMIN SERPL BCP-MCNC: 4.4 G/DL (ref 3.2–4.9)
ALBUMIN/GLOB SERPL: 2 G/DL
ALBUMIN/GLOB SERPL: 2 G/DL
ALP SERPL-CCNC: 111 U/L (ref 30–99)
ALP SERPL-CCNC: 116 U/L (ref 30–99)
ALT SERPL-CCNC: 19 U/L (ref 2–50)
ALT SERPL-CCNC: 22 U/L (ref 2–50)
ANION GAP SERPL CALC-SCNC: 11 MMOL/L (ref 7–16)
ANION GAP SERPL CALC-SCNC: 12 MMOL/L (ref 7–16)
APPEARANCE UR: CLEAR
AST SERPL-CCNC: 22 U/L (ref 12–45)
AST SERPL-CCNC: 23 U/L (ref 12–45)
BACTERIA #/AREA URNS HPF: NEGATIVE /HPF
BILIRUB SERPL-MCNC: 0.4 MG/DL (ref 0.1–1.5)
BILIRUB SERPL-MCNC: 0.4 MG/DL (ref 0.1–1.5)
BILIRUB UR QL STRIP.AUTO: NEGATIVE
BUN SERPL-MCNC: 13 MG/DL (ref 8–22)
BUN SERPL-MCNC: 14 MG/DL (ref 8–22)
CALCIUM ALBUM COR SERPL-MCNC: 8.8 MG/DL (ref 8.5–10.5)
CALCIUM ALBUM COR SERPL-MCNC: 8.9 MG/DL (ref 8.5–10.5)
CALCIUM SERPL-MCNC: 9.1 MG/DL (ref 8.5–10.5)
CALCIUM SERPL-MCNC: 9.1 MG/DL (ref 8.5–10.5)
CHLORIDE SERPL-SCNC: 105 MMOL/L (ref 96–112)
CHLORIDE SERPL-SCNC: 107 MMOL/L (ref 96–112)
CHOLEST SERPL-MCNC: 145 MG/DL (ref 100–199)
CO2 SERPL-SCNC: 23 MMOL/L (ref 20–33)
CO2 SERPL-SCNC: 23 MMOL/L (ref 20–33)
COLOR UR: YELLOW
CREAT SERPL-MCNC: 0.92 MG/DL (ref 0.5–1.4)
CREAT SERPL-MCNC: 0.95 MG/DL (ref 0.5–1.4)
CRP SERPL HS-MCNC: 0.49 MG/DL (ref 0–0.75)
EPI CELLS #/AREA URNS HPF: ABNORMAL /HPF
EST. AVERAGE GLUCOSE BLD GHB EST-MCNC: 111 MG/DL
FASTING STATUS PATIENT QL REPORTED: NORMAL
GFR SERPLBLD CREATININE-BSD FMLA CKD-EPI: 67 ML/MIN/1.73 M 2
GFR SERPLBLD CREATININE-BSD FMLA CKD-EPI: 70 ML/MIN/1.73 M 2
GLOBULIN SER CALC-MCNC: 2.1 G/DL (ref 1.9–3.5)
GLOBULIN SER CALC-MCNC: 2.2 G/DL (ref 1.9–3.5)
GLUCOSE SERPL-MCNC: 90 MG/DL (ref 65–99)
GLUCOSE SERPL-MCNC: 92 MG/DL (ref 65–99)
GLUCOSE UR STRIP.AUTO-MCNC: NEGATIVE MG/DL
HBA1C MFR BLD: 5.5 % (ref 4–5.6)
HDLC SERPL-MCNC: 37 MG/DL
HYALINE CASTS #/AREA URNS LPF: ABNORMAL /LPF
KETONES UR STRIP.AUTO-MCNC: NEGATIVE MG/DL
LDLC SERPL CALC-MCNC: 55 MG/DL
LEUKOCYTE ESTERASE UR QL STRIP.AUTO: ABNORMAL
MICRO URNS: ABNORMAL
NITRITE UR QL STRIP.AUTO: NEGATIVE
PH UR STRIP.AUTO: 7.5 [PH] (ref 5–8)
POTASSIUM SERPL-SCNC: 4.1 MMOL/L (ref 3.6–5.5)
POTASSIUM SERPL-SCNC: 4.3 MMOL/L (ref 3.6–5.5)
PROT SERPL-MCNC: 6.4 G/DL (ref 6–8.2)
PROT SERPL-MCNC: 6.6 G/DL (ref 6–8.2)
PROT UR QL STRIP: NEGATIVE MG/DL
RBC # URNS HPF: ABNORMAL /HPF
RBC UR QL AUTO: NEGATIVE
RHEUMATOID FACT SER IA-ACNC: <10 IU/ML (ref 0–14)
SODIUM SERPL-SCNC: 140 MMOL/L (ref 135–145)
SODIUM SERPL-SCNC: 141 MMOL/L (ref 135–145)
SP GR UR STRIP.AUTO: 1.01
T3FREE SERPL-MCNC: 2.72 PG/ML (ref 2–4.4)
T4 FREE SERPL-MCNC: 1.29 NG/DL (ref 0.93–1.7)
TRIGL SERPL-MCNC: 263 MG/DL (ref 0–149)
TSH SERPL DL<=0.005 MIU/L-ACNC: 2.77 UIU/ML (ref 0.38–5.33)
UROBILINOGEN UR STRIP.AUTO-MCNC: 0.2 MG/DL
WBC #/AREA URNS HPF: ABNORMAL /HPF

## 2023-05-02 PROCEDURE — 86431 RHEUMATOID FACTOR QUANT: CPT

## 2023-05-02 PROCEDURE — 80053 COMPREHEN METABOLIC PANEL: CPT

## 2023-05-02 PROCEDURE — 85007 BL SMEAR W/DIFF WBC COUNT: CPT

## 2023-05-02 PROCEDURE — 84439 ASSAY OF FREE THYROXINE: CPT

## 2023-05-02 PROCEDURE — 36415 COLL VENOUS BLD VENIPUNCTURE: CPT

## 2023-05-02 PROCEDURE — 81001 URINALYSIS AUTO W/SCOPE: CPT

## 2023-05-02 PROCEDURE — 80053 COMPREHEN METABOLIC PANEL: CPT | Mod: 91

## 2023-05-02 PROCEDURE — 86140 C-REACTIVE PROTEIN: CPT

## 2023-05-02 PROCEDURE — 86038 ANTINUCLEAR ANTIBODIES: CPT

## 2023-05-02 PROCEDURE — 80061 LIPID PANEL: CPT

## 2023-05-02 PROCEDURE — 85025 COMPLETE CBC W/AUTO DIFF WBC: CPT

## 2023-05-02 PROCEDURE — 85025 COMPLETE CBC W/AUTO DIFF WBC: CPT | Mod: 91

## 2023-05-02 PROCEDURE — 82306 VITAMIN D 25 HYDROXY: CPT

## 2023-05-02 PROCEDURE — 84443 ASSAY THYROID STIM HORMONE: CPT

## 2023-05-02 PROCEDURE — 83036 HEMOGLOBIN GLYCOSYLATED A1C: CPT

## 2023-05-02 PROCEDURE — 84481 FREE ASSAY (FT-3): CPT

## 2023-05-02 PROCEDURE — 85007 BL SMEAR W/DIFF WBC COUNT: CPT | Mod: 91

## 2023-05-03 ENCOUNTER — TELEPHONE (OUTPATIENT)
Dept: MEDICAL GROUP | Facility: MEDICAL CENTER | Age: 63
End: 2023-05-03
Payer: COMMERCIAL

## 2023-05-03 LAB
BASOPHILS # BLD AUTO: 0 % (ref 0–1.8)
BASOPHILS # BLD AUTO: 0 % (ref 0–1.8)
BASOPHILS # BLD: 0 K/UL (ref 0–0.12)
BASOPHILS # BLD: 0 K/UL (ref 0–0.12)
EOSINOPHIL # BLD AUTO: 0.2 K/UL (ref 0–0.51)
EOSINOPHIL # BLD AUTO: 0.21 K/UL (ref 0–0.51)
EOSINOPHIL NFR BLD: 1.6 % (ref 0–6.9)
EOSINOPHIL NFR BLD: 1.7 % (ref 0–6.9)
ERYTHROCYTE [DISTWIDTH] IN BLOOD BY AUTOMATED COUNT: 41.6 FL (ref 35.9–50)
ERYTHROCYTE [DISTWIDTH] IN BLOOD BY AUTOMATED COUNT: 42 FL (ref 35.9–50)
HCT VFR BLD AUTO: 43.8 % (ref 37–47)
HCT VFR BLD AUTO: 44.3 % (ref 37–47)
HGB BLD-MCNC: 14.6 G/DL (ref 12–16)
HGB BLD-MCNC: 14.7 G/DL (ref 12–16)
LYMPHOCYTES # BLD AUTO: 7.26 K/UL (ref 1–4.8)
LYMPHOCYTES # BLD AUTO: 7.8 K/UL (ref 1–4.8)
LYMPHOCYTES NFR BLD: 59 % (ref 22–41)
LYMPHOCYTES NFR BLD: 63.9 % (ref 22–41)
MANUAL DIFF BLD: NORMAL
MANUAL DIFF BLD: NORMAL
MCH RBC QN AUTO: 29.6 PG (ref 27–33)
MCH RBC QN AUTO: 30.1 PG (ref 27–33)
MCHC RBC AUTO-ENTMCNC: 33 G/DL (ref 33.6–35)
MCHC RBC AUTO-ENTMCNC: 33.6 G/DL (ref 33.6–35)
MCV RBC AUTO: 89.6 FL (ref 81.4–97.8)
MCV RBC AUTO: 89.7 FL (ref 81.4–97.8)
MONOCYTES # BLD AUTO: 0.1 K/UL (ref 0–0.85)
MONOCYTES # BLD AUTO: 0.2 K/UL (ref 0–0.85)
MONOCYTES NFR BLD AUTO: 0.8 % (ref 0–13.4)
MONOCYTES NFR BLD AUTO: 1.6 % (ref 0–13.4)
MORPHOLOGY BLD-IMP: NORMAL
MORPHOLOGY BLD-IMP: NORMAL
NEUTROPHILS # BLD AUTO: 4.1 K/UL (ref 2–7.15)
NEUTROPHILS # BLD AUTO: 4.65 K/UL (ref 2–7.15)
NEUTROPHILS NFR BLD: 33.6 % (ref 44–72)
NEUTROPHILS NFR BLD: 37.8 % (ref 44–72)
NRBC # BLD AUTO: 0 K/UL
NRBC # BLD AUTO: 0 K/UL
NRBC BLD-RTO: 0 /100 WBC
NRBC BLD-RTO: 0 /100 WBC
PLATELET # BLD AUTO: 185 K/UL (ref 164–446)
PLATELET # BLD AUTO: 189 K/UL (ref 164–446)
PLATELET BLD QL SMEAR: NORMAL
PLATELET BLD QL SMEAR: NORMAL
PMV BLD AUTO: 10.7 FL (ref 9–12.9)
PMV BLD AUTO: 10.8 FL (ref 9–12.9)
RBC # BLD AUTO: 4.89 M/UL (ref 4.2–5.4)
RBC # BLD AUTO: 4.94 M/UL (ref 4.2–5.4)
RBC BLD AUTO: NORMAL
RBC BLD AUTO: PRESENT
SMUDGE CELLS BLD QL SMEAR: NORMAL
WBC # BLD AUTO: 12.2 K/UL (ref 4.8–10.8)
WBC # BLD AUTO: 12.3 K/UL (ref 4.8–10.8)

## 2023-05-04 LAB — NUCLEAR IGG SER QL IA: NORMAL

## 2023-05-04 NOTE — TELEPHONE ENCOUNTER
Notified with labs, stable leukocytosis followed by oncology CLL, remains on atorvastatin, triglycerides elevated compared to previous, she has been working on her elliptical 25 minutes daily although not for the past 10 days because of a foot injury, has been following a good nutrition program.  Advised her to continue to work on nutrition, exercise, we can repeat her lipid panel sometime in August.  She can notify me when she is ready to repeat that.

## 2023-05-12 DIAGNOSIS — I48.0 PAROXYSMAL ATRIAL FIBRILLATION (HCC): ICD-10-CM

## 2023-05-17 RX ORDER — APIXABAN 5 MG/1
TABLET, FILM COATED ORAL
Qty: 180 TABLET | Refills: 1 | Status: SHIPPED | OUTPATIENT
Start: 2023-05-17 | End: 2023-11-06

## 2023-05-22 ENCOUNTER — OFFICE VISIT (OUTPATIENT)
Dept: CARDIOLOGY | Facility: MEDICAL CENTER | Age: 63
End: 2023-05-22
Attending: INTERNAL MEDICINE
Payer: COMMERCIAL

## 2023-05-22 VITALS
DIASTOLIC BLOOD PRESSURE: 82 MMHG | RESPIRATION RATE: 14 BRPM | SYSTOLIC BLOOD PRESSURE: 124 MMHG | WEIGHT: 203 LBS | HEIGHT: 64 IN | HEART RATE: 61 BPM | OXYGEN SATURATION: 93 % | BODY MASS INDEX: 34.66 KG/M2

## 2023-05-22 DIAGNOSIS — Z95.0 PACEMAKER: ICD-10-CM

## 2023-05-22 DIAGNOSIS — I42.2 HYPERTROPHIC CARDIOMYOPATHY (HCC): ICD-10-CM

## 2023-05-22 DIAGNOSIS — I10 ESSENTIAL HYPERTENSION, BENIGN: ICD-10-CM

## 2023-05-22 DIAGNOSIS — I48.0 PAROXYSMAL ATRIAL FIBRILLATION (HCC): Chronic | ICD-10-CM

## 2023-05-22 PROCEDURE — 93280 PM DEVICE PROGR EVAL DUAL: CPT | Performed by: INTERNAL MEDICINE

## 2023-05-22 PROCEDURE — 99213 OFFICE O/P EST LOW 20 MIN: CPT | Performed by: INTERNAL MEDICINE

## 2023-05-22 PROCEDURE — 3074F SYST BP LT 130 MM HG: CPT | Performed by: INTERNAL MEDICINE

## 2023-05-22 PROCEDURE — 3079F DIAST BP 80-89 MM HG: CPT | Performed by: INTERNAL MEDICINE

## 2023-05-22 PROCEDURE — 99214 OFFICE O/P EST MOD 30 MIN: CPT | Mod: 25 | Performed by: INTERNAL MEDICINE

## 2023-05-22 PROCEDURE — 93280 PM DEVICE PROGR EVAL DUAL: CPT | Mod: 26 | Performed by: INTERNAL MEDICINE

## 2023-05-22 PROCEDURE — 93010 ELECTROCARDIOGRAM REPORT: CPT | Mod: 59 | Performed by: INTERNAL MEDICINE

## 2023-05-22 PROCEDURE — 93005 ELECTROCARDIOGRAM TRACING: CPT | Performed by: INTERNAL MEDICINE

## 2023-05-22 ASSESSMENT — FIBROSIS 4 INDEX: FIB4 SCORE: 1.72

## 2023-05-22 NOTE — PROGRESS NOTES
Arrhythmia Clinic Note (Established patient)    DOS: 5/22/2023    Chief complaint/Reason for consult: F/u HCM    Interval History:  Pt is a 62 yo F. History of HCM, prior alcohol septal ablation, PVCs, pacemaker lead malfunction s/p extraction and replacement. S/p PV isolation with me for rising AF burden. Here for device check and f/u. Doing well. Occasional chest discomfort radiating to her upper back shoulders, associated with moderate hypertension when this happens. Lead to her visit in ED in March.    ROS (+ highlighted in red):  General--Negative for fatigue, weight loss or weight gain  Cardiovascular--Negative for CP, orthopnea, PND    Past Medical History:   Diagnosis Date    Anesthesia     wakes up during procedure (x2)    Aortic regurgitation     Arrhythmia, ventricular 2008    Symptomatic PVCs, controlled with medication.    Asthma     inhaler PRN, only uses when sick     Atrial fibrillation (HCC)     Awareness under anesthesia     Cardiac arrhythmia     Cervical arthritis 02/08/2016    Chickenpox     Chronic kidney disease, stage III (moderate) (HCC) 02/09/2016    CTS (carpal tunnel syndrome) 02/28/2011    Depression     GERD (gastroesophageal reflux disease)     Heart burn     Heart murmur     Hemorrhagic disorder (HCC)     on Eliquis    Hypertr obst cardiomyop 1991    Dr. Bert Vazquez, [address] [address], [place] [address] [phone] fax [phone].  [place] office [phone].  Alchol Septal Reduction 8/00, Coronaries normal.    Hypertrophic cardiomyopathy (HCC)     Indigestion     Influenza     Pacemaker 2014    Renal disorder     chronic kidney disease stage 3-  **pt states currently no issue, numbers are stable    Sleep apnea     uses CPAP    Snoring        Past Surgical History:   Procedure Laterality Date    PACEMAKER INSERTION  04/2022    PB WRIST ARTHROSCOP,RELEASE XVERS LIG Right 06/01/2021    Procedure: RELEASE, CARPAL TUNNEL, ENDOSCOPIC;  Surgeon: Mike Ospina M.D.;   Location: SURGERY [facility];  Service: Orthopedics    JOINT INJECTION DIAGNOSTIC Left 06/01/2021    Procedure: INJECTION, JOINT, DIAGNOSTIC-CARPAL TUNNEL;  Surgeon: Mike Ospina M.D.;  Location: SURGERY [facility];  Service: Orthopedics    HYSTEROSCOPY WITH VIDEO OPERATIVE  04/30/2018    Procedure: HYSTEROSCOPY WITH VIDEO OPERATIVE;  Surgeon: Noemi Liu M.D.;  Location: SURGERY SAME DAY [facility];  Service: Labor and Delivery    DILATION AND CURETTAGE  04/30/2018    Procedure: DILATION AND CURETTAGE;  Surgeon: Noemi Liu M.D.;  Location: SURGERY SAME DAY [facility];  Service: Labor and Delivery    BREAST BIOPSY Left 01/30/2018    Procedure: BREAST BIOPSY- WIRE LOCALIZED;  Surgeon: Vijaya Britt M.D.;  Location: SURGERY SAME DAY [facility];  Service: General    RECOVERY  04/15/2015    Performed by [facility] at SURGERY PRE-POST PROC UNIT [facility]    PACEMAKER INSERTION  2015    RECOVERY  12/12/2014    Performed by [facility] at SURGERY SAME DAY [facility]    SEPTAL RECONSTRUCTION  2000    ENDOSCOPY      PRIMARY C SECTION      TUBAL COAGULATION LAPAROSCOPIC BILATERAL         Social History     Socioeconomic History    Marital status:      Spouse name: Not on file    Number of children: Not on file    Years of education: Not on file    Highest education level: Some college, no degree   Occupational History    Not on file   Tobacco Use    Smoking status: Never    Smokeless tobacco: Never   Vaping Use    Vaping Use: Never used   Substance and Sexual Activity    Alcohol use: Yes     Alcohol/week: 0.6 oz     Types: 1 Standard drinks or equivalent per week     Comment: 2-3 per week    Drug use: No    Sexual activity: Yes     Partners: Male     Birth control/protection: Surgical     Comment:    Other Topics Concern    Not on file   Social History Narrative    Not on file     Social Determinants of Health     Financial Resource Strain: Low Risk  (4/7/2022)     Overall Financial Resource Strain (CARDIA)     Difficulty of Paying Living Expenses: Not hard at all   Food Insecurity: No Food Insecurity (2022)    Hunger Vital Sign     Worried About Running Out of Food in the Last Year: Never true     Ran Out of Food in the Last Year: Never true   Transportation Needs: No Transportation Needs (2022)    PRAPARE - Transportation     Lack of Transportation (Medical): No     Lack of Transportation (Non-Medical): No   Physical Activity: Sufficiently Active (2022)    Exercise Vital Sign     Days of Exercise per Week: 7 days     Minutes of Exercise per Session: 60 min   Stress: No Stress Concern Present (2022)    [place] [employer] - Occupational Stress Questionnaire     Feeling of Stress : Only a little   Social Connections: Moderately Integrated (2022)    Social Connection and Isolation Panel [NHANES]     Frequency of Communication with Friends and Family: Three times a week     Frequency of Social Gatherings with Friends and Family: Once a week     Attends Pentec[employer]al Services: Never     Active Member of Clubs or Organizations: Yes     Attends Club or Organization Meetings: More than 4 times per year     Marital Status:    Intimate Partner Violence: Not on file   Housing Stability: Low Risk  (2022)    Housing Stability Vital Sign     Unable to Pay for Housing in the Last Year: No     Number of Places Lived in the Last Year: 1     Unstable Housing in the Last Year: No       Family History   Problem Relation Age of Onset    Heart Disease Mother         CHF    Hypertension Mother     Stroke Mother     Heart Failure Mother     Other Mother         carotid artery disease,gout    Cancer Father         AML    Diabetes Maternal Grandmother     Cancer Maternal Grandfather         Prostate    Heart Disease Paternal Grandfather 52         MI     Sleep Apnea Neg Hx        No Known Allergies    Current Outpatient Medications   Medication Sig  "Dispense Refill    Cetirizine HCl (ZYRTEC ALLERGY PO) Take  by mouth.      Triamcinolone Acetonide (NASACORT AQ NA) Administer  into affected nostril(S).      ELIQUIS 5 MG Tab Take 1 tablet by mouth twice daily 180 Tablet 1    omeprazole (PRILOSEC) 20 MG delayed-release capsule Take 1 Capsule by mouth every day. 90 Capsule 3    albuterol 108 (90 Base) MCG/ACT Aero Soln inhalation aerosol Inhale 2 Puffs every four hours as needed for Shortness of Breath. 1 Each 3    disopyramide (NORPACE) 150 MG Cap Take 1 Capsule by mouth in the morning, at noon, and at bedtime. 270 Capsule 2    atorvastatin (LIPITOR) 40 MG Tab       metoprolol SR (TOPROL XL) 25 MG TABLET SR 24 HR Take 1 Tablet by mouth every day. 30 Tablet 5    nortriptyline (PAMELOR) 10 MG Cap Take 1 Capsule by mouth every evening. 90 Capsule 3    acetaminophen (TYLENOL) 325 MG Tab Take 2 Tablets by mouth 1 time a day as needed. Indications: Pain      Probiotic Product (PROBIOTIC-10 PO) Take 1 Capsule by mouth every day.      Multiple Vitamin (MULTI-VITAMIN PO) Take 1 Tablet by mouth every day.      Pantothenic Acid 500 MG Tab Take 1 Tablet by mouth every day.      Calcium-Vitamin D-Vitamin K (CALCIUM FOR WOMEN PO) Take 1 Each by mouth every day.      Magnesium 200 MG TABS Take 1 Tab by mouth every day.      orphenadrine (NORFLEX) 100 MG tablet Take 1 Tablet by mouth 2 times a day as needed (shoulder/back pain) for up to 20 doses. 20 Tablet 0    lidocaine (LIDODERM) 5 % Patch Place 1 Patch on the skin every 24 hours. 10 Patch 0    montelukast (SINGULAIR) 10 MG Tab Take 1 Tablet by mouth every day. 90 Tablet 1     No current facility-administered medications for this visit.       Physical Exam:  Vitals:    05/22/23 0832   BP: 124/82   BP Location: Left arm   Patient Position: Sitting   BP Cuff Size: Adult   Pulse: 61   Resp: 14   SpO2: 93%   Weight: 92.1 kg (203 lb)   Height: 1.626 m (5' 4\")     General appearance: NAD, conversant  HEENT: PERRL, neck is supple with " FROM  Lungs: Clear to auscultation, normal respiratory effort  CV: RRR, no murmurs/rubs/gallops, no JVD  Abdomen: Soft, non-tender with normal bowel sounds  Extremities: No peripheral edema, no clubbing or cyanosis  Skin: No rash, lesions, or ulcers  Psych: Alert and oriented to person, place and time    Data:  Labs reviewed    Prior echo/stress reviewed:  Normal LV function    EKG interpreted by me:  A paced, LBBB    Impression/Plan:  1. Paroxysmal atrial fibrillation (HCC)  EKG      2. Pacemaker        3. Hypertrophic cardiomyopathy (HCC)        4. Essential hypertension, benign          -Device check reviewed, appropriate function  -<minutes of AF accumulated over 6 mo, doing well  -Continue OAC as tolerated  -Double Toprol back up to 50   -Continue kavita Witt MD

## 2023-06-12 ENCOUNTER — PATIENT MESSAGE (OUTPATIENT)
Dept: CARDIOLOGY | Facility: MEDICAL CENTER | Age: 63
End: 2023-06-12
Payer: COMMERCIAL

## 2023-06-12 DIAGNOSIS — I10 ESSENTIAL HYPERTENSION, BENIGN: ICD-10-CM

## 2023-06-16 ENCOUNTER — TELEPHONE (OUTPATIENT)
Dept: MEDICAL GROUP | Facility: MEDICAL CENTER | Age: 63
End: 2023-06-16
Payer: COMMERCIAL

## 2023-06-19 RX ORDER — HYDROCHLOROTHIAZIDE 25 MG/1
25 TABLET ORAL DAILY
Qty: 90 TABLET | Refills: 1 | Status: SHIPPED | OUTPATIENT
Start: 2023-06-19 | End: 2023-10-28

## 2023-06-22 DIAGNOSIS — I48.0 PAROXYSMAL ATRIAL FIBRILLATION (HCC): ICD-10-CM

## 2023-06-22 NOTE — TELEPHONE ENCOUNTER
Is the patient due for a refill? Yes    Was the patient seen the past year? Yes    Date of last office visit: 5/22/2023    Does the patient have an upcoming appointment?  Yes   If yes, When? 11/14/2023    Provider to refill:SS    Does the patients insurance require a 100 day supply?  No

## 2023-06-27 RX ORDER — METOPROLOL SUCCINATE 25 MG/1
TABLET, EXTENDED RELEASE ORAL
Qty: 90 TABLET | Refills: 3 | Status: SHIPPED | OUTPATIENT
Start: 2023-06-27 | End: 2023-08-02 | Stop reason: SDUPTHER

## 2023-07-04 ENCOUNTER — NON-PROVIDER VISIT (OUTPATIENT)
Dept: CARDIOLOGY | Facility: MEDICAL CENTER | Age: 63
End: 2023-07-04
Payer: COMMERCIAL

## 2023-07-04 PROCEDURE — 93294 REM INTERROG EVL PM/LDLS PM: CPT | Performed by: INTERNAL MEDICINE

## 2023-07-05 NOTE — CARDIAC REMOTE MONITOR - SCAN
Device transmission reviewed. Device demonstrated appropriate function.       Electronically Signed by: Solitario Witt M.D.    7/19/2023  1:47 PM

## 2023-07-10 DIAGNOSIS — E78.5 DYSLIPIDEMIA: ICD-10-CM

## 2023-07-11 NOTE — TELEPHONE ENCOUNTER
Is the patient due for a refill? Yes    Was the patient seen the past year? Yes    Date of last office visit: 05/22/2023    Does the patient have an upcoming appointment?  Yes   If yes, When? 11/14/2023    Provider to refill:SS    Does the patients insurance require a 100 day supply?  No

## 2023-07-14 LAB — EKG IMPRESSION: NORMAL

## 2023-07-16 NOTE — DISCHARGE SUMMARY
"Discharge Summary    CHIEF COMPLAINT ON ADMISSION  Chief Complaint   Patient presents with   • Altered Mental Status     at 11 am this morning; not remembering events, doesnt remember getting dress this morning or talking with son or any other events, frequent reoientation needed per ,       Reason for Admission  Other     Admission Date  1/18/2019    CODE STATUS  Full Code    HPI & HOSPITAL COURSE  This is a 58 y.o. female here with Altered Mental Status (at 11 am this morning; not remembering events, doesnt remember getting dress this morning or talking with son or any other events, frequent reoientation needed per ,)    Please review Dr. Jossy Romano M.D. notes for further details of history of present illness, past medical/social/family histories, allergies and medications.     History of atrial fibrillation s/p pacer on Xarelto  Presented with transient global amnesia.    She had just gotten off the phone with her son in [place] as it was her grandchild's birthday, she remembers hanging up and then nothing for roughly 2 hours afterwards.   came into the room, saw her with her phone in her hand, sitting on the medicine ball and was completely confused.  She was repetitive in her statements, for example \"did I talk to Abilio?\" (her son) over and over for 20-30 times.  She was able to dress herself and walk around the house without issue. She confessed she did not remember the last 2 hours. At the ED, she was afebrile but hypertensive. NIHSS 0. She was admitted for TIA work-up. CT head showed:  1.  Head CT without contrast within normal limits. No evidence of acute cerebral infarction, hemorrhage or mass lesion.  2.  BILATERAL maxillary sinus disease  CTA head showed normal [place]  Carotid Dopplers showed no significant stenosis  Echo was not repeated as she had a recent one in September 2018. Did show EF of 55% but mentioned having Grade 1 diastolic dysfunction and signs of LV " [facility]  MOD        Reason to Call RRT: Possible seizure    History of Present Illness: Patient is a poor historian,  Hx from chart review  Marcio is a 55 yo M with a hx of ESRD on HD, DM, HTN, HLD, CAD s/p PCI, HFpEF, Hx of CVA with left-sided hemiparesisSeizure disorder, anxiety, and depression who presented to [facility] initially for evaluation of chest pain and SOB. Also endorsing SI and was evaluated by psychiatry. Patient underwent medical workup for chest pain that was unrevealing and was eventually cleared for inpatient psychiatric hospitalization.     Tonight I was asked to evaluate Marcio after the nurse noted a possible seizure. She states the patient asked to use the restroom and when the nurse and tech assisted him to his chair he stopped responding, turned his head down and to the left but pupils stayed midline, and then his upper extremities got tense and contracted. Event lasted less than one minute per nurse. No tonic clonic movement. No tongue biting, no drooling. He did have an episode of bowel incontinence immediately afterward. Nurse states he was mildly confused only answering yes or no for approx a min but then regained his baseline speech. Nurse also notes that last night he had a different episode of generalized shaking that lasted approximately 30 seconds. Jorge episode was different.     On my evaluation, the patient is comfortably asleep. Easily aroused. Confused. States he is \"in a two flat\" when asked his current location, and states the year is 1983. He is re-directable upon orientation. Patient unable to provide most of his medical history or the events that lead to his arrival. Unable to tell me about his seizure hx or medications.     Vital Signs:  /75   Pulse 75   Temp 98.1 °F (36.7 °C) (Oral)   Resp 18   Ht 5' 9\" (1.753 m)   Wt 92 kg (202 lb 13.2 oz)   BMI 29.95 kg/m²   BSA 2.08 m²     Physical Exam  Alert, oriented to person, not to time or  outlet flow tract obstruction.     She will continue her Xarelto, metoprolol  for chronic Afib. She will continue metoprolol for hypertension. Advise Seda Cagle to check blood pressure at home at least twice a day and have a log for primary provider to review. Her LDL is over 100, hence we will continue her on statin for hyperlipidemia. She will continue her CPAP for GEORGINA.    At discharge date, Seda Cagle afebrile and hemodynamically stable.  Seda Cagle wanted to be discharged today.    Discharge Physical Exam  General/Constitutional: No acute distress.   Head: Normocephalic, atraumatic  ENT: Oral mucosa is moist. No obvious pharyngeal exudates  Eyes: Pink conjunctiva, no scleral icterus  Neck: Supple, no lymphadenopathy  Cardiovascular: Normal rate and regular rhythm. S1,2 noted. No murmurs, gallops or rubs.  Pulmonary: Clear to auscultation bilaterally. No wheezes, rales or rhonchi.  Abdominal: Soft, nontender, not distended, bowel sounds normoactive. No guarding or peritoneal signs.  Musculoskeletal: No tenderness to palpation of chest wall.  Neurologic: Alert and oriented. Grossly nonfocal, moving all extremities.  Genitourinary: No gross hematuria  Skin: No obvious rash.  Psychiatric: Pleasant, cooperative.  Vitals Reviewed  Labs Reviewed  Imaging reviewed  Nursing notes reviewed      Imaging  CT-CTA HEAD WITH & W/O-POST PROCESS   Final Result      CT angiogram of the [place] within normal limits.            3D angiographic/MIP images of the vasculature confirm the vascular findings as described above.      CT-HEAD W/O   Final Result      1.  Head CT without contrast within normal limits. No evidence of acute cerebral infarction, hemorrhage or mass lesion.   2.  BILATERAL maxillary sinus disease      DX-CHEST-PORTABLE (1 VIEW)   Final Result      No radiographic evidence of acute cardiopulmonary process.      US-CAROTID DOPPLER BILAT    (Results  place. Cranial nerves grossly intact. Left upper extremity chronically contracted but patient noted to have no new focal neurologic deficits. No new aphasia or dysarthria.  Heart rate WNL, regular. Work of breathing and respiratory rate both WNL. Abdomen soft, NT/ND.     RRT Course:   Patient is at his baseline on my exam and per bedside nurse. He is HD stable. No new deficits. Unclear what his seizure hx is and if this episode was even a seizure. He is currently only on trileptal. Could be seizure vs syncope with the incontinence.  His described post ictal period was only a minute per the bedside RN. Do not suspect new CVA as patient has no new deficits. Intermittent confusion may be delirium, as nurse notes it primarily happens at night. Lower suspicion for metabolic causes as patient is undergoing dialysis and had HD today. Although his AM labs had significant derangement of the phos and potassium. AM labs ordered to re-check.   Patient is currently stable and at baseline. I think this patient is okay to stay on current level of care. If there is any more such events, please call a rapid response for immediate evaluation.The patient needs to be evaluated by Neurology for workup and medical management recommendations.     Assessment and Plan:  - Possible Seizure vs syncope  - Possible delirium  - CAD s/p PCI, hx of  - HFpEF, Hx of  - ESRD on iHD  - DM  - HTN  - HLD  - MDD with psychotic features per psych  - CVA with left sided hemiparesis, Hx of    - Neurology consult    - AM labs to eval metabolic derangement    - If there is another episode call a rapid response for immediate eval     - Disposition: Patient to remain in current location  - Discussed with Michael COVARRUBIAS and Patient    Conner Saha,   MOD   Pending)            Therefore, she is discharged in good and stable condition to home with close outpatient follow-up.        Discharge Date  1/19/19    FOLLOW UP ITEMS POST DISCHARGE      DISCHARGE DIAGNOSES  Principal Problem:    Transient global amnesia, possible transient ischemic attack POA: Yes  Active Problems:    Paroxysmal atrial fibrillation (HCC) (Chronic) POA: Yes        Gastroesophageal reflux disease without esophagitis POA: Yes    Chronic kidney disease, stage III (moderate) (HCC) (Chronic) POA: Yes    Carpal tunnel syndrome of right wrist POA: Yes    Dyslipidemia POA: Yes    Sleep apnea (Chronic) POA: Yes    Obesity (BMI 30-39.9) POA: Yes      FOLLOW UP  Future Appointments  Date Time Provider Department Center   3/7/2019 9:00 AM Rashid Romano M.D. [facility] None   4/26/2019 12:40 PM Solitario Witt M.D. [facility] None   11/14/2019 9:40 AM ROSLYN Levy [facility] None     No follow-up provider specified.    MEDICATIONS ON DISCHARGE     Medication List      START taking these medications      Instructions   atorvastatin 40 MG Tabs  Commonly known as:  LIPITOR   Take 1 Tab by mouth every evening.  Dose:  40 mg        CONTINUE taking these medications      Instructions   ALIVE ONCE DAILY WOMENS 50+ PO   Take 1 Each by mouth every day at 6 PM.  Dose:  1 Each     CALCIUM FOR WOMEN PO   Take 1 Each by mouth every day at 6 PM.  Dose:  1 Each     disopyramide 100 MG Caps  Commonly known as:  NORPACE   Take 1 Cap by mouth every 8 hours.  Dose:  100 mg     FLONASE NA   Spray  in nose.     Magnesium 200 MG Tabs   Take 1 Tab by mouth every day.  Dose:  1 Tab     Melatonin 5 MG Tbdp   Take  by mouth.     metoprolol 25 MG Tabs  Commonly known as:  LOPRESSOR   Take 1 Tab by mouth 2 times a day.  Dose:  25 mg     Pantothenic Acid 500 MG Tabs   Take 500 mg by mouth.  Dose:  500 mg     PREVACID PO   Take 1 Each by mouth 1 time daily as needed.  Dose:  1 Each     rivaroxaban 20 MG Tabs tablet  Commonly known as:   XARELTO   Take 1 Tab by mouth every day at 6 PM.  Dose:  20 mg            Allergies  No Known Allergies    DIET  Orders Placed This Encounter   Procedures   • Diet Order Regular     Standing Status:   Standing     Number of Occurrences:   1     Order Specific Question:   Diet:     Answer:   Regular [1]       ACTIVITY  No heavy lifting  No strenuous activity    CONSULTATIONS      PROCEDURES  Ct-cta Head With & W/o-post Process    Result Date: 1/19/2019 1/19/2019 12:12 AM HISTORY/REASON FOR EXAM:  Speech changes and confusion Stroke TECHNIQUE/EXAM DESCRIPTION: CT angiogram of the [place] without and with contrast.  Initial precontrast images were obtained of the head from the skull base through the vertex.  Postcontrast images were obtained of the [place] following the power injection of nonionic contrast at 5.0 mL/sec. Thin-section helical images were obtained with overlapping reconstruction interval. Coronal and sagittal multiplanar volume reformats were generated.  3D angiographic images were reviewed on PACS.  Maximum intensity projection (MIP) images were generated and reviewed. 80 mL of Omnipaque 350 nonionic contrast was injected intravenously. Low dose optimization technique was utilized for this CT exam including automated exposure control and adjustment of the mA and/or kV according to patient size. COMPARISON:  CT head 1/19/2019 1647 hours FINDINGS: The posterior circulation shows the distal vertebral arteries to be patent. The vertebrobasilar confluence is intact. The basilar artery is patent. No aneurysm or occlusive lesion is evident. The anterior circulation shows no stenotic or occlusive lesion. No aneurysm is evident about the [place]. CALVARIA:  The calvaria are normal in appearance. BRAIN:  The brain parenchyma is normal in appearance. . VENTRICULAR SYSTEM: Ventricular system is normal in size and position. There is no hemorrhage or evidence for acute infarct. There are  no extra-axial fluid collection. Sinuses:  There is bilateral maxillary sinus mucosal thickening. The mastoid air cells and middle ear are clear. The visualized portions of the orbits are normal in appearance.     CT angiogram of the [place] within normal limits. 3D angiographic/MIP images of the vasculature confirm the vascular findings as described above.    Ct-head W/o    Result Date: 1/18/2019 1/18/2019 4:34 PM HISTORY/REASON FOR EXAM:  Altered level of consciousness (LOC), unexplained. TECHNIQUE/EXAM DESCRIPTION AND NUMBER OF VIEWS: CT of the head without contrast. The study was performed on a [facility] CT scanner. Contiguous 5 mm axial sections were obtained from the skull base through the vertex. Up to date radiation dose reduction adjustments have been utilized to meet ALARA standards for radiation dose reduction. COMPARISON:  None available FINDINGS: The calvaria are normal in appearance. There is fluid in the LEFT maxillary sinus. There is some fluid in the RIGHT maxillary sinus. Mastoid air cells and visualized paranasal sinuses are clear. The visualized portions of the orbits are normal in appearance. The brain parenchyma is normal in appearance. There is no hemorrhage.  There is no evidence for acute infarct. The ventricular system is normal in size and position. There are no extra-axial fluid collection present.     1.  Head CT without contrast within normal limits. No evidence of acute cerebral infarction, hemorrhage or mass lesion. 2.  BILATERAL maxillary sinus disease    Dx-chest-portable (1 View)    Result Date: 1/18/2019 1/18/2019 3:17 PM HISTORY/REASON FOR EXAM: Cough. TECHNIQUE/EXAM DESCRIPTION AND NUMBER OF VIEWS: Single AP view of the chest. COMPARISON: April 23 FINDINGS: Hardware: No change. Left transsubclavian pacer is present. The generator obscures underlying structures. Lungs: No consolidation detected. Pleura:  No pleural space process is seen. Heart and mediastinum: There is stable  cardiac silhouette enlargement.     No radiographic evidence of acute cardiopulmonary process.      LABORATORY  Lab Results   Component Value Date    SODIUM 138 01/19/2019    POTASSIUM 3.8 01/19/2019    CHLORIDE 106 01/19/2019    CO2 22 01/19/2019    GLUCOSE 105 (H) 01/19/2019    BUN 19 01/19/2019    CREATININE 1.02 01/19/2019    CREATININE 1.04 (H) 02/19/2011    GLOMRATE 56 (L) 02/19/2011        Lab Results   Component Value Date    WBC 7.4 01/18/2019    WBC 7.3 02/14/2012    HEMOGLOBIN 15.9 01/18/2019    HEMATOCRIT 46.7 01/18/2019    PLATELETCT 231 01/18/2019        Total time of the discharge process exceeds 30 minutes.

## 2023-07-17 DIAGNOSIS — E78.5 HYPERLIPIDEMIA, UNSPECIFIED HYPERLIPIDEMIA TYPE: ICD-10-CM

## 2023-07-18 RX ORDER — ATORVASTATIN CALCIUM 40 MG/1
TABLET, FILM COATED ORAL
Qty: 90 TABLET | Refills: 2 | Status: SHIPPED | OUTPATIENT
Start: 2023-07-18 | End: 2024-01-22 | Stop reason: SDUPTHER

## 2023-07-18 RX ORDER — ATORVASTATIN CALCIUM 40 MG/1
40 TABLET, FILM COATED ORAL DAILY
Qty: 90 TABLET | Refills: 3 | Status: SHIPPED | OUTPATIENT
Start: 2023-07-18 | End: 2023-10-28

## 2023-08-02 ENCOUNTER — PATIENT MESSAGE (OUTPATIENT)
Dept: CARDIOLOGY | Facility: MEDICAL CENTER | Age: 63
End: 2023-08-02
Payer: COMMERCIAL

## 2023-08-02 DIAGNOSIS — I48.0 PAROXYSMAL ATRIAL FIBRILLATION (HCC): ICD-10-CM

## 2023-08-02 RX ORDER — METOPROLOL SUCCINATE 50 MG/1
50 TABLET, EXTENDED RELEASE ORAL DAILY
Qty: 90 TABLET | Refills: 3 | Status: SHIPPED | OUTPATIENT
Start: 2023-08-02

## 2023-08-18 ENCOUNTER — TELEPHONE (OUTPATIENT)
Dept: MEDICAL GROUP | Facility: MEDICAL CENTER | Age: 63
End: 2023-08-18
Payer: COMMERCIAL

## 2023-08-18 DIAGNOSIS — Z86.16 HISTORY OF COVID-19: ICD-10-CM

## 2023-08-18 NOTE — TELEPHONE ENCOUNTER
Thank you for the update, she is out of the window where the COVID medication would be beneficial.  Also since she is improving without any shortness of breath, the best thing would be to have her continue to keep well-hydrated with water.  Try not to overdo things.  For the nasal congestion she could try over-the-counter Flonase once a day also the [place] pot nasal lavage over-the-counter may help with the sinus congestion and mucus congestion of her nose.

## 2023-08-18 NOTE — TELEPHONE ENCOUNTER
Spoke with pt, she first became symptomatic on 8/11 and tested Positive for Covid on Sat 8/12. She never did have a fever but has really bad chest congestion. No shortness of breath. She states that she does have foul smelling bloody mucus from her nose. Pt did test Negative this am and went for a short walk and did not experience SOB.

## 2023-08-18 NOTE — TELEPHONE ENCOUNTER
Patient sent me a [employer] message indicating she tested positive for COVID.  Please ask her when she tested positive and what was the date of the onset of symptoms?

## 2023-09-06 DIAGNOSIS — I48.0 PAROXYSMAL ATRIAL FIBRILLATION (HCC): Chronic | ICD-10-CM

## 2023-09-12 RX ORDER — DISOPYRAMIDE PHOSPHATE 150 MG/1
CAPSULE ORAL
Qty: 270 CAPSULE | Refills: 2 | Status: SHIPPED | OUTPATIENT
Start: 2023-09-12

## 2023-10-03 ENCOUNTER — NON-PROVIDER VISIT (OUTPATIENT)
Dept: CARDIOLOGY | Facility: MEDICAL CENTER | Age: 63
End: 2023-10-03
Payer: COMMERCIAL

## 2023-10-03 PROCEDURE — 93294 REM INTERROG EVL PM/LDLS PM: CPT | Performed by: INTERNAL MEDICINE

## 2023-10-03 NOTE — CARDIAC REMOTE MONITOR - SCAN
Device transmission reviewed. Device demonstrated appropriate function.       Electronically Signed by: Hayes Maya M.D.    10/23/2023  2:43 PM

## 2023-10-10 ENCOUNTER — APPOINTMENT (RX ONLY)
Dept: URBAN - METROPOLITAN AREA CLINIC 4 | Facility: CLINIC | Age: 63
Setting detail: DERMATOLOGY
End: 2023-10-10

## 2023-10-10 ENCOUNTER — HOSPITAL ENCOUNTER (OUTPATIENT)
Facility: MEDICAL CENTER | Age: 63
End: 2023-10-10
Attending: PHYSICIAN ASSISTANT
Payer: COMMERCIAL

## 2023-10-10 ENCOUNTER — GYNECOLOGY VISIT (OUTPATIENT)
Dept: OBGYN | Facility: CLINIC | Age: 63
End: 2023-10-10
Payer: COMMERCIAL

## 2023-10-10 VITALS — WEIGHT: 191.6 LBS | DIASTOLIC BLOOD PRESSURE: 82 MMHG | BODY MASS INDEX: 32.89 KG/M2 | SYSTOLIC BLOOD PRESSURE: 162 MMHG

## 2023-10-10 DIAGNOSIS — L57.0 ACTINIC KERATOSIS: ICD-10-CM

## 2023-10-10 DIAGNOSIS — Z01.419 WELL WOMAN EXAM: ICD-10-CM

## 2023-10-10 DIAGNOSIS — Z12.4 SCREENING FOR CERVICAL CANCER: ICD-10-CM

## 2023-10-10 DIAGNOSIS — L66.8 OTHER CICATRICIAL ALOPECIA: ICD-10-CM

## 2023-10-10 DIAGNOSIS — L66.12 FRONTAL FIBROSING ALOPECIA: ICD-10-CM

## 2023-10-10 DIAGNOSIS — L81.4 OTHER MELANIN HYPERPIGMENTATION: ICD-10-CM

## 2023-10-10 DIAGNOSIS — D22 MELANOCYTIC NEVI: ICD-10-CM

## 2023-10-10 DIAGNOSIS — L71.8 OTHER ROSACEA: ICD-10-CM

## 2023-10-10 DIAGNOSIS — D18.0 HEMANGIOMA: ICD-10-CM

## 2023-10-10 DIAGNOSIS — Z71.89 OTHER SPECIFIED COUNSELING: ICD-10-CM

## 2023-10-10 DIAGNOSIS — Z12.31 ENCOUNTER FOR SCREENING MAMMOGRAM FOR BREAST CANCER: ICD-10-CM

## 2023-10-10 PROBLEM — D18.01 HEMANGIOMA OF SKIN AND SUBCUTANEOUS TISSUE: Status: ACTIVE | Noted: 2023-10-10

## 2023-10-10 PROBLEM — D22.5 MELANOCYTIC NEVI OF TRUNK: Status: ACTIVE | Noted: 2023-10-10

## 2023-10-10 PROCEDURE — 88175 CYTOPATH C/V AUTO FLUID REDO: CPT

## 2023-10-10 PROCEDURE — 99386 PREV VISIT NEW AGE 40-64: CPT | Performed by: PHYSICIAN ASSISTANT

## 2023-10-10 PROCEDURE — 3077F SYST BP >= 140 MM HG: CPT | Performed by: PHYSICIAN ASSISTANT

## 2023-10-10 PROCEDURE — 3079F DIAST BP 80-89 MM HG: CPT | Performed by: PHYSICIAN ASSISTANT

## 2023-10-10 PROCEDURE — 87624 HPV HI-RISK TYP POOLED RSLT: CPT

## 2023-10-10 PROCEDURE — ? COUNSELING

## 2023-10-10 PROCEDURE — 99213 OFFICE O/P EST LOW 20 MIN: CPT

## 2023-10-10 PROCEDURE — ? ADDITIONAL NOTES

## 2023-10-10 ASSESSMENT — LOCATION SIMPLE DESCRIPTION DERM
LOCATION SIMPLE: LEFT FOREHEAD
LOCATION SIMPLE: LEFT UPPER ARM
LOCATION SIMPLE: NOSE
LOCATION SIMPLE: FRONTAL SCALP
LOCATION SIMPLE: LEFT ANTERIOR NECK
LOCATION SIMPLE: UPPER BACK
LOCATION SIMPLE: LEFT UPPER BACK
LOCATION SIMPLE: LEFT EYEBROW
LOCATION SIMPLE: RIGHT EYEBROW

## 2023-10-10 ASSESSMENT — LOCATION DETAILED DESCRIPTION DERM
LOCATION DETAILED: NASAL DORSUM
LOCATION DETAILED: LEFT CENTRAL EYEBROW
LOCATION DETAILED: INFERIOR THORACIC SPINE
LOCATION DETAILED: LEFT INFERIOR LATERAL NECK
LOCATION DETAILED: MEDIAL FRONTAL SCALP
LOCATION DETAILED: RIGHT CENTRAL EYEBROW
LOCATION DETAILED: LEFT SUPERIOR UPPER BACK
LOCATION DETAILED: LEFT MEDIAL FOREHEAD
LOCATION DETAILED: LEFT ANTERIOR PROXIMAL UPPER ARM

## 2023-10-10 ASSESSMENT — LOCATION ZONE DERM
LOCATION ZONE: ARM
LOCATION ZONE: NECK
LOCATION ZONE: SCALP
LOCATION ZONE: NOSE
LOCATION ZONE: FACE
LOCATION ZONE: TRUNK

## 2023-10-10 ASSESSMENT — FIBROSIS 4 INDEX: FIB4 SCORE: 1.72

## 2023-10-10 NOTE — PROGRESS NOTES
ANNUAL GYNECOLOGY VISIT    Chief Complaint  Annual Exam    Subjective  Seda Cagle is a 63 y.o. female  Patient's last menstrual period was 2012 (lmp unknown) menopausal  who presents today for Annual Exam.     Preventive Care   Immunization History   Administered Date(s) Administered    INFLUENZA TIV (IM) 09/15/2014    Influenza (IM) Preservative Free - HISTORICAL DATA 2018    Influenza Seasonal Injectable - Historical Data 10/07/2011, 10/24/2012, 10/24/2013, 09/15/2014, 10/08/2014, 10/29/2015    Influenza Vac Subunit Quad Inj (Pf) 2018, 10/09/2019, 10/14/2021, 2022    Influenza Vaccine H1N1 - HISTORICAL DATA 2009    Influenza Vaccine Quad Inj (Preserved) 2016    Influenza Vaccine Quad Recombinant 2018, 2020    MODERNA BIVALENT BOOSTER SARS-COV-2 VACCINE (6+) 10/17/2022    MODERNA SARS-COV-2 VACCINE (12+) 2021, 2021, 10/27/2021, 2022    Pneumococcal Conjugate Vaccine (PCV20) 2022    Pneumococcal polysaccharide vaccine (PPSV-23) 2020    Tdap Vaccine 2016     Last Pap: 2018 neg cotest but endometrial cells present, had had neg EMB and D&C  Any abnormal pap smears?  no  Last Mammogram: 10/4/2022  Last Colonoscopy: 3/5/2020 with 10 year recall   Last DEXA: 3/23/21 WNL, Taking Vit D and Calcium    Gynecology History  Current Sexual Activity: no  History of sexually transmitted diseases? no  Abnormal discharge? no  Menopause: yes -  54y  Postmenopausal bleeding: no      Cancer Risk Assessement:  Family history of:   - Breast cancer: no   - Ovarian cancer: no   - Uterine cancer: no   - Colon cancer: no    Obstetric History  OB History    Para Term  AB Living   3 2 2         SAB IAB Ectopic Molar Multiple Live Births                    # Outcome Date GA Lbr [facility]/2nd Weight Sex Delivery Anes PTL Lv   3             2 Term            1 Term                Past Medical History  Past Medical  History:   Diagnosis Date    Anesthesia     wakes up during procedure (x2)    Aortic regurgitation     Arrhythmia, ventricular 2008    Symptomatic PVCs, controlled with medication.    Asthma     inhaler PRN, only uses when sick     Atrial fibrillation (HCC)     Awareness under anesthesia     Cardiac arrhythmia     Cervical arthritis 02/08/2016    Chickenpox     Chronic kidney disease, stage III (moderate) (HCC) 02/09/2016    CTS (carpal tunnel syndrome) 02/28/2011    Depression     GERD (gastroesophageal reflux disease)     Heart burn     Heart murmur     Hemorrhagic disorder (HCC)     on Eliquis    Hypertr obst cardiomyop 1991    Dr. Bert Vazquez, [address], [facility] [address], [place] [address] [phone] fax [phone].  [facility] [phone].  Alchol Septal Reduction 8/00, Coronaries normal.    Hypertrophic cardiomyopathy (HCC)     Indigestion     Influenza     Pacemaker 2014    Renal disorder     chronic kidney disease stage 3-  **pt states currently no issue, numbers are stable    Sleep apnea     uses CPAP    Snoring        Past Surgical History  Past Surgical History:   Procedure Laterality Date    PACEMAKER INSERTION  04/2022    PB WRIST ARTHROSCOP,RELEASE XVERS LIG Right 06/01/2021    Procedure: RELEASE, CARPAL TUNNEL, ENDOSCOPIC;  Surgeon: Mike Ospina M.D.;  Location: SURGERY [facility];  Service: Orthopedics    JOINT INJECTION DIAGNOSTIC Left 06/01/2021    Procedure: INJECTION, JOINT, DIAGNOSTIC-CARPAL TUNNEL;  Surgeon: Mike Ospina M.D.;  Location: SURGERY [facility];  Service: Orthopedics    HYSTEROSCOPY WITH VIDEO OPERATIVE  04/30/2018    Procedure: HYSTEROSCOPY WITH VIDEO OPERATIVE;  Surgeon: Noemi Liu M.D.;  Location: SURGERY SAME DAY [facility];  Service: Labor and Delivery    DILATION AND CURETTAGE  04/30/2018    Procedure: DILATION AND CURETTAGE;  Surgeon: Noemi Liu M.D.;  Location: SURGERY SAME DAY [facility];  Service: Labor and Delivery     BREAST BIOPSY Left 2018    Procedure: BREAST BIOPSY- WIRE LOCALIZED;  Surgeon: Vijaya Britt M.D.;  Location: SURGERY SAME DAY [facility];  Service: General    RECOVERY  04/15/2015    Performed by [facility] at SURGERY PRE-POST PROC UNIT [facility]    PACEMAKER INSERTION      RECOVERY  2014    Performed by [facility] at SURGERY SAME DAY [facility]    SEPTAL RECONSTRUCTION      ENDOSCOPY      PRIMARY C SECTION      TUBAL COAGULATION LAPAROSCOPIC BILATERAL         Social History  Social History     Tobacco Use    Smoking status: Never    Smokeless tobacco: Never   Vaping Use    Vaping Use: Never used   Substance Use Topics    Alcohol use: Yes     Alcohol/week: 0.6 oz     Types: 1 Standard drinks or equivalent per week     Comment: 2-3 per week    Drug use: No        Family History  Family History   Problem Relation Age of Onset    Heart Disease Mother         CHF    Hypertension Mother     Stroke Mother     Heart Failure Mother     Other Mother         carotid artery disease,gout    Cancer Father         AML    Diabetes Maternal Grandmother     Cancer Maternal Grandfather         Prostate    Heart Disease Paternal Grandfather 52         MI     Sleep Apnea Neg Hx        Home Medications  Current Outpatient Medications   Medication Sig    disopyramide (NORPACE) 150 MG Cap TAKE 1 CAPSULE BY MOUTH IN THE MORNING AND AT NOON AND AT BEDTIME    nortriptyline (PAMELOR) 10 MG Cap TAKE 1 CAPSULE BY MOUTH ONCE DAILY IN THE EVENING    metoprolol SR (TOPROL XL) 50 MG TABLET SR 24 HR Take 1 Tablet by mouth every day.    atorvastatin (LIPITOR) 40 MG Tab TAKE 1 TABLET BY MOUTH ONCE DAILY IN THE EVENING    Cetirizine HCl (ZYRTEC ALLERGY PO) Take  by mouth.    Triamcinolone Acetonide (NASACORT AQ NA) Administer  into affected nostril(S).    ELIQUIS 5 MG Tab Take 1 tablet by mouth twice daily    omeprazole (PRILOSEC) 20 MG delayed-release capsule Take 1 Capsule by mouth every day.    albuterol  108 (90 Base) MCG/ACT Aero Soln inhalation aerosol Inhale 2 Puffs every four hours as needed for Shortness of Breath.    acetaminophen (TYLENOL) 325 MG Tab Take 2 Tablets by mouth 1 time a day as needed. Indications: Pain    Probiotic Product (PROBIOTIC-10 PO) Take 1 Capsule by mouth every day.    Multiple Vitamin (MULTI-VITAMIN PO) Take 1 Tablet by mouth every day.    Pantothenic Acid 500 MG Tab Take 1 Tablet by mouth every day.    Calcium-Vitamin D-Vitamin K (CALCIUM FOR WOMEN PO) Take 1 Each by mouth every day.    Magnesium 200 MG TABS Take 1 Tab by mouth every day.    atorvastatin (LIPITOR) 40 MG Tab Take 1 Tablet by mouth every day.    hydroCHLOROthiazide (HYDRODIURIL) 25 MG Tab Take 1 Tablet by mouth every day.       Allergies/Reactions  No Known Allergies    ROS  Review of Systems:  Gen: no fevers or chills, no significant weight loss or gain  Respiratory:  no cough or dyspnea  Cardiac:  no chest pain, no palpitations, no syncope  GI:  no heartburn, no abdominal pain, no nausea or vomiting  Psych: no depression or anxiety    Objective  BP (!) 162/82   Wt 191 lb 9.6 oz   LMP 01/31/2012 (LMP Unknown)   BMI 32.89 kg/m²     Constitutional: The patient is well developed and well nourished.  Psychiatric: Patient is oriented to time place and person.   Skin: No rash observed.  Neck: Appears symmetric. Thyroid normal size  Respiratory: normal effort  Breast: Inspection reveals no asymetry or nipple discharge, no skin thickening, dimpling or erythema.  Palpation demonstrates no masses.  Abdomen: Soft, non-tender.  Pelvic Exam:      Vulva: external female genitalia are normal in appearance. No lesions     Urethra - no lesions, no erythema     Vagina: moist, pink, normal ruggae     Cervix: pink, smooth, no lesions, no CMT     Uterus - non-tender, normal size, shape, contour, mobile, anteverted     Ovaries: non-tender, no appreciable masses   Pap Smear performed: Yes, performed by GEO Cotter   Chaperone  Present: FLORINA Rose  Extremities: Legs are symmetric and without tenderness. There is no edema present.      Assessment & Plan  Seda Cagle is a 63 y.o. female who presents today for Annual Gyn Exam.     1. Health Maintenance   PAP: THINPREP PAP WITH HPV; collected   STI Screen (HIV, Syphilis, Chlamydia / Gonorrhea): declines   MAMMOGRAM: MA-SCREENING MAMMO BILAT W/TOMOSYNTHESIS W/CAD; ordered   COLONOSCOPY: UTD  DEXA: n/a  IMMUNIZATIONS: Due for Zoster   TOBACCO: Never   DIABETES SCREEN: Monitored by PCP  CHOLESTEROL SCREEN: Monitored by PCP  COUNSELING: breast self exam, mammography screening, use and side effects of HRT, menopause, osteoporosis, and adequate intake of calcium and vitamin D        Return: Annually or PRN    Chiquis Canchola P.A.-C.  10/10/2023

## 2023-10-10 NOTE — PROCEDURE: COUNSELING
Detail Level: Zone
Detail Level: Simple
Detail Level: Detailed
Sunscreen Recommendations: Recommend sunscreen daily, or sun protective clothing.  I recommend a zinc or titanium based sunscreen with a spf of 30 or greater.

## 2023-10-12 ENCOUNTER — HOSPITAL ENCOUNTER (OUTPATIENT)
Dept: RADIOLOGY | Facility: MEDICAL CENTER | Age: 63
End: 2023-10-12
Attending: PHYSICIAN ASSISTANT
Payer: COMMERCIAL

## 2023-10-12 DIAGNOSIS — Z12.31 ENCOUNTER FOR SCREENING MAMMOGRAM FOR BREAST CANCER: ICD-10-CM

## 2023-10-12 LAB
CYTOLOGIST CVX/VAG CYTO: NORMAL
CYTOLOGY CVX/VAG DOC CYTO: NORMAL
CYTOLOGY CVX/VAG DOC THIN PREP: NORMAL
HPV I/H RISK 4 DNA CVX QL PROBE+SIG AMP: NEGATIVE
NOTE NL11727A: NORMAL
OTHER STN SPEC: NORMAL
STAT OF ADQ CVX/VAG CYTO-IMP: NORMAL

## 2023-10-12 PROCEDURE — 77063 BREAST TOMOSYNTHESIS BI: CPT

## 2023-10-28 DIAGNOSIS — Z00.00 PREVENTATIVE HEALTH CARE: Chronic | ICD-10-CM

## 2023-11-02 ENCOUNTER — OFFICE VISIT (OUTPATIENT)
Dept: MEDICAL GROUP | Facility: MEDICAL CENTER | Age: 63
End: 2023-11-02
Payer: COMMERCIAL

## 2023-11-02 VITALS
DIASTOLIC BLOOD PRESSURE: 76 MMHG | SYSTOLIC BLOOD PRESSURE: 138 MMHG | TEMPERATURE: 97.8 F | RESPIRATION RATE: 16 BRPM | OXYGEN SATURATION: 97 % | HEART RATE: 66 BPM | HEIGHT: 64 IN | WEIGHT: 191 LBS | BODY MASS INDEX: 32.61 KG/M2

## 2023-11-02 DIAGNOSIS — K58.1 IRRITABLE BOWEL SYNDROME WITH CONSTIPATION: ICD-10-CM

## 2023-11-02 DIAGNOSIS — Z29.11 NEED FOR RSV IMMUNIZATION: ICD-10-CM

## 2023-11-02 DIAGNOSIS — K59.00 CONSTIPATION, UNSPECIFIED CONSTIPATION TYPE: ICD-10-CM

## 2023-11-02 DIAGNOSIS — C91.10 CLL (CHRONIC LYMPHOCYTIC LEUKEMIA) (HCC): ICD-10-CM

## 2023-11-02 DIAGNOSIS — I48.0 PAROXYSMAL ATRIAL FIBRILLATION (HCC): Chronic | ICD-10-CM

## 2023-11-02 DIAGNOSIS — G47.33 OBSTRUCTIVE SLEEP APNEA SYNDROME: Chronic | ICD-10-CM

## 2023-11-02 PROCEDURE — 99214 OFFICE O/P EST MOD 30 MIN: CPT | Performed by: INTERNAL MEDICINE

## 2023-11-02 PROCEDURE — 3078F DIAST BP <80 MM HG: CPT | Performed by: INTERNAL MEDICINE

## 2023-11-02 PROCEDURE — 3075F SYST BP GE 130 - 139MM HG: CPT | Performed by: INTERNAL MEDICINE

## 2023-11-02 ASSESSMENT — FIBROSIS 4 INDEX: FIB4 SCORE: 1.72

## 2023-11-02 NOTE — PROGRESS NOTES
Subjective     Seda Cagle is a 63 y.o. female who presents with Follow-Up (Constipation - ongoing)            HPI    Patient had a recent COVID infection December in August, since then she has noticed more constipation after having COVID.  She has had constipation in the past, previously having been on Linzess 3 years ago, last colonoscopy in 2020 negative.  She has been trying to include more fiber in her diet, ensuring adequate water intake at least 80 cc daily, before COVID she had been on Metamucil regularly, and since COVID even with Metamucil, and then Dulcolax, laxatives, stool softeners she has had more constipative issues although she does not strain with her bowel movement, she can have normal stools at times and then not be able to go for a bowel meant for 2 to 3 days where normally she will have a bowel movement daily.  No melena.  No new medications.  Followed by cardiology paroxysmal atrial fibrillation, remains on metoprolol 50 mg, Norpace, blood pressures have been stable, she is walking her new dog 2-3 times a day for 1-1/2 to 2 hours.  No palpitations or lightheadedness with ambulation.  Sleep apnea on CPAP CLL has been stable followed by hematology oncology most recent labs in May  Medications, allergies, medical history, surgical history, social history, family history  reviewed and updated      Current Outpatient Medications   Medication Sig Dispense Refill    disopyramide (NORPACE) 150 MG Cap TAKE 1 CAPSULE BY MOUTH IN THE MORNING AND AT NOON AND AT BEDTIME 270 Capsule 2    nortriptyline (PAMELOR) 10 MG Cap TAKE 1 CAPSULE BY MOUTH ONCE DAILY IN THE EVENING 90 Capsule 1    metoprolol SR (TOPROL XL) 50 MG TABLET SR 24 HR Take 1 Tablet by mouth every day. 90 Tablet 3    atorvastatin (LIPITOR) 40 MG Tab TAKE 1 TABLET BY MOUTH ONCE DAILY IN THE EVENING 90 Tablet 2    Cetirizine HCl (ZYRTEC ALLERGY PO) Take  by mouth.      Triamcinolone Acetonide (NASACORT AQ NA) Administer  into  affected nostril(S).      ELIQUIS 5 MG Tab Take 1 tablet by mouth twice daily 180 Tablet 1    omeprazole (PRILOSEC) 20 MG delayed-release capsule Take 1 Capsule by mouth every day. 90 Capsule 3    albuterol 108 (90 Base) MCG/ACT Aero Soln inhalation aerosol Inhale 2 Puffs every four hours as needed for Shortness of Breath. 1 Each 3    acetaminophen (TYLENOL) 325 MG Tab Take 2 Tablets by mouth 1 time a day as needed. Indications: Pain      Probiotic Product (PROBIOTIC-10 PO) Take 1 Capsule by mouth every day.      Multiple Vitamin (MULTI-VITAMIN PO) Take 1 Tablet by mouth every day.      Pantothenic Acid 500 MG Tab Take 1 Tablet by mouth every day.      Calcium-Vitamin D-Vitamin K (CALCIUM FOR WOMEN PO) Take 1 Each by mouth every day.      Magnesium 200 MG TABS Take 1 Tab by mouth every day.       No current facility-administered medications for this visit.         Abnormal nasal septum  8/12/21  ENT note, nasal septum S-shaped curve bows to the left nostril inferiorly, in the right nose superiorly, normal turbinate size and symmetry, does not seem to bother her when awake, recommend humidification and discuss further at follow-up, higher perioperative risk due to cardiovascular disease    allergic rhinitis  1/30/23 off flonase dry mouth, trial atrovent nasal   2/3/23 cxr negative  2/3/23 cough might be worse when walking outside in cold weather, will provide albuterol, she will keep a symptom diary, notify me next week if atrovent nasal is working if not consider singulair, do not believe she needs PFTs at this time  2/16/23 atrovent not effective, albuterol helps, trial of singulair and can try otc nasacort as flonase made her mouth dry  2/28/23 on nasonex and singulair     cll  2/3/20 wbc 7.1 (52%N,39%L)  9/1/20 wbc 6.9 (41%N,51%L)  11/22/21 wbc 8.4 (35%N,57%L) many smudge cells  1/12/22 wbc 9.9 (34%N,60%L),flow cytometry kappa restricted CD5+ B-cell population accounting for 20% of visible  leukocytes, phenotypic features most consistent with CLL  2/23/22  oncology consultation, CLL with FISH panel DEL 13+,DEL 17-showing favorable prognosis, counseled patient on benign nature of CLL with no cytopenia or physical evidence of lymphadenopathy or hepatosplenomegaly, discussed watchful waiting follow-up 3 months and repeat cbc every 6 months  5/18/22  oncology note no cytopenia, no splenomegaly, hepatomegaly, lymphadenopathy, favorable prognostic markers, follow-up 6 months  8/17/22 wbc 13.0 (18%N,80%L)  11/1/22 wbc 10.9 (31%N,62%L),  11/9/22  oncology note stable continue to monitor  5/2/23 wbc 12.2 (33%N,64L),RF<10,CRP 0.49,NAVIN negative  5/10/23  oncology note follow up one year     dec gfr  2/19/11 bun 15,creat 1.0,GFR 56  5/14/14 bun 24,creat 1.23,GFR 45  8/2/14 ultrasound renal negative  2/19/15 bun 19,creat 1.1,GFR 50  8/3/15 bun 17,creat 1.2, GFR 43,urine mac<0.5,PTH 27,vit d 37  10/16/15 bun 17,creat 1.1,GFR 47  1/27/16 bun 14,creat 1.2,GFR 45  12/5/16 bun 16,creat 1.0,GFR 52  1/18/19 bun 15,creat 1.0,GFR 52   2/3/20 bum 16,creat 1.13,GFR 49,urine mac<0.7  9/2/20 bun 20,creat 1.1,GFR 50  3/3/21 bun 13,creat 0.85,GFR>60,PTH 56, urine mac<1.2,SPEP negative  11/22/21 bun 13,creat 1.0,GFR 56  11/1/22  bun 16,creat 1.05,GFR 60  5/2/23 bun 13,creat 0.9,GFR 70     Dyslipidemia  12/5/16 chol 186,trig 143,hdl 52,ldl 105  11/26/18  vascular medicine note chronic venous stasis changes lower extremities, recheck lipid panel qualify for statin therapy, continue home blood pressure monitoring, consider 24-hour ambulatory blood pressure monitoring, continue metoprolol, continue xarelto use compression stockings  1/18/19 chol 183,trig 160,hdl 45,ldl 106  11/26/19 chol 133,trig 202,hdl 42,ldl 51  3/3/21 chol 144,trig 98,hdl 48,ldl 76 on lipitor 40 mg  10/5/21 CT-CTA heart left main no plaque or stenosis, LAD 25% stenosis, circumflex and obtuse  marginal patent, RCA, posterior descending and posterior lateral branches patent no stenosis, normal LV size, ascending aorta 3.5 cm  11/22/21 chol 134,trig 133,hdl 45,ldl 62 on lipitor 40 mg  5/2/23 chol 145,trig 263,hdl 37,ldl 55 on lipitor 40 mg     gerd on pepcid  2/8/16 on pepcid  8/12/21  ENT note throat symptoms possibly related to reflux, recommend famotidine 40 mg bid and work on lifestyle modification recommended humidification to avoid excessive dryness  10/12/21  ENT note flexible laryngoscopy, moderate interarytenoid and moderate postcricoid edema, most likely cause of sensation in her throat is reflux consistent with laryngeal pharyngeal reflux, work on healthy lifestyle, diet modification, continue famotidine twice daily for 6 to 8 weeks  1/20/22 on pepcid 40 mg bid   8/22/22 on prilosec after ablation will stop on 9/4/22 and go back to pepcid, had headache that started when she went off pepcid but that was when she had ablation so monitor for recurrent headache on pepcid or if breakthrough heartburn on pepcid then will need to go back to omeprazole if that happens  9/12/22 on pepcid 40 mg  3/15/23 on prilosec 20 mg as failed pepcid  5/2/23 vit d 33 on prilosec 20 mg    history covid  8/12/23 covid positive      history headache  11/22/21 trial of pamelor for tension headache and monitor for norpace interaction, also consider ct head no improvement  1/20/22 still with insomnia, nortriptyline 10 mg, consider going to 20 mg, need to check with cardiology first because of potential interaction with norpace  2/11/22 increase nortriptylline to 20 mg ok per cardiology see patient mychart note   2/22/22 off nortriptyline due to chest pain  3/15/22 resuming nortriptyline for sleep as melatonin caused headache  8/22/22 stop nortriptyline as headache improved after ablation  9/12/22 back on pamelor 10 mg     History transient global amnesia  1/18/19 chol 183,trig 160,hdl 45,ldl  106  1/18/19 CT head negative  1/19/19 ultrasound carotid less than 50% bilateral stenosis  1/19/19 CT-CTA head with and without postprocessing, negative [place]  1/18/19 hospital admission, 1/19/19 hospital discharge transient global amnesia, patient was speaking to her son on the phone, and could not recall for anything 2 hours afterwards, has been indicated that patient was confused and repetitive and could not remember talking on the phone, came to the emergency room for evaluation, CT scan head negative, no other symptoms, resolution discharged home the following day  1/29/19 cardiology note, patient still having some issues with short-term memory, will switch to coumadin from xarelto with recent event, referral to neurology, paroxysmal atrial fibrillation burden less than 1%  3/26/19 [facility] neurological note probable transient global amnesia, complete holter monitor per cardiology, EEG ordered and sedimentation rate for headache    ibs  2/3/20 trial of linzess 145 mcg  2/25/20 increase to linzess 290 mcg   3/5/20 colon per [facility] negative, repeat 10 years  9/12/22 back on pamelor 10 mg     insomnia   11/22/21 trial of pamelor for tension headache and monitor for norpace interaction, also consider ct head no improvement  2/22/22 off nortriptyline due to chest pain, will try melatonin for sleep  9/12/22 back on pamelor 10 mg     neck arthritis  3/24/15 MRI cervical spine C4-C5 small disc osteophyte complex, moderate left facet arthropathy, multilevel DJD  4/14/21 EMG nerve conduction study right upper extremity consistent with right median neuropathy without denervation, possible mild right ulnar neuropathy, no evidence of cervical radiculopathy     pacemaker  4/16/15  cardiology procedure note, dual-chamber pacemaker implantation  2/8/22 cardiology note pacemaker nearing EDIE, we will go ahead and extracted failing RV lead transvenously and replace, patient agreeable to proceed  4/6/22   cardiology procedure note transvenous dual-chamber PPM lead extraction, pacemaker generator removal, new permanent pacemaker implantation pulse generator boston model L311 serial # [id number]  11/16/22 cardiology note device interrogation 80% paced   5/22/23 EKG sinus 62 atrial paced rhythm, left bundle branch block  5/22/23  cardiology note device check functioning appropriately, continue oral anticoagulation, continue norpace, increase toprol to 50 mg     Paroxysmal atrial fibrillation  11/18/14 echo normal LV size and function, grade 2 diastolic dysfunction, EF 60-65%, mild aortic stenosis, mild aortic insufficiency, RVSP 20-25  11/19/14 holter sinus with left bundle branch block, average rate 57, minimum heart rate 44, maximal heart rate 85, PVCs, PACs   12/12/14 ALLEN known post-ablation for hypertrophic cardiomyopathy without obstruction, small amount of remaining myocardium of lower tract does not cause significant outflow gradient, mild aortic insufficiency, moderate central mitral regurgitation, remnant myocardium in the LVOT with mild obstruction, peak outflow gradient 11 (previously 16-18)  11/30/15 cardiology note paroxysmal atrial fibrillation, short atrial fibrillation under 4 minutes  2/22/16 cardiology note minimal atrial fibrillation on dual-chamber pacemaker check  3/24/16 cardiology note minimal atrial fibrillation   10/24/16 cardiology note longer episodes of atrial fibrillation, still at 2.6 hours total on dual-chamber pacemaker review, continues on xarelto  9/26/18 echo normal LV systolic function, EF 55%, mild LVH, grade 1 diastolic dysfunction, evidence of LVOT, septal wall thickness 1.2 cm, consider subaortic membrane, mild to moderate mitral regurgitation, mild aortic insufficiency  10/8/18 cardiology note does not appear to be subaortic membrane on echo, perhaps subaortic thickening, continue to monitor echo of full gradient for recurrence continue oral anticoagulation gradient is low and  asymptomatic, xarelto, norpace, lopressor  1/29/19 cardiology note, patient still having some issues with short-term memory, will switch to coumadin from xarelto with recent event, referral to neurology, paroxysmal atrial fibrillation burden less than 1%  2/28/19 cardiology note hypertrophic obstructive cardiomyopathy and paroxysmal atrial fibrillation, sinus on exam, medi-lynx ventricular premature contractions isolated but frequent, asymptomatic, one short episode of paroxysmal atrial fibrillation 1.5 minutes, continues on warfarin INR 2.0, continues on norpace, increase metoprolol XL to 50 mg follow-up 1 month   10/10/19 cardiology note hypertrophic cardiomyopathy status post septal ablation, pacemaker, paroxysmal atrial fibrillation relatively short episodes, longest episode 49 minutes remainder less than 1 minute in length, remains controlled on toprol, norpace, coumadin per coumadin clinic  10/31/19 echo normal left ventricular function, EF 60%, basal septal wall appears hypokinetic, grade 2 diastolic dysfunction, moderately dilated left atrium volume index 44 mL, mild mitral regurgitation, mild aortic stenosis, mild aortic insufficiency, peak gradient 23  1/7/20 ultrasound carotid less than 50% stenosis right internal carotid, left carotid mild plaque carotid bifurcation  8/25/20 cardiology note pacemaker, device check, no arrhythmia burden, change from coumadin to eliquis follow-up 6 months repeat echo at that time  2/3/21 echo mild LVH, EF 65%, grade 2 diastolic dysfunction, RVSP 30  2/26/21  cardiology note pacemaker, paroxysmal atrial fibrillation stable, echo reviewed no significant LVOT gradient, continue norpace and toprol discussed options of RV lead replacement including transvenous extraction, implanting the lead, or performing generator change and using it until no longer working, we will plan on transvenous extraction and new RV lead, follow-up 6 months  8/11/21 cardiology note would like to  exclude obstructive CAD but do not think pretest probability is high enough to warrant catheterization, will order coronary CTA follow-up 6 months  8/11/21 cardiology note EKG sinus, ordered CTA  10/5/21 CT-CTA heart left main no plaque or stenosis, LAD 25% stenosis, circumflex and obtuse marginal patent, RCA, posterior descending and posterior lateral branches patent no stenosis, normal LV size, ascending aorta 3.5 cm  2/8/22 cardiology note pacemaker nearing EDIE, we will go ahead and extracted failing RV lead transvenously and replace, patient agreeable to proceed  4/6/22  cardiology procedure note transvenous dual-chamber PPM lead extraction, pacemaker generator removal, new permanent pacemaker implantation pulse generator Singular model L311 serial # [id number]  4/27/22  cardiology note having higher PVCs and atrial arrhythmia burden since extraction and new implant, device functioning appropriately, follow-up 4 weeks  5/27/22  cardiology note device interrogation, increasing sustained atrial fibrillation episodes up to 5 hours, options discussed, increase norpace to 200 mg tid for now, plan pulmonary vein isolation next available  6/27/22  cardiology note plan for ablation August, lower metoprolol to 50 mg bid and norpace to 150 mg tid  8/4/22 transesophageal echo prior to ablation preserved LV function, left atrial enlargement, mild MR and mild to moderate aortic insufficiency, no evidence of thrombus  8/14/22  cardiology EPS noted, successful RF ablation pulmonary vein isolation procedure, resume anticoagulation, start carafate bid x 2 weeks and PPI x one month  11/16/22 cardiology note device interrogation 80% paced, start walking down beta-blocker and norpace, decrease metoprolol to 25 mg, can decrease norpace if doing well follow-up 6 months  1/30/23 on norpace, metoprolol, eliquis  5/22/23 EKG sinus 62 atrial paced rhythm, left bundle branch block  5/22/23  cardiology note  device check functioning appropriately, continue oral anticoagulation, continue norpace, increase toprol to 50 mg     Preventative health  12/2/16 tdap  2/3/20 hep c ab negative  3/5/20 colon per [facility] negative, repeat 10 years  9/11/20 declines sonocine  3/23/21 dexa LS-0.4,hip-0.2  8/22/22 prevnar 20  5/2/23 A1c 5.5%  5/2/23 vit d 33  9/19/23 flu   10/10/23 pap per [facility]   10/12/23 mammogram heterogeneously dense breast tissue per gynecology    sleep apnea  2/3/17 sleep apnea referral pending, OPO ordered  2/14/17 apnea link per [facility], AHI 19.7, low saturation 83, time<90% saturation 199 minutes, time less than 85% saturation 1 minute  2/16/17 sleep consultation, proceed with polysomnography  3/26/17 sleep study duration 430 minutes, AHI 21.3, low saturation 88%, mild to moderate sleep-disordered breathing with significant disruption of sleep continuity, limb movement disorder could be present as well, cpap titration recommended  3/31/17 sleep note ordered autocpap 5-15 nightly, follow-up 6-8 weeks  11/30/18 sleep note on autocpap 5-15  6/4/20 sleep note on autopap 5 to 15  2/3/21 echo mild LVH, EF 65%, grade 2 diastolic dysfunction, RVSP 30  9/3/21 sleep note on autocpap 5 to 15, compliance report 96.7% usage average time 5 hours 30 minutes AHI 6.9, terrance device recall  2/9/23 sleep note bring in device tomorrow for compliance review     S/p carpal tunnel right release  2/25/21 EMG nerve conduction study right upper extremity evaluate carpal tunnel  3/3/21 wbc 6.9 (41%N,51%L),NAVIN negative,SPEP negative  4/14/21 EMG nerve conduction study right upper extremity consistent with right median neuropathy without denervation, possible mild right ulnar neuropathy, no evidence of cervical radiculopathy  6/1/21  orthopedic operative note right endoscopic carpal tunnel release and left carpal tunnel injection under anesthesia  6/14/21  orthopedic note status post right endoscopic  "carpal tunnel release and left carpal tunnel injection under anesthesia anesthesia, follow-up 8 to 10 weeks   8/25/21 PACO note status post right endoscopic carpal tunnel release follow-up 8 to 10 weeks     Venous insufficiency  1/22/19 [facility] note venous insufficiency, physical exam does not indicate any evidence of venous insufficiency, patient declines venous ultrasound                  Patient Active Problem List   Diagnosis    S/P carpal tunnel release    Pacemaker    Preventative health care    GERD (gastroesophageal reflux disease)    Neck arthritis    Decreased GFR    Dyslipidemia    Paroxysmal atrial fibrillation (HCC)    Sleep apnea    History of transient global amnesia    Venous insufficiency    IBS (irritable bowel syndrome)    Abnormal nasal septum    CLL (chronic lymphocytic leukemia) (HCC)    History of headache    Insomnia    H/O cardiac radiofrequency ablation 8/4/22 Dr Witt    Allergic rhinitis    History of COVID-19                Patient Care Team:  Juve Stoddard M.D. as PCP - General (Internal Medicine)  Vibha Chua M.D. (Nephrology)  [facility] as Pharmacist      ROS           Objective     /76 (BP Location: Right arm, Patient Position: Sitting, BP Cuff Size: Adult)   Pulse 66   Temp 36.6 °C (97.8 °F)   Resp 16   Ht 1.626 m (5' 4\")   Wt 86.6 kg (191 lb)   LMP 01/31/2012 (LMP Unknown)   SpO2 97%   BMI 32.79 kg/m²      Physical Exam  Vitals and nursing note reviewed.   HENT:      Head: Normocephalic and atraumatic.      Right Ear: External ear normal.      Left Ear: External ear normal.   Eyes:      Conjunctiva/sclera: Conjunctivae normal.   Cardiovascular:      Rate and Rhythm: Normal rate and regular rhythm.      Heart sounds: Normal heart sounds.   Pulmonary:      Effort: Pulmonary effort is normal.      Breath sounds: Normal breath sounds.   Abdominal:      General: Abdomen is flat. There is no distension.      Palpations: Abdomen is soft.      " Tenderness: There is no abdominal tenderness.   Musculoskeletal:         General: No swelling.   Skin:     Coloration: Skin is not jaundiced.      Findings: No bruising.   Neurological:      General: No focal deficit present.      Mental Status: She is alert.   Psychiatric:         Mood and Affect: Mood normal.         Behavior: Behavior normal.                           Assessment & Plan        Assessment  #1  History of recent COVID in August, since then has had more constipation and less frequent bowel movements than she would normally have as typically she will have bowel movements once a day, she has tried Metamucil, Dulcolax, laxatives, stool softeners, previously history of possible IBS tried on Linzess 2020 which was beneficial without side effects, she has tried to incorporate more fiber and water in her diet, on exam and by history no evidence of obstruction    #2 paroxysmal atrial fibrillation sinus on exam, followed by cardiology remains on metoprolol, Norpace and Eliquis per cardiology    #3 pacemaker per cardiology    #4 sleep apnea on CPAP    #5 BMI 32.79 overweight working on a good nutrition exercise program, has been walking more since she has a new dog    #6 chronic thrombophilia on Eliquis per cardiology for atrial fibrillation    Plan  #1 trial of Linzess again she had been on that previously for IBS and constipation without side effects, start at 145 mcg daily monitor for loose stools, should that occur she could try going every other day dosing    #2 encouraged to continue good fiber intake aiming for 20 to 30 g of fiber per day, she can supplement with psyllium if she is short of 20 g to 30 g of fiber daily    #3 continue adequate water intake    #4 continue regular exercise, continue working on a good nutrition program    #5 recommend she get the RSV vaccine at the pharmacy, prescription printed to carry to the pharmacy and sent to her pharmacy at [facility]    #6 recommend getting the  newly updated COVID-vaccine at the pharmacy    #7 she has had the flu vaccine    #8 continue CPAP nightly    #9 follow-up cardiology    #10 follow up in May

## 2023-11-03 PROCEDURE — RXMED WILLOW AMBULATORY MEDICATION CHARGE: Performed by: INTERNAL MEDICINE

## 2023-11-06 ENCOUNTER — PHARMACY VISIT (OUTPATIENT)
Dept: PHARMACY | Facility: MEDICAL CENTER | Age: 63
End: 2023-11-06
Payer: COMMERCIAL

## 2023-11-06 DIAGNOSIS — I48.0 PAROXYSMAL ATRIAL FIBRILLATION (HCC): ICD-10-CM

## 2023-11-06 RX ORDER — RESPIRATORY SYNCYTIAL VIRUS VACCINE 120MCG/0.5
KIT INTRAMUSCULAR
Qty: 0.5 ML | Refills: 0 | OUTPATIENT
Start: 2023-11-06

## 2023-11-06 RX ORDER — APIXABAN 5 MG/1
TABLET, FILM COATED ORAL
Qty: 180 TABLET | Refills: 0 | Status: SHIPPED | OUTPATIENT
Start: 2023-11-06 | End: 2024-02-09

## 2023-11-14 ENCOUNTER — OFFICE VISIT (OUTPATIENT)
Dept: CARDIOLOGY | Facility: MEDICAL CENTER | Age: 63
End: 2023-11-14
Attending: INTERNAL MEDICINE
Payer: COMMERCIAL

## 2023-11-14 VITALS
RESPIRATION RATE: 16 BRPM | OXYGEN SATURATION: 97 % | SYSTOLIC BLOOD PRESSURE: 134 MMHG | WEIGHT: 188 LBS | BODY MASS INDEX: 32.1 KG/M2 | HEIGHT: 64 IN | HEART RATE: 60 BPM | DIASTOLIC BLOOD PRESSURE: 78 MMHG

## 2023-11-14 DIAGNOSIS — Z95.0 PACEMAKER: ICD-10-CM

## 2023-11-14 DIAGNOSIS — I48.0 PAROXYSMAL ATRIAL FIBRILLATION (HCC): Chronic | ICD-10-CM

## 2023-11-14 PROCEDURE — 99214 OFFICE O/P EST MOD 30 MIN: CPT | Mod: 25 | Performed by: INTERNAL MEDICINE

## 2023-11-14 PROCEDURE — 93010 ELECTROCARDIOGRAM REPORT: CPT | Mod: 59 | Performed by: INTERNAL MEDICINE

## 2023-11-14 PROCEDURE — 93280 PM DEVICE PROGR EVAL DUAL: CPT | Performed by: INTERNAL MEDICINE

## 2023-11-14 PROCEDURE — 93280 PM DEVICE PROGR EVAL DUAL: CPT | Mod: 26 | Performed by: INTERNAL MEDICINE

## 2023-11-14 PROCEDURE — 93005 ELECTROCARDIOGRAM TRACING: CPT | Performed by: INTERNAL MEDICINE

## 2023-11-14 PROCEDURE — 3078F DIAST BP <80 MM HG: CPT | Performed by: INTERNAL MEDICINE

## 2023-11-14 PROCEDURE — 3075F SYST BP GE 130 - 139MM HG: CPT | Performed by: INTERNAL MEDICINE

## 2023-11-14 PROCEDURE — 99213 OFFICE O/P EST LOW 20 MIN: CPT | Performed by: INTERNAL MEDICINE

## 2023-11-14 ASSESSMENT — FIBROSIS 4 INDEX: FIB4 SCORE: 1.72

## 2023-11-14 NOTE — PROGRESS NOTES
Arrhythmia Clinic Note (Established patient)    DOS: 11/14/2023    Chief complaint/Reason for consult: F/u AF    Interval History:  Pt is a 64 yo F. She has history of HCM, PAF, s/p septal ablation and PV isolation. Remains on BB along with norpace along with oral anticoagulation. S/p PPM. HEre for follow-up of AF and device. Doing well no complaints today.    ROS (+ highlighted in red):  General--Negative for fatigue, weight loss or weight gain  Cardiovascular--Negative for CP, orthopnea, PND    Past Medical History:   Diagnosis Date    Anesthesia     wakes up during procedure (x2)    Aortic regurgitation     Arrhythmia, ventricular 2008    Symptomatic PVCs, controlled with medication.    Asthma     inhaler PRN, only uses when sick     Atrial fibrillation (HCC)     Awareness under anesthesia     Cardiac arrhythmia     Cervical arthritis 02/08/2016    Chickenpox     Chronic kidney disease, stage III (moderate) (HCC) 02/09/2016    CTS (carpal tunnel syndrome) 02/28/2011    Depression     GERD (gastroesophageal reflux disease)     Heart burn     Heart murmur     Hemorrhagic disorder (HCC)     on Eliquis    Hypertr obst cardiomyop 1991    Dr. Bert Vazquez, [facility] [address], [place] [address] [phone] fax [phone].  [facility] [phone].  Alchol Septal Reduction 8/00, Coronaries normal.    Hypertrophic cardiomyopathy (HCC)     Indigestion     Influenza     Pacemaker 2014    Renal disorder     chronic kidney disease stage 3-  **pt states currently no issue, numbers are stable    Sleep apnea     uses CPAP    Snoring        Past Surgical History:   Procedure Laterality Date    PACEMAKER INSERTION  04/2022    PB WRIST ARTHROSCOP,RELEASE XVERS LIG Right 06/01/2021    Procedure: RELEASE, CARPAL TUNNEL, ENDOSCOPIC;  Surgeon: Mike Ospina M.D.;  Location: SURGERY [facility];  Service: Orthopedics    JOINT INJECTION DIAGNOSTIC Left 06/01/2021    Procedure: INJECTION, JOINT, DIAGNOSTIC-CARPAL  TUNNEL;  Surgeon: Mike Ospina M.D.;  Location: SURGERY [facility];  Service: Orthopedics    HYSTEROSCOPY WITH VIDEO OPERATIVE  04/30/2018    Procedure: HYSTEROSCOPY WITH VIDEO OPERATIVE;  Surgeon: Noemi Liu M.D.;  Location: SURGERY SAME DAY [facility];  Service: Labor and Delivery    DILATION AND CURETTAGE  04/30/2018    Procedure: DILATION AND CURETTAGE;  Surgeon: Noemi Liu M.D.;  Location: SURGERY SAME DAY [facility];  Service: Labor and Delivery    BREAST BIOPSY Left 01/30/2018    Procedure: BREAST BIOPSY- WIRE LOCALIZED;  Surgeon: Vijaya Britt M.D.;  Location: SURGERY SAME DAY [facility];  Service: General    RECOVERY  04/15/2015    Performed by Recoveryonly Surgery at SURGERY [facility]POST PROC UN[facility]    PACEMAKER INSERTION  2015    RECOVERY  12/12/2014    Performed by Ir-Recovery Surgery at SURGERY SAME DAY [facility]    SEPTAL RECONSTRUCTION  2000    ENDOSCOPY      PRIMARY C SECTION      TUBAL COAGULATION LAPAROSCOPIC BILATERAL         Social History     Socioeconomic History    Marital status:      Spouse name: Not on file    Number of children: Not on file    Years of education: Not on file    Highest education level: Some college, no degree   Occupational History    Not on file   Tobacco Use    Smoking status: Never    Smokeless tobacco: Never   Vaping Use    Vaping Use: Never used   Substance and Sexual Activity    Alcohol use: Yes     Alcohol/week: 0.6 oz     Types: 1 Standard drinks or equivalent per week     Comment: 2-3 per week    Drug use: No    Sexual activity: Yes     Partners: Male     Birth control/protection: Surgical     Comment:    Other Topics Concern    Not on file   Social History Narrative    Not on file     Social Determinants of Health     Financial Resource Strain: Low Risk  (4/7/2022)    Overall Financial Resource Strain (CARDIA)     Difficulty of Paying Living Expenses: Not hard at all   Food Insecurity: No Food Insecurity (4/7/2022)     Hunger Vital Sign     Worried About Running Out of Food in the Last Year: Never true     Ran Out of Food in the Last Year: Never true   Transportation Needs: No Transportation Needs (2022)    PRAPARE - Transportation     Lack of Transportation (Medical): No     Lack of Transportation (Non-Medical): No   Physical Activity: Sufficiently Active (2022)    Exercise Vital Sign     Days of Exercise per Week: 7 days     Minutes of Exercise per Session: 60 min   Stress: No Stress Concern Present (2022)    [employer] - Occupational Stress Questionnaire     Feeling of Stress : Only a little   Social Connections: Moderately Integrated (2022)    Social Connection and Isolation Panel [NHANES]     Frequency of Communication with Friends and Family: Three times a week     Frequency of Social Gatherings with Friends and Family: Once a week     Attends [employer] Services: Never     Active Member of Clubs or Organizations: Yes     Attends Club or Organization Meetings: More than 4 times per year     Marital Status:    Intimate Partner Violence: Not on file   Housing Stability: Low Risk  (2022)    Housing Stability Vital Sign     Unable to Pay for Housing in the Last Year: No     Number of Places Lived in the Last Year: 1     Unstable Housing in the Last Year: No       Family History   Problem Relation Age of Onset    Heart Disease Mother         CHF    Hypertension Mother     Stroke Mother     Heart Failure Mother     Other Mother         carotid artery disease,gout    Cancer Father         AML    Diabetes Maternal Grandmother     Cancer Maternal Grandfather         Prostate    Heart Disease Paternal Grandfather 52         MI     Sleep Apnea Neg Hx        No Known Allergies    Current Outpatient Medications   Medication Sig Dispense Refill    ELIQUIS 5 MG Tab Take 1 tablet by mouth twice daily 180 Tablet 0    injection RSV Pre-Fusion F A&B Vac Rcmb (ABRYSVO) 120 MCG/0.5ML  "Recon Soln Inject  into the shoulder, thigh, or buttocks. 0.5 mL 0    linaCLOtide 145 MCG Cap Take 145 mcg by mouth every day. 30 Capsule 3    disopyramide (NORPACE) 150 MG Cap TAKE 1 CAPSULE BY MOUTH IN THE MORNING AND AT NOON AND AT BEDTIME 270 Capsule 2    nortriptyline (PAMELOR) 10 MG Cap TAKE 1 CAPSULE BY MOUTH ONCE DAILY IN THE EVENING 90 Capsule 1    metoprolol SR (TOPROL XL) 50 MG TABLET SR 24 HR Take 1 Tablet by mouth every day. 90 Tablet 3    atorvastatin (LIPITOR) 40 MG Tab TAKE 1 TABLET BY MOUTH ONCE DAILY IN THE EVENING 90 Tablet 2    Cetirizine HCl (ZYRTEC ALLERGY PO) Take  by mouth.      Triamcinolone Acetonide (NASACORT AQ NA) Administer  into affected nostril(S).      omeprazole (PRILOSEC) 20 MG delayed-release capsule Take 1 Capsule by mouth every day. 90 Capsule 3    albuterol 108 (90 Base) MCG/ACT Aero Soln inhalation aerosol Inhale 2 Puffs every four hours as needed for Shortness of Breath. 1 Each 3    acetaminophen (TYLENOL) 325 MG Tab Take 2 Tablets by mouth 1 time a day as needed. Indications: Pain      Probiotic Product (PROBIOTIC-10 PO) Take 1 Capsule by mouth every day.      Multiple Vitamin (MULTI-VITAMIN PO) Take 1 Tablet by mouth every day.      Pantothenic Acid 500 MG Tab Take 1 Tablet by mouth every day.      Calcium-Vitamin D-Vitamin K (CALCIUM FOR WOMEN PO) Take 1 Each by mouth every day.      Magnesium 200 MG TABS Take 1 Tab by mouth every day.       No current facility-administered medications for this visit.       Physical Exam:  Vitals:    11/14/23 0825   BP: 134/78   BP Location: Left arm   Patient Position: Sitting   BP Cuff Size: Adult   Pulse: 60   Resp: 16   SpO2: 97%   Weight: 85.3 kg (188 lb)   Height: 1.626 m (5' 4\")     General appearance: NAD, conversant  HEENT: PERRL, neck is supple with FROM  Lungs: Clear to auscultation, normal respiratory effort  CV: RRR, no murmurs/rubs/gallops, no JVD  Abdomen: Soft, non-tender with normal bowel sounds  Extremities: No peripheral " edema, no clubbing or cyanosis  Skin: No rash, lesions, or ulcers  Psych: Alert and oriented to person, place and time    Data:  Labs reviewed    Prior echo/stress reviewed:  Normal LV function, minimal gradient/GEORGE    EKG interpreted by me:  A paced, LBBB    Impression/Plan:  1. Paroxysmal atrial fibrillation (HCC)  EKG      2. Pacemaker          -Done well in terms of AF burden  -Minimal burden in last ~180 days or so, last longer episode in June, overall just over an hour accumulated in that time  -No changes to meds  -Continue BB/Norpace and eliquis at current dose  -F/u in 12 mo    Solitario Witt MD

## 2023-11-19 ENCOUNTER — OFFICE VISIT (OUTPATIENT)
Dept: URGENT CARE | Facility: CLINIC | Age: 63
End: 2023-11-19
Payer: COMMERCIAL

## 2023-11-19 VITALS
WEIGHT: 185 LBS | TEMPERATURE: 97 F | SYSTOLIC BLOOD PRESSURE: 104 MMHG | HEART RATE: 60 BPM | DIASTOLIC BLOOD PRESSURE: 64 MMHG | BODY MASS INDEX: 31.58 KG/M2 | HEIGHT: 64 IN | OXYGEN SATURATION: 97 % | RESPIRATION RATE: 14 BRPM

## 2023-11-19 DIAGNOSIS — S05.01XA ABRASION OF RIGHT CORNEA, INITIAL ENCOUNTER: ICD-10-CM

## 2023-11-19 PROCEDURE — 3078F DIAST BP <80 MM HG: CPT | Performed by: PHYSICIAN ASSISTANT

## 2023-11-19 PROCEDURE — 99213 OFFICE O/P EST LOW 20 MIN: CPT | Performed by: PHYSICIAN ASSISTANT

## 2023-11-19 PROCEDURE — 3074F SYST BP LT 130 MM HG: CPT | Performed by: PHYSICIAN ASSISTANT

## 2023-11-19 RX ORDER — MOXIFLOXACIN 5 MG/ML
1 SOLUTION/ DROPS OPHTHALMIC 3 TIMES DAILY
Qty: 2 ML | Refills: 0 | Status: SHIPPED | OUTPATIENT
Start: 2023-11-19 | End: 2023-11-26

## 2023-11-19 ASSESSMENT — ENCOUNTER SYMPTOMS
DIARRHEA: 0
CHILLS: 0
EYE PAIN: 1
EYE DISCHARGE: 0
PHOTOPHOBIA: 0
COUGH: 0
BLURRED VISION: 0
ABDOMINAL PAIN: 0
MYALGIAS: 0
SORE THROAT: 0
EYE REDNESS: 1
SHORTNESS OF BREATH: 0
NAUSEA: 0
DOUBLE VISION: 0
HEADACHES: 0
CONSTIPATION: 0
FEVER: 0
VOMITING: 0

## 2023-11-19 ASSESSMENT — FIBROSIS 4 INDEX: FIB4 SCORE: 1.72

## 2023-11-19 NOTE — PROGRESS NOTES
"Subjective:   Seda Cagle is a 63 y.o. female who presents for Eye Injury (Sx (R) eye , this morning / accidental dog scratch )      Is a pleasant 63-year-old female who has a new puppy at home and while they were playing last night the dog accidentally pod/potentially scratched her right eye.  Typically she wears contact lenses, was not wearing them at that time however.  Since then she has been wearing glasses, she rinsed out her eye in the shower.  She has not noted any visual acuity changes but notes mild discomfort mostly over the inferior medial aspect of the eye    Review of Systems   Constitutional:  Negative for chills and fever.   HENT:  Negative for congestion, ear pain and sore throat.    Eyes:  Positive for pain and redness. Negative for blurred vision, double vision, photophobia and discharge.   Respiratory:  Negative for cough and shortness of breath.    Cardiovascular:  Negative for chest pain.   Gastrointestinal:  Negative for abdominal pain, constipation, diarrhea, nausea and vomiting.   Genitourinary:  Negative for dysuria.   Musculoskeletal:  Negative for myalgias.   Skin:  Negative for rash.   Neurological:  Negative for headaches.       Medications, Allergies, and current problem list reviewed today in Epic.     Objective:     /64 (BP Location: Left arm, Patient Position: Sitting, BP Cuff Size: Large adult)   Pulse 60   Temp 36.1 °C (97 °F) (Temporal)   Resp 14   Ht 1.626 m (5' 4\")   Wt 83.9 kg (185 lb)   SpO2 97%     Physical Exam  Vitals reviewed.   Constitutional:       Appearance: Normal appearance.   HENT:      Head: Normocephalic and atraumatic.      Right Ear: External ear normal.      Left Ear: External ear normal.      Nose: Nose normal.      Mouth/Throat:      Mouth: Mucous membranes are moist.   Eyes:      General: Lids are normal. Lids are everted, no foreign bodies appreciated.         Left eye: No foreign body or discharge.      Extraocular Movements: "      Left eye: Normal extraocular motion.      Conjunctiva/sclera:      Left eye: Left conjunctiva is injected. Hemorrhage present. No chemosis or exudate.     Pupils: Pupils are equal, round, and reactive to light.      Left eye: No corneal abrasion or fluorescein uptake.   Cardiovascular:      Rate and Rhythm: Normal rate.   Pulmonary:      Effort: Pulmonary effort is normal.   Skin:     General: Skin is warm and dry.      Capillary Refill: Capillary refill takes less than 2 seconds.   Neurological:      Mental Status: She is alert and oriented to person, place, and time.         Assessment/Plan:     Diagnosis and associated orders:     1. Abrasion of right cornea, initial encounter  moxifloxacin (VIGAMOX) 0.5 % Solution         Comments/MDM:     Although there is no fluorescein uptake or corneal abrasion visualized nonetheless I would still recommend moxifloxacin as a preventative measure.  Suspect that she may have been able to close her eye and most of the injection in the small subconjunctival hemorrhage is a result of the direct impact trauma.  Continue to monitor symptoms, do not use contact lenses until eye is healed completely         Differential diagnosis, natural history, supportive care, and indications for immediate follow-up discussed.    Advised the patient to follow-up with the primary care physician for recheck, reevaluation, and consideration of further management.    Please note that this dictation was created using voice recognition software. I have made a reasonable attempt to correct obvious errors, but I expect that there are errors of grammar and possibly content that I did not discover before finalizing the note.    This note was electronically signed by Wayne Villanueva PA-C

## 2023-11-20 ENCOUNTER — PHARMACY VISIT (OUTPATIENT)
Dept: PHARMACY | Facility: MEDICAL CENTER | Age: 63
End: 2023-11-20
Payer: COMMERCIAL

## 2023-11-20 PROCEDURE — RXMED WILLOW AMBULATORY MEDICATION CHARGE: Performed by: INTERNAL MEDICINE

## 2023-11-20 NOTE — LETTER
[facility]   [address], [address]  [place] [address]  Phone: [phone]  Fax: [phone]              Seda Lighthleen Gurjit  1960    Encounter Date: 2/14/2019    DANIEL ChristiansonPROCIO          PROGRESS NOTE:  No notes on file      Juve Stoddard M.D.  [address]  [address]  [facility] [address]  VIA In Basket                  Patient reports that she is currently on her cycle and had another fainting spell last night and hit her face. The episode came on suddenly and she was unable to catch herself. She is going to see her dentist this morning for an injury sustained to her tooth. She is asking if she should come back for additional labs since she is currently on her period. Recent trans-vaginal ultrasound came back normal. Please advise.     Patient informed Dr. Vital is currently out of the office today and will be returning tomorrow. She is okay with waiting for further recommendations, declined scheduling visit with other provider at this time.

## 2023-11-27 ENCOUNTER — PHARMACY VISIT (OUTPATIENT)
Dept: PHARMACY | Facility: MEDICAL CENTER | Age: 63
End: 2023-11-27
Payer: COMMERCIAL

## 2023-11-27 PROCEDURE — RXMED WILLOW AMBULATORY MEDICATION CHARGE: Performed by: INTERNAL MEDICINE

## 2023-11-27 RX ORDER — COVID-19 VACCINE, MRNA 0.04 MG/.418ML
INJECTION, SUSPENSION INTRAMUSCULAR
Qty: 0.3 ML | Refills: 0 | OUTPATIENT
Start: 2023-11-27

## 2023-12-29 LAB — EKG IMPRESSION: NORMAL

## 2024-01-02 ENCOUNTER — NON-PROVIDER VISIT (OUTPATIENT)
Dept: CARDIOLOGY | Facility: MEDICAL CENTER | Age: 64
End: 2024-01-02
Payer: COMMERCIAL

## 2024-01-02 PROCEDURE — 93294 REM INTERROG EVL PM/LDLS PM: CPT | Performed by: INTERNAL MEDICINE

## 2024-01-02 NOTE — CARDIAC REMOTE MONITOR - SCAN
Device transmission reviewed. Device demonstrated appropriate function.       Electronically Signed by: Chele Maloney M.D.    1/6/2024  3:33 PM

## 2024-01-22 DIAGNOSIS — E78.5 DYSLIPIDEMIA: ICD-10-CM

## 2024-01-22 NOTE — TELEPHONE ENCOUNTER
Is the patient due for a refill? Yes    Was the patient seen the past year? Yes    Date of last office visit: 11/14/2023    Does the patient have an upcoming appointment?  Yes   If yes, When? 11/15/2024    Provider to refill:SS    Does the patients insurance require a 100 day supply?  No

## 2024-01-24 RX ORDER — ATORVASTATIN CALCIUM 40 MG/1
40 TABLET, FILM COATED ORAL EVERY EVENING
Qty: 90 TABLET | Refills: 3 | Status: SHIPPED | OUTPATIENT
Start: 2024-01-24

## 2024-02-09 DIAGNOSIS — I48.0 PAROXYSMAL ATRIAL FIBRILLATION (HCC): ICD-10-CM

## 2024-02-09 RX ORDER — APIXABAN 5 MG/1
TABLET, FILM COATED ORAL
Qty: 180 TABLET | Refills: 3 | Status: SHIPPED | OUTPATIENT
Start: 2024-02-09

## 2024-02-12 ENCOUNTER — OFFICE VISIT (OUTPATIENT)
Dept: SLEEP MEDICINE | Facility: MEDICAL CENTER | Age: 64
End: 2024-02-12
Attending: NURSE PRACTITIONER
Payer: COMMERCIAL

## 2024-02-12 VITALS
BODY MASS INDEX: 32.44 KG/M2 | OXYGEN SATURATION: 95 % | WEIGHT: 190 LBS | RESPIRATION RATE: 16 BRPM | HEIGHT: 64 IN | HEART RATE: 61 BPM | SYSTOLIC BLOOD PRESSURE: 104 MMHG | DIASTOLIC BLOOD PRESSURE: 76 MMHG

## 2024-02-12 DIAGNOSIS — G47.33 OBSTRUCTIVE SLEEP APNEA SYNDROME: ICD-10-CM

## 2024-02-12 PROCEDURE — 3074F SYST BP LT 130 MM HG: CPT | Performed by: NURSE PRACTITIONER

## 2024-02-12 PROCEDURE — 99214 OFFICE O/P EST MOD 30 MIN: CPT | Performed by: NURSE PRACTITIONER

## 2024-02-12 PROCEDURE — 99213 OFFICE O/P EST LOW 20 MIN: CPT | Performed by: NURSE PRACTITIONER

## 2024-02-12 PROCEDURE — 3078F DIAST BP <80 MM HG: CPT | Performed by: NURSE PRACTITIONER

## 2024-02-12 ASSESSMENT — FIBROSIS 4 INDEX: FIB4 SCORE: 1.72

## 2024-02-12 ASSESSMENT — PATIENT HEALTH QUESTIONNAIRE - PHQ9: CLINICAL INTERPRETATION OF PHQ2 SCORE: 0

## 2024-02-12 NOTE — PROGRESS NOTES
Chief Complaint   Patient presents with    Follow-Up     Last seen 02/09/2023 Bari Koo  1yr. F/v GEORGINA Compliance       HPI:  Seda Cagle is a 63 y.o. year old female here today for follow-up on GEORGINA.  Last seen by me on 2/9/2023. Patient has a history of hypertrophic cardiomyopathy and atrial fib with septal ablation and pacemaker placement.  She is followed by cardiology.  In regards to sleep apnea, she underwent a home sleep study on February 14, 2017 which showed an DUONG of 19.7.  Repeat polysomnogram showed AHI of 21.3.  She was started on auto CPAP at 5 to 15 cm/H2O.     Was previously on a recall of Carmen RespirDirect Sitterss device.  She received a DreamStation 2 replacement device on 10/1/2022.  She has been using since.  She purchases her replacement supplies out of pocket through [employer].  She is currently using a nasal cushion and denies any difficulty with mask fit or pressures.  She gets between 5 to 7 hours of sleep and does have occasional difficulty falling asleep.  She notes improved sleep quality with CPAP use.  Her energy levels during the day are significantly increased.  Currently she denies any excessive daytime sleepiness, morning headaches, palpitations, concentration or memory problems.    30-day compliance reviewed with patient shows 100% use with an average time of 5 hours and 17 minutes and resultant AHI of 5.  There is no evidence of persistent mask leak.      ROS: As per HPI and otherwise negative if not stated.    Past Medical History:   Diagnosis Date    Anesthesia     wakes up during procedure (x2)    Aortic regurgitation     Arrhythmia, ventricular 2008    Symptomatic PVCs, controlled with medication.    Asthma     inhaler PRN, only uses when sick     Atrial fibrillation (HCC)     Awareness under anesthesia     Cardiac arrhythmia     Cervical arthritis 02/08/2016    Chickenpox     Chronic kidney disease, stage III (moderate) (HCC) 02/09/2016    CTS (carpal tunnel syndrome)  02/28/2011    Depression     GERD (gastroesophageal reflux disease)     Heart burn     Heart murmur     Hemorrhagic disorder (HCC)     on Eliquis    Hypertr obst cardiomyop 1991    Dr. Bert Vazquez, [address] [place] [address] [phone] fax [phone].  [facility] [phone].  Alchol Septal Reduction 8/00, Coronaries normal.    Hypertrophic cardiomyopathy (HCC)     Indigestion     Influenza     Pacemaker 2014    Renal disorder     chronic kidney disease stage 3-  **pt states currently no issue, numbers are stable    Sleep apnea     uses CPAP    Snoring        Past Surgical History:   Procedure Laterality Date    PACEMAKER INSERTION  04/2022    PB WRIST ARTHROSCOP,RELEASE XVERS LIG Right 06/01/2021    Procedure: RELEASE, CARPAL TUNNEL, ENDOSCOPIC;  Surgeon: Mike Ospina M.D.;  Location: SURGERY [facility];  Service: Orthopedics    JOINT INJECTION DIAGNOSTIC Left 06/01/2021    Procedure: INJECTION, JOINT, DIAGNOSTIC-CARPAL TUNNEL;  Surgeon: Mike Ospina M.D.;  Location: SURGERY [facility];  Service: Orthopedics    HYSTEROSCOPY WITH VIDEO OPERATIVE  04/30/2018    Procedure: HYSTEROSCOPY WITH VIDEO OPERATIVE;  Surgeon: Noemi Liu M.D.;  Location: SURGERY SAME DAY [facility];  Service: Labor and Delivery    DILATION AND CURETTAGE  04/30/2018    Procedure: DILATION AND CURETTAGE;  Surgeon: Noemi Liu M.D.;  Location: SURGERY SAME DAY [facility];  Service: Labor and Delivery    BREAST BIOPSY Left 01/30/2018    Procedure: BREAST BIOPSY- WIRE LOCALIZED;  Surgeon: Vijaya Britt M.D.;  Location: SURGERY SAME DAY [facility];  Service: General    RECOVERY  04/15/2015    Performed by Recoveryonly Surgery at SURGERY [facility]POST PROC [facility]    PACEMAKER INSERTION  2015    RECOVERY  12/12/2014    Performed by Ir-Recovery Surgery at SURGERY SAME DAY [facility]    SEPTAL RECONSTRUCTION  2000    ENDOSCOPY      PRIMARY C SECTION      TUBAL COAGULATION LAPAROSCOPIC  "BILATERAL         Family History   Problem Relation Age of Onset    Heart Disease Mother         CHF    Hypertension Mother     Stroke Mother     Heart Failure Mother     Other Mother         carotid artery disease,gout    Cancer Father         AML    Diabetes Maternal Grandmother     Cancer Maternal Grandfather         Prostate    Heart Disease Paternal Grandfather 52         MI     Sleep Apnea Neg Hx        Allergies as of 2024    (No Known Allergies)        Vitals:  /76 (BP Location: Left arm, Patient Position: Sitting, BP Cuff Size: Adult)   Pulse 61   Resp 16   Ht 1.626 m (5' 4\")   Wt 86.2 kg (190 lb)   SpO2 95%     Current medications as of today   Current Outpatient Medications   Medication Sig Dispense Refill    ELIQUIS 5 MG Tab Take 1 tablet by mouth twice daily 180 Tablet 3    atorvastatin (LIPITOR) 40 MG Tab Take 1 Tablet by mouth every evening. 90 Tablet 3    COVID-19 mRNA Vac-Sarahi,Pfizer, (COMIRNATY) 30 MCG/0.3ML Suspension Prefilled Syringe injection Inject  into the shoulder, thigh, or buttocks. 0.3 mL 0    injection RSV Pre-Fusion F A&B Vac Rcmb (ABRYSVO) 120 MCG/0.5ML Recon Soln Inject  into the shoulder, thigh, or buttocks. 0.5 mL 0    linaCLOtide 145 MCG Cap Take 145 mcg by mouth every day. 30 Capsule 3    disopyramide (NORPACE) 150 MG Cap TAKE 1 CAPSULE BY MOUTH IN THE MORNING AND AT NOON AND AT BEDTIME 270 Capsule 2    nortriptyline (PAMELOR) 10 MG Cap TAKE 1 CAPSULE BY MOUTH ONCE DAILY IN THE EVENING 90 Capsule 1    metoprolol SR (TOPROL XL) 50 MG TABLET SR 24 HR Take 1 Tablet by mouth every day. 90 Tablet 3    Cetirizine HCl (ZYRTEC ALLERGY PO) Take  by mouth.      Triamcinolone Acetonide (NASACORT AQ NA) Administer  into affected nostril(S).      omeprazole (PRILOSEC) 20 MG delayed-release capsule Take 1 Capsule by mouth every day. 90 Capsule 3    albuterol 108 (90 Base) MCG/ACT Aero Soln inhalation aerosol Inhale 2 Puffs every four hours as needed for Shortness of " Breath. 1 Each 3    acetaminophen (TYLENOL) 325 MG Tab Take 2 Tablets by mouth 1 time a day as needed. Indications: Pain      Probiotic Product (PROBIOTIC-10 PO) Take 1 Capsule by mouth every day.      Multiple Vitamin (MULTI-VITAMIN PO) Take 1 Tablet by mouth every day.      Pantothenic Acid 500 MG Tab Take 1 Tablet by mouth every day.      Calcium-Vitamin D-Vitamin K (CALCIUM FOR WOMEN PO) Take 1 Each by mouth every day.      Magnesium 200 MG TABS Take 1 Tab by mouth every day.       No current facility-administered medications for this visit.         Physical Exam:  Gen:           Alert and oriented, No apparent distress. Mood and affect appropriate, normal interaction with examiner.  Eyes:          PERRL, EOM intact, sclere white, conjunctive moist.  Ears:          Not examined.   Hearing:     Grossly intact.  Nose:          Normal, no lesions or deformities.  Dentition:    Good dentition.  Oropharynx:   Tongue normal, posterior pharynx without erythema or exudate.  Mallampati Classification:   Neck:        Supple, trachea midline, no masses.  Respiratory Effort: No intercostal retractions or use of accessory muscles.   Lung Auscultation:      Clear to auscultation bilaterally; no rales, rhonchi or wheezing.  CV:            Regular rate and rhythm. No murmurs, rubs or gallops.  Abd:           Not examined.   Lymphadenopathy: Not examined.  Gait and Station: Normal.  Digits and Nails: No clubbing, cyanosis, petechiae, or nodes.   Cranial Nerves: II-XII grossly intact.  Skin:        No rashes, lesions or ulcers noted.               Ext:           No cyanosis or edema.      Assessment:  1. Obstructive sleep apnea syndrome  DME Mask and Supplies        Plan:  Patient is using and benefiting from CPAP therapy.  Compliance shows adequate use and control of GEORGINA.  Order placed for mask and supplies which will be good for 1 year.  Advise annual follow-up, but can be seen sooner if needed.    Please note that this  dictation was created using voice recognition software. I have made every reasonable attempt to correct obvious errors, but it is possible there are errors of grammar and possibly content that I did not discover before finalizing the note.

## 2024-02-23 RX ORDER — NORTRIPTYLINE HYDROCHLORIDE 10 MG/1
10 CAPSULE ORAL EVERY EVENING
Qty: 90 CAPSULE | Refills: 2 | Status: SHIPPED | OUTPATIENT
Start: 2024-02-23

## 2024-02-28 ENCOUNTER — APPOINTMENT (OUTPATIENT)
Dept: PHARMACY | Facility: MEDICAL CENTER | Age: 64
End: 2024-02-28
Payer: COMMERCIAL

## 2024-03-14 DIAGNOSIS — I48.0 PAROXYSMAL ATRIAL FIBRILLATION (HCC): ICD-10-CM

## 2024-03-14 RX ORDER — OMEPRAZOLE 20 MG/1
20 CAPSULE, DELAYED RELEASE ORAL DAILY
Qty: 90 CAPSULE | Refills: 1 | Status: SHIPPED | OUTPATIENT
Start: 2024-03-14

## 2024-03-15 ENCOUNTER — PATIENT MESSAGE (OUTPATIENT)
Dept: CARDIOLOGY | Facility: MEDICAL CENTER | Age: 64
End: 2024-03-15
Payer: COMMERCIAL

## 2024-03-18 ENCOUNTER — PATIENT MESSAGE (OUTPATIENT)
Dept: CARDIOLOGY | Facility: MEDICAL CENTER | Age: 64
End: 2024-03-18
Payer: COMMERCIAL

## 2024-04-02 ENCOUNTER — NON-PROVIDER VISIT (OUTPATIENT)
Dept: CARDIOLOGY | Facility: MEDICAL CENTER | Age: 64
End: 2024-04-02
Payer: COMMERCIAL

## 2024-04-02 PROCEDURE — 93294 REM INTERROG EVL PM/LDLS PM: CPT | Performed by: INTERNAL MEDICINE

## 2024-04-02 NOTE — CARDIAC REMOTE MONITOR - SCAN
Device transmission reviewed. Device demonstrated appropriate function.       Electronically Signed by: Hayes Maya M.D.    4/23/2024  7:50 AM

## 2024-04-09 ENCOUNTER — PHARMACY VISIT (OUTPATIENT)
Dept: PHARMACY | Facility: MEDICAL CENTER | Age: 64
End: 2024-04-09
Payer: COMMERCIAL

## 2024-04-09 PROCEDURE — RXMED WILLOW AMBULATORY MEDICATION CHARGE: Performed by: INTERNAL MEDICINE

## 2024-04-09 RX ORDER — ZOSTER VACCINE RECOMBINANT, ADJUVANTED 50 MCG/0.5
KIT INTRAMUSCULAR
Qty: 0.5 ML | Refills: 0 | OUTPATIENT
Start: 2024-04-09

## 2024-04-29 ENCOUNTER — HOSPITAL ENCOUNTER (OUTPATIENT)
Dept: LAB | Facility: MEDICAL CENTER | Age: 64
End: 2024-04-29
Attending: INTERNAL MEDICINE
Payer: COMMERCIAL

## 2024-04-29 LAB
ALBUMIN SERPL BCP-MCNC: 4.3 G/DL (ref 3.2–4.9)
ALBUMIN/GLOB SERPL: 2 G/DL
ALP SERPL-CCNC: 109 U/L (ref 30–99)
ALT SERPL-CCNC: 32 U/L (ref 2–50)
ANION GAP SERPL CALC-SCNC: 14 MMOL/L (ref 7–16)
ANISOCYTOSIS BLD QL SMEAR: ABNORMAL
AST SERPL-CCNC: 32 U/L (ref 12–45)
BASOPHILS # BLD AUTO: 0.9 % (ref 0–1.8)
BASOPHILS # BLD: 0.13 K/UL (ref 0–0.12)
BILIRUB SERPL-MCNC: 0.9 MG/DL (ref 0.1–1.5)
BUN SERPL-MCNC: 20 MG/DL (ref 8–22)
CALCIUM ALBUM COR SERPL-MCNC: 9.1 MG/DL (ref 8.5–10.5)
CALCIUM SERPL-MCNC: 9.3 MG/DL (ref 8.5–10.5)
CHLORIDE SERPL-SCNC: 102 MMOL/L (ref 96–112)
CO2 SERPL-SCNC: 24 MMOL/L (ref 20–33)
COMMENT NL1176: NORMAL
CREAT SERPL-MCNC: 1 MG/DL (ref 0.5–1.4)
EOSINOPHIL # BLD AUTO: 0.13 K/UL (ref 0–0.51)
EOSINOPHIL NFR BLD: 0.9 % (ref 0–6.9)
ERYTHROCYTE [DISTWIDTH] IN BLOOD BY AUTOMATED COUNT: 40.1 FL (ref 35.9–50)
GFR SERPLBLD CREATININE-BSD FMLA CKD-EPI: 63 ML/MIN/1.73 M 2
GLOBULIN SER CALC-MCNC: 2.2 G/DL (ref 1.9–3.5)
GLUCOSE SERPL-MCNC: 73 MG/DL (ref 65–99)
HCT VFR BLD AUTO: 46.1 % (ref 37–47)
HGB BLD-MCNC: 15.5 G/DL (ref 12–16)
LDH SERPL L TO P-CCNC: 324 U/L (ref 107–266)
LYMPHOCYTES # BLD AUTO: 10.97 K/UL (ref 1–4.8)
LYMPHOCYTES NFR BLD: 74.1 % (ref 22–41)
MANUAL DIFF BLD: NORMAL
MCH RBC QN AUTO: 29.8 PG (ref 27–33)
MCHC RBC AUTO-ENTMCNC: 33.6 G/DL (ref 32.2–35.5)
MCV RBC AUTO: 88.7 FL (ref 81.4–97.8)
MICROCYTES BLD QL SMEAR: ABNORMAL
MONOCYTES # BLD AUTO: 0.27 K/UL (ref 0–0.85)
MONOCYTES NFR BLD AUTO: 1.8 % (ref 0–13.4)
MORPHOLOGY BLD-IMP: NORMAL
NEUTROPHILS # BLD AUTO: 3.3 K/UL (ref 1.82–7.42)
NEUTROPHILS NFR BLD: 22.3 % (ref 44–72)
NRBC # BLD AUTO: 0 K/UL
NRBC BLD-RTO: 0 /100 WBC (ref 0–0.2)
PLATELET # BLD AUTO: 184 K/UL (ref 164–446)
PLATELET BLD QL SMEAR: NORMAL
PMV BLD AUTO: 11 FL (ref 9–12.9)
POTASSIUM SERPL-SCNC: 4.3 MMOL/L (ref 3.6–5.5)
PROT SERPL-MCNC: 6.5 G/DL (ref 6–8.2)
RBC # BLD AUTO: 5.2 M/UL (ref 4.2–5.4)
RBC BLD AUTO: PRESENT
SMUDGE CELLS BLD QL SMEAR: NORMAL
SODIUM SERPL-SCNC: 140 MMOL/L (ref 135–145)
WBC # BLD AUTO: 14.8 K/UL (ref 4.8–10.8)

## 2024-04-29 PROCEDURE — 85027 COMPLETE CBC AUTOMATED: CPT

## 2024-04-29 PROCEDURE — 36415 COLL VENOUS BLD VENIPUNCTURE: CPT

## 2024-04-29 PROCEDURE — 80053 COMPREHEN METABOLIC PANEL: CPT

## 2024-04-29 PROCEDURE — 83615 LACTATE (LD) (LDH) ENZYME: CPT

## 2024-04-29 PROCEDURE — 85007 BL SMEAR W/DIFF WBC COUNT: CPT

## 2024-05-09 ENCOUNTER — APPOINTMENT (OUTPATIENT)
Dept: MEDICAL GROUP | Facility: MEDICAL CENTER | Age: 64
End: 2024-05-09
Payer: COMMERCIAL

## 2024-06-03 ENCOUNTER — TELEPHONE (OUTPATIENT)
Dept: MEDICAL GROUP | Facility: MEDICAL CENTER | Age: 64
End: 2024-06-03

## 2024-06-03 ENCOUNTER — APPOINTMENT (OUTPATIENT)
Dept: MEDICAL GROUP | Facility: MEDICAL CENTER | Age: 64
End: 2024-06-03
Payer: COMMERCIAL

## 2024-06-03 VITALS
RESPIRATION RATE: 16 BRPM | WEIGHT: 197 LBS | HEIGHT: 64 IN | HEART RATE: 60 BPM | DIASTOLIC BLOOD PRESSURE: 76 MMHG | BODY MASS INDEX: 33.63 KG/M2 | SYSTOLIC BLOOD PRESSURE: 138 MMHG | TEMPERATURE: 97.6 F

## 2024-06-03 DIAGNOSIS — I10 HYPERTENSION, UNSPECIFIED TYPE: ICD-10-CM

## 2024-06-03 DIAGNOSIS — R07.9 CHEST PAIN, UNSPECIFIED TYPE: ICD-10-CM

## 2024-06-03 DIAGNOSIS — E78.5 DYSLIPIDEMIA: ICD-10-CM

## 2024-06-03 DIAGNOSIS — I48.0 PAROXYSMAL ATRIAL FIBRILLATION (HCC): Chronic | ICD-10-CM

## 2024-06-03 DIAGNOSIS — R03.0 ELEVATED BLOOD PRESSURE READING: ICD-10-CM

## 2024-06-03 DIAGNOSIS — R73.09 IMPAIRED GLUCOSE METABOLISM: ICD-10-CM

## 2024-06-03 DIAGNOSIS — D68.69 OTHER THROMBOPHILIA (HCC): ICD-10-CM

## 2024-06-03 DIAGNOSIS — M77.11 LATERAL EPICONDYLITIS OF RIGHT ELBOW: ICD-10-CM

## 2024-06-03 DIAGNOSIS — E55.9 VITAMIN D DEFICIENCY: ICD-10-CM

## 2024-06-03 DIAGNOSIS — E53.8 B12 DEFICIENCY: ICD-10-CM

## 2024-06-03 DIAGNOSIS — R79.0 LOW MAGNESIUM LEVEL: ICD-10-CM

## 2024-06-03 PROCEDURE — 99214 OFFICE O/P EST MOD 30 MIN: CPT | Performed by: INTERNAL MEDICINE

## 2024-06-03 PROCEDURE — 3075F SYST BP GE 130 - 139MM HG: CPT | Performed by: INTERNAL MEDICINE

## 2024-06-03 PROCEDURE — 3078F DIAST BP <80 MM HG: CPT | Performed by: INTERNAL MEDICINE

## 2024-06-03 RX ORDER — LOSARTAN POTASSIUM 50 MG/1
50 TABLET ORAL DAILY
Qty: 30 TABLET | Refills: 3 | Status: SHIPPED | OUTPATIENT
Start: 2024-06-03 | End: 2024-06-29 | Stop reason: SDUPTHER

## 2024-06-03 ASSESSMENT — FIBROSIS 4 INDEX: FIB4 SCORE: 1.97

## 2024-06-03 NOTE — TELEPHONE ENCOUNTER
Please call the patient, I forgot to ask her about physical therapy for her right elbow at the appointment, if she would like to try physical therapy let me know or if she has a physical therapy group she has seen previously to let me know

## 2024-06-03 NOTE — PROGRESS NOTES
Subjective     Seda Cagle is a 64 y.o. female who presents with Follow-Up (R arm pain 6 mo/Chest tightness x 6 in 4 mo)            HPI  New complaint, right elbow pain with lifting objects, also has had a puppy since last August weight 21 pounds, takes her puppy up for walks with the leash and has more noticeable right elbow type pain when her dog pulls on the leash, also more noticeable pain with lifting objects, and with activity such as raking, mopping, any type of gripping activity will make it more noticeable, has tried ice, heat, topical Advil, topical Voltaren, no oral NSAIDs on Eliquis.  Has tried low-dose Tylenol with no benefit.  No recurrent trauma to the area, no history of right elbow pain or epicondylitis.  Pain does radiate down the arm on occasion to the hands when raking, sweeping, mopping, no numbness or tingling, no weakness of hand strength.  No associated right shoulder pain. Also chest pain in am when walking since february has had this six times, three times last 3 weeks but does not occur with every walking session, when it does occur will happen when walking in the mornings, she will walk 45 mintues to an hour total and will onset about after she started walking 30 minutes into her walk, described as cramping gripping tight pain and some tingling sensation right arm, then will release after 5 to 8 minutes constant in that timeframe, she does not stop to rest, no lightheadedness or dizziness, no palpitations or heart racing, no caffeine.  Reflux stable on Prilosec in the evening, no daytime heartburn breakthrough. Blood pressures running higher at home has an arm cuff, has been checking blood pressures regularly since the start of the year, systolics running 130s to 150s over diastolic 70s to 90s, history of atrial fibrillation on metoprolol, norpace as well as Eliquis per cardiology.  Medications, allergies, medical history, surgical history, social history, family history   reviewed and updated          Current Outpatient Medications   Medication Sig Dispense Refill    Zoster Vac Recomb Adjuvanted (SHINGRIX) 50 MCG/0.5ML Recon Susp Inject  into the shoulder, thigh, or buttocks. 0.5 mL 0    omeprazole (PRILOSEC) 20 MG delayed-release capsule Take 1 capsule by mouth once daily 90 Capsule 1    nortriptyline (PAMELOR) 10 MG Cap TAKE 1 CAPSULE BY MOUTH ONCE DAILY IN THE EVENING 90 Capsule 2    ELIQUIS 5 MG Tab Take 1 tablet by mouth twice daily 180 Tablet 3    atorvastatin (LIPITOR) 40 MG Tab Take 1 Tablet by mouth every evening. 90 Tablet 3    COVID-19 mRNA Vac-Sarahi,Pfizer, (COMIRNATY) 30 MCG/0.3ML Suspension Prefilled Syringe injection Inject  into the shoulder, thigh, or buttocks. 0.3 mL 0    injection RSV Pre-Fusion F A&B Vac Rcmb (ABRYSVO) 120 MCG/0.5ML Recon Soln Inject  into the shoulder, thigh, or buttocks. 0.5 mL 0    linaCLOtide 145 MCG Cap Take 145 mcg by mouth every day. 30 Capsule 3    disopyramide (NORPACE) 150 MG Cap TAKE 1 CAPSULE BY MOUTH IN THE MORNING AND AT NOON AND AT BEDTIME 270 Capsule 2    metoprolol SR (TOPROL XL) 50 MG TABLET SR 24 HR Take 1 Tablet by mouth every day. 90 Tablet 3    Cetirizine HCl (ZYRTEC ALLERGY PO) Take  by mouth.      Triamcinolone Acetonide (NASACORT AQ NA) Administer  into affected nostril(S).      albuterol 108 (90 Base) MCG/ACT Aero Soln inhalation aerosol Inhale 2 Puffs every four hours as needed for Shortness of Breath. 1 Each 3    acetaminophen (TYLENOL) 325 MG Tab Take 2 Tablets by mouth 1 time a day as needed. Indications: Pain      Probiotic Product (PROBIOTIC-10 PO) Take 1 Capsule by mouth every day.      Multiple Vitamin (MULTI-VITAMIN PO) Take 1 Tablet by mouth every day.      Pantothenic Acid 500 MG Tab Take 1 Tablet by mouth every day.      Calcium-Vitamin D-Vitamin K (CALCIUM FOR WOMEN PO) Take 1 Each by mouth every day.      Magnesium 200 MG TABS Take 1 Tab by mouth every day.       No current facility-administered medications  for this visit.               Abnormal nasal septum  8/12/21  ENT note, nasal septum S-shaped curve bows to the left nostril inferiorly, in the right nose superiorly, normal turbinate size and symmetry, does not seem to bother her when awake, recommend humidification and discuss further at follow-up, higher perioperative risk due to cardiovascular disease     allergic rhinitis  1/30/23 off flonase dry mouth, trial atrovent nasal   2/3/23 cxr negative  2/3/23 cough might be worse when walking outside in cold weather, will provide albuterol, she will keep a symptom diary, notify me next week if atrovent nasal is working if not consider singulair, do not believe she needs PFTs at this time  2/16/23 atrovent not effective, albuterol helps, trial of singulair and can try otc nasacort as flonase made her mouth dry  2/28/23 on nasonex and singulair     cll  2/3/20 wbc 7.1 (52%N,39%L)  9/1/20 wbc 6.9 (41%N,51%L)  11/22/21 wbc 8.4 (35%N,57%L) many smudge cells  1/12/22 wbc 9.9 (34%N,60%L),flow cytometry kappa restricted CD5+ B-cell population accounting for 20% of visible leukocytes, phenotypic features most consistent with CLL  2/23/22  oncology consultation, CLL with FISH panel DEL 13+,DEL 17-showing favorable prognosis, counseled patient on benign nature of CLL with no cytopenia or physical evidence of lymphadenopathy or hepatosplenomegaly, discussed watchful waiting follow-up 3 months and repeat cbc every 6 months  5/18/22  oncology note no cytopenia, no splenomegaly, hepatomegaly, lymphadenopathy, favorable prognostic markers, follow-up 6 months  8/17/22 wbc 13.0 (18%N,80%L)  11/1/22 wbc 10.9 (31%N,62%L),  11/9/22  oncology note stable continue to monitor  5/2/23 wbc 12.2 (33%N,64L),RF<10,CRP 0.49,NAVIN negative  5/10/23  oncology note follow up one year  4/29/24 wbc 14.8 (22.3%N,74%L),  5/8/24 wbc 12.1 (34%N,59%L)   5/8/24  oncology  note continue to monitor, patient would like repeat labs 6 months       dec gfr  2/19/11 bun 15,creat 1.0,GFR 56  5/14/14 bun 24,creat 1.23,GFR 45  8/2/14 ultrasound renal negative  2/19/15 bun 19,creat 1.1,GFR 50  8/3/15 bun 17,creat 1.2, GFR 43,urine mac<0.5,PTH 27,vit d 37  10/16/15 bun 17,creat 1.1,GFR 47  1/27/16 bun 14,creat 1.2,GFR 45  12/5/16 bun 16,creat 1.0,GFR 52  1/18/19 bun 15,creat 1.0,GFR 52   2/3/20 bum 16,creat 1.13,GFR 49,urine mac<0.7  9/2/20 bun 20,creat 1.1,GFR 50  3/3/21 bun 13,creat 0.85,GFR>60,PTH 56, urine mac<1.2,SPEP negative  11/22/21 bun 13,creat 1.0,GFR 56  11/1/22  bun 16,creat 1.05,GFR 60  5/2/23 bun 13,creat 0.9,GFR 70  4/29/24 bun 20,creat 1.0,GFR 63     Dyslipidemia  12/5/16 chol 186,trig 143,hdl 52,ldl 105  11/26/18  vascular medicine note chronic venous stasis changes lower extremities, recheck lipid panel qualify for statin therapy, continue home blood pressure monitoring, consider 24-hour ambulatory blood pressure monitoring, continue metoprolol, continue xarelto use compression stockings  1/18/19 chol 183,trig 160,hdl 45,ldl 106  11/26/19 chol 133,trig 202,hdl 42,ldl 51  3/3/21 chol 144,trig 98,hdl 48,ldl 76 on lipitor 40 mg  10/5/21 CT-CTA heart left main no plaque or stenosis, LAD 25% stenosis, circumflex and obtuse marginal patent, RCA, posterior descending and posterior lateral branches patent no stenosis, normal LV size, ascending aorta 3.5 cm  11/22/21 chol 134,trig 133,hdl 45,ldl 62 on lipitor 40 mg  5/2/23 chol 145,trig 263,hdl 37,ldl 55 on lipitor 40 mg     gerd on pepcid  2/8/16 on pepcid  8/12/21  ENT note throat symptoms possibly related to reflux, recommend famotidine 40 mg bid and work on lifestyle modification recommended humidification to avoid excessive dryness  10/12/21  ENT note flexible laryngoscopy, moderate interarytenoid and moderate postcricoid edema, most likely cause of sensation in her throat is reflux consistent  with laryngeal pharyngeal reflux, work on healthy lifestyle, diet modification, continue famotidine twice daily for 6 to 8 weeks  1/20/22 on pepcid 40 mg bid   8/22/22 on prilosec after ablation will stop on 9/4/22 and go back to pepcid, had headache that started when she went off pepcid but that was when she had ablation so monitor for recurrent headache on pepcid or if breakthrough heartburn on pepcid then will need to go back to omeprazole if that happens  9/12/22 on pepcid 40 mg  3/15/23 on prilosec 20 mg as failed pepcid  5/2/23 vit d 33 on prilosec 20 mg     history covid  8/12/23 covid positive      history headache  11/22/21 trial of pamelor for tension headache and monitor for norpace interaction, also consider ct head no improvement  1/20/22 still with insomnia, nortriptyline 10 mg, consider going to 20 mg, need to check with cardiology first because of potential interaction with norpace  2/11/22 increase nortriptylline to 20 mg ok per cardiology see patient mychart note   2/22/22 off nortriptyline due to chest pain  3/15/22 resuming nortriptyline for sleep as melatonin caused headache  8/22/22 stop nortriptyline as headache improved after ablation  9/12/22 back on pamelor 10 mg     History transient global amnesia  1/18/19 chol 183,trig 160,hdl 45,ldl 106  1/18/19 CT head negative  1/19/19 ultrasound carotid less than 50% bilateral stenosis  1/19/19 CT-CTA head with and without postprocessing, negative [place] [facility] [place]  1/18/19 hospital admission, 1/19/19 hospital discharge transient global amnesia, patient was speaking to her son on the phone, and could not recall for anything 2 hours afterwards, has been indicated that patient was confused and repetitive and could not remember talking on the phone, came to the emergency room for evaluation, CT scan head negative, no other symptoms, resolution discharged home the following day  1/29/19 cardiology note, patient still having some issues with short-term  memory, will switch to coumadin from xarelto with recent event, referral to neurology, paroxysmal atrial fibrillation burden less than 1%  3/26/19 [facility] neurological note probable transient global amnesia, complete holter monitor per cardiology, EEG ordered and sedimentation rate for headache     ibs  2/3/20 trial of linzess 145 mcg  2/25/20 increase to linzess 290 mcg   3/5/20 colon per [facility] negative, repeat 10 years  9/12/22 back on pamelor 10 mg     insomnia   11/22/21 trial of pamelor for tension headache and monitor for norpace interaction, also consider ct head no improvement  2/22/22 off nortriptyline due to chest pain, will try melatonin for sleep  9/12/22 back on pamelor 10 mg  11/2/23 resume linzess 145 mcg for constipation     neck arthritis  3/24/15 MRI cervical spine C4-C5 small disc osteophyte complex, moderate left facet arthropathy, multilevel DJD  4/14/21 EMG nerve conduction study right upper extremity consistent with right median neuropathy without denervation, possible mild right ulnar neuropathy, no evidence of cervical radiculopathy     pacemaker  4/16/15  cardiology procedure note, dual-chamber pacemaker implantation  2/8/22 cardiology note pacemaker nearing EDIE, we will go ahead and extracted failing RV lead transvenously and replace, patient agreeable to proceed  4/6/22  cardiology procedure note transvenous dual-chamber PPM lead extraction, pacemaker generator removal, new permanent pacemaker implantation pulse generator boston model L311 serial # [id number]  11/16/22 cardiology note device interrogation 80% paced   5/22/23 EKG sinus 62 atrial paced rhythm, left bundle branch block  5/22/23  cardiology note device check functioning appropriately, continue oral anticoagulation, continue norpace, increase toprol to 50 mg  4/2/24 cardiology note device check functioning appropriately     Paroxysmal atrial fibrillation  11/18/14 echo normal LV size and function, grade 2 diastolic  dysfunction, EF 60-65%, mild aortic stenosis, mild aortic insufficiency, RVSP 20-25  11/19/14 holter sinus with left bundle branch block, average rate 57, minimum heart rate 44, maximal heart rate 85, PVCs, PACs   12/12/14 ALLEN known post-ablation for hypertrophic cardiomyopathy without obstruction, small amount of remaining myocardium of lower tract does not cause significant outflow gradient, mild aortic insufficiency, moderate central mitral regurgitation, remnant myocardium in the LVOT with mild obstruction, peak outflow gradient 11 (previously 16-18)  11/30/15 cardiology note paroxysmal atrial fibrillation, short atrial fibrillation under 4 minutes  2/22/16 cardiology note minimal atrial fibrillation on dual-chamber pacemaker check  3/24/16 cardiology note minimal atrial fibrillation   10/24/16 cardiology note longer episodes of atrial fibrillation, still at 2.6 hours total on dual-chamber pacemaker review, continues on xarelto  9/26/18 echo normal LV systolic function, EF 55%, mild LVH, grade 1 diastolic dysfunction, evidence of LVOT, septal wall thickness 1.2 cm, consider subaortic membrane, mild to moderate mitral regurgitation, mild aortic insufficiency  10/8/18 cardiology note does not appear to be subaortic membrane on echo, perhaps subaortic thickening, continue to monitor echo of full gradient for recurrence continue oral anticoagulation gradient is low and asymptomatic, xarelto, norpace, lopressor  1/29/19 cardiology note, patient still having some issues with short-term memory, will switch to coumadin from xarelto with recent event, referral to neurology, paroxysmal atrial fibrillation burden less than 1%  2/28/19 cardiology note hypertrophic obstructive cardiomyopathy and paroxysmal atrial fibrillation, sinus on exam, medi-lynx ventricular premature contractions isolated but frequent, asymptomatic, one short episode of paroxysmal atrial fibrillation 1.5 minutes, continues on warfarin INR 2.0,  continues on norpace, increase metoprolol XL to 50 mg follow-up 1 month   10/10/19 cardiology note hypertrophic cardiomyopathy status post septal ablation, pacemaker, paroxysmal atrial fibrillation relatively short episodes, longest episode 49 minutes remainder less than 1 minute in length, remains controlled on toprol, norpace, coumadin per coumadin clinic  10/31/19 echo normal left ventricular function, EF 60%, basal septal wall appears hypokinetic, grade 2 diastolic dysfunction, moderately dilated left atrium volume index 44 mL, mild mitral regurgitation, mild aortic stenosis, mild aortic insufficiency, peak gradient 23  1/7/20 ultrasound carotid less than 50% stenosis right internal carotid, left carotid mild plaque carotid bifurcation  8/25/20 cardiology note pacemaker, device check, no arrhythmia burden, change from coumadin to eliquis follow-up 6 months repeat echo at that time  2/3/21 echo mild LVH, EF 65%, grade 2 diastolic dysfunction, RVSP 30  2/26/21  cardiology note pacemaker, paroxysmal atrial fibrillation stable, echo reviewed no significant LVOT gradient, continue norpace and toprol discussed options of RV lead replacement including transvenous extraction, implanting the lead, or performing generator change and using it until no longer working, we will plan on transvenous extraction and new RV lead, follow-up 6 months  8/11/21 cardiology note would like to exclude obstructive CAD but do not think pretest probability is high enough to warrant catheterization, will order coronary CTA follow-up 6 months  8/11/21 cardiology note EKG sinus, ordered CTA  10/5/21 CT-CTA heart left main no plaque or stenosis, LAD 25% stenosis, circumflex and obtuse marginal patent, RCA, posterior descending and posterior lateral branches patent no stenosis, normal LV size, ascending aorta 3.5 cm  2/8/22 cardiology note pacemaker nearing EDIE, we will go ahead and extracted failing RV lead transvenously and replace,  patient agreeable to proceed  4/6/22  cardiology procedure note transvenous dual-chamber PPM lead extraction, pacemaker generator removal, new permanent pacemaker implantation pulse generator boston model L311 serial # [id number]  4/27/22  cardiology note having higher PVCs and atrial arrhythmia burden since extraction and new implant, device functioning appropriately, follow-up 4 weeks  5/27/22  cardiology note device interrogation, increasing sustained atrial fibrillation episodes up to 5 hours, options discussed, increase norpace to 200 mg tid for now, plan pulmonary vein isolation next available  6/27/22  cardiology note plan for ablation August, lower metoprolol to 50 mg bid and norpace to 150 mg tid  8/4/22 transesophageal echo prior to ablation preserved LV function, left atrial enlargement, mild MR and mild to moderate aortic insufficiency, no evidence of thrombus  8/14/22  cardiology EPS noted, successful RF ablation pulmonary vein isolation procedure, resume anticoagulation, start carafate bid x 2 weeks and PPI x one month  11/16/22 cardiology note device interrogation 80% paced, start walking down beta-blocker and norpace, decrease metoprolol to 25 mg, can decrease norpace if doing well follow-up 6 months  1/30/23 on norpace, metoprolol, eliquis  5/22/23 EKG sinus 62 atrial paced rhythm, left bundle branch block  5/22/23  cardiology note device check functioning appropriately, continue oral anticoagulation, continue norpace, increase toprol to 50 mg  11/14/23 cardiology note device check functioning appropriately paroxysmal atrial fibrillation, minimal burden less than 180 days overall just over 1 hour at that time, continue medication follow-up 12 months     Preventative health  12/2/16 tdap  2/3/20 hep c ab negative  3/5/20 colon per [facility] negative, repeat 10 years  9/11/20 declines sonocine  3/23/21 dexa LS-0.4,hip-0.2  8/22/22 prevnar 20  5/2/23 A1c 5.5%  5/2/23 vit d  33  9/19/23 flu   10/10/23 pap per [facility]   10/12/23 mammogram heterogeneously dense breast tissue per gynecology  11/20/23 rsv  11/27/23 covid   4/9/24 shingrix     sleep apnea  2/3/17 sleep apnea referral pending, OPO ordered  2/14/17 apnea link per [facility], AHI 19.7, low saturation 83, time<90% saturation 199 minutes, time less than 85% saturation 1 minute  2/16/17 sleep consultation, proceed with polysomnography  3/26/17 sleep study duration 430 minutes, AHI 21.3, low saturation 88%, mild to moderate sleep-disordered breathing with significant disruption of sleep continuity, limb movement disorder could be present as well, cpap titration recommended  3/31/17 sleep note ordered autocpap 5-15 nightly, follow-up 6-8 weeks  11/30/18 sleep note on autocpap 5-15  6/4/20 sleep note on autopap 5 to 15  2/3/21 echo mild LVH, EF 65%, grade 2 diastolic dysfunction, RVSP 30  9/3/21 sleep note on autocpap 5 to 15, compliance report 96.7% usage average time 5 hours 30 minutes AHI 6.9, terrance device recall  2/9/23 sleep note bring in device tomorrow for compliance review  2/12/24 sleep note 30 compliance 100% average time 5 hours 17 minutes, AHI 5, no mask leak, follow-up 1 year     S/p carpal tunnel right release  2/25/21 EMG nerve conduction study right upper extremity evaluate carpal tunnel  3/3/21 wbc 6.9 (41%N,51%L),NAVIN negative,SPEP negative  4/14/21 EMG nerve conduction study right upper extremity consistent with right median neuropathy without denervation, possible mild right ulnar neuropathy, no evidence of cervical radiculopathy  6/1/21  orthopedic operative note right endoscopic carpal tunnel release and left carpal tunnel injection under anesthesia  6/14/21  orthopedic note status post right endoscopic carpal tunnel release and left carpal tunnel injection under anesthesia anesthesia, follow-up 8 to 10 weeks   8/25/21 PACO note status post right endoscopic carpal tunnel  "release follow-up 8 to 10 weeks     Venous insufficiency  1/22/19 [facility] note venous insufficiency, physical exam does not indicate any evidence of venous insufficiency, patient declines venous ultrasound       Patient Active Problem List   Diagnosis    S/P carpal tunnel release    Pacemaker    Preventative health care    GERD (gastroesophageal reflux disease)    Neck arthritis    Decreased GFR    Dyslipidemia    Paroxysmal atrial fibrillation (HCC)    Sleep apnea    History of transient global amnesia    Venous insufficiency    IBS (irritable bowel syndrome)    Abnormal nasal septum    CLL (chronic lymphocytic leukemia) (HCC)    History of headache    Insomnia    H/O cardiac radiofrequency ablation 8/4/22 Dr Witt    Allergic rhinitis    History of COVID-19                  Patient Care Team:  Juve Stoddard M.D. as PCP - General (Internal Medicine)  Vibha Chua M.D. (Nephrology)  [facility] as Pharmacist  [employer] as Respiratory Therapist (DME Supplier)    ROS           Objective     /76 (BP Location: Left arm, Patient Position: Sitting, BP Cuff Size: Adult)   Pulse 60   Temp 36.4 °C (97.6 °F)   Resp 16   Ht 1.626 m (5' 4\")   Wt 89.4 kg (197 lb)   LMP 01/31/2012 (LMP Unknown)   BMI 33.81 kg/m²      Physical Exam  Vitals and nursing note reviewed.   Constitutional:       Appearance: Normal appearance.   HENT:      Head: Normocephalic and atraumatic.      Right Ear: External ear normal.      Left Ear: External ear normal.   Eyes:      Conjunctiva/sclera: Conjunctivae normal.   Cardiovascular:      Rate and Rhythm: Normal rate and regular rhythm.      Heart sounds: Normal heart sounds.   Pulmonary:      Effort: Pulmonary effort is normal.      Breath sounds: Normal breath sounds.   Abdominal:      General: There is no distension.   Musculoskeletal:         General: No swelling.      Cervical back: Neck supple.   Skin:     Coloration: Skin is not jaundiced.      Findings: " No bruising.   Neurological:      General: No focal deficit present.      Mental Status: She is alert.   Psychiatric:         Mood and Affect: Mood normal.         Behavior: Behavior normal.          Right lateral elbow epicondylar region, tender with gripping, no edema, no olecranon bursa prominence or effusion, normal right shoulder range of motion, normal right  strength, no wrist edema or elbow edema                   Assessment & Plan        Assessment  #1  Right lateral epicondylitis, no evidence of cervical radiculopathy or carpal tunnel, has tried heat, cold without benefit, tried topical Advil and topical Voltaren without benefit    #2 paroxysmal atrial fibrillation followed by cardiology on metoprolol, Norpace, Eliquis    #3 pacemaker per cardiology    #4 chest pain with activity with walking 6 episodes since the start of the year, happens only with walking, no history of significant CAD, most recent study CT-CTA two years ago no significant stenoses, only occurs when walking but not all the time, do not suspect reflux as she is on Prilosec in the evening with no daytime symptoms    #5 elevated blood pressure, already on metoprolol per cardiology for atrial fibrillation, resting heart rate 60    #6 sleep apnea on CPAP    #7 reflux on Prilosec takes Prilosec in evening no daytime heartburn chronic PPI use can contribute to vitamin D, magnesium, B12 deficiency    #8 chronic thrombophilia due to atrial fibrillation on Eliquis per cardiology    #9 dyslipidemia 5/2/23 chol 145,trig 263,hdl 37,ldl 55 on lipitor 40 mg     #10 CLL followed by hematology oncology    #11 impaired glucose metabolism    #12 vitamin D deficiency    #13 BMi 33.81       Plan  #1  Recommend physical therapy right epicondylitis I forgot to offer that to her at the appointment will call her about that option for physical therapy for the right epicondylitis     #2 can use Tylenol up to 1000 mg 3 times daily    #3 avoid oral NSAIDs on  Eliquis    #4  EKG in clinic pacer rhythm, left bundle branch block    #5 stress echo for chest pain, if she does go walking and experiences chest pain to stop walking until pain subsides, if pain persists recommend she go to the ER    #6 start losartan 50 mg daily, continue checking blood pressures, may cause lightheadedness, dizziness, continue low-sodium diet, may take 2 to 4 weeks for benefit, unable to increase beta-blocker dose because her resting heart rate is 60 on metoprolol, losartan would not interact with Norpace or metoprolol, or Eliquis    #7 follow-up cardiology    #8 follow-up hematology oncology    #9 Labs ordered, continue work on good nutrition and exercise    #10 send me a MyChart update on her blood pressures 2 weeks

## 2024-06-04 ENCOUNTER — HOSPITAL ENCOUNTER (OUTPATIENT)
Dept: LAB | Facility: MEDICAL CENTER | Age: 64
End: 2024-06-04
Attending: INTERNAL MEDICINE
Payer: COMMERCIAL

## 2024-06-04 DIAGNOSIS — R79.0 LOW MAGNESIUM LEVEL: ICD-10-CM

## 2024-06-04 DIAGNOSIS — E78.5 DYSLIPIDEMIA: ICD-10-CM

## 2024-06-04 DIAGNOSIS — E53.8 B12 DEFICIENCY: ICD-10-CM

## 2024-06-04 DIAGNOSIS — E55.9 VITAMIN D DEFICIENCY: ICD-10-CM

## 2024-06-04 DIAGNOSIS — I10 HYPERTENSION, UNSPECIFIED TYPE: ICD-10-CM

## 2024-06-04 DIAGNOSIS — R73.09 IMPAIRED GLUCOSE METABOLISM: ICD-10-CM

## 2024-06-04 LAB
25(OH)D3 SERPL-MCNC: 45 NG/ML (ref 30–100)
ALBUMIN SERPL BCP-MCNC: 4.2 G/DL (ref 3.2–4.9)
ALBUMIN/GLOB SERPL: 1.7 G/DL
ALP SERPL-CCNC: 115 U/L (ref 30–99)
ALT SERPL-CCNC: 30 U/L (ref 2–50)
ANION GAP SERPL CALC-SCNC: 12 MMOL/L (ref 7–16)
APPEARANCE UR: CLEAR
AST SERPL-CCNC: 32 U/L (ref 12–45)
BACTERIA #/AREA URNS HPF: NEGATIVE /HPF
BASOPHILS # BLD AUTO: 0.9 % (ref 0–1.8)
BASOPHILS # BLD: 0.11 K/UL (ref 0–0.12)
BILIRUB SERPL-MCNC: 0.6 MG/DL (ref 0.1–1.5)
BILIRUB UR QL STRIP.AUTO: NEGATIVE
BUN SERPL-MCNC: 17 MG/DL (ref 8–22)
CALCIUM ALBUM COR SERPL-MCNC: 9.4 MG/DL (ref 8.5–10.5)
CALCIUM SERPL-MCNC: 9.6 MG/DL (ref 8.5–10.5)
CHLORIDE SERPL-SCNC: 103 MMOL/L (ref 96–112)
CHOLEST SERPL-MCNC: 127 MG/DL (ref 100–199)
CO2 SERPL-SCNC: 25 MMOL/L (ref 20–33)
COLOR UR: YELLOW
COMMENT NL1176: NORMAL
CREAT SERPL-MCNC: 0.93 MG/DL (ref 0.5–1.4)
CREAT UR-MCNC: 31.61 MG/DL
EOSINOPHIL # BLD AUTO: 0.11 K/UL (ref 0–0.51)
EOSINOPHIL NFR BLD: 0.9 % (ref 0–6.9)
EPI CELLS #/AREA URNS HPF: NEGATIVE /HPF
ERYTHROCYTE [DISTWIDTH] IN BLOOD BY AUTOMATED COUNT: 41.9 FL (ref 35.9–50)
EST. AVERAGE GLUCOSE BLD GHB EST-MCNC: 114 MG/DL
FASTING STATUS PATIENT QL REPORTED: NORMAL
GFR SERPLBLD CREATININE-BSD FMLA CKD-EPI: 69 ML/MIN/1.73 M 2
GLOBULIN SER CALC-MCNC: 2.5 G/DL (ref 1.9–3.5)
GLUCOSE SERPL-MCNC: 75 MG/DL (ref 65–99)
GLUCOSE UR STRIP.AUTO-MCNC: NEGATIVE MG/DL
HBA1C MFR BLD: 5.6 % (ref 4–5.6)
HCT VFR BLD AUTO: 46.3 % (ref 37–47)
HDLC SERPL-MCNC: 40 MG/DL
HGB BLD-MCNC: 15.5 G/DL (ref 12–16)
HYALINE CASTS #/AREA URNS LPF: NORMAL /LPF
KETONES UR STRIP.AUTO-MCNC: NEGATIVE MG/DL
LDLC SERPL CALC-MCNC: 68 MG/DL
LEUKOCYTE ESTERASE UR QL STRIP.AUTO: ABNORMAL
LYMPHOCYTES # BLD AUTO: 7.17 K/UL (ref 1–4.8)
LYMPHOCYTES NFR BLD: 56.9 % (ref 22–41)
MAGNESIUM SERPL-MCNC: 2.2 MG/DL (ref 1.5–2.5)
MANUAL DIFF BLD: NORMAL
MCH RBC QN AUTO: 29.8 PG (ref 27–33)
MCHC RBC AUTO-ENTMCNC: 33.5 G/DL (ref 32.2–35.5)
MCV RBC AUTO: 88.9 FL (ref 81.4–97.8)
MICRO URNS: ABNORMAL
MICROALBUMIN UR-MCNC: <1.2 MG/DL
MICROALBUMIN/CREAT UR: NORMAL MG/G (ref 0–30)
MICROCYTES BLD QL SMEAR: ABNORMAL
MONOCYTES # BLD AUTO: 0.69 K/UL (ref 0–0.85)
MONOCYTES NFR BLD AUTO: 5.5 % (ref 0–13.4)
MORPHOLOGY BLD-IMP: NORMAL
NEUTROPHILS # BLD AUTO: 4.51 K/UL (ref 1.82–7.42)
NEUTROPHILS NFR BLD: 35.8 % (ref 44–72)
NITRITE UR QL STRIP.AUTO: NEGATIVE
NRBC # BLD AUTO: 0.02 K/UL
NRBC BLD-RTO: 0.2 /100 WBC (ref 0–0.2)
PH UR STRIP.AUTO: 7 [PH] (ref 5–8)
PLATELET # BLD AUTO: 193 K/UL (ref 164–446)
PLATELET BLD QL SMEAR: NORMAL
PMV BLD AUTO: 10.6 FL (ref 9–12.9)
POTASSIUM SERPL-SCNC: 4.9 MMOL/L (ref 3.6–5.5)
PROT SERPL-MCNC: 6.7 G/DL (ref 6–8.2)
PROT UR QL STRIP: NEGATIVE MG/DL
RBC # BLD AUTO: 5.21 M/UL (ref 4.2–5.4)
RBC # URNS HPF: NORMAL /HPF
RBC BLD AUTO: PRESENT
RBC UR QL AUTO: NEGATIVE
SMUDGE CELLS BLD QL SMEAR: NORMAL
SODIUM SERPL-SCNC: 140 MMOL/L (ref 135–145)
SP GR UR STRIP.AUTO: 1.01
TRIGL SERPL-MCNC: 95 MG/DL (ref 0–149)
TSH SERPL DL<=0.005 MIU/L-ACNC: 2.82 UIU/ML (ref 0.38–5.33)
UROBILINOGEN UR STRIP.AUTO-MCNC: 0.2 MG/DL
VIT B12 SERPL-MCNC: 795 PG/ML (ref 211–911)
WBC # BLD AUTO: 12.6 K/UL (ref 4.8–10.8)
WBC #/AREA URNS HPF: NORMAL /HPF

## 2024-06-04 PROCEDURE — 81001 URINALYSIS AUTO W/SCOPE: CPT

## 2024-06-04 PROCEDURE — 82306 VITAMIN D 25 HYDROXY: CPT

## 2024-06-04 PROCEDURE — 82043 UR ALBUMIN QUANTITATIVE: CPT

## 2024-06-04 PROCEDURE — 85027 COMPLETE CBC AUTOMATED: CPT

## 2024-06-04 PROCEDURE — 82607 VITAMIN B-12: CPT

## 2024-06-04 PROCEDURE — 83036 HEMOGLOBIN GLYCOSYLATED A1C: CPT

## 2024-06-04 PROCEDURE — 84443 ASSAY THYROID STIM HORMONE: CPT

## 2024-06-04 PROCEDURE — 80053 COMPREHEN METABOLIC PANEL: CPT

## 2024-06-04 PROCEDURE — 36415 COLL VENOUS BLD VENIPUNCTURE: CPT

## 2024-06-04 PROCEDURE — 80061 LIPID PANEL: CPT

## 2024-06-04 PROCEDURE — 82570 ASSAY OF URINE CREATININE: CPT

## 2024-06-04 PROCEDURE — 83735 ASSAY OF MAGNESIUM: CPT

## 2024-06-04 PROCEDURE — 85007 BL SMEAR W/DIFF WBC COUNT: CPT

## 2024-06-05 NOTE — ANESTHESIA PROCEDURE NOTES
Arterial Line  Performed by: Sam Colon M.D.  Authorized by: Sam Colon M.D.     Localization: surface landmarks    Patient Location:  OR  Indication: continuous blood pressure monitoring        Catheter Size:  20 G  Seldinger Technique?: Yes    Laterality:  Right  Site:  Radial artery  Line Secured:  Antimicrobial disc, tape and transparent dressing  Events: patient tolerated procedure well with no complications           Patient in today for nail care. Patient does not have any complaints of pain at this time. Patient's PCP is Martina Johnston MD date of last ov 5/8/24          Alayna Pan LPN       Edema noted with both varicosities and stasis skin changes present bilaterally.  Coolness is noted to the digital regions to palpation.  Capillary fill time delayed digital areas bilateral foot.  No heel fissuring or macerations of the web spaces.  No plantar calluses and/or ulcerative areas are noted.  Patient is having difficulty with gait/walking.             Plan Per Assessment  Jaida was seen today for nail problem.    Diagnoses and all orders for this visit:    Onychomycosis    PVD (peripheral vascular disease) (HCC)    Venous insufficiency (chronic) (peripheral)    Difficulty walking        1. Evaluation and Management  2. Manual and electrical debridement of the mycotic nails was performed for thickness and length to prevent injection and/or ulceration.  3. Discussed additional venous insufficiency lower extremity care techniques with patient today.  4. We did discuss importance of shoe gear and foot inspection to prevent potential issues.  5. We will see the patient back at a later date for continued podiatric management and care.  Patient was advised to call the office with any questions or concerns prior to their next appointment if needed.        Seen By:    Balaji Michael Jr, DPM    Electronically signed by Balaji Michael Jr, DPM on 6/5/2024 at 1:11 PM      This note was created using voice recognition software.  The note was reviewed however may contain grammatical errors.

## 2024-06-10 NOTE — PROGRESS NOTES
Chief Complaint   Patient presents with    Atrial Fibrillation     F/V DX: Paroxysmal atrial fibrillation (HCC)       Subjective     Seda Cagle is a 64 y.o. female who presents today for follow up paroxysmal Afib.      She is followed by Dr. Witt for EP.  History of HCM on norpace and betablockers, S/P septal ablation and PV isolation, SSS S/P dual chamber PPM.     Today in follow-up she reports episodes of intermittent angina while walking her dog.  Reports symptoms of squeezing substernal chest pressure without radiation, occasionally associated with shortness of breath.  States she does not feel well during this time.  She slows down or rests and symptoms improve.  Does not have the symptoms at rest.  States has been happening intermittently on or before March 2024.  Reports no episodes in April has had some in May and increased episodes in June.  She reports no episodes of lightheadedness dizziness near syncope or syncope.  No peripheral edema.    She was seen by Dr. Stoddard last week and stress echo was ordered.  Additionally noted to be hypertensive was started on losartan 50 mg.    Past Medical History:   Diagnosis Date    Anesthesia     wakes up during procedure (x2)    Aortic regurgitation     Arrhythmia, ventricular 2008    Symptomatic PVCs, controlled with medication.    Asthma     inhaler PRN, only uses when sick     Atrial fibrillation (HCC)     Awareness under anesthesia     Cardiac arrhythmia     Cervical arthritis 02/08/2016    Chickenpox     Chronic kidney disease, stage III (moderate) 02/09/2016    CTS (carpal tunnel syndrome) 02/28/2011    Depression     GERD (gastroesophageal reflux disease)     Heart burn     Heart murmur     Hemorrhagic disorder (HCC)     on Eliquis    Hypertr obst cardiomyop 1991    Dr. Bert Vazquez, [address] [address], [place] [address] [phone] fax [phone].  [facility] [phone].  Alchol Septal Reduction 8/00, Coronaries  normal.    Hypertrophic cardiomyopathy (HCC)     Indigestion     Influenza     Pacemaker 2014    Renal disorder     chronic kidney disease stage 3-  **pt states currently no issue, numbers are stable    Sleep apnea     uses CPAP    Snoring      Past Surgical History:   Procedure Laterality Date    PACEMAKER INSERTION  04/2022    PB WRIST ARTHROSCOP,RELEASE XVERS LIG Right 06/01/2021    Procedure: RELEASE, CARPAL TUNNEL, ENDOSCOPIC;  Surgeon: Mike Ospina M.D.;  Location: SURGERY [facility];  Service: Orthopedics    JOINT INJECTION DIAGNOSTIC Left 06/01/2021    Procedure: INJECTION, JOINT, DIAGNOSTIC-CARPAL TUNNEL;  Surgeon: Mike Ospina M.D.;  Location: SURGERY [facility];  Service: Orthopedics    HYSTEROSCOPY WITH VIDEO OPERATIVE  04/30/2018    Procedure: HYSTEROSCOPY WITH VIDEO OPERATIVE;  Surgeon: Noemi Liu M.D.;  Location: SURGERY SAME DAY [facility];  Service: Labor and Delivery    DILATION AND CURETTAGE  04/30/2018    Procedure: DILATION AND CURETTAGE;  Surgeon: Noemi Liu M.D.;  Location: SURGERY SAME DAY [facility];  Service: Labor and Delivery    BREAST BIOPSY Left 01/30/2018    Procedure: BREAST BIOPSY- WIRE LOCALIZED;  Surgeon: Vijaya Britt M.D.;  Location: SURGERY SAME DAY [facility];  Service: General    RECOVERY  04/15/2015    Performed by [facility] at SURGERY PRE-POST PROC UNIT C    PACEMAKER INSERTION  2015    RECOVERY  12/12/2014    Performed by [facility] at SURGERY SAME DAY [facility]    SEPTAL RECONSTRUCTION  2000    ENDOSCOPY      PRIMARY C SECTION      TUBAL COAGULATION LAPAROSCOPIC BILATERAL       Family History   Problem Relation Age of Onset    Heart Disease Mother         CHF    Hypertension Mother     Stroke Mother     Heart Failure Mother     Other Mother         carotid artery disease,gout    Cancer Father         AML    Diabetes Maternal Grandmother     Cancer Maternal Grandfather         Prostate    Heart Disease Paternal  Grandfather 52         MI     Sleep Apnea Neg Hx      Social History     Socioeconomic History    Marital status:      Spouse name: Not on file    Number of children: Not on file    Years of education: Not on file    Highest education level: Some college, no degree   Occupational History    Not on file   Tobacco Use    Smoking status: Never    Smokeless tobacco: Never   Vaping Use    Vaping status: Never Used   Substance and Sexual Activity    Alcohol use: Yes     Alcohol/week: 0.6 oz     Types: 1 Standard drinks or equivalent per week     Comment: 2-3 per week    Drug use: No    Sexual activity: Yes     Partners: Male     Birth control/protection: Surgical     Comment:    Other Topics Concern    Not on file   Social History Narrative    Not on file     Social Determinants of Health     Financial Resource Strain: Low Risk  (2022)    Overall Financial Resource Strain (CARDIA)     Difficulty of Paying Living Expenses: Not hard at all   Food Insecurity: No Food Insecurity (2022)    Hunger Vital Sign     Worried About Running Out of Food in the Last Year: Never true     Ran Out of Food in the Last Year: Never true   Transportation Needs: No Transportation Needs (2022)    PRAPARE - Transportation     Lack of Transportation (Medical): No     Lack of Transportation (Non-Medical): No   Physical Activity: Sufficiently Active (2022)    Exercise Vital Sign     Days of Exercise per Week: 7 days     Minutes of Exercise per Session: 60 min   Stress: No Stress Concern Present (2022)    [employer] - Occupational Stress Questionnaire     Feeling of Stress : Only a little   Social Connections: Moderately Integrated (2022)    Social Connection and Isolation Panel [NHANES]     Frequency of Communication with Friends and Family: Three times a week     Frequency of Social Gatherings with Friends and Family: Once a week     Attends [employer] Services: Never     Active  Member of Clubs or Organizations: Yes     Attends Club or Organization Meetings: More than 4 times per year     Marital Status:    Intimate Partner Violence: Not on file   Housing Stability: Low Risk  (4/7/2022)    Housing Stability Vital Sign     Unable to Pay for Housing in the Last Year: No     Number of Places Lived in the Last Year: 1     Unstable Housing in the Last Year: No     No Known Allergies  Outpatient Encounter Medications as of 6/11/2024   Medication Sig Dispense Refill    losartan (COZAAR) 50 MG Tab Take 1 Tablet by mouth every day. Indications: blood pressure 30 Tablet 3    Zoster Vac Recomb Adjuvanted (SHINGRIX) 50 MCG/0.5ML Recon Susp Inject  into the shoulder, thigh, or buttocks. 0.5 mL 0    omeprazole (PRILOSEC) 20 MG delayed-release capsule Take 1 capsule by mouth once daily 90 Capsule 1    nortriptyline (PAMELOR) 10 MG Cap TAKE 1 CAPSULE BY MOUTH ONCE DAILY IN THE EVENING 90 Capsule 2    ELIQUIS 5 MG Tab Take 1 tablet by mouth twice daily 180 Tablet 3    atorvastatin (LIPITOR) 40 MG Tab Take 1 Tablet by mouth every evening. 90 Tablet 3    linaCLOtide 145 MCG Cap Take 145 mcg by mouth every day. 30 Capsule 3    disopyramide (NORPACE) 150 MG Cap TAKE 1 CAPSULE BY MOUTH IN THE MORNING AND AT NOON AND AT BEDTIME 270 Capsule 2    metoprolol SR (TOPROL XL) 50 MG TABLET SR 24 HR Take 1 Tablet by mouth every day. 90 Tablet 3    Cetirizine HCl (ZYRTEC ALLERGY PO) Take  by mouth.      Triamcinolone Acetonide (NASACORT AQ NA) Administer  into affected nostril(S).      albuterol 108 (90 Base) MCG/ACT Aero Soln inhalation aerosol Inhale 2 Puffs every four hours as needed for Shortness of Breath. 1 Each 3    acetaminophen (TYLENOL) 325 MG Tab Take 2 Tablets by mouth 1 time a day as needed. Indications: Pain      Probiotic Product (PROBIOTIC-10 PO) Take 1 Capsule by mouth every day.      Multiple Vitamin (MULTI-VITAMIN PO) Take 1 Tablet by mouth every day.      Pantothenic Acid 500 MG Tab Take 1  "Tablet by mouth every day.      Calcium-Vitamin D-Vitamin K (CALCIUM FOR WOMEN PO) Take 1 Each by mouth every day.      Magnesium 200 MG TABS Take 1 Tab by mouth every day.       No facility-administered encounter medications on file as of 6/11/2024.     Review of Systems   Constitutional:  Negative for chills, fever, malaise/fatigue and weight loss.   HENT:  Negative for congestion and tinnitus.    Respiratory:  Negative for cough, hemoptysis, sputum production, shortness of breath, wheezing and stridor.    Cardiovascular:  Positive for chest pain. Negative for palpitations, orthopnea, leg swelling and PND.   Gastrointestinal:  Negative for heartburn, nausea and vomiting.   Neurological:  Negative for dizziness, tingling, tremors, sensory change, speech change, focal weakness, loss of consciousness and headaches.              Objective     /84 (BP Location: Left arm, Patient Position: Sitting, BP Cuff Size: Adult)   Pulse 61   Resp 12   Ht 1.626 m (5' 4\")   Wt 88.5 kg (195 lb)   LMP 01/31/2012 (LMP Unknown)   SpO2 96%   BMI 33.47 kg/m²     Physical Exam  Constitutional:       Appearance: Normal appearance.   HENT:      Head: Normocephalic and atraumatic.      Mouth/Throat:      Mouth: Mucous membranes are moist.      Pharynx: Oropharynx is clear.   Eyes:      Extraocular Movements: Extraocular movements intact.      Conjunctiva/sclera: Conjunctivae normal.      Pupils: Pupils are equal, round, and reactive to light.   Cardiovascular:      Rate and Rhythm: Normal rate and regular rhythm.      Pulses: Normal pulses.   Pulmonary:      Effort: Pulmonary effort is normal.      Breath sounds: Normal breath sounds. No wheezing, rhonchi or rales.   Musculoskeletal:      Cervical back: Normal range of motion and neck supple.      Right lower leg: No edema.      Left lower leg: No edema.   Skin:     General: Skin is warm and dry.      Capillary Refill: Capillary refill takes 2 to 3 seconds.   Neurological:      " Mental Status: She is alert and oriented to person, place, and time.   Psychiatric:         Mood and Affect: Mood normal.         Behavior: Behavior normal.         Thought Content: Thought content normal.         Judgment: Judgment normal.                Assessment & Plan     1. Angina of effort (HCC)  [id number] PET W/FLOW RESERVE    EC-ECHOCARDIOGRAM COMPLETE W/O CONT      2. Paroxysmal atrial fibrillation (HCC)  EKG    [id number] PET W/FLOW RESERVE    EC-ECHOCARDIOGRAM COMPLETE W/O CONT      3. H/O cardiac radiofrequency ablation 8/4/22 Dr Witt        4. Hypertrophic cardiomyopathy (HCC)        5. LBBB (left bundle branch block)  [id number] PET W/FLOW RESERVE    EC-ECHOCARDIOGRAM COMPLETE W/O CONT      6. Pacemaker        7. Primary hypertension        8. Obstructive sleep apnea syndrome            Medical Decision Making: Today's Assessment/Status/Plan:   1.  Angina  - New symptoms, intermittent since March.  Only occurs when walking her dog, resolves with slowing down or rest.  -Twelve-lead ECG with left bundle branch block/paced rhythm.  Left bundle branch block is overall stable.  -Unknown etiology at this time, obstructive disease, microvascular disease, hypertensive disease, structural changes, non cardiac source within the differential .  -Stress echo previously ordered.  I did discuss with Seda today that I will change this  to a cardiac PET scan and a transthoracic echo given history of left bundle branch block and pacemaker.  She will be notified of results and further recommendations.  -We reviewed ER precautions if pain does not resolve with rest or worsening symptoms she needs to go directly to the ER.    2.  Paroxysmal atrial fibrillation  3.  Status post ablation  - A-fib is well-controlled with less than 1% A-fib burden.  1 A-fib event in March lasting approximately 8 minutes.  -Continue Eliquis and beta-blocker.    4.  Hypertrophic cardiomyopathy  - Status post septal  ablation.  -Repeat echocardiogram  -Continue Norpace 150 mg and Toprol 50 mg.    5.  Left bundle branch block  - Stable    6.  Pacemaker in situ  - Device interrogated today and demonstrates appropriate function with stable lead parameters.      7.  Hypertension  - Systolic blood pressure is appropriate per ACC guidelines today.  Diastolic blood pressure remains slightly elevated.  -Recently started on losartan 50 mg.  Will continue to monitor blood pressure at home.    Return to clinic in 3 months, sooner if abnormal testing results.  PLEASE NOTE: This Note was created using voice recognition Software. I have made every reasonable attempt to correct obvious errors, but I expect that there are errors of grammar and possibly content that I did not discover before finalizing the note

## 2024-06-11 ENCOUNTER — OFFICE VISIT (OUTPATIENT)
Dept: CARDIOLOGY | Facility: MEDICAL CENTER | Age: 64
End: 2024-06-11
Attending: NURSE PRACTITIONER
Payer: COMMERCIAL

## 2024-06-11 VITALS
OXYGEN SATURATION: 96 % | DIASTOLIC BLOOD PRESSURE: 84 MMHG | SYSTOLIC BLOOD PRESSURE: 128 MMHG | RESPIRATION RATE: 12 BRPM | WEIGHT: 195 LBS | BODY MASS INDEX: 33.29 KG/M2 | HEIGHT: 64 IN | HEART RATE: 61 BPM

## 2024-06-11 DIAGNOSIS — I44.7 LBBB (LEFT BUNDLE BRANCH BLOCK): ICD-10-CM

## 2024-06-11 DIAGNOSIS — Z98.890 H/O CARDIAC RADIOFREQUENCY ABLATION: ICD-10-CM

## 2024-06-11 DIAGNOSIS — G47.33 OBSTRUCTIVE SLEEP APNEA SYNDROME: Chronic | ICD-10-CM

## 2024-06-11 DIAGNOSIS — I10 PRIMARY HYPERTENSION: ICD-10-CM

## 2024-06-11 DIAGNOSIS — I20.89 ANGINA OF EFFORT (HCC): ICD-10-CM

## 2024-06-11 DIAGNOSIS — I48.0 PAROXYSMAL ATRIAL FIBRILLATION (HCC): Chronic | ICD-10-CM

## 2024-06-11 DIAGNOSIS — Z95.0 PACEMAKER: ICD-10-CM

## 2024-06-11 DIAGNOSIS — I42.2 HYPERTROPHIC CARDIOMYOPATHY (HCC): ICD-10-CM

## 2024-06-11 LAB — EKG IMPRESSION: NORMAL

## 2024-06-11 PROCEDURE — 99213 OFFICE O/P EST LOW 20 MIN: CPT | Performed by: NURSE PRACTITIONER

## 2024-06-11 PROCEDURE — 3079F DIAST BP 80-89 MM HG: CPT | Performed by: NURSE PRACTITIONER

## 2024-06-11 PROCEDURE — 93288 INTERROG EVL PM/LDLS PM IP: CPT | Mod: 26 | Performed by: NURSE PRACTITIONER

## 2024-06-11 PROCEDURE — 93288 INTERROG EVL PM/LDLS PM IP: CPT | Performed by: NURSE PRACTITIONER

## 2024-06-11 PROCEDURE — 93005 ELECTROCARDIOGRAM TRACING: CPT | Performed by: NURSE PRACTITIONER

## 2024-06-11 PROCEDURE — 3074F SYST BP LT 130 MM HG: CPT | Performed by: NURSE PRACTITIONER

## 2024-06-11 PROCEDURE — 99214 OFFICE O/P EST MOD 30 MIN: CPT | Mod: 25 | Performed by: NURSE PRACTITIONER

## 2024-06-11 RX ORDER — DISOPYRAMIDE PHOSPHATE 150 MG/1
CAPSULE ORAL
Qty: 270 CAPSULE | Refills: 3 | Status: SHIPPED | OUTPATIENT
Start: 2024-06-11

## 2024-06-11 ASSESSMENT — ENCOUNTER SYMPTOMS
WEIGHT LOSS: 0
TREMORS: 0
DIZZINESS: 0
HEADACHES: 0
PND: 0
SPUTUM PRODUCTION: 0
HEMOPTYSIS: 0
PALPITATIONS: 0
NAUSEA: 0
SHORTNESS OF BREATH: 0
FOCAL WEAKNESS: 0
ORTHOPNEA: 0
FEVER: 0
WHEEZING: 0
SPEECH CHANGE: 0
COUGH: 0
SENSORY CHANGE: 0
VOMITING: 0
LOSS OF CONSCIOUSNESS: 0
CHILLS: 0
HEARTBURN: 0
TINGLING: 0
STRIDOR: 0

## 2024-06-11 ASSESSMENT — FIBROSIS 4 INDEX: FIB4 SCORE: 1.94

## 2024-06-17 ENCOUNTER — HOSPITAL ENCOUNTER (OUTPATIENT)
Dept: CARDIOLOGY | Facility: MEDICAL CENTER | Age: 64
End: 2024-06-17
Attending: NURSE PRACTITIONER
Payer: COMMERCIAL

## 2024-06-17 DIAGNOSIS — I44.7 LBBB (LEFT BUNDLE BRANCH BLOCK): ICD-10-CM

## 2024-06-17 DIAGNOSIS — I20.89 ANGINA OF EFFORT (HCC): ICD-10-CM

## 2024-06-17 DIAGNOSIS — I48.0 PAROXYSMAL ATRIAL FIBRILLATION (HCC): Chronic | ICD-10-CM

## 2024-06-17 LAB
LV EJECT FRACT  99904: 65
LV EJECT FRACT MOD 2C 99903: 63.75
LV EJECT FRACT MOD 4C 99902: 63.9
LV EJECT FRACT MOD BP 99901: 64.01

## 2024-06-17 PROCEDURE — 93306 TTE W/DOPPLER COMPLETE: CPT | Mod: 26 | Performed by: INTERNAL MEDICINE

## 2024-06-17 PROCEDURE — 93306 TTE W/DOPPLER COMPLETE: CPT

## 2024-06-18 ENCOUNTER — PATIENT MESSAGE (OUTPATIENT)
Dept: CARDIOLOGY | Facility: MEDICAL CENTER | Age: 64
End: 2024-06-18
Payer: COMMERCIAL

## 2024-06-18 DIAGNOSIS — F41.9 ANXIETY: ICD-10-CM

## 2024-06-18 DIAGNOSIS — R07.89 OTHER CHEST PAIN: ICD-10-CM

## 2024-06-18 RX ORDER — ALPRAZOLAM 0.25 MG/1
0.25 TABLET ORAL
Qty: 1 TABLET | Refills: 0 | Status: SHIPPED | OUTPATIENT
Start: 2024-06-18 | End: 2024-06-25

## 2024-06-18 NOTE — PROGRESS NOTES
I would recommend that she follow the scheduling instructions for the test, if they told her not to take metoprolol that is fine.  I would like her to continue the norpace however.     PDMP reviewed.  She has score of zero. She can try xanax 0.25 mg,  take one hour prior to test.  She must have a  for the test if she takes the medication.    Rachael, I sent in the RX.     Xena

## 2024-06-21 ENCOUNTER — HOSPITAL ENCOUNTER (OUTPATIENT)
Dept: RADIOLOGY | Facility: MEDICAL CENTER | Age: 64
End: 2024-06-21
Attending: NURSE PRACTITIONER
Payer: COMMERCIAL

## 2024-06-21 DIAGNOSIS — I44.7 LBBB (LEFT BUNDLE BRANCH BLOCK): ICD-10-CM

## 2024-06-21 DIAGNOSIS — I48.0 PAROXYSMAL ATRIAL FIBRILLATION (HCC): Chronic | ICD-10-CM

## 2024-06-21 DIAGNOSIS — I20.89 ANGINA OF EFFORT (HCC): ICD-10-CM

## 2024-06-21 PROCEDURE — 78431 MYOCRD IMG PET RST&STRS CT: CPT | Mod: 26 | Performed by: INTERNAL MEDICINE

## 2024-06-21 PROCEDURE — 78434 AQMBF PET REST & RX STRESS: CPT | Mod: 26 | Performed by: INTERNAL MEDICINE

## 2024-06-21 PROCEDURE — 78431 MYOCRD IMG PET RST&STRS CT: CPT

## 2024-06-21 PROCEDURE — 93018 CV STRESS TEST I&R ONLY: CPT | Performed by: INTERNAL MEDICINE

## 2024-06-24 ENCOUNTER — PATIENT MESSAGE (OUTPATIENT)
Dept: CARDIOLOGY | Facility: MEDICAL CENTER | Age: 64
End: 2024-06-24
Payer: COMMERCIAL

## 2024-06-24 ENCOUNTER — TELEPHONE (OUTPATIENT)
Dept: CARDIOLOGY | Facility: MEDICAL CENTER | Age: 64
End: 2024-06-24
Payer: COMMERCIAL

## 2024-06-24 DIAGNOSIS — I48.0 PAROXYSMAL ATRIAL FIBRILLATION (HCC): ICD-10-CM

## 2024-06-24 RX ORDER — METOPROLOL SUCCINATE 50 MG/1
75 TABLET, EXTENDED RELEASE ORAL DAILY
Qty: 135 TABLET | Refills: 3 | Status: SHIPPED | OUTPATIENT
Start: 2024-06-24

## 2024-06-24 NOTE — PROGRESS NOTES
We can try to go up on her Metoprolol SR to 75 mg and see how she does with a slightly higher dose in addition to the Losartan.      Rachael, if she is willing, can you update the prescription?  Xena

## 2024-06-24 NOTE — TELEPHONE ENCOUNTER
Mark Cano,   Can you please call Seda and let her know that her pet scan showed no areas of low blood flow.  I did discuss results with dr. Witt as well, the septal infarct area mentioned consistent with prior septal alcohol ablation for HOCM.     Echo showed overall normal function.  Septal thickness measurement is not increased from prior.  Valves show some mild regurgitations and tightening, however not clinically significant at this time.  We will follow echos every one -two years.      How is her blood pressure doing after starting on the losartan?   She reports pain was somewhat improved after starting this medication.  Is she still having pain when walking?   We can further adjust her medications if needed.    Thank you  Xena

## 2024-06-24 NOTE — PATIENT COMMUNICATION
EA- Please advise, patient reporting only one episodes since starting losartan.   BP is still pretty elevated, any recommendations?

## 2024-06-29 RX ORDER — LOSARTAN POTASSIUM 50 MG/1
50 TABLET ORAL 2 TIMES DAILY
Qty: 60 TABLET | Refills: 3 | Status: SHIPPED | OUTPATIENT
Start: 2024-06-29

## 2024-07-02 ENCOUNTER — PATIENT MESSAGE (OUTPATIENT)
Dept: CARDIOLOGY | Facility: MEDICAL CENTER | Age: 64
End: 2024-07-02
Payer: COMMERCIAL

## 2024-07-02 ENCOUNTER — NON-PROVIDER VISIT (OUTPATIENT)
Dept: CARDIOLOGY | Facility: MEDICAL CENTER | Age: 64
End: 2024-07-02
Payer: COMMERCIAL

## 2024-07-02 PROCEDURE — 93294 REM INTERROG EVL PM/LDLS PM: CPT | Performed by: INTERNAL MEDICINE

## 2024-07-18 ENCOUNTER — TELEPHONE (OUTPATIENT)
Dept: MEDICAL GROUP | Facility: MEDICAL CENTER | Age: 64
End: 2024-07-18
Payer: COMMERCIAL

## 2024-07-18 RX ORDER — AMLODIPINE BESYLATE 5 MG/1
5 TABLET ORAL DAILY
Qty: 30 TABLET | Refills: 1 | Status: SHIPPED | OUTPATIENT
Start: 2024-07-18 | End: 2024-07-30 | Stop reason: SDUPTHER

## 2024-07-19 ENCOUNTER — PATIENT MESSAGE (OUTPATIENT)
Dept: CARDIOLOGY | Facility: MEDICAL CENTER | Age: 64
End: 2024-07-19
Payer: COMMERCIAL

## 2024-07-29 LAB — EKG IMPRESSION: NORMAL

## 2024-07-30 ENCOUNTER — OFFICE VISIT (OUTPATIENT)
Dept: MEDICAL GROUP | Facility: MEDICAL CENTER | Age: 64
End: 2024-07-30
Payer: COMMERCIAL

## 2024-07-30 VITALS
OXYGEN SATURATION: 97 % | HEART RATE: 60 BPM | HEIGHT: 64 IN | DIASTOLIC BLOOD PRESSURE: 72 MMHG | BODY MASS INDEX: 33.29 KG/M2 | WEIGHT: 195 LBS | RESPIRATION RATE: 16 BRPM | TEMPERATURE: 97.4 F | SYSTOLIC BLOOD PRESSURE: 130 MMHG

## 2024-07-30 DIAGNOSIS — K58.1 IRRITABLE BOWEL SYNDROME WITH CONSTIPATION: ICD-10-CM

## 2024-07-30 RX ORDER — AMLODIPINE BESYLATE 5 MG/1
5 TABLET ORAL DAILY
Qty: 90 TABLET | Refills: 3 | Status: SHIPPED | OUTPATIENT
Start: 2024-07-30

## 2024-07-30 ASSESSMENT — FIBROSIS 4 INDEX: FIB4 SCORE: 1.94

## 2024-08-05 ENCOUNTER — TELEPHONE (OUTPATIENT)
Dept: CARDIOLOGY | Facility: MEDICAL CENTER | Age: 64
End: 2024-08-05
Payer: COMMERCIAL

## 2024-08-05 DIAGNOSIS — R07.9 EXERTIONAL CHEST PAIN: ICD-10-CM

## 2024-08-05 NOTE — TELEPHONE ENCOUNTER
----- Message from ROSLYN Zamudio sent at 8/1/2024  6:13 PM PDT -----  Mark Reis,    Can you please order a CTA for exertional chest pressure despite escalating BB and adding amlodipine and a previously negative pet scan.  Can you please let janine know that we are ordering test and for her to call and get scheduled soon.    Thank you   Xena

## 2024-08-06 NOTE — PROGRESS NOTES
INITIAL VASCULAR MED VISIT  Subjective:   Seda Cagle is a 58 y.o. female who presents today 11/26/2018 for   Chief Complaint   Patient presents with   • New Patient     Stasis Dermatitis   Initially referred by PCP for eval and mgmt of chronic venous stasis.     HPI:    Chronic venous disease:   Had been seen by [facility], PCP, and Dr. Witt (cardiology) and had been seen there and told needed to come to vascular evaluation.   Has been very active and walking 15,000 steps daily.  This summer developed some brownish-red spots on legs and increasing size of varicosities.  Did not have last year this time.  No current pain, no bleeding, no DVT.   No prior surgery of the legs.   Hx of open wound in RLE - unclear if venous leg ulcer.      HTN:   No hx of HTN.  Using metoprolol for rate-control.     HOCM:  Had septal-reduction 2000, currently has been using metoprolol and norpace.  In 2014,     Anticoag:   Current symptoms:  Denies current SOB, CP, leg swelling, edema, leg pain, cyanosis of digits, redness of extremities.  Anticoagulant: xarelto 20mg, tolerating well, no bleeding or bruising.   Expected duration of anticoagulation: lifelong due to Afib     Past Medical History:   Diagnosis Date   • Anesthesia     wakes up during procedure (x2)   • Aortic regurgitation    • Arrhythmia, ventricular 2008    Symptomatic PVCs, controlled with medication.   • Asthma     inhaler PRN, only uses when sick    • Atrial fibrillation (HCC)    • Cardiac arrhythmia    • Cervical arthritis 2/8/2016   • Chickenpox    • Chronic kidney disease, stage III (moderate) (HCC) 2/9/2016   • CTS (carpal tunnel syndrome) 2/28/2011   • Depression    • GERD (gastroesophageal reflux disease)    • Gynecological disorder    • Heart burn    • Heart murmur    • Hemorrhagic disorder (HCC)     on Xarelto    • Hypertr obst cardiomyop 1991    Dr. Bert Vazquez, [address] [address], [place], [place] [address] [phone] fax [phone].  [place]  -CT scan of chest with mild reticular opacities of right middle lobe  -prn Duonebs  -outpatient pulm      [place] office [phone].  Alchol Septal Reduction 8/00, Coronaries normal.   • Hypertrophic cardiomyopathy (HCC)    • Indigestion    • Influenza    • Pacemaker 2014   • Renal disorder     chronic kidney disease stage 3-  **pt states currently no issue, numbers are stable   • Sleep apnea     uses CPAP   • Snoring      Past Surgical History:   Procedure Laterality Date   • HYSTEROSCOPY WITH VIDEO OPERATIVE  4/30/2018    Procedure: HYSTEROSCOPY WITH VIDEO OPERATIVE;  Surgeon: Noemi Liu M.D.;  Location: SURGERY SAME DAY [facility] ORS;  Service: Labor and Delivery   • DILATION AND CURETTAGE  4/30/2018    Procedure: DILATION AND CURETTAGE;  Surgeon: Noemi Liu M.D.;  Location: SURGERY SAME DAY [facility] ORS;  Service: Labor and Delivery   • BREAST BIOPSY Left 1/30/2018    Procedure: BREAST BIOPSY- WIRE LOCALIZED;  Surgeon: Vijaya Britt M.D.;  Location: SURGERY SAME DAY [facility] ORS;  Service: General   • RECOVERY  4/15/2015    Performed by [facility] at SURGERY PRE-POST PROC UNIT [facility] [place]   • PACEMAKER INSERTION  2015   • RECOVERY  12/12/2014    Performed by [facility] at SURGERY SAME DAY [facility] ORS   • SEPTAL RECONSTRUCTION  2000   • ENDOSCOPY     • PRIMARY C SECTION     • TUBAL COAGULATION LAPAROSCOPIC BILATERAL       Past Surgical History:   Procedure Laterality Date   • HYSTEROSCOPY WITH VIDEO OPERATIVE  4/30/2018    Procedure: HYSTEROSCOPY WITH VIDEO OPERATIVE;  Surgeon: Noemi Liu M.D.;  Location: SURGERY SAME DAY [facility] ORS;  Service: Labor and Delivery   • DILATION AND CURETTAGE  4/30/2018    Procedure: DILATION AND CURETTAGE;  Surgeon: Noemi Liu M.D.;  Location: SURGERY SAME DAY [facility] ORS;  Service: Labor and Delivery   • BREAST BIOPSY Left 1/30/2018    Procedure: BREAST BIOPSY- WIRE LOCALIZED;  Surgeon: Vijaya Britt M.D.;  Location: SURGERY SAME DAY [facility] ORS;  Service: General   • RECOVERY  4/15/2015    [place] [facility] [place] [facility] [place]   • PACEMAKER INSERTION     • RECOVERY  2014    Performed by [facility] at SURGERY SAME DAY [facility] ORS   • SEPTAL RECONSTRUCTION     • ENDOSCOPY     • PRIMARY C SECTION     • TUBAL COAGULATION LAPAROSCOPIC BILATERAL       Current Outpatient Prescriptions on File Prior to Visit   Medication Sig Dispense Refill   • metoprolol (LOPRESSOR) 25 MG Tab Take 1 Tab by mouth 2 times a day. 180 Tab 3   • rivaroxaban (XARELTO) 20 MG Tab tablet Take 1 Tab by mouth every day at 6 PM. 90 Tab 3   • disopyramide (NORPACE) 100 MG Cap Take 1 Cap by mouth every 8 hours. 270 Cap 3   • Melatonin 5 MG TABLET DISPERSIBLE Take  by mouth.     • Fluticasone Propionate (FLONASE NA) Spray  in nose.     • Calcium-Vitamin D-Vitamin K (CALCIUM FOR WOMEN PO) Take 1 Each by mouth every day at 6 PM.     • Lansoprazole (PREVACID PO) Take 1 Each by mouth 1 time daily as needed.     • Magnesium 200 MG TABS Take 1 Tab by mouth every day.     • Multiple Vitamins-Minerals (ALIVE ONCE DAILY WOMENS 50+ PO) Take 1 Each by mouth every day at 6 PM.       No current facility-administered medications on file prior to visit.      No Known Allergies    Family History   Problem Relation Age of Onset   • Heart Disease Mother         CHF   • Hypertension Mother    • Stroke Mother    • Heart Failure Mother    • Other Mother         carotid artery disease,gout   • Cancer Father         AML   • Diabetes Maternal Grandmother    • Cancer Maternal Grandfather         Prostate   • Heart Disease Paternal Grandfather 52         MI    • Sleep Apnea Neg Hx      History   Smoking Status   • Never Smoker   Smokeless Tobacco   • Never Used     Social History   Substance Use Topics   • Smoking status: Never Smoker   • Smokeless tobacco: Never Used   • Alcohol use 0.6 oz/week     1 Standard drinks or equivalent per week      Comment: 3-4 per week     DIET AND EXERCISE:  Weight Change: reduced weight overr 10lb   Diet: common  "adult,  Semi-Keto diet, no Etoh, reduced carbs   Exercise: moderate regular exercise program     Review of Systems   Constitutional: Negative for chills, fever and malaise/fatigue.   HENT: Negative for nosebleeds, sore throat and tinnitus.    Eyes: Negative for blurred vision and double vision.   Respiratory: Negative for cough, hemoptysis, shortness of breath and wheezing.    Cardiovascular: Negative for chest pain, palpitations, orthopnea and leg swelling.   Gastrointestinal: Negative for abdominal pain, blood in stool, diarrhea, melena, nausea and vomiting.   Genitourinary: Negative for hematuria.   Musculoskeletal: Negative for joint pain and myalgias.   Skin: Negative for itching and rash.   Neurological: Negative for dizziness, tremors, focal weakness, seizures, weakness and headaches.   Endo/Heme/Allergies: Does not bruise/bleed easily.   Psychiatric/Behavioral: Negative for depression. The patient is not nervous/anxious and does not have insomnia.       Objective:     Vitals:    11/26/18 1530 11/26/18 1534   BP: 142/82 135/76   BP Location: Left arm Left arm   Patient Position: Sitting Sitting   BP Cuff Size: Adult Adult   Pulse: 60 60   Weight: 85.9 kg (189 lb 6.4 oz) 85.9 kg (189 lb 4.8 oz)   Height: 1.626 m (5' 4\") 1.626 m (5' 4\")      BP Readings from Last 4 Encounters:   11/30/18 118/78   11/26/18 135/76   11/01/18 128/78   10/08/18 126/74      Body mass index is 32.49 kg/m².   BMI Readings from Last 4 Encounters:   11/30/18 32.27 kg/m²   11/26/18 32.49 kg/m²   11/01/18 33.76 kg/m²   10/08/18 33.30 kg/m²      Physical Exam   Constitutional: She is oriented to person, place, and time. She appears well-developed and well-nourished. No distress.   HENT:   Head: Normocephalic and atraumatic.   Neck: Normal range of motion. Neck supple. No JVD present. No thyromegaly present.   Cardiovascular: Normal rate, regular rhythm, normal heart sounds and intact distal pulses.  Exam reveals no gallop and no friction " rub.    No murmur heard.  Pulses:       Carotid pulses are 2+ on the right side, and 2+ on the left side.       Radial pulses are 2+ on the right side, and 2+ on the left side.        Dorsalis pedis pulses are 2+ on the right side, and 2+ on the left side.        Posterior tibial pulses are 2+ on the right side, and 2+ on the left side.   Edema     RLE: none     LLE: none   Spider telangectasia:       RLE:  Scattered      LLE: scattered   Varicosities:         RLE: scattered 3mm, stasis dermatitis w/o lipodermatosclerosis      LLE: scattered 3mm, stasis dermatitis w/o lipodermatosclerosis    Corona phlebectatica:      RLE:  None       LLE:  None   Cording:         RLE:  None     LLE: None      Pulmonary/Chest: Effort normal and breath sounds normal.   Abdominal: Soft. Bowel sounds are normal. She exhibits no distension and no mass. There is no tenderness. There is no rebound and no guarding.   Musculoskeletal: Normal range of motion. She exhibits no edema or tenderness.   Neurological: She is alert and oriented to person, place, and time. No cranial nerve deficit. She exhibits normal muscle tone. Coordination normal.   Skin: Skin is warm and dry. She is not diaphoretic.        Psychiatric: She has a normal mood and affect.     Lab Results   Component Value Date    CHOLSTRLTOT 186 12/05/2016     (H) 12/05/2016    HDL 52 12/05/2016    TRIGLYCERIDE 143 12/05/2016           Lab Results   Component Value Date    SODIUM 137 04/23/2018    POTASSIUM 4.3 04/23/2018    CHLORIDE 105 04/23/2018    CO2 26 04/23/2018    GLUCOSE 89 04/23/2018    BUN 18 04/23/2018    CREATININE 1.08 04/23/2018    IFAFRICA >60 04/23/2018    IFNOTAFR 52 (A) 04/23/2018        Lab Results   Component Value Date    WBC 6.3 04/23/2018    RBC 4.89 04/23/2018    HEMOGLOBIN 14.6 04/23/2018    HEMATOCRIT 44.1 04/23/2018    MCV 90.2 04/23/2018    MCH 29.9 04/23/2018    MCHC 33.1 (L) 04/23/2018    MPV 10.2 04/23/2018      Medical Decision Making:   Today's Assessment / Status / Plan:     1. Chronic venous stasis dermatitis     2. CKD stage G3a/A1, GFR 45-59 and albumin creatinine ratio <30 mg/g (HCC)     3. Paroxysmal atrial fibrillation (HCC)     4. Obstructive sleep apnea syndrome     5. Mitral valve insufficiency, unspecified etiology     6. Obesity (BMI 30-39.9)     7. Hypertrophic obstructive cardiomyopathy (HCC)     8. Cardiac pacemaker in situ       Patient Type: Secondary Prevention    Etiology of Established CVD if Present:   1) HOCM with pacer in place  2) hx of bradycardia with pacer placement, unsure if ischemia-related     Lipid Management: Qualifies for Statin Therapy Based on 2013 ACC/AHA Guidelines: yes  Calculated 10-Year Risk of ASCVD: N/A  Currently on Statin: No  NLA Risk Category:   High:  3+ major RFs, T1/T2D and 0-1 RF and no evidence of end-org damage  Tx threshold: non-HDL-C >129, LDL-C >99  Tx goal:  non-HDL <130, LDL-C <100 (optional: apoB<90)   At goal:  No, per prior labs 2016  Tx plan:   - update labs, low threshold to start statin   - TLC measures     Blood Pressure Management:Goal: ACC/AHA (2017) goal <130/80  Home BP at goal:  yes  Office BP at goal:  No  Elevated BP w/o formal dx of HTN.    Plan:   - continue home BP monitoring, reviewed correct technique:  Yes   - order 24h ABPM:  UNDECIDED, if white coat effect   - update labs   - continue metoprolol 25mg BID for cardiac reasons   - consider addition of thiazide or ACEI/ARB, best to avoid amlodipine for now due to potential for swelling     Glycemic Status: Normal  - update fasting sugar     Anti-Platelet/Anti-Coagulant Tx: yes  - xarelto 20mg daily, defer mgmt to cardio    Smoking: continued complete avoidance of all tobacco products     Physical Activity: - engage in moderate to vigorous physical activity such as brisk walking, swimming, cycling, >150 minutes per week at 30-40 minutes per session, 3 to 5 times weekly    Weight Management and Nutrition: Dietary plan was  discussed with patient at this visit including - consume diet that emphasizes intake of vegetables, fruits, and whole grains,  - use low fat diary products, poultry, fish, legumes, nontropical vegetable oils, nuts  - limit intake of seets, sugar-sweetned beverages, and red meats   - reduce saturated and trans fats to <6% of your daily calories   - consume no more than 2,400mg of sodium daily (look at food labels)  - healthy diet plans reviewed including plate method, DASH, AHA diets     Other:   1) chronic venous disease with stasis pigmentation and dermatitis, mildly symptomatic.  ? Prior hx of VLU at anterior RLE  - update labs   - start compression stockings  - continue anticoag   - horse chestnut seed extract for 3mo trial   - consider venous reflux study at next visit     2) CKD G3a/A1 - monitor, consider addition of ACEI    3) GEORGINA - continue CPAP, defer mgmt to pulm     4) Afib, rate-controlled.  [id number] Score = 1 point, indicating low-moderate risk for CVA   - continue xarelto and metoprolol  - defer mgmt to cardiology     Instructed to follow-up with PCP for remainder of adult medical needs: yes  We will partner with other providers in the management of established vascular disease and cardiometabolic risk factors.    Studies to Be Obtained:  Consider venous reflux duplex at next visit  Labs to Be Obtained:  Needs lipid at next visit     Follow up in: 3 months    Rashid Romano M.D.

## 2024-08-08 DIAGNOSIS — Z01.812 PRE-PROCEDURE LAB EXAM: ICD-10-CM

## 2024-08-08 NOTE — PROGRESS NOTES
Request received to review order/ protocol for coronary CTA. Scan approved for either cardiac scanner. Upon review of available medical records, the following orders have been placed:    Order placed for outpatient, non fasting creatinine blood draw to check kidney function within 30 days prior to contrast administration for coronary CTA if determined medically necessary by CT staff. Instructions for timing of blood test to be given by scheduling/ CT facility team.    This CT study may be scheduled through [facility] Scheduling at , option 2.

## 2024-08-10 NOTE — ASSESSMENT & PLAN NOTE
Problem: Discharge Planning  Goal: Discharge to home or other facility with appropriate resources  Outcome: Progressing     Problem: Chronic Conditions and Co-morbidities  Goal: Patient's chronic conditions and co-morbidity symptoms are monitored and maintained or improved  Outcome: Progressing     Problem: Skin  Goal: Decreased wound size/increased tissue granulation at next dressing change  Outcome: Progressing  Goal: Participates in plan/prevention/treatment measures  Outcome: Progressing  Goal: Prevent/manage excess moisture  Outcome: Progressing  Goal: Prevent/minimize sheer/friction injuries  Outcome: Progressing  Goal: Promote/optimize nutrition  Outcome: Progressing  Goal: Promote skin healing  Outcome: Progressing     Problem: Respiratory  Goal: Minimize anxiety/maximize coping throughout shift  Outcome: Progressing  Goal: Minimal/no exertional discomfort or dyspnea this shift  Outcome: Progressing  Goal: Tolerate mechanical ventilation evidenced by VS/agitation level this shift  Outcome: Progressing  Goal: Tolerate pulmonary toileting this shift  Outcome: Progressing  Goal: Verbalize decreased shortness of breath this shift  Outcome: Progressing  Goal: Wean oxygen to maintain O2 saturation per order/standard this shift  Outcome: Progressing  Goal: Increase self care and/or family involvement in next 24 hours  Outcome: Progressing   The patient's goals for the shift include saftey    The clinical goals for the shift include pt. will remaion safe from falls throughout shift.       Bed: 15  Expected date:   Expected time:   Means of arrival:   Comments:  [place]    She also complains of lower extremity skin changes that have occurred over the past 6 months or so.  Denies any pain.  No significant itching.  Has a significant cardiac history.  Also has kidney concerns with chronic kidney disease.  She is followed by a new cardiologist Dr. porras.  He told her that she likely had chronic venous insufficiency.  At times she will have pain on the lower extremities when she has socks on.  Even loose socks will cause a tenderness.

## 2024-08-21 ENCOUNTER — OFFICE VISIT (OUTPATIENT)
Dept: URGENT CARE | Facility: CLINIC | Age: 64
End: 2024-08-21
Payer: COMMERCIAL

## 2024-08-21 VITALS
BODY MASS INDEX: 33.87 KG/M2 | TEMPERATURE: 97 F | HEIGHT: 64 IN | DIASTOLIC BLOOD PRESSURE: 60 MMHG | SYSTOLIC BLOOD PRESSURE: 112 MMHG | WEIGHT: 198.4 LBS | OXYGEN SATURATION: 97 % | RESPIRATION RATE: 15 BRPM | HEART RATE: 60 BPM

## 2024-08-21 DIAGNOSIS — R23.3 PETECHIAL RASH: ICD-10-CM

## 2024-08-21 PROCEDURE — 99213 OFFICE O/P EST LOW 20 MIN: CPT | Performed by: PHYSICIAN ASSISTANT

## 2024-08-21 PROCEDURE — 3078F DIAST BP <80 MM HG: CPT | Performed by: PHYSICIAN ASSISTANT

## 2024-08-21 PROCEDURE — 3074F SYST BP LT 130 MM HG: CPT | Performed by: PHYSICIAN ASSISTANT

## 2024-08-21 ASSESSMENT — ENCOUNTER SYMPTOMS
ABDOMINAL PAIN: 0
NAUSEA: 0
BRUISES/BLEEDS EASILY: 1
PALPITATIONS: 0
CHILLS: 0
FLANK PAIN: 0
COUGH: 0
DIZZINESS: 0
VOMITING: 0
FEVER: 0
MYALGIAS: 0
ORTHOPNEA: 0
TINGLING: 0
WHEEZING: 0
HEADACHES: 0

## 2024-08-21 ASSESSMENT — FIBROSIS 4 INDEX: FIB4 SCORE: 1.94

## 2024-08-21 NOTE — PROGRESS NOTES
Subjective:     CHIEF COMPLAINT  Chief Complaint   Patient presents with    Rash     Red color on both of her legs noticed it this weekend, and broke out in a rash. By the end of the day legs will swell.      '    HPI  Seda Cagle is a very pleasant 64 y.o. female who presents to the clinic with a rash on her bilateral lower extremities that she noticed over the last 5 days.  Describes small red spots on her lower legs bilaterally.  Denies any associated pain or pruritus.  States at the end of the day her legs appear slightly more swollen than normal.  States she otherwise has been feeling well.  Denies any recent illness.  No chest pain, fevers, chills, joint pain, GI symptoms or urinary complaints.  PMH significant for paroxysmal A-fib currently controlled on Eliquis.  Also has a history of CLL at stage 0 per patient.  She was recently started on amlodipine in the last 3 weeks for hypertension.    REVIEW OF SYSTEMS  Review of Systems   Constitutional:  Negative for chills, fever and malaise/fatigue.   Respiratory:  Negative for cough and wheezing.    Cardiovascular:  Positive for leg swelling. Negative for chest pain, palpitations and orthopnea.   Gastrointestinal:  Negative for abdominal pain, nausea and vomiting.   Genitourinary:  Negative for dysuria, flank pain and hematuria.   Musculoskeletal:  Negative for joint pain and myalgias.   Skin:  Positive for rash. Negative for itching.   Neurological:  Negative for dizziness, tingling and headaches.   Endo/Heme/Allergies:  Bruises/bleeds easily.       PAST MEDICAL HISTORY  Patient Active Problem List    Diagnosis Date Noted    Other thrombophilia (HCC) 06/03/2024    Hypertension 06/03/2024    Lateral epicondylitis of right elbow 06/03/2024    History of COVID-19 08/18/2023    Allergic rhinitis 01/30/2023    H/O cardiac radiofrequency ablation 8/4/22 Dr Witt 09/22/2022    Insomnia 02/22/2022    History of headache 02/11/2022    CLL (chronic  lymphocytic leukemia) (HCC) 11/23/2021    Abnormal nasal septum 08/12/2021    IBS (irritable bowel syndrome) 02/04/2020    Venous insufficiency 05/09/2019    History of transient global amnesia 01/18/2019    Sleep apnea 04/17/2017    Dyslipidemia 12/05/2016    Paroxysmal atrial fibrillation (HCC) 12/05/2016    Decreased GFR 02/09/2016    Preventative health care 02/08/2016    GERD (gastroesophageal reflux disease) 02/08/2016    Neck arthritis 02/08/2016    Pacemaker 01/21/2015    Hypertrophic cardiomyopathy (HCC) 12/12/2014    S/P carpal tunnel release 02/28/2011       SURGICAL HISTORY   has a past surgical history that includes primary c section; tubal coagulation laparoscopic bilateral; recovery (12/12/2014); recovery (04/15/2015); pacemaker insertion (2015); septal reconstruction (2000); endoscopy; breast biopsy (Left, 01/30/2018); hysteroscopy with video operative (04/30/2018); dilation and curettage (04/30/2018); wrist arthroscop,release xvers lig (Right, 06/01/2021); joint injection diagnostic (Left, 06/01/2021); and pacemaker insertion (04/2022).    ALLERGIES  No Known Allergies    CURRENT MEDICATIONS  Home Medications       Reviewed by Phill Gilmore P.A.-C. (Physician Assistant) on 08/21/24 at 1358  Med List Status: <None>     Medication Last Dose Status   acetaminophen (TYLENOL) 325 MG Tab PRN Active   albuterol 108 (90 Base) MCG/ACT Aero Soln inhalation aerosol PRN Active   amLODIPine (NORVASC) 5 MG Tab Taking Active   atorvastatin (LIPITOR) 40 MG Tab Taking Active   Calcium-Vitamin D-Vitamin K (CALCIUM FOR WOMEN PO) Taking Active   Cetirizine HCl (ZYRTEC ALLERGY PO) PRN Active   disopyramide (NORPACE) 150 MG Cap Taking Active   ELIQUIS 5 MG Tab Taking Active   linaCLOtide 145 MCG Cap Taking Active   losartan (COZAAR) 50 MG Tab Taking Active   Magnesium 200 MG TABS Taking Active   metoprolol SR (TOPROL XL) 50 MG TABLET SR 24 HR Taking Active   Multiple Vitamin (MULTI-VITAMIN PO) Taking Active  "  nortriptyline (PAMELOR) 10 MG Cap Taking Active   omeprazole (PRILOSEC) 20 MG delayed-release capsule Taking Active   Pantothenic Acid 500 MG Tab Taking Active   Probiotic Product (PROBIOTIC-10 PO) Taking Active   PSYLLIUM HUSK PO Taking Active   Triamcinolone Acetonide (NASACORT AQ NA) PRN Active   Zoster Vac Recomb Adjuvanted (SHINGRIX) 50 MCG/0.5ML Recon Susp Taking Active                    SOCIAL HISTORY  Social History     Tobacco Use    Smoking status: Never    Smokeless tobacco: Never   Vaping Use    Vaping status: Never Used   Substance and Sexual Activity    Alcohol use: Yes     Alcohol/week: 0.6 oz     Types: 1 Standard drinks or equivalent per week     Comment: 2-3 per week    Drug use: No    Sexual activity: Yes     Partners: Male     Birth control/protection: Surgical     Comment:        FAMILY HISTORY  Family History   Problem Relation Age of Onset    Heart Disease Mother         CHF    Hypertension Mother     Stroke Mother     Heart Failure Mother     Other Mother         carotid artery disease,gout    Cancer Father         AML    Diabetes Maternal Grandmother     Cancer Maternal Grandfather         Prostate    Heart Disease Paternal Grandfather 52         MI     Sleep Apnea Neg Hx           Objective:     VITAL SIGNS: /60 (BP Location: Left arm, Patient Position: Sitting, BP Cuff Size: Adult)   Pulse 60   Temp 36.1 °C (97 °F) (Temporal)   Resp 15   Ht 1.626 m (5' 4\")   Wt 90 kg (198 lb 6.4 oz)   LMP 2012 (LMP Unknown)   SpO2 97%   BMI 34.06 kg/m²     PHYSICAL EXAM  Physical Exam  Constitutional:       General: She is not in acute distress.     Appearance: Normal appearance. She is not ill-appearing, toxic-appearing or diaphoretic.   HENT:      Head: Normocephalic and atraumatic.   Eyes:      Conjunctiva/sclera: Conjunctivae normal.   Cardiovascular:      Rate and Rhythm: Normal rate and regular rhythm.      Pulses: Normal pulses.      Heart sounds: Normal heart " sounds.   Pulmonary:      Effort: Pulmonary effort is normal.   Musculoskeletal:      Cervical back: Normal range of motion.      Right lower leg: No edema.      Left lower leg: No edema.      Comments: No pitting edema to lower extremities.   Skin:     Findings: Rash present.      Comments: Petechial rash on the bilateral lower extremities.   Neurological:      General: No focal deficit present.      Mental Status: She is alert and oriented to person, place, and time. Mental status is at baseline.         Assessment/Plan:     1. Petechial rash            MDM/Comments:    Patient presents for evaluation of a rash noted over the last 5 days on her lower extremities bilaterally.  On exam she has a small petechial rash to the lower legs.  No associated pain or pruritus.  There is no lower extremity edema in clinic.  States she has noted some edema at the end of the day over the last couple weeks.  She was recently started on amlodipine for blood pressure which I believe may be contributing to her symptoms at this time.  Patient otherwise has been feeling well without any signs of infection.  Advise close follow-up with PCP with return precautions for new or worsening symptoms.    Differential diagnosis, natural history, supportive care, and indications for immediate follow-up discussed. All questions answered. Patient agrees with the plan of care.    Follow-up as needed if symptoms worsen or fail to improve to PCP, Urgent care or Emergency Room.    I have personally reviewed prior external notes and test results pertinent to today's visit.  I have independently reviewed and interpreted all diagnostics ordered during this urgent care acute visit.   Discussed management options (risks,benefits, and alternatives to treatment). Pt expresses understanding and the treatment plan was agreed upon. Questions were encouraged and answered to pt's satisfaction.    Please note that this dictation was created using voice recognition  software. I have made a reasonable attempt to correct obvious errors, but I expect that there are errors of grammar and possibly content that I did not discover before finalizing the note.   5-Fu Counseling: 5-Fluorouracil Counseling:  I discussed with the patient the risks of 5-fluorouracil including but not limited to erythema, scaling, itching, weeping, crusting, and pain.

## 2024-08-30 ENCOUNTER — APPOINTMENT (OUTPATIENT)
Dept: PHARMACY | Facility: MEDICAL CENTER | Age: 64
End: 2024-08-30
Payer: COMMERCIAL

## 2024-08-30 ENCOUNTER — PHARMACY VISIT (OUTPATIENT)
Dept: PHARMACY | Facility: MEDICAL CENTER | Age: 64
End: 2024-08-30
Payer: COMMERCIAL

## 2024-08-30 ENCOUNTER — HOSPITAL ENCOUNTER (OUTPATIENT)
Dept: LAB | Facility: MEDICAL CENTER | Age: 64
End: 2024-08-30
Attending: NURSE PRACTITIONER
Payer: COMMERCIAL

## 2024-08-30 DIAGNOSIS — Z01.812 PRE-PROCEDURE LAB EXAM: ICD-10-CM

## 2024-08-30 LAB
CREAT SERPL-MCNC: 0.84 MG/DL (ref 0.5–1.4)
GFR SERPLBLD CREATININE-BSD FMLA CKD-EPI: 77 ML/MIN/1.73 M 2

## 2024-08-30 PROCEDURE — 82565 ASSAY OF CREATININE: CPT

## 2024-08-30 PROCEDURE — RXMED WILLOW AMBULATORY MEDICATION CHARGE: Performed by: INTERNAL MEDICINE

## 2024-08-30 PROCEDURE — 36415 COLL VENOUS BLD VENIPUNCTURE: CPT

## 2024-09-03 DIAGNOSIS — I48.0 PAROXYSMAL ATRIAL FIBRILLATION (HCC): ICD-10-CM

## 2024-09-04 RX ORDER — AMLODIPINE BESYLATE 5 MG/1
5 TABLET ORAL DAILY
Qty: 90 TABLET | Refills: 3 | Status: SHIPPED | OUTPATIENT
Start: 2024-09-04

## 2024-09-17 ENCOUNTER — PATIENT MESSAGE (OUTPATIENT)
Dept: CARDIOLOGY | Facility: MEDICAL CENTER | Age: 64
End: 2024-09-17
Payer: COMMERCIAL

## 2024-09-19 ENCOUNTER — PATIENT MESSAGE (OUTPATIENT)
Dept: CARDIOLOGY | Facility: MEDICAL CENTER | Age: 64
End: 2024-09-19
Payer: COMMERCIAL

## 2024-09-19 NOTE — PATIENT COMMUNICATION
ANTONETTE Zamudio.  You1 hour ago (10:29 AM)       If she wants to hold off until after the test that is ok.  I would recommend maybe discussing amlodipine with Her PCP is she is having  significant ankle edema or petechiae    Xena

## 2024-09-26 ENCOUNTER — HOSPITAL ENCOUNTER (OUTPATIENT)
Dept: RADIOLOGY | Facility: MEDICAL CENTER | Age: 64
End: 2024-09-26
Attending: NURSE PRACTITIONER
Payer: COMMERCIAL

## 2024-09-26 VITALS — HEART RATE: 60 BPM | OXYGEN SATURATION: 92 % | RESPIRATION RATE: 16 BRPM

## 2024-09-26 DIAGNOSIS — R07.9 EXERTIONAL CHEST PAIN: ICD-10-CM

## 2024-09-26 PROCEDURE — 700117 HCHG RX CONTRAST REV CODE 255: Performed by: NURSE PRACTITIONER

## 2024-09-26 PROCEDURE — 75574 CT ANGIO HRT W/3D IMAGE: CPT

## 2024-09-26 PROCEDURE — A9270 NON-COVERED ITEM OR SERVICE: HCPCS | Performed by: RADIOLOGY

## 2024-09-26 PROCEDURE — 700102 HCHG RX REV CODE 250 W/ 637 OVERRIDE(OP): Performed by: RADIOLOGY

## 2024-09-26 RX ORDER — METOPROLOL TARTRATE 100 MG
100 TABLET ORAL
Status: CANCELLED | OUTPATIENT
Start: 2024-09-26 | End: 2024-09-27

## 2024-09-26 RX ORDER — METOPROLOL TARTRATE 1 MG/ML
5 INJECTION, SOLUTION INTRAVENOUS
Status: DISCONTINUED | OUTPATIENT
Start: 2024-09-26 | End: 2024-09-27 | Stop reason: HOSPADM

## 2024-09-26 RX ORDER — NITROGLYCERIN 0.4 MG/1
0.4 TABLET SUBLINGUAL ONCE
Status: COMPLETED | OUTPATIENT
Start: 2024-09-26 | End: 2024-09-26

## 2024-09-26 RX ORDER — METOPROLOL TARTRATE 1 MG/ML
5 INJECTION, SOLUTION INTRAVENOUS
Status: CANCELLED | OUTPATIENT
Start: 2024-09-27 | End: 2024-09-27

## 2024-09-26 RX ORDER — SODIUM CHLORIDE 9 MG/ML
500 INJECTION, SOLUTION INTRAVENOUS
Status: CANCELLED | OUTPATIENT
Start: 2024-09-26 | End: 2024-09-26

## 2024-09-26 RX ORDER — ACETAMINOPHEN 325 MG/1
650 TABLET ORAL
Status: CANCELLED | OUTPATIENT
Start: 2024-09-26 | End: 2024-09-26

## 2024-09-26 RX ORDER — NITROGLYCERIN 0.4 MG/1
0.4 TABLET SUBLINGUAL ONCE
Status: CANCELLED | OUTPATIENT
Start: 2024-09-26 | End: 2024-09-26

## 2024-09-26 RX ORDER — METOPROLOL TARTRATE 1 MG/ML
5 INJECTION, SOLUTION INTRAVENOUS
Status: CANCELLED | OUTPATIENT
Start: 2024-09-28 | End: 2024-09-28

## 2024-09-26 RX ORDER — METOPROLOL TARTRATE 1 MG/ML
5 INJECTION, SOLUTION INTRAVENOUS
Status: CANCELLED | OUTPATIENT
Start: 2024-09-27 | End: 2024-09-28

## 2024-09-26 RX ADMIN — NITROGLYCERIN 0.4 MG: 0.4 TABLET SUBLINGUAL at 13:55

## 2024-09-26 RX ADMIN — IOHEXOL 100 ML: 350 INJECTION, SOLUTION INTRAVENOUS at 14:46

## 2024-09-30 ENCOUNTER — TELEPHONE (OUTPATIENT)
Dept: CARDIOLOGY | Facility: MEDICAL CENTER | Age: 64
End: 2024-09-30
Payer: COMMERCIAL

## 2024-09-30 RX ORDER — ISOSORBIDE MONONITRATE 30 MG/1
30 TABLET, EXTENDED RELEASE ORAL EVERY MORNING
Qty: 90 TABLET | Refills: 1 | Status: SHIPPED | OUTPATIENT
Start: 2024-09-30

## 2024-09-30 NOTE — TELEPHONE ENCOUNTER
ANCA     Caller: Seda Cagle    Topic/issue: PT states she rcvd a call from EA wanting to go over test results, I do not see any call attempt documented, so I am unsure of the urgency. PT states her cell is affected by the outage so please call her house phone.     Callback Number: [phone]    Thank you,   Emily HERNANDEZ

## 2024-09-30 NOTE — PROGRESS NOTES
Discussed CTA results with Mrs Cagle, no evidence of significant coronary calcification or plaquing.  Mild 25% noted in LAD.  Already on HI statin with LDL <70, on Eliquis, Betablocker.    Although overall improved exertional chest pressure with improved blood pressure control, still having occasional events while walking.  Will trial low dose Imdur 30 mg daily.   She will keep follow up with Dr. Witt next month.

## 2024-09-30 NOTE — TELEPHONE ENCOUNTER
Phone Number Called: [phone]    Call outcome: Spoke to patient regarding message below.    Message: Called patient back. Patient stated EA tried to contact her about her recent test results but because of the phone network being down she could not answer. She is wondering if EA could call her back on her house phone.    EA- Patient asking if you would be able to call her back on her house phone. Number is [phone]. Thank you!

## 2024-10-01 ENCOUNTER — NON-PROVIDER VISIT (OUTPATIENT)
Dept: CARDIOLOGY | Facility: MEDICAL CENTER | Age: 64
End: 2024-10-01
Payer: COMMERCIAL

## 2024-10-01 PROCEDURE — 93294 REM INTERROG EVL PM/LDLS PM: CPT | Performed by: INTERNAL MEDICINE

## 2024-10-15 ENCOUNTER — APPOINTMENT (RX ONLY)
Dept: URBAN - METROPOLITAN AREA CLINIC 4 | Facility: CLINIC | Age: 64
Setting detail: DERMATOLOGY
End: 2024-10-15

## 2024-10-15 ENCOUNTER — HOSPITAL ENCOUNTER (OUTPATIENT)
Dept: RADIOLOGY | Facility: MEDICAL CENTER | Age: 64
End: 2024-10-15
Attending: INTERNAL MEDICINE
Payer: COMMERCIAL

## 2024-10-15 DIAGNOSIS — D22 MELANOCYTIC NEVI: ICD-10-CM

## 2024-10-15 DIAGNOSIS — D18.0 HEMANGIOMA: ICD-10-CM

## 2024-10-15 DIAGNOSIS — L85.3 XEROSIS CUTIS: ICD-10-CM

## 2024-10-15 DIAGNOSIS — L81.4 OTHER MELANIN HYPERPIGMENTATION: ICD-10-CM

## 2024-10-15 DIAGNOSIS — Z12.31 ENCOUNTER FOR MAMMOGRAM TO ESTABLISH BASELINE MAMMOGRAM: ICD-10-CM

## 2024-10-15 DIAGNOSIS — Z71.89 OTHER SPECIFIED COUNSELING: ICD-10-CM

## 2024-10-15 DIAGNOSIS — L82.1 OTHER SEBORRHEIC KERATOSIS: ICD-10-CM

## 2024-10-15 PROBLEM — D22.5 MELANOCYTIC NEVI OF TRUNK: Status: ACTIVE | Noted: 2024-10-15

## 2024-10-15 PROBLEM — D18.01 HEMANGIOMA OF SKIN AND SUBCUTANEOUS TISSUE: Status: ACTIVE | Noted: 2024-10-15

## 2024-10-15 PROCEDURE — 77067 SCR MAMMO BI INCL CAD: CPT

## 2024-10-15 PROCEDURE — ? COUNSELING

## 2024-10-15 PROCEDURE — 99213 OFFICE O/P EST LOW 20 MIN: CPT

## 2024-10-15 ASSESSMENT — LOCATION DETAILED DESCRIPTION DERM
LOCATION DETAILED: LEFT MEDIAL SUPERIOR CHEST
LOCATION DETAILED: RIGHT SUPERIOR MEDIAL UPPER BACK
LOCATION DETAILED: RIGHT INFERIOR UPPER BACK
LOCATION DETAILED: RIGHT MID-UPPER BACK
LOCATION DETAILED: MIDDLE STERNUM
LOCATION DETAILED: UPPER STERNUM

## 2024-10-15 ASSESSMENT — LOCATION SIMPLE DESCRIPTION DERM
LOCATION SIMPLE: RIGHT UPPER BACK
LOCATION SIMPLE: CHEST

## 2024-10-15 ASSESSMENT — LOCATION ZONE DERM: LOCATION ZONE: TRUNK

## 2024-10-15 NOTE — HPI: FULL BODY SKIN EXAMINATION
How Severe Are Your Spot(S)?: mild
What Is The Reason For Today's Visit?: Full Body Skin Examination
What Is The Reason For Today's Visit? (Being Monitored For X): concerning skin lesions on an annual basis
Kirstin Montoya  (RN)  2024 20:47:58

## 2024-10-16 ENCOUNTER — TELEPHONE (OUTPATIENT)
Dept: MEDICAL GROUP | Facility: MEDICAL CENTER | Age: 64
End: 2024-10-16
Payer: COMMERCIAL

## 2024-10-16 DIAGNOSIS — Z00.00 PREVENTATIVE HEALTH CARE: Chronic | ICD-10-CM

## 2024-10-22 ENCOUNTER — TELEPHONE (OUTPATIENT)
Dept: MEDICAL GROUP | Facility: MEDICAL CENTER | Age: 64
End: 2024-10-22
Payer: COMMERCIAL

## 2024-11-01 ENCOUNTER — PATIENT MESSAGE (OUTPATIENT)
Dept: HEALTH INFORMATION MANAGEMENT | Facility: OTHER | Age: 64
End: 2024-11-01

## 2024-11-02 RX ORDER — LOSARTAN POTASSIUM 50 MG/1
TABLET ORAL
Qty: 180 TABLET | Refills: 3 | Status: SHIPPED | OUTPATIENT
Start: 2024-11-02

## 2024-11-06 ENCOUNTER — HOSPITAL ENCOUNTER (OUTPATIENT)
Dept: LAB | Facility: MEDICAL CENTER | Age: 64
End: 2024-11-06
Attending: INTERNAL MEDICINE
Payer: COMMERCIAL

## 2024-11-06 LAB
ALBUMIN SERPL BCP-MCNC: 4 G/DL (ref 3.2–4.9)
ALBUMIN/GLOB SERPL: 1.8 G/DL
ALP SERPL-CCNC: 112 U/L (ref 30–99)
ALT SERPL-CCNC: 29 U/L (ref 2–50)
ANION GAP SERPL CALC-SCNC: 8 MMOL/L (ref 7–16)
ANISOCYTOSIS BLD QL SMEAR: ABNORMAL
AST SERPL-CCNC: 35 U/L (ref 12–45)
BASOPHILS # BLD AUTO: 0.9 % (ref 0–1.8)
BASOPHILS # BLD: 0.12 K/UL (ref 0–0.12)
BILIRUB SERPL-MCNC: 0.3 MG/DL (ref 0.1–1.5)
BUN SERPL-MCNC: 16 MG/DL (ref 8–22)
BURR CELLS BLD QL SMEAR: NORMAL
CALCIUM ALBUM COR SERPL-MCNC: 9.4 MG/DL (ref 8.5–10.5)
CALCIUM SERPL-MCNC: 9.4 MG/DL (ref 8.5–10.5)
CHLORIDE SERPL-SCNC: 105 MMOL/L (ref 96–112)
CO2 SERPL-SCNC: 27 MMOL/L (ref 20–33)
COMMENT NL1176: NORMAL
CREAT SERPL-MCNC: 0.89 MG/DL (ref 0.5–1.4)
EOSINOPHIL # BLD AUTO: 0.36 K/UL (ref 0–0.51)
EOSINOPHIL NFR BLD: 2.6 % (ref 0–6.9)
ERYTHROCYTE [DISTWIDTH] IN BLOOD BY AUTOMATED COUNT: 42.5 FL (ref 35.9–50)
GFR SERPLBLD CREATININE-BSD FMLA CKD-EPI: 72 ML/MIN/1.73 M 2
GLOBULIN SER CALC-MCNC: 2.2 G/DL (ref 1.9–3.5)
GLUCOSE SERPL-MCNC: 108 MG/DL (ref 65–99)
HCT VFR BLD AUTO: 42.6 % (ref 37–47)
HGB BLD-MCNC: 13.9 G/DL (ref 12–16)
LDH SERPL L TO P-CCNC: 257 U/L (ref 107–266)
LYMPHOCYTES # BLD AUTO: 10.35 K/UL (ref 1–4.8)
LYMPHOCYTES NFR BLD: 75 % (ref 22–41)
MANUAL DIFF BLD: NORMAL
MCH RBC QN AUTO: 29.3 PG (ref 27–33)
MCHC RBC AUTO-ENTMCNC: 32.6 G/DL (ref 32.2–35.5)
MCV RBC AUTO: 89.9 FL (ref 81.4–97.8)
MICROCYTES BLD QL SMEAR: ABNORMAL
MONOCYTES # BLD AUTO: 0.23 K/UL (ref 0–0.85)
MONOCYTES NFR BLD AUTO: 1.7 % (ref 0–13.4)
MORPHOLOGY BLD-IMP: NORMAL
NEUTROPHILS # BLD AUTO: 2.73 K/UL (ref 1.82–7.42)
NEUTROPHILS NFR BLD: 19.8 % (ref 44–72)
NRBC # BLD AUTO: 0 K/UL
NRBC BLD-RTO: 0 /100 WBC (ref 0–0.2)
PLATELET # BLD AUTO: 170 K/UL (ref 164–446)
PLATELET BLD QL SMEAR: NORMAL
PMV BLD AUTO: 10.5 FL (ref 9–12.9)
POIKILOCYTOSIS BLD QL SMEAR: NORMAL
POTASSIUM SERPL-SCNC: 4.5 MMOL/L (ref 3.6–5.5)
PROT SERPL-MCNC: 6.2 G/DL (ref 6–8.2)
RBC # BLD AUTO: 4.74 M/UL (ref 4.2–5.4)
RBC BLD AUTO: PRESENT
SMUDGE CELLS BLD QL SMEAR: NORMAL
SODIUM SERPL-SCNC: 140 MMOL/L (ref 135–145)
VARIANT LYMPHS BLD QL SMEAR: NORMAL
WBC # BLD AUTO: 13.8 K/UL (ref 4.8–10.8)

## 2024-11-06 PROCEDURE — 80053 COMPREHEN METABOLIC PANEL: CPT

## 2024-11-06 PROCEDURE — 83615 LACTATE (LD) (LDH) ENZYME: CPT

## 2024-11-06 PROCEDURE — 36415 COLL VENOUS BLD VENIPUNCTURE: CPT

## 2024-11-06 PROCEDURE — 85007 BL SMEAR W/DIFF WBC COUNT: CPT

## 2024-11-06 PROCEDURE — 85027 COMPLETE CBC AUTOMATED: CPT

## 2024-11-15 ENCOUNTER — APPOINTMENT (OUTPATIENT)
Dept: CARDIOLOGY | Facility: MEDICAL CENTER | Age: 64
End: 2024-11-15
Attending: INTERNAL MEDICINE
Payer: COMMERCIAL

## 2024-11-26 ENCOUNTER — OFFICE VISIT (OUTPATIENT)
Dept: CARDIOLOGY | Facility: MEDICAL CENTER | Age: 64
End: 2024-11-26
Attending: INTERNAL MEDICINE
Payer: COMMERCIAL

## 2024-11-26 VITALS
HEIGHT: 64 IN | WEIGHT: 198 LBS | HEART RATE: 60 BPM | SYSTOLIC BLOOD PRESSURE: 112 MMHG | BODY MASS INDEX: 33.8 KG/M2 | OXYGEN SATURATION: 96 % | DIASTOLIC BLOOD PRESSURE: 60 MMHG | RESPIRATION RATE: 16 BRPM

## 2024-11-26 DIAGNOSIS — I48.0 PAROXYSMAL ATRIAL FIBRILLATION (HCC): ICD-10-CM

## 2024-11-26 DIAGNOSIS — Z95.0 PACEMAKER: ICD-10-CM

## 2024-11-26 LAB — EKG IMPRESSION: NORMAL

## 2024-11-26 PROCEDURE — 93280 PM DEVICE PROGR EVAL DUAL: CPT | Performed by: INTERNAL MEDICINE

## 2024-11-26 PROCEDURE — 93005 ELECTROCARDIOGRAM TRACING: CPT | Performed by: INTERNAL MEDICINE

## 2024-11-26 PROCEDURE — 99213 OFFICE O/P EST LOW 20 MIN: CPT | Performed by: INTERNAL MEDICINE

## 2024-11-26 ASSESSMENT — FIBROSIS 4 INDEX: FIB4 SCORE: 2.45

## 2024-11-26 NOTE — PROGRESS NOTES
Arrhythmia Clinic Note (Established patient)    DOS: 11/26/2024    Chief complaint/Reason for consult: PAF    Interval History:  Pt is a 65 yo F. She has history of HCM s/p alcohol septal ablation.Has paroxysmal AV block s/p PPM. Chronically elevated thresholds, we explanted entire system and replaced the system. Has PAF s/p PVI with us. Here for follow-up. Been experiencing exertional chest pain. Goes away after 10-15 minutes. Walking her dogs uphill usually when it happens. Ischemic eval including CT angiography has not been that impressive. Low dose imdur has helped somewhat.    ROS (+ highlighted in red):  General--Negative for fatigue, weight loss or weight gain  Cardiovascular--Negative for CP, orthopnea, PND    Past Medical History:   Diagnosis Date    Anesthesia     wakes up during procedure (x2)    Aortic regurgitation     Arrhythmia, ventricular 2008    Symptomatic PVCs, controlled with medication.    Asthma     inhaler PRN, only uses when sick     Atrial fibrillation (HCC)     Awareness under anesthesia     Cardiac arrhythmia     Cervical arthritis 02/08/2016    Chickenpox     Chronic kidney disease, stage III (moderate) 02/09/2016    CTS (carpal tunnel syndrome) 02/28/2011    Depression     GERD (gastroesophageal reflux disease)     Heart burn     Heart murmur     Hemorrhagic disorder (HCC)     on Eliquis    Hypertr obst cardiomyop 1991    Dr. Bert Vazquez, [address] [address] [phone] fax [phone].  [facility] [phone].  Alchol Septal Reduction 8/00, Coronaries normal.    Hypertrophic cardiomyopathy (HCC)     Indigestion     Influenza     Pacemaker 2014    Renal disorder     chronic kidney disease stage 3-  **pt states currently no issue, numbers are stable    Sleep apnea     uses CPAP    Snoring        Past Surgical History:   Procedure Laterality Date    PACEMAKER INSERTION  04/2022    PB WRIST ARTHROSCOP,RELEASE XVERS LIG Right 06/01/2021     Procedure: RELEASE, CARPAL TUNNEL, ENDOSCOPIC;  Surgeon: Mike Ospina M.D.;  Location: SURGERY [facility];  Service: Orthopedics    JOINT INJECTION DIAGNOSTIC Left 06/01/2021    Procedure: INJECTION, JOINT, DIAGNOSTIC-CARPAL TUNNEL;  Surgeon: Mike Ospina M.D.;  Location: SURGERY [facility];  Service: Orthopedics    HYSTEROSCOPY WITH VIDEO OPERATIVE  04/30/2018    Procedure: HYSTEROSCOPY WITH VIDEO OPERATIVE;  Surgeon: Noemi Liu M.D.;  Location: SURGERY SAME DAY [facility];  Service: Labor and Delivery    DILATION AND CURETTAGE  04/30/2018    Procedure: DILATION AND CURETTAGE;  Surgeon: Noemi Liu M.D.;  Location: SURGERY SAME DAY [facility];  Service: Labor and Delivery    BREAST BIOPSY Left 01/30/2018    Procedure: BREAST BIOPSY- WIRE LOCALIZED;  Surgeon: Vijaya Britt M.D.;  Location: SURGERY SAME DAY [facility];  Service: General    RECOVERY  04/15/2015    Performed by [facility] at SURGERY PRE-POST PROC UNIT [facility]    PACEMAKER INSERTION  2015    RECOVERY  12/12/2014    Performed by [facility] at SURGERY SAME DAY [facility]    SEPTAL RECONSTRUCTION  2000    ENDOSCOPY      PRIMARY C SECTION      TUBAL COAGULATION LAPAROSCOPIC BILATERAL         Social History     Socioeconomic History    Marital status:      Spouse name: Not on file    Number of children: Not on file    Years of education: Not on file    Highest education level: Some college, no degree   Occupational History    Not on file   Tobacco Use    Smoking status: Never    Smokeless tobacco: Never   Vaping Use    Vaping status: Never Used   Substance and Sexual Activity    Alcohol use: Yes     Alcohol/week: 0.6 oz     Types: 1 Standard drinks or equivalent per week     Comment: 2-3 per week    Drug use: No    Sexual activity: Yes     Partners: Male     Birth control/protection: Surgical     Comment:    Other Topics Concern    Not on file   Social History Narrative    Not on file      Social Drivers of Health     Financial Resource Strain: Low Risk  (2022)    Overall Financial Resource Strain (CARDIA)     Difficulty of Paying Living Expenses: Not hard at all   Food Insecurity: No Food Insecurity (2022)    Hunger Vital Sign     Worried About Running Out of Food in the Last Year: Never true     Ran Out of Food in the Last Year: Never true   Transportation Needs: No Transportation Needs (2022)    PRAPARE - Transportation     Lack of Transportation (Medical): No     Lack of Transportation (Non-Medical): No   Physical Activity: Sufficiently Active (2022)    Exercise Vital Sign     Days of Exercise per Week: 7 days     Minutes of Exercise per Session: 60 min   Stress: No Stress Concern Present (2022)    [employer] [place] [employer] - Occupational Stress Questionnaire     Feeling of Stress : Only a little   Social Connections: Moderately Integrated (2022)    Social Connection and Isolation Panel [NHANES]     Frequency of Communication with Friends and Family: Three times a week     Frequency of Social Gatherings with Friends and Family: Once a week     Attends Sabianist Services: Never     Active Member of Clubs or Organizations: Yes     Attends Club or Organization Meetings: More than 4 times per year     Marital Status:    Intimate Partner Violence: Not on file   Housing Stability: Low Risk  (2022)    Housing Stability Vital Sign     Unable to Pay for Housing in the Last Year: No     Number of Places Lived in the Last Year: 1     Unstable Housing in the Last Year: No       Family History   Problem Relation Age of Onset    Heart Disease Mother         CHF    Hypertension Mother     Stroke Mother     Heart Failure Mother     Other Mother         carotid artery disease,gout    Cancer Father         AML    Diabetes Maternal Grandmother     Cancer Maternal Grandfather         Prostate    Heart Disease Paternal Grandfather 52         MI     Sleep Apnea  Neg Hx        No Known Allergies    Current Outpatient Medications   Medication Sig Dispense Refill    losartan (COZAAR) 50 MG Tab TAKE 1 TABLET BY MOUTH TWICE DAILY FOR BLOOD PRESSURE 180 Tablet 3    isosorbide mononitrate SR (IMDUR) 30 MG TABLET SR 24 HR Take 1 Tablet by mouth every morning. 90 Tablet 1    omeprazole (PRILOSEC) 20 MG delayed-release capsule Take 1 capsule by mouth once daily 90 Capsule 3    amLODIPine (NORVASC) 5 MG Tab Take 1 Tablet by mouth every day. Indications: blood pressure 90 Tablet 3    Zoster Vac Recomb Adjuvanted (SHINGRIX) 50 MCG/0.5ML Recon Susp Inject  into the shoulder, thigh, or buttocks. 0.5 mL 0    PSYLLIUM HUSK PO Take  by mouth.      metoprolol SR (TOPROL XL) 50 MG TABLET SR 24 HR Take 1.5 Tablets by mouth every day. 135 Tablet 3    disopyramide (NORPACE) 150 MG Cap TAKE 1 CAPSULE BY MOUTH IN THE MORNING AND AT NOON AND AT BEDTIME 270 Capsule 3    Zoster Vac Recomb Adjuvanted (SHINGRIX) 50 MCG/0.5ML Recon Susp Inject  into the shoulder, thigh, or buttocks. 0.5 mL 0    nortriptyline (PAMELOR) 10 MG Cap TAKE 1 CAPSULE BY MOUTH ONCE DAILY IN THE EVENING 90 Capsule 2    ELIQUIS 5 MG Tab Take 1 tablet by mouth twice daily 180 Tablet 3    atorvastatin (LIPITOR) 40 MG Tab Take 1 Tablet by mouth every evening. 90 Tablet 3    Cetirizine HCl (ZYRTEC ALLERGY PO) Take  by mouth.      Triamcinolone Acetonide (NASACORT AQ NA) Administer  into affected nostril(S).      albuterol 108 (90 Base) MCG/ACT Aero Soln inhalation aerosol Inhale 2 Puffs every four hours as needed for Shortness of Breath. 1 Each 3    acetaminophen (TYLENOL) 325 MG Tab Take 2 Tablets by mouth 1 time a day as needed. Indications: Pain      Probiotic Product (PROBIOTIC-10 PO) Take 1 Capsule by mouth every day.      Multiple Vitamin (MULTI-VITAMIN PO) Take 1 Tablet by mouth every day.      Pantothenic Acid 500 MG Tab Take 1 Tablet by mouth every day.      Calcium-Vitamin D-Vitamin K (CALCIUM FOR WOMEN PO) Take 1 Each by  "mouth every day.      Magnesium 200 MG TABS Take 1 Tab by mouth every day.      linaCLOtide 145 MCG Cap Take 145 mcg by mouth every day. 90 Capsule 3     No current facility-administered medications for this visit.       Physical Exam:  Vitals:    11/26/24 1416   BP: 112/60   BP Location: Left arm   Patient Position: Sitting   BP Cuff Size: Adult   Pulse: 60   Resp: 16   SpO2: 96%   Weight: 89.8 kg (198 lb)   Height: 1.626 m (5' 4\")     General appearance: NAD, conversant  HEENT: PERRL, neck is supple with FROM  Lungs: Clear to auscultation, normal respiratory effort  CV: RRR, no murmurs/rubs/gallops, no JVD  Abdomen: Soft, non-tender with normal bowel sounds  Extremities: No peripheral edema, no clubbing or cyanosis  Skin: No rash, lesions, or ulcers  Psych: Alert and oriented to person, place and time    Data:  Labs reviewed    Prior echo/stress reviewed:  LVEF 65%  ALLEN reviewed, subaortic membrane present, mild to moderate AI    EKG interpreted by me:  A paced    Impression/Plan:  1. Paroxysmal atrial fibrillation (HCC)  EKG      2. Pacemaker          -I suspect the chest pain 2/2 to her HCM and this residual sub-aortic membrane seen well on last ALLEN here possibly causing dynamic outflow gradients  -Small vessel ischemia also possible  -She may be better managed at an HCM center as this is somewhat out of our expertise as electrophysiologists  -Will trial increase dose of imdur for now    Solitario Witt MD    "

## 2024-12-06 ENCOUNTER — PATIENT MESSAGE (OUTPATIENT)
Dept: CARDIOLOGY | Facility: MEDICAL CENTER | Age: 64
End: 2024-12-06
Payer: COMMERCIAL

## 2024-12-06 DIAGNOSIS — R07.9 EXERTIONAL CHEST PAIN: ICD-10-CM

## 2024-12-06 RX ORDER — NORTRIPTYLINE HYDROCHLORIDE 10 MG/1
10 CAPSULE ORAL EVERY EVENING
Qty: 90 CAPSULE | Refills: 3 | Status: SHIPPED | OUTPATIENT
Start: 2024-12-06

## 2024-12-06 NOTE — TELEPHONE ENCOUNTER
SS- Please advise, patient stating she has tolerated the increased dose of imdur. BP stable. She is currently taking 45mg daily. Okay to refill with a new dose of 45mg daily? Thank you.

## 2024-12-06 NOTE — TELEPHONE ENCOUNTER
Received request via: Pharmacy    Was the patient seen in the last year in this department? Yes    Does the patient have an active prescription (recently filled or refills available) for medication(s) requested? No    Pharmacy Name: [id number] - [place], [place] - [address]     Does the patient have assisted Plus and need 100-day supply? (This applies to ALL medications) Patient does not have SCP

## 2024-12-12 RX ORDER — ISOSORBIDE MONONITRATE 30 MG/1
45 TABLET, EXTENDED RELEASE ORAL EVERY MORNING
Qty: 135 TABLET | Refills: 0 | Status: SHIPPED | OUTPATIENT
Start: 2024-12-12

## 2024-12-12 NOTE — TELEPHONE ENCOUNTER
JS- Please advise SS OOO, patient stating she has tolerated the increased dose of imdur. BP stable. She is currently taking 45mg daily. Okay to refill with a new dose of 45mg daily? Thank you.

## 2024-12-31 ENCOUNTER — NON-PROVIDER VISIT (OUTPATIENT)
Dept: CARDIOLOGY | Facility: MEDICAL CENTER | Age: 64
End: 2024-12-31
Payer: COMMERCIAL

## 2024-12-31 PROCEDURE — 93294 REM INTERROG EVL PM/LDLS PM: CPT | Performed by: STUDENT IN AN ORGANIZED HEALTH CARE EDUCATION/TRAINING PROGRAM

## 2024-12-31 NOTE — CARDIAC REMOTE MONITOR - SCAN
Device transmission reviewed. Device demonstrated appropriate function.       Electronically Signed by: Maliha Jimenez MD, PhD    1/5/2025  4:35 AM

## 2025-01-09 ENCOUNTER — PATIENT MESSAGE (OUTPATIENT)
Dept: CARDIOLOGY | Facility: MEDICAL CENTER | Age: 65
End: 2025-01-09
Payer: COMMERCIAL

## 2025-01-09 DIAGNOSIS — I48.0 PAROXYSMAL ATRIAL FIBRILLATION (HCC): Chronic | ICD-10-CM

## 2025-01-23 DIAGNOSIS — I51.7 HYPERTROPHIC CARDIOMEGALY: ICD-10-CM

## 2025-02-01 DIAGNOSIS — E78.5 DYSLIPIDEMIA: ICD-10-CM

## 2025-02-03 RX ORDER — ATORVASTATIN CALCIUM 40 MG/1
40 TABLET, FILM COATED ORAL EVERY EVENING
Qty: 90 TABLET | Refills: 0 | Status: SHIPPED | OUTPATIENT
Start: 2025-02-03

## 2025-02-03 NOTE — TELEPHONE ENCOUNTER
Is the patient due for a refill? Yes    Was the patient seen the last 15 months? Yes    Date of last office visit: 11.26.2024    Does the patient have an upcoming appointment?  Yes   If yes, When? 03.27.2025    Provider to refill: SS    Does the patient have snf Plus and need 100-day supply? (This applies to ALL medications) Patient does not have SCP

## 2025-02-05 ENCOUNTER — HOSPITAL ENCOUNTER (OUTPATIENT)
Dept: LAB | Facility: MEDICAL CENTER | Age: 65
End: 2025-02-05
Attending: INTERNAL MEDICINE
Payer: COMMERCIAL

## 2025-02-05 DIAGNOSIS — E78.5 DYSLIPIDEMIA: ICD-10-CM

## 2025-02-05 LAB
CHOLEST SERPL-MCNC: 129 MG/DL (ref 100–199)
FASTING STATUS PATIENT QL REPORTED: NORMAL
HDLC SERPL-MCNC: 40 MG/DL
LDLC SERPL CALC-MCNC: 72 MG/DL
TRIGL SERPL-MCNC: 86 MG/DL (ref 0–149)

## 2025-02-05 PROCEDURE — 80061 LIPID PANEL: CPT

## 2025-02-05 PROCEDURE — 36415 COLL VENOUS BLD VENIPUNCTURE: CPT

## 2025-02-12 ENCOUNTER — OFFICE VISIT (OUTPATIENT)
Dept: SLEEP MEDICINE | Facility: MEDICAL CENTER | Age: 65
End: 2025-02-12
Attending: NURSE PRACTITIONER
Payer: COMMERCIAL

## 2025-02-12 VITALS
SYSTOLIC BLOOD PRESSURE: 104 MMHG | BODY MASS INDEX: 32.95 KG/M2 | HEIGHT: 64 IN | HEART RATE: 61 BPM | OXYGEN SATURATION: 94 % | WEIGHT: 193 LBS | DIASTOLIC BLOOD PRESSURE: 62 MMHG | RESPIRATION RATE: 14 BRPM

## 2025-02-12 DIAGNOSIS — G47.33 OBSTRUCTIVE SLEEP APNEA SYNDROME: ICD-10-CM

## 2025-02-12 PROCEDURE — 3078F DIAST BP <80 MM HG: CPT | Performed by: NURSE PRACTITIONER

## 2025-02-12 PROCEDURE — 99214 OFFICE O/P EST MOD 30 MIN: CPT | Performed by: NURSE PRACTITIONER

## 2025-02-12 PROCEDURE — 3074F SYST BP LT 130 MM HG: CPT | Performed by: NURSE PRACTITIONER

## 2025-02-12 PROCEDURE — 99213 OFFICE O/P EST LOW 20 MIN: CPT | Performed by: NURSE PRACTITIONER

## 2025-02-12 ASSESSMENT — FIBROSIS 4 INDEX: FIB4 SCORE: 2.45

## 2025-02-12 ASSESSMENT — PATIENT HEALTH QUESTIONNAIRE - PHQ9: CLINICAL INTERPRETATION OF PHQ2 SCORE: 0

## 2025-02-12 NOTE — PROGRESS NOTES
Chief Complaint   Patient presents with    Follow-Up     SLEEP - ESTABLISHED PT CHART PREP COMPLETED ON 02/06/2025     Last Office Visit 02/12/2024 with Bari Koo  Setup date: 05/10/2024  1st Compliance: No     DME: DME:  [employer] / ph [phone] / fx [phone]      PAP/O2/OAT: AirSense 11 AutoSet  Min Pressure (cmH2O)  5.0  Max Pressure (cmH2O)  15.0  EPR  Ramp Only  EPR level (cmH2O)  1  Ramp enable  Auto  Ramp time (min)  20  Start pressure (cmH2O)  4.0  Response  Standard    Wireless Data? YES ResMed       HPI:  Seda Cagle is a 64 y.o. year old female here today for follow-up on GEORGINA.  Last seen by me on 2/9/2023. Patient has a history of hypertrophic cardiomyopathy and atrial fib with septal ablation and pacemaker placement.  She is followed by cardiology.  In regards to sleep apnea, she underwent a home sleep study on February 14, 2017 which showed an DUONG of 19.7.  Repeat polysomnogram showed AHI of 21.3.  She was started on auto CPAP at 5 to 15 cm/H2O.     Currently using a ResMed auto CPAP that was set up on 5/10/2024.  DME company [employer]. she is currently using a nasal cushion and denies any difficulty with mask fit or pressures.  She gets between 5 to 7 hours of sleep and does have occasional difficulty falling asleep.  She notes improved sleep quality with CPAP use.  Her energy levels during the day are significantly increased.  Currently she denies any excessive daytime sleepiness, morning headaches, palpitations, concentration or memory problems.     30-day compliance reviewed with patient shows 100% use with average time of 5 hours and 1 minute and a residual AHI of 2.2 with no evidence of mask leak    ROS: As per HPI and otherwise negative if not stated.    Past Medical History:   Diagnosis Date    Anesthesia     wakes up during procedure (x2)    Aortic regurgitation     Arrhythmia, ventricular 2008    Symptomatic PVCs, controlled with medication.    Asthma     inhaler  PRN, only uses when sick     Atrial fibrillation (HCC)     Awareness under anesthesia     Cardiac arrhythmia     Cervical arthritis 02/08/2016    Chickenpox     Chronic kidney disease, stage III (moderate) 02/09/2016    CTS (carpal tunnel syndrome) 02/28/2011    Depression     GERD (gastroesophageal reflux disease)     Heart burn     Heart murmur     Hemorrhagic disorder (HCC)     on Eliquis    Hypertr obst cardiomyop 1991    Dr. Bert Vazquez, [address] [facility] [address], [place] [address] [phone] fax [phone].  [place] office [phone].  Alchol Septal Reduction 8/00, Coronaries normal.    Hypertrophic cardiomyopathy (HCC)     Indigestion     Influenza     Pacemaker 2014    Renal disorder     chronic kidney disease stage 3-  **pt states currently no issue, numbers are stable    Sleep apnea     uses CPAP    Snoring        Past Surgical History:   Procedure Laterality Date    PACEMAKER INSERTION  04/2022    PB WRIST ARTHROSCOP,RELEASE XVERS LIG Right 06/01/2021    Procedure: RELEASE, CARPAL TUNNEL, ENDOSCOPIC;  Surgeon: Mike Ospina M.D.;  Location: SURGERY [facility];  Service: Orthopedics    JOINT INJECTION DIAGNOSTIC Left 06/01/2021    Procedure: INJECTION, JOINT, DIAGNOSTIC-CARPAL TUNNEL;  Surgeon: Mike Ospina M.D.;  Location: SURGERY [facility];  Service: Orthopedics    HYSTEROSCOPY WITH VIDEO OPERATIVE  04/30/2018    Procedure: HYSTEROSCOPY WITH VIDEO OPERATIVE;  Surgeon: Noemi Liu M.D.;  Location: SURGERY SAME DAY [facility];  Service: Labor and Delivery    DILATION AND CURETTAGE  04/30/2018    Procedure: DILATION AND CURETTAGE;  Surgeon: Noemi Liu M.D.;  Location: SURGERY SAME DAY [facility];  Service: Labor and Delivery    BREAST BIOPSY Left 01/30/2018    Procedure: BREAST BIOPSY- WIRE LOCALIZED;  Surgeon: Vijaya Britt M.D.;  Location: SURGERY SAME DAY [facility];  Service: General    RECOVERY  04/15/2015    Performed by [facility] at  "SURGERY PRE-POST PROC UNIT [facility]    PACEMAKER INSERTION  2015    RECOVERY  2014    Performed by [facility] at SURGERY SAME DAY [facility]    SEPTAL RECONSTRUCTION      ENDOSCOPY      PRIMARY C SECTION      TUBAL COAGULATION LAPAROSCOPIC BILATERAL         Family History   Problem Relation Age of Onset    Heart Disease Mother         CHF    Hypertension Mother     Stroke Mother     Heart Failure Mother     Other Mother         carotid artery disease,gout    Cancer Father         AML    Diabetes Maternal Grandmother     Cancer Maternal Grandfather         Prostate    Heart Disease Paternal Grandfather 52         MI     Sleep Apnea Neg Hx        Allergies as of 2025    (No Known Allergies)        Vitals:  /62 (BP Location: Left arm, Patient Position: Sitting, BP Cuff Size: Adult)   Pulse 61   Resp 14   Ht 1.626 m (5' 4\")   Wt 87.5 kg (193 lb)   SpO2 94%     Current medications as of today   Current Outpatient Medications   Medication Sig Dispense Refill    atorvastatin (LIPITOR) 40 MG Tab Take 1 Tablet by mouth every evening. 90 Tablet 0    isosorbide mononitrate SR (IMDUR) 30 MG TABLET SR 24 HR Take 1.5 Tablets by mouth every morning. 135 Tablet 0    nortriptyline (PAMELOR) 10 MG Cap TAKE 1 CAPSULE BY MOUTH ONCE DAILY IN THE EVENING 90 Capsule 3    losartan (COZAAR) 50 MG Tab TAKE 1 TABLET BY MOUTH TWICE DAILY FOR BLOOD PRESSURE 180 Tablet 3    omeprazole (PRILOSEC) 20 MG delayed-release capsule Take 1 capsule by mouth once daily 90 Capsule 3    amLODIPine (NORVASC) 5 MG Tab Take 1 Tablet by mouth every day. Indications: blood pressure 90 Tablet 3    Zoster Vac Recomb Adjuvanted (SHINGRIX) 50 MCG/0.5ML Recon Susp Inject  into the shoulder, thigh, or buttocks. 0.5 mL 0    PSYLLIUM HUSK PO Take  by mouth.      metoprolol SR (TOPROL XL) 50 MG TABLET SR 24 HR Take 1.5 Tablets by mouth every day. 135 Tablet 3    disopyramide (NORPACE) 150 MG Cap TAKE 1 CAPSULE BY MOUTH IN THE MORNING " AND AT NOON AND AT BEDTIME 270 Capsule 3    Zoster Vac Recomb Adjuvanted (SHINGRIX) 50 MCG/0.5ML Recon Susp Inject  into the shoulder, thigh, or buttocks. 0.5 mL 0    ELIQUIS 5 MG Tab Take 1 tablet by mouth twice daily 180 Tablet 3    Cetirizine HCl (ZYRTEC ALLERGY PO) Take  by mouth.      Triamcinolone Acetonide (NASACORT AQ NA) Administer  into affected nostril(S).      albuterol 108 (90 Base) MCG/ACT Aero Soln inhalation aerosol Inhale 2 Puffs every four hours as needed for Shortness of Breath. 1 Each 3    acetaminophen (TYLENOL) 325 MG Tab Take 2 Tablets by mouth 1 time a day as needed. Indications: Pain      Probiotic Product (PROBIOTIC-10 PO) Take 1 Capsule by mouth every day.      Multiple Vitamin (MULTI-VITAMIN PO) Take 1 Tablet by mouth every day.      Pantothenic Acid 500 MG Tab Take 1 Tablet by mouth every day.      Calcium-Vitamin D-Vitamin K (CALCIUM FOR WOMEN PO) Take 1 Each by mouth every day.      Magnesium 200 MG TABS Take 1 Tab by mouth every day.      linaCLOtide 145 MCG Cap Take 145 mcg by mouth every day. 90 Capsule 3     No current facility-administered medications for this visit.         Physical Exam:   Gen:           Alert and oriented, No apparent distress. Mood and affect appropriate, normal interaction with examiner.  Eyes:          PERRL, EOM intact, sclere white, conjunctive moist.  Ears:          Not examined.   Hearing:     Grossly intact.  Nose:          Normal, no lesions or deformities.  Dentition:    Good dentition.  Oropharynx:   Tongue normal, posterior pharynx without erythema or exudate.  Neck:        Supple, trachea midline, no masses.  Respiratory Effort: No intercostal retractions or use of accessory muscles.   Lung Auscultation:      Clear to auscultation bilaterally; no rales, rhonchi or wheezing.  CV:            Regular rate and rhythm. No murmurs, rubs or gallops.  Abd:           Not examined.   Lymphadenopathy: Not examined.  Gait and Station: Normal.  Digits and  Nails: No clubbing, cyanosis, petechiae, or nodes.   Cranial Nerves: II-XII grossly intact.  Skin:        No rashes, lesions or ulcers noted.               Ext:           No cyanosis or edema.      Assessment:  1. Obstructive sleep apnea syndrome              Plan:  Using and benefiting from CPAP therapy.  Compliance shows adequate use and control of GEORGINA.  Order placed for mask and supplies which will be good for 1 year.  Advise annual follow-up, but can be seen sooner if needed.    Please note that this dictation was created using voice recognition software. I have made every reasonable attempt to correct obvious errors, but it is possible there are errors of grammar and possibly content that I did not discover before finalizing the note.

## 2025-02-21 DIAGNOSIS — I48.0 PAROXYSMAL ATRIAL FIBRILLATION (HCC): ICD-10-CM

## 2025-02-21 RX ORDER — APIXABAN 5 MG/1
5 TABLET, FILM COATED ORAL 2 TIMES DAILY
Qty: 180 TABLET | Refills: 2 | Status: SHIPPED | OUTPATIENT
Start: 2025-02-21

## 2025-03-10 DIAGNOSIS — R07.9 EXERTIONAL CHEST PAIN: ICD-10-CM

## 2025-03-10 RX ORDER — ISOSORBIDE MONONITRATE 30 MG/1
TABLET, EXTENDED RELEASE ORAL
Qty: 135 TABLET | Refills: 0 | Status: SHIPPED | OUTPATIENT
Start: 2025-03-10

## 2025-03-10 NOTE — TELEPHONE ENCOUNTER
Is the patient due for a refill? Yes    Was the patient seen the last 15 months? Yes    Date of last office visit: 11.26.2024    Does the patient have an upcoming appointment?  Yes   If yes, When? 03.27.2025    Provider to refill: SS    Does the patient have [facility] and need 100-day supply? (This applies to ALL medications) Patient does not have SCP

## 2025-03-18 LAB — EKG IMPRESSION: NORMAL

## 2025-03-27 ENCOUNTER — OFFICE VISIT (OUTPATIENT)
Dept: CARDIOLOGY | Facility: MEDICAL CENTER | Age: 65
End: 2025-03-27
Attending: INTERNAL MEDICINE
Payer: COMMERCIAL

## 2025-03-27 VITALS
OXYGEN SATURATION: 95 % | HEIGHT: 64 IN | HEART RATE: 60 BPM | WEIGHT: 199 LBS | SYSTOLIC BLOOD PRESSURE: 122 MMHG | RESPIRATION RATE: 12 BRPM | DIASTOLIC BLOOD PRESSURE: 76 MMHG | BODY MASS INDEX: 33.97 KG/M2

## 2025-03-27 DIAGNOSIS — I35.1 AORTIC VALVE INSUFFICIENCY, ETIOLOGY OF CARDIAC VALVE DISEASE UNSPECIFIED: ICD-10-CM

## 2025-03-27 DIAGNOSIS — I48.0 PAROXYSMAL ATRIAL FIBRILLATION (HCC): Chronic | ICD-10-CM

## 2025-03-27 LAB — EKG IMPRESSION: NORMAL

## 2025-03-27 PROCEDURE — 93280 PM DEVICE PROGR EVAL DUAL: CPT | Mod: 26 | Performed by: INTERNAL MEDICINE

## 2025-03-27 PROCEDURE — 93280 PM DEVICE PROGR EVAL DUAL: CPT | Performed by: INTERNAL MEDICINE

## 2025-03-27 PROCEDURE — 93005 ELECTROCARDIOGRAM TRACING: CPT | Mod: TC | Performed by: INTERNAL MEDICINE

## 2025-03-27 PROCEDURE — 99213 OFFICE O/P EST LOW 20 MIN: CPT | Performed by: INTERNAL MEDICINE

## 2025-03-27 PROCEDURE — 99214 OFFICE O/P EST MOD 30 MIN: CPT | Performed by: INTERNAL MEDICINE

## 2025-03-27 PROCEDURE — 3078F DIAST BP <80 MM HG: CPT | Performed by: INTERNAL MEDICINE

## 2025-03-27 PROCEDURE — 3074F SYST BP LT 130 MM HG: CPT | Performed by: INTERNAL MEDICINE

## 2025-03-27 ASSESSMENT — FIBROSIS 4 INDEX: FIB4 SCORE: 2.45

## 2025-03-27 NOTE — PROGRESS NOTES
Arrhythmia Clinic Note (Established patient)    DOS: 3/27/2025    Chief complaint/Reason for consult: F/u HCM/PPM/AF    Interval History:  Pt is a 63 yo F. She has history of HCM s/p prior septal alcohol ablation, symptomatic PAF/PVCs. Maintained on Norpace and BB for this. Continues to have some outflow tract gradient due to outcropping of high LVOT beneath the valve, possible subaortic membrane and at least moderate AI. In the past had AF ablation for increasing AF burden. This has been not as much of an issue. One episode since last check lasting total <1 hour she was not aware of since ablation. Main complaint is SPARROW.    ROS (+ highlighted in red):  General--Negative for fatigue, weight loss or weight gain  Cardiovascular--Negative for CP, orthopnea, PND    Past Medical History:   Diagnosis Date    Anesthesia     wakes up during procedure (x2)    Aortic regurgitation     Arrhythmia, ventricular 2008    Symptomatic PVCs, controlled with medication.    Asthma     inhaler PRN, only uses when sick     Atrial fibrillation (HCC)     Awareness under anesthesia     Cardiac arrhythmia     Cervical arthritis 02/08/2016    Chickenpox     Chronic kidney disease, stage III (moderate) 02/09/2016    CTS (carpal tunnel syndrome) 02/28/2011    Depression     GERD (gastroesophageal reflux disease)     Heart burn     Heart murmur     Hemorrhagic disorder (HCC)     on Eliquis    Hypertr obst cardiomyop 1991    Dr. Bert Vazquez, [address] [facility] [address], [place] [address] [phone] fax [phone].  [place] office [phone].  Alchol Septal Reduction 8/00, Coronaries normal.    Hypertrophic cardiomyopathy (HCC)     Indigestion     Influenza     Pacemaker 2014    Renal disorder     chronic kidney disease stage 3-  **pt states currently no issue, numbers are stable    Sleep apnea     uses CPAP    Snoring        Past Surgical History:   Procedure Laterality Date    PACEMAKER INSERTION  04/2022    PB WRIST  ARTHROSCOP,RELEASE XVERS LIG Right 06/01/2021    Procedure: RELEASE, CARPAL TUNNEL, ENDOSCOPIC;  Surgeon: Mike Ospina M.D.;  Location: SURGERY [facility];  Service: Orthopedics    JOINT INJECTION DIAGNOSTIC Left 06/01/2021    Procedure: INJECTION, JOINT, DIAGNOSTIC-CARPAL TUNNEL;  Surgeon: Mike Ospina M.D.;  Location: SURGERY [facility];  Service: Orthopedics    HYSTEROSCOPY WITH VIDEO OPERATIVE  04/30/2018    Procedure: HYSTEROSCOPY WITH VIDEO OPERATIVE;  Surgeon: Noemi Liu M.D.;  Location: SURGERY SAME DAY [facility];  Service: Labor and Delivery    DILATION AND CURETTAGE  04/30/2018    Procedure: DILATION AND CURETTAGE;  Surgeon: Noemi Liu M.D.;  Location: SURGERY SAME DAY [facility];  Service: Labor and Delivery    BREAST BIOPSY Left 01/30/2018    Procedure: BREAST BIOPSY- WIRE LOCALIZED;  Surgeon: Vijaya Britt M.D.;  Location: SURGERY SAME DAY [facility];  Service: General    RECOVERY  04/15/2015    Performed by [facility] at SURGERY PRE-POST PROC UNIT [facility]    PACEMAKER INSERTION  2015    RECOVERY  12/12/2014    Performed by [facility] at SURGERY SAME DAY [facility]    SEPTAL RECONSTRUCTION  2000    ENDOSCOPY      PRIMARY C SECTION      TUBAL COAGULATION LAPAROSCOPIC BILATERAL         Social History     Socioeconomic History    Marital status:      Spouse name: Not on file    Number of children: Not on file    Years of education: Not on file    Highest education level: Some college, no degree   Occupational History    Not on file   Tobacco Use    Smoking status: Never    Smokeless tobacco: Never   Vaping Use    Vaping status: Never Used   Substance and Sexual Activity    Alcohol use: Yes     Alcohol/week: 0.6 oz     Types: 1 Standard drinks or equivalent per week     Comment: 2-3 per week    Drug use: No    Sexual activity: Yes     Partners: Male     Birth control/protection: Surgical     Comment:    Other Topics Concern    Not on file    Social History Narrative    Not on file     Social Drivers of Health     Financial Resource Strain: Low Risk  (4/7/2022)    Overall Financial Resource Strain (CARDIA)     Difficulty of Paying Living Expenses: Not hard at all   Food Insecurity: No Food Insecurity (4/7/2022)    Hunger Vital Sign     Worried About Running Out of Food in the Last Year: Never true     Ran Out of Food in the Last Year: Never true   Transportation Needs: No Transportation Needs (4/7/2022)    PRAPARE - Transportation     Lack of Transportation (Medical): No     Lack of Transportation (Non-Medical): No   Physical Activity: Sufficiently Active (4/7/2022)    Exercise Vital Sign     Days of Exercise per Week: 7 days     Minutes of Exercise per Session: 60 min   Stress: No Stress Concern Present (4/7/2022)    [employer] [place] [employer] - Occupational Stress Questionnaire     Feeling of Stress : Only a little   Social Connections: Moderately Integrated (4/7/2022)    Social Connection and Isolation Panel [NHANES]     Frequency of Communication with Friends and Family: Three times a week     Frequency of Social Gatherings with Friends and Family: Once a week     Attends Anabaptist Services: Never     Active Member of Clubs or Organizations: Yes     Attends Club or Organization Meetings: More than 4 times per year     Marital Status:    Intimate Partner Violence: Not on file   Housing Stability: Low Risk  (4/7/2022)    Housing Stability Vital Sign     Unable to Pay for Housing in the Last Year: No     Number of Places Lived in the Last Year: 1     Unstable Housing in the Last Year: No       Family History   Problem Relation Age of Onset    Heart Disease Mother         CHF    Hypertension Mother     Stroke Mother     Heart Failure Mother     Other Mother         carotid artery disease,gout    Cancer Father         AML    Diabetes Maternal Grandmother     Cancer Maternal Grandfather         Prostate    Heart Disease Paternal  Grandfather 52         MI     Sleep Apnea Neg Hx        No Known Allergies    Current Outpatient Medications   Medication Sig Dispense Refill    isosorbide mononitrate SR (IMDUR) 30 MG TABLET SR 24 HR TAKE 1 & 1/2 (ONE & ONE-HALF) TABLETS BY MOUTH IN THE MORNING 135 Tablet 0    ELIQUIS 5 MG Tab Take 1 tablet by mouth twice daily 180 Tablet 2    atorvastatin (LIPITOR) 40 MG Tab Take 1 Tablet by mouth every evening. 90 Tablet 0    nortriptyline (PAMELOR) 10 MG Cap TAKE 1 CAPSULE BY MOUTH ONCE DAILY IN THE EVENING 90 Capsule 3    losartan (COZAAR) 50 MG Tab TAKE 1 TABLET BY MOUTH TWICE DAILY FOR BLOOD PRESSURE 180 Tablet 3    omeprazole (PRILOSEC) 20 MG delayed-release capsule Take 1 capsule by mouth once daily 90 Capsule 3    amLODIPine (NORVASC) 5 MG Tab Take 1 Tablet by mouth every day. Indications: blood pressure 90 Tablet 3    Zoster Vac Recomb Adjuvanted (SHINGRIX) 50 MCG/0.5ML Recon Susp Inject  into the shoulder, thigh, or buttocks. 0.5 mL 0    metoprolol SR (TOPROL XL) 50 MG TABLET SR 24 HR Take 1.5 Tablets by mouth every day. 135 Tablet 3    disopyramide (NORPACE) 150 MG Cap TAKE 1 CAPSULE BY MOUTH IN THE MORNING AND AT NOON AND AT BEDTIME 270 Capsule 3    Zoster Vac Recomb Adjuvanted (SHINGRIX) 50 MCG/0.5ML Recon Susp Inject  into the shoulder, thigh, or buttocks. 0.5 mL 0    Cetirizine HCl (ZYRTEC ALLERGY PO) Take  by mouth.      Triamcinolone Acetonide (NASACORT AQ NA) Administer  into affected nostril(S).      albuterol 108 (90 Base) MCG/ACT Aero Soln inhalation aerosol Inhale 2 Puffs every four hours as needed for Shortness of Breath. 1 Each 3    acetaminophen (TYLENOL) 325 MG Tab Take 2 Tablets by mouth 1 time a day as needed. Indications: Pain      Probiotic Product (PROBIOTIC-10 PO) Take 1 Capsule by mouth every day.      Multiple Vitamin (MULTI-VITAMIN PO) Take 1 Tablet by mouth every day.      Pantothenic Acid 500 MG Tab Take 1 Tablet by mouth every day.      Calcium-Vitamin D-Vitamin K (CALCIUM  "FOR WOMEN PO) Take 1 Each by mouth every day.      Magnesium 200 MG TABS Take 1 Tab by mouth every day.      PSYLLIUM HUSK PO Take  by mouth.      linaCLOtide 145 MCG Cap Take 145 mcg by mouth every day. 90 Capsule 3     No current facility-administered medications for this visit.       Physical Exam:  Vitals:    03/27/25 1527   BP: 122/76   BP Location: Right arm   Patient Position: Sitting   BP Cuff Size: Adult   Pulse: 60   Resp: 12   SpO2: 95%   Weight: 90.3 kg (199 lb)   Height: 1.626 m (5' 4\")     General appearance: NAD, conversant  HEENT: PERRL, neck is supple with FROM  Lungs: Clear to auscultation, normal respiratory effort  CV: RRR, 3/6 murmur, no JVD  Abdomen: Soft, non-tender with normal bowel sounds  Extremities: No peripheral edema, no clubbing or cyanosis  Skin: No rash, lesions, or ulcers  Psych: Alert and oriented to person, place and time    Data:  Labs reviewed    Prior echo/stress reviewed:  ALLEN showing moderate amount of AI     EKG interpreted by me:  A paced    Impression/Plan:  1. Paroxysmal atrial fibrillation (HCC)  EKG    EC-ECHOCARDIOGRAM COMPLETE W/O CONT      2. Aortic valve insufficiency, etiology of cardiac valve disease unspecified  EC-ECHOCARDIOGRAM COMPLETE W/O CONT        -Device interrogation reviewed  -Appropriate function  -Mostly A paced dependent, sensor is on, but mostly paced at base rate  -Will have her her rate response sensitivity adjusted, make more aggressive  -Repeat echo for surveillance of aortic regurg, if severe, refer valve clinic  -She will establish with HCM specialty clinic, may be they can comment on whether this potential sub-aortic membrane needs to be addressed  -F/u after echo    Solitario Witt MD    "

## 2025-03-31 ENCOUNTER — TELEPHONE (OUTPATIENT)
Dept: CARDIOLOGY | Facility: MEDICAL CENTER | Age: 65
End: 2025-03-31
Payer: COMMERCIAL

## 2025-03-31 NOTE — TELEPHONE ENCOUNTER
Dr. Witt advised to program device with increased rate response.  Patient scheduled 4/1 for reprogramming.

## 2025-04-01 ENCOUNTER — NON-PROVIDER VISIT (OUTPATIENT)
Dept: CARDIOLOGY | Facility: MEDICAL CENTER | Age: 65
End: 2025-04-01
Attending: INTERNAL MEDICINE
Payer: COMMERCIAL

## 2025-04-01 ENCOUNTER — NON-PROVIDER VISIT (OUTPATIENT)
Dept: CARDIOLOGY | Facility: MEDICAL CENTER | Age: 65
End: 2025-04-01

## 2025-04-01 DIAGNOSIS — Z95.0 PACEMAKER: ICD-10-CM

## 2025-04-01 DIAGNOSIS — I49.5 SICK SINUS SYNDROME (HCC): ICD-10-CM

## 2025-04-01 PROCEDURE — 93280 PM DEVICE PROGR EVAL DUAL: CPT | Performed by: STUDENT IN AN ORGANIZED HEALTH CARE EDUCATION/TRAINING PROGRAM

## 2025-04-01 PROCEDURE — 93294 REM INTERROG EVL PM/LDLS PM: CPT | Performed by: STUDENT IN AN ORGANIZED HEALTH CARE EDUCATION/TRAINING PROGRAM

## 2025-04-01 PROCEDURE — 93280 PM DEVICE PROGR EVAL DUAL: CPT | Mod: 26 | Performed by: STUDENT IN AN ORGANIZED HEALTH CARE EDUCATION/TRAINING PROGRAM

## 2025-04-01 NOTE — CARDIAC REMOTE MONITOR - SCAN
Device transmission reviewed. Device demonstrated appropriate function.       Electronically Signed by: Maliha Jimenez MD, PhD    4/5/2025  11:29 AM

## 2025-04-04 ENCOUNTER — NON-PROVIDER VISIT (OUTPATIENT)
Dept: CARDIOLOGY | Facility: MEDICAL CENTER | Age: 65
End: 2025-04-04
Attending: INTERNAL MEDICINE
Payer: COMMERCIAL

## 2025-04-04 ENCOUNTER — NON-PROVIDER VISIT (OUTPATIENT)
Dept: CARDIOLOGY | Facility: MEDICAL CENTER | Age: 65
End: 2025-04-04

## 2025-04-04 DIAGNOSIS — I49.5 SICK SINUS SYNDROME (HCC): ICD-10-CM

## 2025-04-04 DIAGNOSIS — Z95.0 CARDIAC PACEMAKER IN SITU: ICD-10-CM

## 2025-04-04 PROCEDURE — 93280 PM DEVICE PROGR EVAL DUAL: CPT | Performed by: INTERNAL MEDICINE

## 2025-04-04 NOTE — CARDIAC REMOTE MONITOR - SCAN
Device transmission reviewed. Device demonstrated appropriate function.       Electronically Signed by: Maliha Jimenez MD, PhD    4/5/2025  11:30 AM

## 2025-04-10 PROCEDURE — 93280 PM DEVICE PROGR EVAL DUAL: CPT | Mod: 26 | Performed by: INTERNAL MEDICINE

## 2025-04-10 NOTE — CARDIAC REMOTE MONITOR - SCAN
Device transmission reviewed. Device demonstrated appropriate function.       Electronically Signed by: Solitario Witt M.D.    4/19/2025  10:57 PM

## 2025-05-01 ENCOUNTER — APPOINTMENT (OUTPATIENT)
Dept: MEDICAL GROUP | Facility: MEDICAL CENTER | Age: 65
End: 2025-05-01

## 2025-05-01 VITALS
BODY MASS INDEX: 33.69 KG/M2 | WEIGHT: 197.31 LBS | SYSTOLIC BLOOD PRESSURE: 132 MMHG | DIASTOLIC BLOOD PRESSURE: 60 MMHG | TEMPERATURE: 98.2 F | HEART RATE: 60 BPM | OXYGEN SATURATION: 98 % | HEIGHT: 64 IN

## 2025-05-01 DIAGNOSIS — M79.604 PAIN IN BOTH LOWER EXTREMITIES: ICD-10-CM

## 2025-05-01 DIAGNOSIS — I48.0 PAROXYSMAL ATRIAL FIBRILLATION (HCC): Chronic | ICD-10-CM

## 2025-05-01 DIAGNOSIS — I10 PRIMARY HYPERTENSION: ICD-10-CM

## 2025-05-01 DIAGNOSIS — E61.1 IRON DEFICIENCY: ICD-10-CM

## 2025-05-01 DIAGNOSIS — E53.8 B12 DEFICIENCY: ICD-10-CM

## 2025-05-01 DIAGNOSIS — R60.0 LOWER EXTREMITY EDEMA: ICD-10-CM

## 2025-05-01 DIAGNOSIS — E78.5 DYSLIPIDEMIA: ICD-10-CM

## 2025-05-01 DIAGNOSIS — R07.9 EXERTIONAL CHEST PAIN: ICD-10-CM

## 2025-05-01 DIAGNOSIS — I48.0 PAROXYSMAL ATRIAL FIBRILLATION (HCC): ICD-10-CM

## 2025-05-01 DIAGNOSIS — R73.09 IMPAIRED GLUCOSE METABOLISM: ICD-10-CM

## 2025-05-01 DIAGNOSIS — I87.2 VENOUS INSUFFICIENCY: ICD-10-CM

## 2025-05-01 DIAGNOSIS — M79.605 PAIN IN BOTH LOWER EXTREMITIES: ICD-10-CM

## 2025-05-01 DIAGNOSIS — Z86.16 HISTORY OF COVID-19: ICD-10-CM

## 2025-05-01 DIAGNOSIS — Z87.898 HISTORY OF HEADACHE: ICD-10-CM

## 2025-05-01 DIAGNOSIS — E55.9 VITAMIN D DEFICIENCY: ICD-10-CM

## 2025-05-01 DIAGNOSIS — R79.0 LOW MAGNESIUM LEVEL: ICD-10-CM

## 2025-05-01 PROCEDURE — 3078F DIAST BP <80 MM HG: CPT | Performed by: INTERNAL MEDICINE

## 2025-05-01 PROCEDURE — 99214 OFFICE O/P EST MOD 30 MIN: CPT | Performed by: INTERNAL MEDICINE

## 2025-05-01 PROCEDURE — 3075F SYST BP GE 130 - 139MM HG: CPT | Performed by: INTERNAL MEDICINE

## 2025-05-01 ASSESSMENT — FIBROSIS 4 INDEX: FIB4 SCORE: 2.45

## 2025-05-01 NOTE — PROGRESS NOTES
Subjective     Seda Cagle is a 64 y.o. female who presents with Leg Pain (Cramping on both legs runs down from knees, X 6 mo )            HPI    Here for leg pain bilateral lower extremities, has noticed discoloration of her legs and ankle areas bilaterally as well, will noticed the leg cramping every night, typically will start in her thighs and go down to her feet, will become painful and starts typically when she lays down either to watch TV or go to sleep at night, Tylenol PM does help, she can be watching television resting with her feet up and symptoms will start after 30 to 60 minutes as you need to get up to move around.  Sometimes it will wake her up at night when she gets up to use the restroom.  During the day does not notice that cramping pain, she tries to walk 10-12,000 steps per day and will notice heaviness of her legs bilateral but no cramping pain.  Notices swelling of her ankles at the end of the day.  Blood pressure has been stable followed by cardiology on Imdur 30 mg 1/2 tablets daily, losartan 50 mg twice daily, amlodipine 5 mg, Toprol 50 mg 1 and half a day followed by cardiology referral made to [facility] hypertrophic cardiomyopathy program.  Sleep apnea continues on CPAP nightly.  Tries to follow healthy diet low sodium, limits processed foods.  Recent COVID infection February 17, no medications, she got over the COVID infection without difficulty.  Has tried compression stockings in the past.  CLL followed by hematology oncology stable WBC count with chronic lymphocytosis  Dyslipidemia continues on Lipitor  Paroxysmal atrial fibrillation on Eliquis, metoprolol  On nortriptyline for history of tension type headache, no current headache  Medications, allergies, medical history, surgical history, social history, family history  reviewed and updated      Current Outpatient Medications   Medication Sig Dispense Refill    isosorbide mononitrate SR (IMDUR) 30 MG TABLET  SR 24 HR TAKE 1 & 1/2 (ONE & ONE-HALF) TABLETS BY MOUTH IN THE MORNING 135 Tablet 0    ELIQUIS 5 MG Tab Take 1 tablet by mouth twice daily 180 Tablet 2    atorvastatin (LIPITOR) 40 MG Tab Take 1 Tablet by mouth every evening. 90 Tablet 0    nortriptyline (PAMELOR) 10 MG Cap TAKE 1 CAPSULE BY MOUTH ONCE DAILY IN THE EVENING 90 Capsule 3    losartan (COZAAR) 50 MG Tab TAKE 1 TABLET BY MOUTH TWICE DAILY FOR BLOOD PRESSURE 180 Tablet 3    omeprazole (PRILOSEC) 20 MG delayed-release capsule Take 1 capsule by mouth once daily 90 Capsule 3    amLODIPine (NORVASC) 5 MG Tab Take 1 Tablet by mouth every day. Indications: blood pressure 90 Tablet 3    Zoster Vac Recomb Adjuvanted (SHINGRIX) 50 MCG/0.5ML Recon Susp Inject  into the shoulder, thigh, or buttocks. 0.5 mL 0    PSYLLIUM HUSK PO Take  by mouth.      linaCLOtide 145 MCG Cap Take 145 mcg by mouth every day. 90 Capsule 3    metoprolol SR (TOPROL XL) 50 MG TABLET SR 24 HR Take 1.5 Tablets by mouth every day. 135 Tablet 3    disopyramide (NORPACE) 150 MG Cap TAKE 1 CAPSULE BY MOUTH IN THE MORNING AND AT NOON AND AT BEDTIME 270 Capsule 3    Zoster Vac Recomb Adjuvanted (SHINGRIX) 50 MCG/0.5ML Recon Susp Inject  into the shoulder, thigh, or buttocks. 0.5 mL 0    Cetirizine HCl (ZYRTEC ALLERGY PO) Take  by mouth.      Triamcinolone Acetonide (NASACORT AQ NA) Administer  into affected nostril(S).      albuterol 108 (90 Base) MCG/ACT Aero Soln inhalation aerosol Inhale 2 Puffs every four hours as needed for Shortness of Breath. 1 Each 3    acetaminophen (TYLENOL) 325 MG Tab Take 2 Tablets by mouth 1 time a day as needed. Indications: Pain      Probiotic Product (PROBIOTIC-10 PO) Take 1 Capsule by mouth every day.      Multiple Vitamin (MULTI-VITAMIN PO) Take 1 Tablet by mouth every day.      Pantothenic Acid 500 MG Tab Take 1 Tablet by mouth every day.      Calcium-Vitamin D-Vitamin K (CALCIUM FOR WOMEN PO) Take 1 Each by mouth every day.      Magnesium 200 MG TABS Take 1 Tab  by mouth every day.       No current facility-administered medications for this visit.         Abnormal nasal septum  8/12/21  ENT note, nasal septum S-shaped curve bows to the left nostril inferiorly, in the right nose superiorly, normal turbinate size and symmetry, does not seem to bother her when awake, recommend humidification and discuss further at follow-up, higher perioperative risk due to cardiovascular disease     allergic rhinitis  1/30/23 off flonase dry mouth, trial atrovent nasal   2/3/23 cxr negative  2/3/23 cough might be worse when walking outside in cold weather, will provide albuterol, she will keep a symptom diary, notify me next week if atrovent nasal is working if not consider singulair, do not believe she needs PFTs at this time  2/16/23 atrovent not effective, albuterol helps, trial of singulair and can try otc nasacort as flonase made her mouth dry  2/28/23 on nasonex and singulair     cll  2/3/20 wbc 7.1 (52%N,39%L)  9/1/20 wbc 6.9 (41%N,51%L)  11/22/21 wbc 8.4 (35%N,57%L) many smudge cells  1/12/22 wbc 9.9 (34%N,60%L),flow cytometry kappa restricted CD5+ B-cell population accounting for 20% of visible leukocytes, phenotypic features most consistent with CLL  2/23/22  oncology consultation, CLL with FISH panel DEL 13+,DEL 17-showing favorable prognosis, counseled patient on benign nature of CLL with no cytopenia or physical evidence of lymphadenopathy or hepatosplenomegaly, discussed watchful waiting follow-up 3 months and repeat cbc every 6 months  5/18/22  oncology note no cytopenia, no splenomegaly, hepatomegaly, lymphadenopathy, favorable prognostic markers, follow-up 6 months  8/17/22 wbc 13.0 (18%N,80%L)  11/1/22 wbc 10.9 (31%N,62%L),  11/9/22  oncology note stable continue to monitor  5/2/23 wbc 12.2 (33%N,64L),RF<10,CRP 0.49,NAVIN negative  5/10/23  oncology note follow up one year  4/29/24 wbc 14.8 (22.3%N,74%L),LDH  324  5/8/24 wbc 12.1 (34%N,59%L)   5/8/24  oncology note continue to monitor, patient would like repeat labs 6 months  6/4/24 wbc 12.6 (35%N,57%L)  11/6/24 wbc 13.8 (19.8%N,75%L),  11/13/24  oncology note stable cbc, continue watch and wait approach     dec gfr  2/19/11 bun 15,creat 1.0,GFR 56  5/14/14 bun 24,creat 1.23,GFR 45  8/2/14 ultrasound renal negative  2/19/15 bun 19,creat 1.1,GFR 50  8/3/15 bun 17,creat 1.2, GFR 43,urine mac<0.5,PTH 27,vit d 37  10/16/15 bun 17,creat 1.1,GFR 47  1/27/16 bun 14,creat 1.2,GFR 45  12/5/16 bun 16,creat 1.0,GFR 52  1/18/19 bun 15,creat 1.0,GFR 52   2/3/20 bum 16,creat 1.13,GFR 49,urine mac<0.7  9/2/20 bun 20,creat 1.1,GFR 50  3/3/21 bun 13,creat 0.85,GFR>60,PTH 56, urine mac<1.2,SPEP negative  11/22/21 bun 13,creat 1.0,GFR 56  11/1/22  bun 16,creat 1.05,GFR 60  5/2/23 bun 13,creat 0.9,GFR 70  4/29/24 bun 20,creat 1.0,GFR 63  11/6/24 bun 16,creat 0.89,GFR 72     Dyslipidemia  12/5/16 chol 186,trig 143,hdl 52,ldl 105  11/26/18  vascular medicine note chronic venous stasis changes lower extremities, recheck lipid panel qualify for statin therapy, continue home blood pressure monitoring, consider 24-hour ambulatory blood pressure monitoring, continue metoprolol, continue xarelto use compression stockings  1/18/19 chol 183,trig 160,hdl 45,ldl 106  11/26/19 chol 133,trig 202,hdl 42,ldl 51  3/3/21 chol 144,trig 98,hdl 48,ldl 76 on lipitor 40 mg  10/5/21 CT-CTA heart left main no plaque or stenosis, LAD 25% stenosis, circumflex and obtuse marginal patent, RCA, posterior descending and posterior lateral branches patent no stenosis, normal LV size, ascending aorta 3.5 cm  11/22/21 chol 134,trig 133,hdl 45,ldl 62 on lipitor 40 mg  5/2/23 chol 145,trig 263,hdl 37,ldl 55 on lipitor 40 mg  6/4/24 chol 127,trig 95,hdl 40,ldl 68 on lipitor 40 mg  2/5/25 chol 129,trig 86,hdl 40,ldl 72 on lipitor 40 mg     gerd on pepcid  2/8/16 on pepcid  8/12/21   ENT note throat symptoms possibly related to reflux, recommend famotidine 40 mg bid and work on lifestyle modification recommended humidification to avoid excessive dryness  10/12/21  ENT note flexible laryngoscopy, moderate interarytenoid and moderate postcricoid edema, most likely cause of sensation in her throat is reflux consistent with laryngeal pharyngeal reflux, work on healthy lifestyle, diet modification, continue famotidine twice daily for 6 to 8 weeks  1/20/22 on pepcid 40 mg bid   8/22/22 on prilosec after ablation will stop on 9/4/22 and go back to pepcid, had headache that started when she went off pepcid but that was when she had ablation so monitor for recurrent headache on pepcid or if breakthrough heartburn on pepcid then will need to go back to omeprazole if that happens  9/12/22 on pepcid 40 mg  3/15/23 on prilosec 20 mg as failed pepcid  5/2/23 vit d 33 on prilosec 20 mg  6/4/24 b12 795,vit d 45,magnesium 2.2 on prilosec 20 mg     history covid  8/12/23 covid positive      history headache  11/22/21 trial of pamelor for tension headache and monitor for norpace interaction, also consider ct head no improvement  1/20/22 still with insomnia, nortriptyline 10 mg, consider going to 20 mg, need to check with cardiology first because of potential interaction with norpace  2/11/22 increase nortriptylline to 20 mg ok per cardiology see patient mychart note   2/22/22 off nortriptyline due to chest pain  3/15/22 resuming nortriptyline for sleep as melatonin caused headache  8/22/22 stop nortriptyline as headache improved after ablation  9/12/22 back on pamelor 10 mg     History transient global amnesia  1/18/19 chol 183,trig 160,hdl 45,ldl 106  1/18/19 CT head negative  1/19/19 ultrasound carotid less than 50% bilateral stenosis  1/19/19 CT-CTA head with and without postprocessing, negative [place]  1/18/19 hospital admission, 1/19/19 hospital discharge transient global  amnesia, patient was speaking to her son on the phone, and could not recall for anything 2 hours afterwards, has been indicated that patient was confused and repetitive and could not remember talking on the phone, came to the emergency room for evaluation, CT scan head negative, no other symptoms, resolution discharged home the following day  1/29/19 cardiology note, patient still having some issues with short-term memory, will switch to coumadin from xarelto with recent event, referral to neurology, paroxysmal atrial fibrillation burden less than 1%  3/26/19 [facility] neurological note probable transient global amnesia, complete holter monitor per cardiology, EEG ordered and sedimentation rate for headache     hypertension  11/18/14 echo normal LV size and function, grade 2 diastolic dysfunction, EF 60-65%, mild aortic stenosis, mild aortic insufficiency, RVSP 20-25  11/19/14 holter sinus with left bundle branch block, average rate 57, minimum heart rate 44, maximal heart rate 85, PVCs, PACs   12/12/14 ALLEN known post-ablation for hypertrophic cardiomyopathy without obstruction, small amount of remaining myocardium of lower tract does not cause significant outflow gradient, mild aortic insufficiency, moderate central mitral regurgitation, remnant myocardium in the LVOT with mild obstruction, peak outflow gradient 11 (previously 16-18)  11/30/15 cardiology note paroxysmal atrial fibrillation, short atrial fibrillation under 4 minutes  9/26/18 echo normal LV systolic function, EF 55%, mild LVH, grade 1 diastolic dysfunction, evidence of LVOT, septal wall thickness 1.2 cm, consider subaortic membrane, mild to moderate mitral regurgitation, mild aortic insufficiency  continues on norpace, increase metoprolol XL to 50 mg follow-up 1 month   2/28/19 cardiology note continues on norpace, increase metoprolol XL to 50 mg follow-up 1 month   10/10/19 cardiology note hypertrophic cardiomyopathy status post septal ablation,  pacemaker, paroxysmal atrial fibrillation relatively short episodes, longest episode 49 minutes remainder less than 1 minute in length, remains controlled on toprol, norpace, coumadin per coumadin clinic  10/31/19 echo normal left ventricular function, EF 60%, basal septal wall appears hypokinetic, grade 2 diastolic dysfunction, moderately dilated left atrium volume index 44 mL, mild mitral regurgitation, mild aortic stenosis, mild aortic insufficiency, peak gradient 23  1/7/20 ultrasound carotid less than 50% stenosis right internal carotid, left carotid mild plaque carotid bifurcation  6/4/20 sleep note continues on autocpap 5-15  2/3/21 echo mild LVH, EF 65%, grade 2 diastolic dysfunction, RVSP 30  10/5/21 CT-CTA heart left main no plaque or stenosis, LAD 25% stenosis, circumflex and obtuse marginal patent, RCA, posterior descending and posterior lateral branches patent no stenosis, normal LV size, ascending aorta 3.5 cm  4/6/22  cardiology procedure note transvenous dual-chamber PPM lead extraction, pacemaker generator removal, new permanent pacemaker implantation pulse generator Kirax model L311 serial # [id number]  6/27/22  cardiology note plan for ablation august, lower metoprolol to 50 mg bid and norpace to 150 mg tid  8/4/22 transesophageal echo prior to ablation preserved LV function, left atrial enlargement, mild MR and mild to moderate aortic insufficiency, no evidence of thrombus  8/4/22  cardiology EPS noted, successful RF ablation pulmonary vein isolation procedure, resume anticoagulation, start carafate bid x 2 weeks and PPI x one month  11/16/22 cardiology note device interrogation 80% paced, start walking down beta-blocker and norpace, decrease metoprolol to 25 mg, can decrease norpace if doing well follow-up 6 months  6/4/24 urine mac<!.2 start losartan 50 mg and continue metoprolol 25 mg, blood pressure update in 2 weeks in [facility],stress echo ordered, follow-up cardiology  6/9/24  cardiology note stress echo ordered but will change to cardiac PET and echo given history of left bundle branch block and pacemaker, atrial fibrillation well-controlled less than 1% burden with 1 episode in march lasting 8 minutes, continue beta-blocker and eliquis, continue norpace 150 mg and toprol 50 mg, and losartan 50 mg for blood pressure, follow-up 3 months  6/17/24 echo normal LV size, thickness, function, EF 65%, normal diastolic function, severely dilated atrium 52 mL, mild MR, mild aortic valve stenosis, moderate aortic insufficiency, mild TR, RVSP 37  6/21/24 cardiac PET scan EF 57%, mid to basal septal infarct anteriorly, and diaphragmatic artifact, no ischemic changes with persantine, no arrhythmias, chest heaviness experienced  6/24/24 cardiology increased metoprolol from 50 mg to 75 mg qpm,continues on losartan 50 mg,norpace 150 mg  6/29/24 on metoprolol 75 mg and losartan 50 mg qday, blood pressure still high will increase losartan to 50 mg bid, continue norpace  7/18/24 start norvasc 5 mg and continue losartan 50 mg bid, metoprolol 75 mg, norpace per cardiology as blood pressures still running 140-150s/70-80 and HR 60s  9/27/24 CT-CTA coronary arteries right dominant circulation, left main no plaque or stenosis, LAD calcified plaque less than 25% stenosis, circumflex and obtuse marginal patent, RCA poorly evaluated due to artifact, pacemaker, LMA 0, LCx 0, LAD 50.6, RCA 0 = 50.6  3/27/25  cardiology note paroxysmal atrial fibrillation, pacemaker check, repeat echo, establish with HCM clinic to see whether or not subaortic membrane needs to be addressed, follow-up after echo  4/30/25 imdur 30 mg 1.5 tab in am,losartan 50 mg bid,norvasc 5 mg, eliquis      ibs  2/3/20 trial of linzess 145 mcg  2/25/20 increase to linzess 290 mcg   3/5/20 colon per [facility] negative, repeat 10 years  9/12/22 back on pamelor 10 mg  11/2/23 resume linzess 145 mcg for constipation  8/1/24 stop linzess due to nausea      insomnia   11/22/21 trial of pamelor for tension headache and monitor for norpace interaction, also consider ct head no improvement  2/22/22 off nortriptyline due to chest pain, will try melatonin for sleep  9/12/22 back on pamelor 10 mg  11/2/23 resume linzess 145 mcg for constipation     neck arthritis  3/24/15 MRI cervical spine C4-C5 small disc osteophyte complex, moderate left facet arthropathy, multilevel DJD  4/14/21 EMG nerve conduction study right upper extremity consistent with right median neuropathy without denervation, possible mild right ulnar neuropathy, no evidence of cervical radiculopathy     pacemaker  4/16/15  cardiology procedure note, dual-chamber pacemaker implantation  2/8/22 cardiology note pacemaker nearing EDIE, we will go ahead and extracted failing RV lead transvenously and replace, patient agreeable to proceed  4/6/22  cardiology procedure note transvenous dual-chamber PPM lead extraction, pacemaker generator removal, new permanent pacemaker implantation pulse generator BBOXX model L311 serial # [id number]  11/16/22 cardiology note device interrogation 80% paced   5/22/23 EKG sinus 62 atrial paced rhythm, left bundle branch block  5/22/23  cardiology note device check functioning appropriately, continue oral anticoagulation, continue norpace, increase toprol to 50 mg  4/2/24 cardiology note device check functioning appropriately  3/27/25  cardiology note     Paroxysmal atrial fibrillation  11/18/14 echo normal LV size and function, grade 2 diastolic dysfunction, EF 60-65%, mild aortic stenosis, mild aortic insufficiency, RVSP 20-25  11/19/14 holter sinus with left bundle branch block, average rate 57, minimum heart rate 44, maximal heart rate 85, PVCs, PACs   12/12/14 ALLEN known post-ablation for hypertrophic cardiomyopathy without obstruction, small amount of remaining myocardium of lower tract does not cause significant outflow gradient, mild aortic  insufficiency, moderate central mitral regurgitation, remnant myocardium in the LVOT with mild obstruction, peak outflow gradient 11 (previously 16-18)  11/30/15 cardiology note paroxysmal atrial fibrillation, short atrial fibrillation under 4 minutes  2/22/16 cardiology note minimal atrial fibrillation on dual-chamber pacemaker check  3/24/16 cardiology note minimal atrial fibrillation   10/24/16 cardiology note longer episodes of atrial fibrillation, still at 2.6 hours total on dual-chamber pacemaker review, continues on xarelto  9/26/18 echo normal LV systolic function, EF 55%, mild LVH, grade 1 diastolic dysfunction, evidence of LVOT, septal wall thickness 1.2 cm, consider subaortic membrane, mild to moderate mitral regurgitation, mild aortic insufficiency  10/8/18 cardiology note does not appear to be subaortic membrane on echo, perhaps subaortic thickening, continue to monitor echo of full gradient for recurrence continue oral anticoagulation gradient is low and asymptomatic, xarelto, norpace, lopressor  1/29/19 cardiology note, patient still having some issues with short-term memory, will switch to coumadin from xarelto with recent event, referral to neurology, paroxysmal atrial fibrillation burden less than 1%  2/28/19 cardiology note hypertrophic obstructive cardiomyopathy and paroxysmal atrial fibrillation, sinus on exam, medi-lynx ventricular premature contractions isolated but frequent, asymptomatic, one short episode of paroxysmal atrial fibrillation 1.5 minutes, continues on warfarin INR 2.0, continues on norpace, increase metoprolol XL to 50 mg follow-up 1 month   10/10/19 cardiology note hypertrophic cardiomyopathy status post septal ablation, pacemaker, paroxysmal atrial fibrillation relatively short episodes, longest episode 49 minutes remainder less than 1 minute in length, remains controlled on toprol, norpace, coumadin per coumadin clinic  10/31/19 echo normal left ventricular function, EF 60%,  basal septal wall appears hypokinetic, grade 2 diastolic dysfunction, moderately dilated left atrium volume index 44 mL, mild mitral regurgitation, mild aortic stenosis, mild aortic insufficiency, peak gradient 23  1/7/20 ultrasound carotid less than 50% stenosis right internal carotid, left carotid mild plaque carotid bifurcation  8/25/20 cardiology note pacemaker, device check, no arrhythmia burden, change from coumadin to eliquis follow-up 6 months repeat echo at that time  2/3/21 echo mild LVH, EF 65%, grade 2 diastolic dysfunction, RVSP 30  2/26/21  cardiology note pacemaker, paroxysmal atrial fibrillation stable, echo reviewed no significant LVOT gradient, continue norpace and toprol discussed options of RV lead replacement including transvenous extraction, implanting the lead, or performing generator change and using it until no longer working, we will plan on transvenous extraction and new RV lead, follow-up 6 months  8/11/21 cardiology note would like to exclude obstructive CAD but do not think pretest probability is high enough to warrant catheterization, will order coronary CTA follow-up 6 months  8/11/21 cardiology note EKG sinus, ordered CTA  10/5/21 CT-CTA heart left main no plaque or stenosis, LAD 25% stenosis, circumflex and obtuse marginal patent, RCA, posterior descending and posterior lateral branches patent no stenosis, normal LV size, ascending aorta 3.5 cm  2/8/22 cardiology note pacemaker nearing EDIE, we will go ahead and extracted failing RV lead transvenously and replace, patient agreeable to proceed  4/6/22  cardiology procedure note transvenous dual-chamber PPM lead extraction, pacemaker generator removal, new permanent pacemaker implantation pulse generator Transactiv model L311 serial # [id number]  4/27/22  cardiology note having higher PVCs and atrial arrhythmia burden since extraction and new implant, device functioning appropriately, follow-up 4 weeks  5/27/22   cardiology note device interrogation, increasing sustained atrial fibrillation episodes up to 5 hours, options discussed, increase norpace to 200 mg tid for now, plan pulmonary vein isolation next available  6/27/22  cardiology note plan for ablation August, lower metoprolol to 50 mg bid and norpace to 150 mg tid  8/4/22 transesophageal echo prior to ablation preserved LV function, left atrial enlargement, mild MR and mild to moderate aortic insufficiency, no evidence of thrombus  8/14/22  cardiology EPS noted, successful RF ablation pulmonary vein isolation procedure, resume anticoagulation, start carafate bid x 2 weeks and PPI x one month  11/16/22 cardiology note device interrogation 80% paced, start walking down beta-blocker and norpace, decrease metoprolol to 25 mg, can decrease norpace if doing well follow-up 6 months  1/30/23 on norpace, metoprolol, eliquis  5/22/23 EKG sinus 62 atrial paced rhythm, left bundle branch block  5/22/23  cardiology note device check functioning appropriately, continue oral anticoagulation, continue norpace, increase toprol to 50 mg  11/14/23 cardiology note device check functioning appropriately paroxysmal atrial fibrillation, minimal burden less than 180 days overall just over 1 hour at that time, continue medication follow-up 12 months  6/9/24 cardiology note stress echo ordered but will change to cardiac PET and echo given history of left bundle branch block and pacemaker, atrial fibrillation well-controlled less than 1% burden with 1 episode in march lasting 8 minutes, continue beta-blocker and eliquis, continue norpace 150 mg and toprol 50 mg, and losartan 50 mg for blood pressure, follow-up 3 months  6/17/24 echo normal LV size, thickness, function, EF 65%, normal diastolic function, severely dilated atrium 52 mL, mild MR, mild aortic valve stenosis, moderate aortic insufficiency, mild TR, RVSP 37  6/21/24 cardiac PET scan EF 57%, mid to basal septal  infarct anteriorly, and diaphragmatic artifact, no ischemic changes with persantine, no arrhythmias, chest heaviness experienced  6/24/24 cardiology increased metoprolol from 50 mg to 75 mg qpm,continues on losartan 50 mg,norpace 150 mg  9/27/24 CT-CTA coronary arteries right dominant circulation, left main no plaque or stenosis, LAD calcified plaque less than 25% stenosis, circumflex and obtuse marginal patent, RCA poorly evaluated due to artifact, pacemaker, LMA 0, LCx 0, LAD 50.6, RCA 0 = 50.6  11/26/24  cardiology note suspect chest pain related to hypertrophic cardiomyopathy and residual subaortic membrane seen on last ALLEN possibly causing outflow gradient, may be better managed at HCM center, will increase dose of imdur from 30 to 45 mg for now  3/27/25  cardiology note paroxysmal atrial fibrillation, pacemaker check, repeat echo, establish with HCM clinic to see whether or not subaortic membrane needs to be addressed, follow-up after echo  4/30/25 imdur 30 mg 1.5 tab in am,losartan 50 mg bid,norvasc 5 mg, eliquis      Preventative health  12/2/16 tdap  2/3/20 hep c ab negative  3/5/20 colon per [facility] negative, repeat 10 years  9/11/20 declines sonocine  3/23/21 dexa LS-0.4,hip-0.2  8/22/22 prevnar 20  10/10/23 pap per [facility]   11/20/23 rsv   6/4/24 A1c 5.6%   6/4/24 vit d 45  8/30/24 shingrix second  9/3/24 flu  9/3/24 covid  10/16/24 mammogram heterogeneously dense breast tissue recommend screening breast ultrasound     sleep apnea  2/3/17 sleep apnea referral pending, OPO ordered  2/14/17 apnea link per [facility], AHI 19.7, low saturation 83, time<90% saturation 199 minutes, time less than 85% saturation 1 minute  2/16/17 sleep consultation, proceed with polysomnography  3/26/17 sleep study duration 430 minutes, AHI 21.3, low saturation 88%, mild to moderate sleep-disordered breathing with significant disruption of sleep continuity, limb movement disorder could be present as  well, cpap titration recommended  3/31/17 sleep note ordered autocpap 5-15 nightly, follow-up 6-8 weeks  11/30/18 sleep note on autocpap 5-15  6/4/20 sleep note on autopap 5 to 15  2/3/21 echo mild LVH, EF 65%, grade 2 diastolic dysfunction, RVSP 30  9/3/21 sleep note on autocpap 5 to 15, compliance report 96.7% usage average time 5 hours 30 minutes AHI 6.9, terrance device recall  2/9/23 sleep note bring in device tomorrow for compliance review  2/12/24 sleep note 30 compliance 100% average time 5 hours 17 minutes, AHI 5, no mask leak, follow-up 1 year  2/12/25 sleep note continue cpap follow-up 1 year     S/p carpal tunnel right release  2/25/21 EMG nerve conduction study right upper extremity evaluate carpal tunnel  3/3/21 wbc 6.9 (41%N,51%L),NAVIN negative,SPEP negative  4/14/21 EMG nerve conduction study right upper extremity consistent with right median neuropathy without denervation, possible mild right ulnar neuropathy, no evidence of cervical radiculopathy  6/1/21  orthopedic operative note right endoscopic carpal tunnel release and left carpal tunnel injection under anesthesia  6/14/21  orthopedic note status post right endoscopic carpal tunnel release and left carpal tunnel injection under anesthesia anesthesia, follow-up 8 to 10 weeks   8/25/21 PACO note status post right endoscopic carpal tunnel release follow-up 8 to 10 weeks                  Patient Active Problem List   Diagnosis    S/P carpal tunnel release    Hypertrophic cardiomyopathy (HCC)    Pacemaker    Preventative health care    GERD (gastroesophageal reflux disease)    Neck arthritis    Decreased GFR    Dyslipidemia    Paroxysmal atrial fibrillation (HCC)    Sleep apnea    History of transient global amnesia    Venous insufficiency    IBS (irritable bowel syndrome)    Abnormal nasal septum    CLL (chronic lymphocytic leukemia) (HCC)    History of headache    Insomnia    H/O cardiac radiofrequency ablation 8/4/22 Dr Witt     "Allergic rhinitis    History of COVID-19    Other thrombophilia (HCC)    Hypertension    Lateral epicondylitis of right elbow            Patient Care Team:  Juve Stoddard M.D. as PCP - General (Internal Medicine)  Vibha Chua M.D. (Nephrology)  [facility] as Pharmacist  [employer] as Respiratory Therapist (DME Supplier)      ROS           Objective     /60 (BP Location: Left arm, Patient Position: Sitting, BP Cuff Size: Adult)   Pulse 60   Temp 36.8 °C (98.2 °F) (Temporal)   Ht 1.626 m (5' 4\")   Wt 89.5 kg (197 lb 5 oz)   LMP 01/31/2012 (LMP Unknown)   SpO2 98%   BMI 33.87 kg/m²      Physical Exam  Vitals and nursing note reviewed.   Constitutional:       Appearance: Normal appearance.   HENT:      Head: Normocephalic and atraumatic.      Right Ear: External ear normal.      Left Ear: External ear normal.      Nose: Nose normal.   Eyes:      Conjunctiva/sclera: Conjunctivae normal.   Cardiovascular:      Rate and Rhythm: Normal rate and regular rhythm.      Heart sounds: Normal heart sounds.   Pulmonary:      Effort: Pulmonary effort is normal. No respiratory distress.      Breath sounds: Normal breath sounds. No wheezing.   Abdominal:      General: There is no distension.   Musculoskeletal:         General: Swelling present.      Cervical back: Neck supple.   Skin:     Coloration: Skin is not jaundiced.      Findings: No bruising.   Neurological:      General: No focal deficit present.      Mental Status: She is alert.   Psychiatric:         Mood and Affect: Mood normal.         Behavior: Behavior normal.        Bilateral lower extremity trace edema, hemosiderin deposition bilateral lower extremities, good dorsalis pedis pulse bilateral         Assessment & Plan       Assessment  #!  Venous insufficiency with hemosiderin deposition, some lower extremity trace edema    #2 leg cramping at night consistent with restless leg also consider peripheral vascular disease, no history of " diabetes, no history of iron overload    #3 tension headache on Pamelor 10 mg nightly no recurrent symptoms    #4 CLL stable followed by hematology oncology most recent labs 11/6/24 wbc 13.8 (19.8%N,75%L),    #5 hypertension on losartan, amlodipine, Toprol per cardiology    #6 hypertrophic cardiomyopathy on Imdur, losartan, amlodipine, metoprolol per cardiology, referral to [place] hypertrophic cardiomyopathy clinic    #7 paroxysmal atrial fibrillation on metoprolol, Eliquis per cardiology    #8 chronic thrombophilia on Eliquis no NSAIDs    #9 sleep apnea on CPAP    #10 reflux on PPI, chronic PPI use can contribute to vitamin D, B12, magnesium, iron deficiency    #11 BMI 33.87 obesity     #12 dyslipidemia 2/5/25 chol 129,trig 86,hdl 40,ldl 72 on lipitor 40 mg    #13 impaired glucose metabolism      Plan  #1 ROBERT bilateral lower extremities and ultrasound arterial evaluate peripheral arterial disease    #2 Referral to [facility] vein [place] for venous insufficiency evaluation possibly contributing to hemosiderin deposition    #3 recommend trial of compression stockings to put on in the morning    #4 no diuretics for now she needs to be evaluated by hypertrophic cardiomyopathy clinic in [place] per cardiology    #5 restless leg symptoms consider gabapentin or Mirapex, for now stop nortriptyline to see if headaches recur, if not consider Mirapex as she would prefer not to use gabapentin for restless leg symptoms    #6 labs    #7 recommended COVID vaccine at the pharmacy    #8 continue CPAP therapy

## 2025-05-07 ENCOUNTER — HOSPITAL ENCOUNTER (OUTPATIENT)
Dept: LAB | Facility: MEDICAL CENTER | Age: 65
End: 2025-05-07
Attending: INTERNAL MEDICINE
Payer: MEDICARE

## 2025-05-07 DIAGNOSIS — E55.9 VITAMIN D DEFICIENCY: ICD-10-CM

## 2025-05-07 DIAGNOSIS — E78.5 DYSLIPIDEMIA: ICD-10-CM

## 2025-05-07 DIAGNOSIS — R79.0 LOW MAGNESIUM LEVEL: ICD-10-CM

## 2025-05-07 DIAGNOSIS — E53.8 B12 DEFICIENCY: ICD-10-CM

## 2025-05-07 DIAGNOSIS — R73.09 IMPAIRED GLUCOSE METABOLISM: ICD-10-CM

## 2025-05-07 DIAGNOSIS — E61.1 IRON DEFICIENCY: ICD-10-CM

## 2025-05-07 LAB
ANISOCYTOSIS BLD QL SMEAR: ABNORMAL
BASOPHILS # BLD AUTO: 0 % (ref 0–1.8)
BASOPHILS # BLD: 0 K/UL (ref 0–0.12)
COMMENT NL1176: NORMAL
EOSINOPHIL # BLD AUTO: 0.15 K/UL (ref 0–0.51)
EOSINOPHIL NFR BLD: 0.9 % (ref 0–6.9)
ERYTHROCYTE [DISTWIDTH] IN BLOOD BY AUTOMATED COUNT: 41.7 FL (ref 35.9–50)
EST. AVERAGE GLUCOSE BLD GHB EST-MCNC: 111 MG/DL
HBA1C MFR BLD: 5.5 % (ref 4–5.6)
HCT VFR BLD AUTO: 45.4 % (ref 37–47)
HGB BLD-MCNC: 15.1 G/DL (ref 12–16)
LYMPHOCYTES # BLD AUTO: 13.9 K/UL (ref 1–4.8)
LYMPHOCYTES NFR BLD: 85.8 % (ref 22–41)
MANUAL DIFF BLD: NORMAL
MCH RBC QN AUTO: 29.3 PG (ref 27–33)
MCHC RBC AUTO-ENTMCNC: 33.3 G/DL (ref 32.2–35.5)
MCV RBC AUTO: 88 FL (ref 81.4–97.8)
MICROCYTES BLD QL SMEAR: ABNORMAL
MONOCYTES # BLD AUTO: 0.3 K/UL (ref 0–0.85)
MONOCYTES NFR BLD AUTO: 1.8 % (ref 0–13.4)
MORPHOLOGY BLD-IMP: NORMAL
NEUTROPHILS # BLD AUTO: 1.86 K/UL (ref 1.82–7.42)
NEUTROPHILS NFR BLD: 11.5 % (ref 44–72)
NRBC # BLD AUTO: 0.03 K/UL
NRBC BLD-RTO: 0.2 /100 WBC (ref 0–0.2)
PLATELET # BLD AUTO: 185 K/UL (ref 164–446)
PLATELET BLD QL SMEAR: NORMAL
PMV BLD AUTO: 10.4 FL (ref 9–12.9)
RBC # BLD AUTO: 5.16 M/UL (ref 4.2–5.4)
RBC BLD AUTO: PRESENT
SMUDGE CELLS BLD QL SMEAR: NORMAL
WBC # BLD AUTO: 16.2 K/UL (ref 4.8–10.8)

## 2025-05-07 PROCEDURE — 83540 ASSAY OF IRON: CPT

## 2025-05-07 PROCEDURE — 85027 COMPLETE CBC AUTOMATED: CPT

## 2025-05-07 PROCEDURE — 82172 ASSAY OF APOLIPOPROTEIN: CPT

## 2025-05-07 PROCEDURE — 82607 VITAMIN B-12: CPT

## 2025-05-07 PROCEDURE — 36415 COLL VENOUS BLD VENIPUNCTURE: CPT

## 2025-05-07 PROCEDURE — 83735 ASSAY OF MAGNESIUM: CPT

## 2025-05-07 PROCEDURE — 83695 ASSAY OF LIPOPROTEIN(A): CPT

## 2025-05-07 PROCEDURE — 83615 LACTATE (LD) (LDH) ENZYME: CPT

## 2025-05-07 PROCEDURE — 85007 BL SMEAR W/DIFF WBC COUNT: CPT

## 2025-05-07 PROCEDURE — 82306 VITAMIN D 25 HYDROXY: CPT

## 2025-05-07 PROCEDURE — 84443 ASSAY THYROID STIM HORMONE: CPT

## 2025-05-07 PROCEDURE — 80053 COMPREHEN METABOLIC PANEL: CPT

## 2025-05-07 PROCEDURE — 83550 IRON BINDING TEST: CPT

## 2025-05-07 PROCEDURE — 83036 HEMOGLOBIN GLYCOSYLATED A1C: CPT | Mod: GA

## 2025-05-07 RX ORDER — ATORVASTATIN CALCIUM 40 MG/1
40 TABLET, FILM COATED ORAL EVERY EVENING
Qty: 90 TABLET | Refills: 3 | Status: SHIPPED | OUTPATIENT
Start: 2025-05-07

## 2025-05-07 RX ORDER — METOPROLOL SUCCINATE 50 MG/1
75 TABLET, EXTENDED RELEASE ORAL DAILY
Qty: 135 TABLET | Refills: 3 | Status: SHIPPED | OUTPATIENT
Start: 2025-05-07

## 2025-05-07 RX ORDER — ISOSORBIDE MONONITRATE 30 MG/1
TABLET, EXTENDED RELEASE ORAL
Qty: 135 TABLET | Refills: 3 | Status: SHIPPED | OUTPATIENT
Start: 2025-05-07

## 2025-05-07 NOTE — TELEPHONE ENCOUNTER
Is the patient due for a refill? Yes    Was the patient seen the last 15 months? Yes    Date of last office visit: 03.27.25    Does the patient have an upcoming appointment?  Yes   If yes, When? 07.21.25    Provider to refill:SS    Does the patient have [facility] Plus and need 100-day supply? (This applies to ALL medications) Patient does not have SCP

## 2025-05-07 NOTE — TELEPHONE ENCOUNTER
Is the patient due for a refill? Yes    Was the patient seen the last 15 months? Yes    Date of last office visit: 03.27.25    Does the patient have an upcoming appointment?  Yes   If yes, When? 07.21.25    Provider to refill:SS    Does the patient have group home Plus and need 100-day supply? (This applies to ALL medications) Patient does not have SCP

## 2025-05-08 LAB
25(OH)D3 SERPL-MCNC: 42 NG/ML (ref 30–100)
ALBUMIN SERPL BCP-MCNC: 4.3 G/DL (ref 3.2–4.9)
ALBUMIN/GLOB SERPL: 1.8 G/DL
ALP SERPL-CCNC: 88 U/L (ref 30–99)
ALT SERPL-CCNC: 31 U/L (ref 2–50)
ANION GAP SERPL CALC-SCNC: 12 MMOL/L (ref 7–16)
AST SERPL-CCNC: 29 U/L (ref 12–45)
BILIRUB SERPL-MCNC: 0.5 MG/DL (ref 0.1–1.5)
BUN SERPL-MCNC: 18 MG/DL (ref 8–22)
CALCIUM ALBUM COR SERPL-MCNC: 9.3 MG/DL (ref 8.5–10.5)
CALCIUM SERPL-MCNC: 9.5 MG/DL (ref 8.5–10.5)
CHLORIDE SERPL-SCNC: 107 MMOL/L (ref 96–112)
CO2 SERPL-SCNC: 23 MMOL/L (ref 20–33)
CREAT SERPL-MCNC: 0.89 MG/DL (ref 0.5–1.4)
GFR SERPLBLD CREATININE-BSD FMLA CKD-EPI: 72 ML/MIN/1.73 M 2
GLOBULIN SER CALC-MCNC: 2.4 G/DL (ref 1.9–3.5)
GLUCOSE SERPL-MCNC: 122 MG/DL (ref 65–99)
IRON SATN MFR SERPL: 32 % (ref 15–55)
IRON SERPL-MCNC: 94 UG/DL (ref 40–170)
LDH SERPL L TO P-CCNC: 241 U/L (ref 107–266)
MAGNESIUM SERPL-MCNC: 2.3 MG/DL (ref 1.5–2.5)
POTASSIUM SERPL-SCNC: 4.2 MMOL/L (ref 3.6–5.5)
PROT SERPL-MCNC: 6.7 G/DL (ref 6–8.2)
SODIUM SERPL-SCNC: 142 MMOL/L (ref 135–145)
TIBC SERPL-MCNC: 294 UG/DL (ref 250–450)
TSH SERPL-ACNC: 2.04 UIU/ML (ref 0.38–5.33)
UIBC SERPL-MCNC: 200 UG/DL (ref 110–370)
VIT B12 SERPL-MCNC: 903 PG/ML (ref 211–911)

## 2025-05-09 ENCOUNTER — RESULTS FOLLOW-UP (OUTPATIENT)
Dept: MEDICAL GROUP | Facility: MEDICAL CENTER | Age: 65
End: 2025-05-09
Payer: MEDICARE

## 2025-05-09 LAB
APO B100 SERPL-MCNC: 92 MG/DL (ref 60–117)
LPA SERPL-MCNC: 12 MG/DL

## 2025-05-10 NOTE — TELEPHONE ENCOUNTER
Called the patient and left a message, please notify her that her blood test shows that: (1) her thyroid level is normal  (2) her vitamin D, vitamin B12, magnesium, iron levels are tram  (3) the special bad cholesterol levels are also normal  (4) no changes to her atorvastatin cholesterol medication, and have her continue her vitamin supplements no changes

## 2025-05-26 RX ORDER — NORTRIPTYLINE HYDROCHLORIDE 10 MG/1
20 CAPSULE ORAL EVERY EVENING
Qty: 180 CAPSULE | Refills: 3 | Status: SHIPPED | OUTPATIENT
Start: 2025-05-26

## 2025-06-03 ENCOUNTER — HOSPITAL ENCOUNTER (OUTPATIENT)
Dept: RADIOLOGY | Facility: MEDICAL CENTER | Age: 65
End: 2025-06-03
Attending: INTERNAL MEDICINE
Payer: MEDICARE

## 2025-06-03 DIAGNOSIS — M79.604 PAIN IN BOTH LOWER EXTREMITIES: ICD-10-CM

## 2025-06-03 DIAGNOSIS — M79.605 PAIN IN BOTH LOWER EXTREMITIES: ICD-10-CM

## 2025-06-03 PROCEDURE — 93922 UPR/L XTREMITY ART 2 LEVELS: CPT

## 2025-06-05 ENCOUNTER — RESULTS FOLLOW-UP (OUTPATIENT)
Dept: MEDICAL GROUP | Facility: MEDICAL CENTER | Age: 65
End: 2025-06-05

## 2025-06-05 PROBLEM — G25.81 RESTLESS LEG SYNDROME: Status: ACTIVE | Noted: 2025-06-05

## 2025-06-16 ENCOUNTER — APPOINTMENT (OUTPATIENT)
Dept: RADIOLOGY | Facility: MEDICAL CENTER | Age: 65
End: 2025-06-16
Payer: MEDICARE

## 2025-06-16 DIAGNOSIS — Z95.0 CARDIAC PACEMAKER IN SITU: ICD-10-CM

## 2025-06-16 DIAGNOSIS — I42.2 PRIMARY HYPERTROPHIC CARDIOMYOPATHY (HCC): ICD-10-CM

## 2025-06-16 DIAGNOSIS — I48.0 PAROXYSMAL ATRIAL FIBRILLATION (HCC): Chronic | ICD-10-CM

## 2025-06-16 PROCEDURE — 71046 X-RAY EXAM CHEST 2 VIEWS: CPT

## 2025-06-17 RX ORDER — DISOPYRAMIDE PHOSPHATE 150 MG/1
CAPSULE ORAL
Qty: 270 CAPSULE | Refills: 1 | Status: SHIPPED | OUTPATIENT
Start: 2025-06-17

## 2025-06-17 NOTE — TELEPHONE ENCOUNTER
Is the patient due for a refill? Yes    Was the patient seen the last 15 months? Yes    Date of last office visit: 3/27/25    Does the patient have an upcoming appointment?  No   If yes, When?     Provider to refill:SS    Does the patient have long-term Plus and need 100-day supply? (This applies to ALL medications) Patient does not have SCP

## 2025-07-01 ENCOUNTER — NON-PROVIDER VISIT (OUTPATIENT)
Dept: CARDIOLOGY | Facility: MEDICAL CENTER | Age: 65
End: 2025-07-01
Payer: MEDICARE

## 2025-07-01 PROCEDURE — 93294 REM INTERROG EVL PM/LDLS PM: CPT | Performed by: INTERNAL MEDICINE

## 2025-07-01 NOTE — CARDIAC REMOTE MONITOR - SCAN
Device transmission reviewed. Device demonstrated appropriate function.       Electronically Signed by: Solitario Witt M.D.    7/31/2025  12:35 PM

## 2025-07-07 LAB — EKG IMPRESSION: NORMAL

## 2025-07-18 ENCOUNTER — OFFICE VISIT (OUTPATIENT)
Dept: URGENT CARE | Facility: CLINIC | Age: 65
End: 2025-07-18
Payer: MEDICARE

## 2025-07-18 VITALS
WEIGHT: 201 LBS | RESPIRATION RATE: 16 BRPM | TEMPERATURE: 97.2 F | BODY MASS INDEX: 34.31 KG/M2 | DIASTOLIC BLOOD PRESSURE: 68 MMHG | SYSTOLIC BLOOD PRESSURE: 112 MMHG | HEART RATE: 60 BPM | HEIGHT: 64 IN | OXYGEN SATURATION: 94 %

## 2025-07-18 DIAGNOSIS — J02.9 ACUTE PHARYNGITIS, UNSPECIFIED ETIOLOGY: Primary | ICD-10-CM

## 2025-07-18 LAB — S PYO DNA SPEC NAA+PROBE: NOT DETECTED

## 2025-07-18 PROCEDURE — 87651 STREP A DNA AMP PROBE: CPT | Performed by: STUDENT IN AN ORGANIZED HEALTH CARE EDUCATION/TRAINING PROGRAM

## 2025-07-18 PROCEDURE — 3074F SYST BP LT 130 MM HG: CPT | Performed by: STUDENT IN AN ORGANIZED HEALTH CARE EDUCATION/TRAINING PROGRAM

## 2025-07-18 PROCEDURE — 99213 OFFICE O/P EST LOW 20 MIN: CPT | Performed by: STUDENT IN AN ORGANIZED HEALTH CARE EDUCATION/TRAINING PROGRAM

## 2025-07-18 PROCEDURE — 3078F DIAST BP <80 MM HG: CPT | Performed by: STUDENT IN AN ORGANIZED HEALTH CARE EDUCATION/TRAINING PROGRAM

## 2025-07-18 ASSESSMENT — ENCOUNTER SYMPTOMS
COUGH: 0
SORE THROAT: 1
FEVER: 0
CHILLS: 0

## 2025-07-18 ASSESSMENT — FIBROSIS 4 INDEX: FIB4 SCORE: 1.83

## 2025-07-18 NOTE — PROGRESS NOTES
Subjective:   Seda Cagle is a 65 y.o. female who presents for Pharyngitis (Headaches,congestion,Pinkish/yellow phlegm  X2 weeks )      HPI:  Patient female presents with 10 to 12 days of pharyngitis and sore throat mild headaches congestion but primarily her sore throat.  She had some pink-colored phlegm when she has been coughing but has also had a few bloody noses with her sinus rinses she has been doing.  Otherwise reports clear sputum.  - No significant throat swelling  - No shortness of breath no difficulty swallowing.  Just mild hoarse voice and sore throat with coughing.  - No fevers no chills  - No nausea vomiting diarrhea.  - Mild congestion mild headaches.  Fatigue    Review of Systems   Constitutional:  Negative for chills and fever.   HENT:  Positive for congestion and sore throat.    Respiratory:  Negative for cough.        Medications:    acetaminophen Tabs  albuterol Aers  amLODIPine Tabs  atorvastatin Tabs  CALCIUM FOR WOMEN PO  disopyramide Caps  Eliquis Tabs  isosorbide mononitrate SR Tb24  losartan Tabs  Magnesium Tabs  metoprolol SR Tb24  MULTI-VITAMIN PO  NASACORT AQ NA  nortriptyline Caps  omeprazole  Pantothenic Acid Tabs  PROBIOTIC-10 PO  PSYLLIUM HUSK PO  Zoster Vac Recomb Adjuvanted Susr  ZYRTEC ALLERGY PO    Allergies: Patient has no known allergies.    Problem List: Seda Cagle does not have any pertinent problems on file.    Surgical History:  Past Surgical History:   Procedure Laterality Date    PACEMAKER INSERTION  04/2022    PB WRIST ARTHROSCOP,RELEASE XVERS LIG Right 06/01/2021    Procedure: RELEASE, CARPAL TUNNEL, ENDOSCOPIC;  Surgeon: Mike Ospina M.D.;  Location: [facility];  Service: Orthopedics    JOINT INJECTION DIAGNOSTIC Left 06/01/2021    Procedure: INJECTION, JOINT, DIAGNOSTIC-CARPAL TUNNEL;  Surgeon: Mike Ospina M.D.;  Location: [facility];  Service: Orthopedics    HYSTEROSCOPY WITH VIDEO OPERATIVE  04/30/2018     "Procedure: HYSTEROSCOPY WITH VIDEO OPERATIVE;  Surgeon: Noemi Liu M.D.;  Location: SURGERY SAME DAY [facility];  Service: Labor and Delivery    DILATION AND CURETTAGE  04/30/2018    Procedure: DILATION AND CURETTAGE;  Surgeon: Noemi Liu M.D.;  Location: SURGERY SAME DAY [facility];  Service: Labor and Delivery    BREAST BIOPSY Left 01/30/2018    Procedure: BREAST BIOPSY- WIRE LOCALIZED;  Surgeon: Vijaya Britt M.D.;  Location: SURGERY SAME DAY [facility];  Service: General    RECOVERY  04/15/2015    Performed by [facility] at SURGERY PRE-POST PROC UNIT [facility]    PACEMAKER INSERTION  2015    RECOVERY  12/12/2014    Performed by [facility] at SURGERY SAME DAY [facility]    SEPTAL RECONSTRUCTION  2000    ENDOSCOPY      PRIMARY C SECTION      TUBAL COAGULATION LAPAROSCOPIC BILATERAL         Past Social Hx: Seda Cagle  reports that she has never smoked. She has never used smokeless tobacco. She reports current alcohol use of about 0.6 oz of alcohol per week. She reports that she does not use drugs.     Past Family Hx:  Seda Cagle family history includes Cancer in her father and maternal grandfather; Diabetes in her maternal grandmother; Heart Disease in her mother; Heart Disease (age of onset: 52) in her paternal grandfather; Heart Failure in her mother; Hypertension in her mother; Other in her mother; Stroke in her mother.     Problem list, medications, and allergies reviewed by myself today in Epic.     Objective:     /68 (BP Location: Left arm, Patient Position: Sitting, BP Cuff Size: Adult)   Pulse 60   Temp 36.2 °C (97.2 °F) (Temporal)   Resp 16   Ht 1.626 m (5' 4\")   Wt 91.2 kg (201 lb)   LMP 01/31/2012 (LMP Unknown)   SpO2 94%   BMI 34.50 kg/m²     Physical Exam  Constitutional:       Appearance: Normal appearance.   HENT:      Head: Normocephalic and atraumatic.      Right Ear: Tympanic membrane normal.      Left Ear: " Tympanic membrane normal.      Nose: Nose normal. No congestion or rhinorrhea.      Mouth/Throat:      Mouth: Mucous membranes are moist.      Pharynx: Oropharynx is clear.   Eyes:      Extraocular Movements: Extraocular movements intact.      Conjunctiva/sclera: Conjunctivae normal.   Cardiovascular:      Rate and Rhythm: Normal rate and regular rhythm.      Pulses: Normal pulses.      Heart sounds: Normal heart sounds.   Pulmonary:      Effort: Pulmonary effort is normal.      Breath sounds: Normal breath sounds.   Neurological:      Mental Status: She is alert.         Assessment/Plan:     Diagnosis and associated orders:     1. Acute pharyngitis, unspecified etiology           Comments/MDM:     Assessment & Plan - Pharyngitis  Likely etiology based on presentation and exam:  Viral  Streptococcal Rapid strep:  Negative    Management:    Supportive care to manage pharyngitis discussed with patient  Salt water gargles BID and prn. Suggested 1/4 to 1/2 teaspoon (1.5 to 3.0 g) of salt per one cup (8 ounces or 250 mL) of warm water.   Use of honey and warm water and/or tea helps alleviate feelings of discomfort and pain as well.  OTC throat analgesic spray or lozenge of choice prn throat pain. Dosage and directions per   OTC  analgesic of choice (acetaminophen or NSAID) prn pain. Follow manufactures dosing and safety precautions.   Hydration  Symptoms typically improve in 3-5 days  Avoid irritants (smoke, alcohol, spicy food)    Follow-Up / Return Precautions:  Return or seek care for:  Worsening symptoms  Difficulty breathing/swallowing  Follow up with PCP if symptoms not improving in the next 5-7 days             Differential diagnosis, natural history, supportive care, and indications for immediate follow-up discussed.    Advised the patient to follow-up with the primary care physician for recheck, reevaluation, and consideration of further management.    Please note that this dictation was created  using voice recognition software. I have made a reasonable attempt to correct obvious errors, but I expect that there are errors of grammar and possibly content that I did not discover before finalizing the note.    Suhail Bella M.D.

## 2025-07-21 ENCOUNTER — APPOINTMENT (OUTPATIENT)
Dept: CARDIOLOGY | Facility: MEDICAL CENTER | Age: 65
End: 2025-07-21
Attending: INTERNAL MEDICINE
Payer: MEDICARE

## 2025-07-25 ENCOUNTER — OFFICE VISIT (OUTPATIENT)
Dept: MEDICAL GROUP | Facility: MEDICAL CENTER | Age: 65
End: 2025-07-25
Payer: MEDICARE

## 2025-07-25 DIAGNOSIS — D68.69 OTHER THROMBOPHILIA (HCC): ICD-10-CM

## 2025-07-25 DIAGNOSIS — I42.2 HYPERTROPHIC CARDIOMYOPATHY (HCC): ICD-10-CM

## 2025-07-25 DIAGNOSIS — I87.2 VENOUS INSUFFICIENCY: Primary | ICD-10-CM

## 2025-07-25 DIAGNOSIS — I48.0 PAROXYSMAL ATRIAL FIBRILLATION (HCC): ICD-10-CM

## 2025-07-25 PROCEDURE — 3074F SYST BP LT 130 MM HG: CPT | Performed by: INTERNAL MEDICINE

## 2025-07-25 PROCEDURE — 99214 OFFICE O/P EST MOD 30 MIN: CPT | Performed by: INTERNAL MEDICINE

## 2025-07-25 PROCEDURE — 3078F DIAST BP <80 MM HG: CPT | Performed by: INTERNAL MEDICINE

## 2025-07-25 RX ORDER — AZELASTINE 1 MG/ML
1 SPRAY, METERED NASAL 2 TIMES DAILY
Qty: 30 ML | Refills: 3 | Status: SHIPPED | OUTPATIENT
Start: 2025-07-25

## 2025-07-25 ASSESSMENT — FIBROSIS 4 INDEX: FIB4 SCORE: 1.83

## 2025-07-25 NOTE — PROGRESS NOTES
Subjective     Seda Cagle is a 65 y.o. female who presents with Cough (Coughing up mucus clumps with blood in it /three weeks ), Pharyngitis, Sinusitis, Tired, and Hoarse            HPI    Patient is here with her . About 3 weeks ago sore throat and cough productive, no fever, sneezing, sinus pressure, post nasal drip, ear pressure as well as sinus pressure, had been doing nasal lavage, her cough would be more noticeable at night as well as with activity, however no sob with activity.  No chest pains or palpitations with those symptoms, did not do a home COVID test.  Seen in urgent care July 18, negative strep test.  No history of asthma, no history of sinus infections or recurrent ear infections, no tobacco.  Paroxysmal atrial fibrillation followed by cardiology, continues on disopyramide, Imdur, metoprolol, Eliquis, losartan and amlodipine for blood pressure.  Recently saw [facility] hypertrophic cardiomyopathy specialist, please schedule for cardiac MRI and being evaluated for potential biopsy of inhibitor therapy for refractory symptoms.  Tries to eat a healthy diet, limiting processed foods but has not been able to exercise as regularly due to fatigue with activity and has found it difficult to be able to lose weight despite her good nutritional efforts.  Continues to use her sleep apnea machine nightly followed by the sleep group with excellent compliance on her most recent sleep apnea visit and even with the CPAP machine she does have fatigue during the day.  On the metoprolol, and Eliquis, no palpitations, she does have a pacemaker, no regular NSAIDs.  Reflux stable on Prilosec.  Dyslipidemia on atorvastatin.  Medications, allergies, medical history, surgical history, social history, family history  reviewed and updated             Current Medications[1]      Abnormal nasal septum  8/12/21  ENT note, nasal septum S-shaped curve bows to the left nostril inferiorly,  in the right nose superiorly, normal turbinate size and symmetry, does not seem to bother her when awake, recommend humidification and discuss further at follow-up, higher perioperative risk due to cardiovascular disease     allergic rhinitis  1/30/23 off flonase dry mouth, trial atrovent nasal   2/3/23 cxr negative  2/3/23 cough might be worse when walking outside in cold weather, will provide albuterol, she will keep a symptom diary, notify me next week if atrovent nasal is working if not consider singulair, do not believe she needs PFTs at this time  2/16/23 atrovent not effective, albuterol helps, trial of singulair and can try otc nasacort as flonase made her mouth dry  2/28/23 on nasonex and singulair     cll  2/3/20 wbc 7.1 (52%N,39%L)  9/1/20 wbc 6.9 (41%N,51%L)  11/22/21 wbc 8.4 (35%N,57%L) many smudge cells  1/12/22 wbc 9.9 (34%N,60%L),flow cytometry kappa restricted CD5+ B-cell population accounting for 20% of visible leukocytes, phenotypic features most consistent with CLL  2/23/22  oncology consultation, CLL with FISH panel DEL 13+,DEL 17-showing favorable prognosis, counseled patient on benign nature of CLL with no cytopenia or physical evidence of lymphadenopathy or hepatosplenomegaly, discussed watchful waiting follow-up 3 months and repeat cbc every 6 months  5/18/22  oncology note no cytopenia, no splenomegaly, hepatomegaly, lymphadenopathy, favorable prognostic markers, follow-up 6 months  8/17/22 wbc 13.0 (18%N,80%L)  11/1/22 wbc 10.9 (31%N,62%L),  11/9/22  oncology note stable continue to monitor  5/2/23 wbc 12.2 (33%N,64L),RF<10,CRP 0.49,NAVIN negative  5/10/23  oncology note follow up one year  4/29/24 wbc 14.8 (22.3%N,74%L),  5/8/24 wbc 12.1 (34%N,59%L)   5/8/24  oncology note continue to monitor, patient would like repeat labs 6 months  6/4/24 wbc 12.6 (35%N,57%L)  11/6/24 wbc 13.8 (19.8%N,75%L),  11/13/24   oncology note stable cbc, continue watch and wait approach  5/7/25 wbc 16.2 (11.5%N,85.8%L),  5/14/25  oncology note stable slight increase in wbc and lymphocytes, no constitutional symptoms, slight doubling time more than 12 months, continue to monitor     dec gfr  2/19/11 bun 15,creat 1.0,GFR 56  5/14/14 bun 24,creat 1.23,GFR 45  8/2/14 ultrasound renal negative  2/19/15 bun 19,creat 1.1,GFR 50  8/3/15 bun 17,creat 1.2, GFR 43,urine mac<0.5,PTH 27,vit d 37  10/16/15 bun 17,creat 1.1,GFR 47  1/27/16 bun 14,creat 1.2,GFR 45  12/5/16 bun 16,creat 1.0,GFR 52  1/18/19 bun 15,creat 1.0,GFR 52   2/3/20 bum 16,creat 1.13,GFR 49,urine mac<0.7  9/2/20 bun 20,creat 1.1,GFR 50  3/3/21 bun 13,creat 0.85,GFR>60,PTH 56, urine mac<1.2,SPEP negative  11/22/21 bun 13,creat 1.0,GFR 56  11/1/22  bun 16,creat 1.05,GFR 60  5/2/23 bun 13,creat 0.9,GFR 70  4/29/24 bun 20,creat 1.0,GFR 63  11/6/24 bun 16,creat 0.89,GFR 72  5/7/25 bun 18,creat 0.89,GFR 72     Dyslipidemia  12/5/16 chol 186,trig 143,hdl 52,ldl 105  11/26/18  vascular medicine note chronic venous stasis changes lower extremities, recheck lipid panel qualify for statin therapy, continue home blood pressure monitoring, consider 24-hour ambulatory blood pressure monitoring, continue metoprolol, continue xarelto use compression stockings  1/18/19 chol 183,trig 160,hdl 45,ldl 106  11/26/19 chol 133,trig 202,hdl 42,ldl 51  3/3/21 chol 144,trig 98,hdl 48,ldl 76 on lipitor 40 mg  10/5/21 CT-CTA heart left main no plaque or stenosis, LAD 25% stenosis, circumflex and obtuse marginal patent, RCA, posterior descending and posterior lateral branches patent no stenosis, normal LV size, ascending aorta 3.5 cm  11/22/21 chol 134,trig 133,hdl 45,ldl 62 on lipitor 40 mg  5/2/23 chol 145,trig 263,hdl 37,ldl 55 on lipitor 40 mg  6/4/24 chol 127,trig 95,hdl 40,ldl 68 on lipitor 40 mg  2/5/25 chol 129,trig 86,hdl 40,ldl 72 on lipitor 40 mg  5/7/25  lipoprotein a 12, apolipoprotein b 92     gerd on pepcid  2/8/16 on pepcid  8/12/21  ENT note throat symptoms possibly related to reflux, recommend famotidine 40 mg bid and work on lifestyle modification recommended humidification to avoid excessive dryness  10/12/21  ENT note flexible laryngoscopy, moderate interarytenoid and moderate postcricoid edema, most likely cause of sensation in her throat is reflux consistent with laryngeal pharyngeal reflux, work on healthy lifestyle, diet modification, continue famotidine twice daily for 6 to 8 weeks  1/20/22 on pepcid 40 mg bid   8/22/22 on prilosec after ablation will stop on 9/4/22 and go back to pepcid, had headache that started when she went off pepcid but that was when she had ablation so monitor for recurrent headache on pepcid or if breakthrough heartburn on pepcid then will need to go back to omeprazole if that happens  9/12/22 on pepcid 40 mg  3/15/23 on prilosec 20 mg as failed pepcid  5/2/23 vit d 33 on prilosec 20 mg  6/4/24 b12 795,vit d 45,magnesium 2.2 on prilosec 20 mg  5/7/25 b12 903,iroin 94,vit d 42,magnesium 2.3 on prilosec 20 mg     history covid  8/12/23 covid positive      history headache  11/22/21 trial of pamelor for tension headache and monitor for norpace interaction, also consider ct head no improvement  1/20/22 still with insomnia, nortriptyline 10 mg, consider going to 20 mg, need to check with cardiology first because of potential interaction with norpace  2/11/22 increase nortriptylline to 20 mg ok per cardiology see patient mychart note   2/22/22 off nortriptyline due to chest pain  3/15/22 resuming nortriptyline for sleep as melatonin caused headache  8/22/22 stop nortriptyline as headache improved after ablation  9/12/22 back on pamelor 10 mg  5/2/25 stop pamelor  5/11/25 stopped pamelor 10 mg but headaches recurred, still with insomnia she would like to try increasing the pamelor to 20 mg understanding this  will not help her restless leg symptoms  5/26/25 [employer] on pamelor 20 mg helping with sleep, no daytime drowsiness, refill sent to the pharmacy     History transient global amnesia  1/18/19 chol 183,trig 160,hdl 45,ldl 106  1/18/19 CT head negative  1/19/19 ultrasound carotid less than 50% bilateral stenosis  1/19/19 CT-CTA head with and without postprocessing, negative [place]  1/18/19 hospital admission, 1/19/19 hospital discharge transient global amnesia, patient was speaking to her son on the phone, and could not recall for anything 2 hours afterwards, has been indicated that patient was confused and repetitive and could not remember talking on the phone, came to the emergency room for evaluation, CT scan head negative, no other symptoms, resolution discharged home the following day  1/29/19 cardiology note, patient still having some issues with short-term memory, will switch to coumadin from xarelto with recent event, referral to neurology, paroxysmal atrial fibrillation burden less than 1%  3/26/19 [facility] neurological note probable transient global amnesia, complete holter monitor per cardiology, EEG ordered and sedimentation rate for headache     hypertension  11/18/14 echo normal LV size and function, grade 2 diastolic dysfunction, EF 60-65%, mild aortic stenosis, mild aortic insufficiency, RVSP 20-25  11/19/14 holter sinus with left bundle branch block, average rate 57, minimum heart rate 44, maximal heart rate 85, PVCs, PACs   12/12/14 ALLEN known post-ablation for hypertrophic cardiomyopathy without obstruction, small amount of remaining myocardium of lower tract does not cause significant outflow gradient, mild aortic insufficiency, moderate central mitral regurgitation, remnant myocardium in the LVOT with mild obstruction, peak outflow gradient 11 (previously 16-18)  11/30/15 cardiology note paroxysmal atrial fibrillation, short atrial fibrillation under 4 minutes  9/26/18 echo normal LV  systolic function, EF 55%, mild LVH, grade 1 diastolic dysfunction, evidence of LVOT, septal wall thickness 1.2 cm, consider subaortic membrane, mild to moderate mitral regurgitation, mild aortic insufficiency  continues on norpace, increase metoprolol XL to 50 mg follow-up 1 month   2/28/19 cardiology note continues on norpace, increase metoprolol XL to 50 mg follow-up 1 month   10/10/19 cardiology note hypertrophic cardiomyopathy status post septal ablation, pacemaker, paroxysmal atrial fibrillation relatively short episodes, longest episode 49 minutes remainder less than 1 minute in length, remains controlled on toprol, norpace, coumadin per coumadin clinic  10/31/19 echo normal left ventricular function, EF 60%, basal septal wall appears hypokinetic, grade 2 diastolic dysfunction, moderately dilated left atrium volume index 44 mL, mild mitral regurgitation, mild aortic stenosis, mild aortic insufficiency, peak gradient 23  1/7/20 ultrasound carotid less than 50% stenosis right internal carotid, left carotid mild plaque carotid bifurcation  6/4/20 sleep note continues on autocpap 5-15  2/3/21 echo mild LVH, EF 65%, grade 2 diastolic dysfunction, RVSP 30  10/5/21 CT-CTA heart left main no plaque or stenosis, LAD 25% stenosis, circumflex and obtuse marginal patent, RCA, posterior descending and posterior lateral branches patent no stenosis, normal LV size, ascending aorta 3.5 cm  4/6/22  cardiology procedure note transvenous dual-chamber PPM lead extraction, pacemaker generator removal, new permanent pacemaker implantation pulse generator boston model L311 serial # [id number]  6/27/22  cardiology note plan for ablation august, lower metoprolol to 50 mg bid and norpace to 150 mg tid  8/4/22 transesophageal echo prior to ablation preserved LV function, left atrial enlargement, mild MR and mild to moderate aortic insufficiency, no evidence of thrombus  8/4/22  cardiology EPS noted, successful RF ablation  pulmonary vein isolation procedure, resume anticoagulation, start carafate bid x 2 weeks and PPI x one month  11/16/22 cardiology note device interrogation 80% paced, start walking down beta-blocker and norpace, decrease metoprolol to 25 mg, can decrease norpace if doing well follow-up 6 months  6/4/24 urine mac<!.2 start losartan 50 mg and continue metoprolol 25 mg, blood pressure update in 2 weeks in [facility],stress echo ordered, follow-up cardiology  6/9/24 cardiology note stress echo ordered but will change to cardiac PET and echo given history of left bundle branch block and pacemaker, atrial fibrillation well-controlled less than 1% burden with 1 episode in march lasting 8 minutes, continue beta-blocker and eliquis, continue norpace 150 mg and toprol 50 mg, and losartan 50 mg for blood pressure, follow-up 3 months  6/17/24 echo normal LV size, thickness, function, EF 65%, normal diastolic function, severely dilated atrium 52 mL, mild MR, mild aortic valve stenosis, moderate aortic insufficiency, mild TR, RVSP 37  6/21/24 cardiac PET scan EF 57%, mid to basal septal infarct anteriorly, and diaphragmatic artifact, no ischemic changes with persantine, no arrhythmias, chest heaviness experienced  6/24/24 cardiology increased metoprolol from 50 mg to 75 mg qpm,continues on losartan 50 mg,norpace 150 mg  6/29/24 on metoprolol 75 mg and losartan 50 mg qday, blood pressure still high will increase losartan to 50 mg bid, continue norpace  7/18/24 start norvasc 5 mg and continue losartan 50 mg bid, metoprolol 75 mg, norpace per cardiology as blood pressures still running 140-150s/70-80 and HR 60s  9/27/24 CT-CTA coronary arteries right dominant circulation, left main no plaque or stenosis, LAD calcified plaque less than 25% stenosis, circumflex and obtuse marginal patent, RCA poorly evaluated due to artifact, pacemaker, LMA 0, LCx 0, LAD 50.6, RCA 0 = 50.6  3/27/25  cardiology note paroxysmal atrial fibrillation,  pacemaker check, repeat echo, establish with HCM clinic to see whether or not subaortic membrane needs to be addressed, follow-up after echo  4/30/25 imdur 30 mg 1.5 tab in am,losartan 50 mg bid,norvasc 5 mg, eliquis   6/4/25 ultrasound and ROBERT lower extremity arterial bilateral, left ROBERT preexercise 1.31, postexercise 1.31, right ROBERT preexercise 1.29, right ROBERT postexercise 1.45, duplex and doppler imaging no significant stenosis  5/15/25  [facility] hypertrophic cardiomyopathy NYHA class II stage III status post septal myomectomy 2000, continue metoprolol 75 mg daily and disopyramide 100 mg q 8 hours, obtain updated echo with hypertrophic cardiomyopathy protocol assess for obstruction, recommend cardiac monitoring every 1 to 2 years device interrogation versus event monitor, cardiac MRI ordered, repeat echo 1 to 2 years and cardiac MRI 3 to 5 years, referral to genetic counselor, first-degree family member screened every 3 to 5 years for hypertrophic cardiomyopathy     Hypertrophic cardiomyopathy  11/18/14 echo normal LV size and function, grade 2 diastolic dysfunction, EF 60-65%, mild aortic stenosis, mild aortic insufficiency, RVSP 20-25  11/19/14 holter sinus with left bundle branch block, average rate 57, minimum heart rate 44, maximal heart rate 85, PVCs, PACs   12/12/14 ALLEN known post-ablation for hypertrophic cardiomyopathy without obstruction, small amount of remaining myocardium of lower tract does not cause significant outflow gradient, mild aortic insufficiency, moderate central mitral regurgitation, remnant myocardium in the LVOT with mild obstruction, peak outflow gradient 11 (previously 16-18)  9/26/18 echo normal LV systolic function, EF 55%, mild LVH, grade 1 diastolic dysfunction, evidence of LVOT, septal wall thickness 1.2 cm, consider subaortic membrane, mild to moderate mitral regurgitation, mild aortic insufficiency  continues on norpace, increase metoprolol XL to 50 mg follow-up  1 month   2/28/19 cardiology note continues on norpace, increase metoprolol XL to 50 mg follow-up 1 month   10/10/19 cardiology note hypertrophic cardiomyopathy status post septal ablation, pacemaker, paroxysmal atrial fibrillation relatively short episodes, longest episode 49 minutes remainder less than 1 minute in length, remains controlled on toprol, norpace, coumadin per coumadin clinic  10/31/19 echo normal left ventricular function, EF 60%, basal septal wall appears hypokinetic, grade 2 diastolic dysfunction, moderately dilated left atrium volume index 44 mL, mild mitral regurgitation, mild aortic stenosis, mild aortic insufficiency, peak gradient 23  2/3/21 echo mild LVH, EF 65%, grade 2 diastolic dysfunction, RVSP 30  10/5/21 CT-CTA heart left main no plaque or stenosis, LAD 25% stenosis, circumflex and obtuse marginal patent, RCA, posterior descending and posterior lateral branches patent no stenosis, normal LV size, ascending aorta 3.5 cm  4/6/22  cardiology procedure note transvenous dual-chamber PPM lead extraction, pacemaker generator removal, new permanent pacemaker implantation pulse generator boston model L311 serial # [id number]  8/4/22 transesophageal echo prior to ablation preserved LV function, left atrial enlargement, mild MR and mild to moderate aortic insufficiency, no evidence of thrombus  8/4/22  cardiology EPS noted, successful RF ablation pulmonary vein isolation procedure, resume anticoagulation, start carafate bid x 2 weeks and PPI x one month  6/17/24 echo normal LV size, thickness, function, EF 65%, normal diastolic function, severely dilated atrium 52 mL, mild MR, mild aortic valve stenosis, moderate aortic insufficiency, mild TR, RVSP 37  6/21/24 cardiac PET scan EF 57%, mid to basal septal infarct anteriorly, and diaphragmatic artifact, no ischemic changes with persantine, no arrhythmias, chest heaviness experienced  9/27/24 CT-CTA coronary arteries right dominant  circulation, left main no plaque or stenosis, LAD calcified plaque less than 25% stenosis, circumflex and obtuse marginal patent, RCA poorly evaluated due to artifact, pacemaker, LMA 0, LCx 0, LAD 50.6, RCA 0 = 50.6  5/15/25  [facility] hypertrophic cardiomyopathy NYHA class II stage III status post septal myomectomy 2000, continue metoprolol 75 mg daily and disopyramide 100 mg q 8 hours, obtain updated echo with hypertrophic cardiomyopathy protocol assess for obstruction, recommend cardiac monitoring every 1 to 2 years device interrogation versus event monitor, cardiac MRI ordered, repeat echo 1 to 2 years and cardiac MRI 3 to 5 years, referral to genetic counselor, first-degree family member screened every 3 to 5 years for hypertrophic cardiomyopathy     ibs  2/3/20 trial of linzess 145 mcg  2/25/20 increase to linzess 290 mcg   3/5/20 colon per DHA negative, repeat 10 years  9/12/22 back on pamelor 10 mg  11/2/23 resume linzess 145 mcg for constipation  8/1/24 stop linzess due to nausea     insomnia   11/22/21 trial of pamelor for tension headache and monitor for norpace interaction, also consider ct head no improvement  2/22/22 off nortriptyline due to chest pain, will try melatonin for sleep  9/12/22 back on pamelor 10 mg  5/11/25 stopped pamelor but headaches recurred, still with insomnia she would like to try increasing the pamelor to 20 mg understanding this will not help her restless leg symptoms  5/26/25 "[employer]hart message on pamelor 20 mg helping with sleep, no daytime drowsiness, refill sent to the pharmacy    neck arthritis  3/24/15 MRI cervical spine C4-C5 small disc osteophyte complex, moderate left facet arthropathy, multilevel DJD  4/14/21 EMG nerve conduction study right upper extremity consistent with right median neuropathy without denervation, possible mild right ulnar neuropathy, no evidence of cervical radiculopathy     pacemaker  4/16/15  cardiology procedure note,  dual-chamber pacemaker implantation  2/8/22 cardiology note pacemaker nearing EDIE, we will go ahead and extracted failing RV lead transvenously and replace, patient agreeable to proceed  4/6/22  cardiology procedure note transvenous dual-chamber PPM lead extraction, pacemaker generator removal, new permanent pacemaker implantation pulse generator boston model L311 serial # [id number]  11/16/22 cardiology note device interrogation 80% paced   5/22/23 EKG sinus 62 atrial paced rhythm, left bundle branch block  5/22/23  cardiology note device check functioning appropriately, continue oral anticoagulation, continue norpace, increase toprol to 50 mg  4/2/24 cardiology note device check functioning appropriately  3/27/25  cardiology note     Paroxysmal atrial fibrillation  11/18/14 echo normal LV size and function, grade 2 diastolic dysfunction, EF 60-65%, mild aortic stenosis, mild aortic insufficiency, RVSP 20-25  11/19/14 holter sinus with left bundle branch block, average rate 57, minimum heart rate 44, maximal heart rate 85, PVCs, PACs   12/12/14 ALLEN known post-ablation for hypertrophic cardiomyopathy without obstruction, small amount of remaining myocardium of lower tract does not cause significant outflow gradient, mild aortic insufficiency, moderate central mitral regurgitation, remnant myocardium in the LVOT with mild obstruction, peak outflow gradient 11 (previously 16-18)  11/30/15 cardiology note paroxysmal atrial fibrillation, short atrial fibrillation under 4 minutes  2/22/16 cardiology note minimal atrial fibrillation on dual-chamber pacemaker check  3/24/16 cardiology note minimal atrial fibrillation   10/24/16 cardiology note longer episodes of atrial fibrillation, still at 2.6 hours total on dual-chamber pacemaker review, continues on xarelto  9/26/18 echo normal LV systolic function, EF 55%, mild LVH, grade 1 diastolic dysfunction, evidence of LVOT, septal wall thickness 1.2 cm, consider  subaortic membrane, mild to moderate mitral regurgitation, mild aortic insufficiency  10/8/18 cardiology note does not appear to be subaortic membrane on echo, perhaps subaortic thickening, continue to monitor echo of full gradient for recurrence continue oral anticoagulation gradient is low and asymptomatic, xarelto, norpace, lopressor  1/29/19 cardiology note, patient still having some issues with short-term memory, will switch to coumadin from xarelto with recent event, referral to neurology, paroxysmal atrial fibrillation burden less than 1%  2/28/19 cardiology note hypertrophic obstructive cardiomyopathy and paroxysmal atrial fibrillation, sinus on exam, medi-lynx ventricular premature contractions isolated but frequent, asymptomatic, one short episode of paroxysmal atrial fibrillation 1.5 minutes, continues on warfarin INR 2.0, continues on norpace, increase metoprolol XL to 50 mg follow-up 1 month   10/10/19 cardiology note hypertrophic cardiomyopathy status post septal ablation, pacemaker, paroxysmal atrial fibrillation relatively short episodes, longest episode 49 minutes remainder less than 1 minute in length, remains controlled on toprol, norpace, coumadin per coumadin clinic  10/31/19 echo normal left ventricular function, EF 60%, basal septal wall appears hypokinetic, grade 2 diastolic dysfunction, moderately dilated left atrium volume index 44 mL, mild mitral regurgitation, mild aortic stenosis, mild aortic insufficiency, peak gradient 23  1/7/20 ultrasound carotid less than 50% stenosis right internal carotid, left carotid mild plaque carotid bifurcation  8/25/20 cardiology note pacemaker, device check, no arrhythmia burden, change from coumadin to eliquis follow-up 6 months repeat echo at that time  2/3/21 echo mild LVH, EF 65%, grade 2 diastolic dysfunction, RVSP 30  2/26/21  cardiology note pacemaker, paroxysmal atrial fibrillation stable, echo reviewed no significant LVOT gradient, continue  norpace and toprol discussed options of RV lead replacement including transvenous extraction, implanting the lead, or performing generator change and using it until no longer working, we will plan on transvenous extraction and new RV lead, follow-up 6 months  8/11/21 cardiology note would like to exclude obstructive CAD but do not think pretest probability is high enough to warrant catheterization, will order coronary CTA follow-up 6 months  8/11/21 cardiology note EKG sinus, ordered CTA  10/5/21 CT-CTA heart left main no plaque or stenosis, LAD 25% stenosis, circumflex and obtuse marginal patent, RCA, posterior descending and posterior lateral branches patent no stenosis, normal LV size, ascending aorta 3.5 cm  2/8/22 cardiology note pacemaker nearing EDIE, we will go ahead and extracted failing RV lead transvenously and replace, patient agreeable to proceed  4/6/22  cardiology procedure note transvenous dual-chamber PPM lead extraction, pacemaker generator removal, new permanent pacemaker implantation pulse generator Swapdom model L311 serial # [id number]  4/27/22  cardiology note having higher PVCs and atrial arrhythmia burden since extraction and new implant, device functioning appropriately, follow-up 4 weeks  5/27/22  cardiology note device interrogation, increasing sustained atrial fibrillation episodes up to 5 hours, options discussed, increase norpace to 200 mg tid for now, plan pulmonary vein isolation next available  6/27/22  cardiology note plan for ablation August, lower metoprolol to 50 mg bid and norpace to 150 mg tid  8/4/22 transesophageal echo prior to ablation preserved LV function, left atrial enlargement, mild MR and mild to moderate aortic insufficiency, no evidence of thrombus  8/14/22  cardiology EPS noted, successful RF ablation pulmonary vein isolation procedure, resume anticoagulation, start carafate bid x 2 weeks and PPI x one month  11/16/22 cardiology note device  interrogation 80% paced, start walking down beta-blocker and norpace, decrease metoprolol to 25 mg, can decrease norpace if doing well follow-up 6 months  1/30/23 on norpace, metoprolol, eliquis  5/22/23 EKG sinus 62 atrial paced rhythm, left bundle branch block  5/22/23  cardiology note device check functioning appropriately, continue oral anticoagulation, continue norpace, increase toprol to 50 mg  11/14/23 cardiology note device check functioning appropriately paroxysmal atrial fibrillation, minimal burden less than 180 days overall just over 1 hour at that time, continue medication follow-up 12 months  6/9/24 cardiology note stress echo ordered but will change to cardiac PET and echo given history of left bundle branch block and pacemaker, atrial fibrillation well-controlled less than 1% burden with 1 episode in march lasting 8 minutes, continue beta-blocker and eliquis, continue norpace 150 mg and toprol 50 mg, and losartan 50 mg for blood pressure, follow-up 3 months  6/17/24 echo normal LV size, thickness, function, EF 65%, normal diastolic function, severely dilated atrium 52 mL, mild MR, mild aortic valve stenosis, moderate aortic insufficiency, mild TR, RVSP 37  6/21/24 cardiac PET scan EF 57%, mid to basal septal infarct anteriorly, and diaphragmatic artifact, no ischemic changes with persantine, no arrhythmias, chest heaviness experienced  6/24/24 cardiology increased metoprolol from 50 mg to 75 mg qpm,continues on losartan 50 mg,norpace 150 mg  9/27/24 CT-CTA coronary arteries right dominant circulation, left main no plaque or stenosis, LAD calcified plaque less than 25% stenosis, circumflex and obtuse marginal patent, RCA poorly evaluated due to artifact, pacemaker, LMA 0, LCx 0, LAD 50.6, RCA 0 = 50.6  11/26/24  cardiology note suspect chest pain related to hypertrophic cardiomyopathy and residual subaortic membrane seen on last ALLEN possibly causing outflow gradient, may be better managed  at HCM center, will increase dose of imdur from 30 to 45 mg for now  3/27/25  cardiology note paroxysmal atrial fibrillation, pacemaker check, repeat echo, establish with HCM clinic to see whether or not subaortic membrane needs to be addressed, follow-up after echo  4/30/25 imdur 30 mg 1.5 tab in am,losartan 50 mg bid,norvasc 5 mg, eliquis   5/15/25  [facility] hypertrophic cardiomyopathy NYHA class II stage III status post septal myomectomy 2000, continue metoprolol 75 mg daily and disopyramide 100 mg q 8 hours, obtain updated echo with hypertrophic cardiomyopathy protocol assess for obstruction, recommend cardiac monitoring every 1 to 2 years device interrogation versus event monitor, cardiac MRI ordered, repeat echo 1 to 2 years and cardiac MRI 3 to 5 years, referral to genetic counselor, first-degree family member screened every 3 to 5 years for hypertrophic cardiomyopathy      Preventative health  12/2/16 tdap  2/3/20 hep c ab negative  3/5/20 colon per [facility] negative, repeat 10 years  9/11/20 declines sonocine  3/23/21 dexa LS-0.4,hip-0.2  8/22/22 prevnar 20  10/10/23 pap per [facility]   11/20/23 rsv  8/30/24 shingrix second  9/3/24 covid  10/16/24 mammogram heterogeneously dense breast tissue recommend screening breast ultrasound  5/7/25 vit d 42  5/7/25 A1c 5.5%     sleep apnea  2/3/17 sleep apnea referral pending, OPO ordered  2/14/17 apnea link per [facility], AHI 19.7, low saturation 83, time<90% saturation 199 minutes, time less than 85% saturation 1 minute  2/16/17 sleep consultation, proceed with polysomnography  3/26/17 sleep study duration 430 minutes, AHI 21.3, low saturation 88%, mild to moderate sleep-disordered breathing with significant disruption of sleep continuity, limb movement disorder could be present as well, cpap titration recommended  3/31/17 sleep note ordered autocpap 5-15 nightly, follow-up 6-8 weeks  11/30/18 sleep note on autocpap 5-15  6/4/20  "sleep note on autopap 5 to 15  2/3/21 echo mild LVH, EF 65%, grade 2 diastolic dysfunction, RVSP 30  9/3/21 sleep note on autocpap 5 to 15, compliance report 96.7% usage average time 5 hours 30 minutes AHI 6.9, terrance device recall  2/9/23 sleep note bring in device tomorrow for compliance review  2/12/24 sleep note 30 compliance 100% average time 5 hours 17 minutes, AHI 5, no mask leak, follow-up 1 year  2/12/25 sleep note continue cpap follow-up 1 year     S/p carpal tunnel right release  2/25/21 EMG nerve conduction study right upper extremity evaluate carpal tunnel  3/3/21 wbc 6.9 (41%N,51%L),NAVIN negative,SPEP negative  4/14/21 EMG nerve conduction study right upper extremity consistent with right median neuropathy without denervation, possible mild right ulnar neuropathy, no evidence of cervical radiculopathy  6/1/21  orthopedic operative note right endoscopic carpal tunnel release and left carpal tunnel injection under anesthesia  6/14/21  orthopedic note status post right endoscopic carpal tunnel release and left carpal tunnel injection under anesthesia anesthesia, follow-up 8 to 10 weeks   8/25/21 PACO note status post right endoscopic carpal tunnel release follow-up 8 to 10 weeks            Problem List[2]         Fall Risk Assessment  Has the patient had two or more falls in the last year or any fall with injury in the last year?  No               Patient Care Team:  Juve Stoddard M.D. as PCP - General (Internal Medicine)  Vibha Chua M.D. (Nephrology)  [facility] as Pharmacist  [employer] as Respiratory Therapist (DME Supplier)      ROS           Objective     /58   Pulse 60   Temp 37 °C (98.6 °F) (Temporal)   Ht 1.635 m (5' 4.37\")   Wt 90.5 kg (199 lb 9.6 oz)   LMP 01/31/2012 (LMP Unknown)   SpO2 96%   BMI 33.87 kg/m²      Physical Exam  Vitals and nursing note reviewed.   Constitutional:       Appearance: Normal appearance.   HENT:      Head: " Normocephalic and atraumatic.      Right Ear: External ear normal.      Left Ear: External ear normal.   Eyes:      Conjunctiva/sclera: Conjunctivae normal.   Cardiovascular:      Rate and Rhythm: Normal rate and regular rhythm.      Heart sounds: Normal heart sounds.   Pulmonary:      Effort: Pulmonary effort is normal.      Breath sounds: Normal breath sounds.   Abdominal:      General: There is no distension.   Musculoskeletal:         General: No swelling.   Skin:     Coloration: Skin is not jaundiced.      Findings: No bruising.   Neurological:      General: No focal deficit present.      Mental Status: She is alert.   Psychiatric:         Mood and Affect: Mood normal.         Behavior: Behavior normal.                      Assessment & Plan       Assessment  #1 sinusitis with persistent cough, sinus congestion, lungs are clear to auscultation, normal saturation, afebrile, no evidence of pharyngitis, otitis, or pneumonia    #2 hypertrophic cardiomyopathy followed by cardiology locally and has seen [facility] HCM specialist, continues on disopyramide, metoprolol, Imdur    #3 paroxysmal atrial fibrillation sinus on exam on metoprolol, Eliquis    #4 hypertension on Norvasc and losartan stable blood pressure today, does not take over-the-counter decongestants    #5 sleep apnea on CPAP nightly followed by the sleep group, most recent visit good compliance AHI of 5 on the mask, prior to the CPAP machine, her baseline AHI 19.7 done a few years ago prior to treatment    #6 tension type headache on Pamelor, cardiology has cleared her to use Pamelor in conjunction with Norpace    #7 BMI 33.87 obesity with medical comorbidities of sleep apnea, hypertension, paroxysmal atrial fibrillation, dyslipidemia, hypertrophic cardiomyopathy    #8 Dyslipidemia on Lipitor 40 mg daily    #9 chronic thrombophilia due to atrial fibrillation on Eliquis, no NSAIDs      Plan  #1  Treat with Augmentin 875 mg twice daily x 7 days, she  has had this before without side effects, drug interaction check no significant interactions with her cardiac medications, medication may cause nausea, loose stools, diarrhea, rash    #2 trial of azelastine nasal spray as she has tried Flonase in the past, hopefully this will help postnasal drip and the cough    #3 follow-up cardiology    #4 continue work on a good nutrition program, she has difficulty exercising which may be due to her underlying paroxysmal atrial fibrillation, HCM, cardiac medications, or even sleep apnea    #5 I would like to see if she qualifies for Zepbound since she has documented sleep apnea with a sleep apnea AHI of 19.7 indicating moderate sleep apnea, in addition to body mass index of 33.87, as per Medicare guidelines she does have sleep apnea, obesity, and comorbid cardiovascular conditions and I believe she is a candidate for Zepbound therapy    #6 I could not find any contraindication to Zepbound with hypertrophic cardiomyopathy, and the Zepbound does not have any significant interactions with the current medications, advised the patient that I will send a request for Zepbound to her pharmacy, this will be denied but at least this will give us time to work on prior authorization, however she needs to check with her cardiologist and HCM specialist to see if they have any concerns with regards to potentially starting Zepbound, even if the Zepbound is approved.  Advised her to not take the medication until we clear this with her cardiology specialist    #7 if Zepbound is approved and cardiology is fine to start the medication, we will work on titration as the medication can cause heartburn, loose stools, constipation, nausea, bloating, gas, rare risk of pancreatitis and we would need to see her every 3 months to dose adjust the medication              [1]   Current Outpatient Medications   Medication Sig Dispense Refill    disopyramide (NORPACE) 150 MG Cap TAKE 1 CAPSULE BY MOUTH ONCE  DAILY IN THE MORNING, 1 CAP AT NOON, AND 1 CAP AT BEDTIME 270 Capsule 1    nortriptyline (PAMELOR) 10 MG Cap Take 2 Capsules by mouth every evening. 180 Capsule 3    metoprolol SR (TOPROL XL) 50 MG TABLET SR 24 HR TAKE 1 & 1/2 (ONE & ONE-HALF) TABLETS BY MOUTH ONCE DAILY 135 Tablet 3    isosorbide mononitrate SR (IMDUR) 30 MG TABLET SR 24 HR TAKE 1 & 1/2 (ONE & ONE-HALF) TABLETS BY MOUTH ONCE DAILY IN THE MORNING 135 Tablet 3    atorvastatin (LIPITOR) 40 MG Tab TAKE 1 TABLET BY MOUTH ONCE DAILY IN THE EVENING 90 Tablet 3    ELIQUIS 5 MG Tab Take 1 tablet by mouth twice daily 180 Tablet 2    losartan (COZAAR) 50 MG Tab TAKE 1 TABLET BY MOUTH TWICE DAILY FOR BLOOD PRESSURE 180 Tablet 3    omeprazole (PRILOSEC) 20 MG delayed-release capsule Take 1 capsule by mouth once daily 90 Capsule 3    amLODIPine (NORVASC) 5 MG Tab Take 1 Tablet by mouth every day. Indications: blood pressure 90 Tablet 3    Zoster Vac Recomb Adjuvanted (SHINGRIX) 50 MCG/0.5ML Recon Susp Inject  into the shoulder, thigh, or buttocks. 0.5 mL 0    PSYLLIUM HUSK PO Take  by mouth.      Cetirizine HCl (ZYRTEC ALLERGY PO) Take  by mouth.      Triamcinolone Acetonide (NASACORT AQ NA) Administer  into affected nostril(S).      albuterol 108 (90 Base) MCG/ACT Aero Soln inhalation aerosol Inhale 2 Puffs every four hours as needed for Shortness of Breath. 1 Each 3    acetaminophen (TYLENOL) 325 MG Tab Take 2 Tablets by mouth 1 time a day as needed. Indications: Pain      Probiotic Product (PROBIOTIC-10 PO) Take 1 Capsule by mouth every day.      Multiple Vitamin (MULTI-VITAMIN PO) Take 1 Tablet by mouth every day.      Pantothenic Acid 500 MG Tab Take 1 Tablet by mouth every day.      Calcium-Vitamin D-Vitamin K (CALCIUM FOR WOMEN PO) Take 1 Each by mouth every day.      Magnesium 200 MG TABS Take 1 Tab by mouth every day.       No current facility-administered medications for this visit.   [2]   Patient Active Problem List  Diagnosis    S/P carpal tunnel  release    Hypertrophic cardiomyopathy (HCC)    Pacemaker    Preventative health care    GERD (gastroesophageal reflux disease)    Obesity    Neck arthritis    Decreased GFR    Dyslipidemia    Paroxysmal atrial fibrillation (HCC)    Sleep apnea    History of transient global amnesia    Venous insufficiency    IBS (irritable bowel syndrome)    Abnormal nasal septum    CLL (chronic lymphocytic leukemia) ([facility])    History of headache    Insomnia    H/O cardiac radiofrequency ablation 8/4/22 Dr Witt    Allergic rhinitis    History of COVID-19    Other thrombophilia ([facility])    Hypertension    Lateral epicondylitis of right elbow    Restless leg syndrome

## 2025-07-26 VITALS
HEART RATE: 60 BPM | TEMPERATURE: 98.6 F | WEIGHT: 199.6 LBS | DIASTOLIC BLOOD PRESSURE: 58 MMHG | BODY MASS INDEX: 34.08 KG/M2 | OXYGEN SATURATION: 96 % | HEIGHT: 64 IN | SYSTOLIC BLOOD PRESSURE: 118 MMHG | RESPIRATION RATE: 16 BRPM

## 2025-07-26 RX ORDER — TIRZEPATIDE 2.5 MG/.5ML
2.5 INJECTION, SOLUTION SUBCUTANEOUS
Qty: 2 ML | Refills: 1 | Status: SHIPPED | OUTPATIENT
Start: 2025-07-26

## 2025-07-29 ENCOUNTER — TELEPHONE (OUTPATIENT)
Dept: MEDICAL GROUP | Facility: MEDICAL CENTER | Age: 65
End: 2025-07-29
Payer: MEDICARE

## 2025-07-29 DIAGNOSIS — G47.33 OBSTRUCTIVE SLEEP APNEA SYNDROME: Primary | Chronic | ICD-10-CM

## 2025-07-31 ENCOUNTER — PATIENT MESSAGE (OUTPATIENT)
Dept: CARDIOLOGY | Facility: MEDICAL CENTER | Age: 65
End: 2025-07-31
Payer: MEDICARE

## 2025-08-01 ENCOUNTER — PATIENT MESSAGE (OUTPATIENT)
Dept: CARDIOLOGY | Facility: MEDICAL CENTER | Age: 65
End: 2025-08-01
Payer: MEDICARE

## 2025-12-05 ENCOUNTER — APPOINTMENT (OUTPATIENT)
Dept: MEDICAL GROUP | Facility: MEDICAL CENTER | Age: 65
End: 2025-12-05
Payer: MEDICARE

## (undated) DEVICE — MASK ANESTHESIA ADULT  - (100/CA)

## (undated) DEVICE — SHEET TRANSVERSE LAP - (12EA/CA)

## (undated) DEVICE — COVER CIV-FLEX TRANSDUCER - (24/BX)

## (undated) DEVICE — KIT ANESTHESIA W/CIRCUIT & 3/LT BAG W/FILTER (20EA/CA)

## (undated) DEVICE — SUTURE 3-0 VICRYL PLUS SH - 8X 18 INCH (12/BX)

## (undated) DEVICE — PADDING CAST 4 IN STERILE - 4 X 4 YDS (24/CA)

## (undated) DEVICE — HUMID-VENT HEAT AND MOISTURE EXCHANGE- (50/BX)

## (undated) DEVICE — SYRINGE SAFETY 5 ML 18 GA X 1-1/2 BLUNT LL (100/BX 4BX/CA)

## (undated) DEVICE — PROTECTOR ULNA NERVE - (36PR/CA)

## (undated) DEVICE — Device

## (undated) DEVICE — SUCTION INSTRUMENT YANKAUER BULBOUS TIP W/O VENT (50EA/CA)

## (undated) DEVICE — BANDAID SHEER STRIP 3/4 IN (100EA/BX 12BX/CA)

## (undated) DEVICE — ELECTRODE DUAL RETURN W/ CORD - (50/PK)

## (undated) DEVICE — TUBING CLEARLINK DUO-VENT - C-FLO (48EA/CA)

## (undated) DEVICE — LACTATED RINGERS INJ 1000 ML - (14EA/CA 60CA/PF)

## (undated) DEVICE — DRESSING TRANSPARENT FILM TEGADERM 2.375 X 2.75"  (100EA/BX)"

## (undated) DEVICE — CANISTER SUCTION RIGID RED 1500CC (40EA/CA)

## (undated) DEVICE — ELECTRODE 850 FOAM ADHESIVE - HYDROGEL RADIOTRNSPRNT (50/PK)

## (undated) DEVICE — CHLORAPREP 26 ML APPLICATOR - ORANGE TINT(25/CA)

## (undated) DEVICE — TUBING INFLOW HYSTEROSCOPY (10EA/CA)

## (undated) DEVICE — PACK MINOR BASIN - (2EA/CA)

## (undated) DEVICE — DRAPE LARGE 3 QUARTER - (20/CA)

## (undated) DEVICE — GLOVE BIOGEL SZ 8 SURGICAL PF LTX - (50PR/BX 4BX/CA)

## (undated) DEVICE — NEPTUNE 4 PORT MANIFOLD - (20/PK)

## (undated) DEVICE — SET LEADWIRE 5 LEAD BEDSIDE DISPOSABLE ECG (1SET OF 5/EA)

## (undated) DEVICE — SENSOR SPO2 NEO LNCS ADHESIVE (20/BX) SEE USER NOTES

## (undated) DEVICE — SOLUTION SORBITOL 3000ML (4/CA)

## (undated) DEVICE — TUBE CONNECTING SUCTION - CLEAR PLASTIC STERILE 72 IN (50EA/CA)

## (undated) DEVICE — BAG SPONGE COUNT 10.25 X 32 - BLUE (250/CA)

## (undated) DEVICE — BOVIE BLADE COATED &INSULATED - 25/PK

## (undated) DEVICE — WATER IRRIGATION STERILE 1000ML (12EA/CA)

## (undated) DEVICE — PAD SANITARY 11IN MAXI IND WRAPPED  (12EA/PK 24PK/CA)

## (undated) DEVICE — KIT  I.V. START (100EA/CA)

## (undated) DEVICE — HEAD HOLDER JUNIOR/ADULT

## (undated) DEVICE — CLOSURE SKIN STRIP 1/2 X 4 IN - (STERI STRIP) (50/BX 4BX/CA)

## (undated) DEVICE — TOWEL STOP TIMEOUT SAFETY FLAG (40EA/CA)

## (undated) DEVICE — GLOVE BIOGEL INDICATOR SZ 7.5 SURGICAL PF LTX - (50PR/BX 4BX/CA)

## (undated) DEVICE — SUTURE 4-0 ETHILON PS-2 18 (12PK/BX)"

## (undated) DEVICE — CANISTER SUCTION 3000ML MECHANICAL FILTER AUTO SHUTOFF MEDI-VAC NONSTERILE LF DISP  (40EA/CA)

## (undated) DEVICE — GLOVE, LITE (PAIR)

## (undated) DEVICE — SODIUM CHL IRRIGATION 0.9% 1000ML (12EA/CA)

## (undated) DEVICE — GOWN WARMING STANDARD FLEX - (30/CA)

## (undated) DEVICE — GLOVE SZ 6.5 BIOGEL PI MICRO - PF LF (50PR/BX)

## (undated) DEVICE — CATHETER IV 20 GA X 1-1/4 ---SURG.& SDS ONLY--- (50EA/BX)

## (undated) DEVICE — GLOVE BIOGEL SZ 6 PF LATEX - (50EA/BX 4BX/CA)

## (undated) DEVICE — PACK UPPER EXTREMITY SM OR - (3/CA)

## (undated) DEVICE — CORDS BIPOLAR COAGULATION - 12FT STERILE DISP. (10EA/BX)

## (undated) DEVICE — STOCKINET BIAS 4 IN STERILE - (20/CA)

## (undated) DEVICE — DRAPE UNDER BUTTOCKS FLUID - (20/CA)

## (undated) DEVICE — KIT CARPAL TUNNEL ENDOSCOPIC

## (undated) DEVICE — TRAY SRGPRP PVP IOD WT PRP - (20/CA)

## (undated) DEVICE — TUBING OUTFLOW HYSTEROSCPY (10EA/BX)

## (undated) DEVICE — SUTURE 2-0 PDS II CT-2 - (36/BX)

## (undated) DEVICE — SPONGE GAUZESTER. 2X2 4-PL - (2/PK 50PK/BX 30BX/CS)

## (undated) DEVICE — GLOVE BIOGEL SZ 7.5 SURGICAL PF LTX - (50PR/BX 4BX/CA)

## (undated) DEVICE — ELECTRODE MONOPOLAR ANGLED CUTTING LOOP DIAM 0.35 YELLOW 24FR

## (undated) DEVICE — GLOVE BIOGEL INDICATOR SZ 6.5 SURGICAL PF LTX - (50PR/BX 4BX/CA)

## (undated) DEVICE — GLOVE BIOGEL INDICATOR SZ 8 SURGICAL PF LTX - (50/BX 4BX/CA)

## (undated) DEVICE — SLEEVE, VASO, THIGH, MED

## (undated) DEVICE — SUTURE GENERAL

## (undated) DEVICE — DRESSING NON ADHERENT 3 X 4 - STERILE (100/BX 12BX/CA)

## (undated) DEVICE — DRAPE STRLE REG TOWEL 18X24 - (10/BX 4BX/CA)"

## (undated) DEVICE — SUTURE 2-0 ETHILON FS - (36/BX) 18 INCH

## (undated) DEVICE — MASK, LARYNGEAL AIRWAY #4

## (undated) DEVICE — GLOVE BIOGEL SZ 7 SURGICAL PF LTX - (50PR/BX 4BX/CA)

## (undated) DEVICE — SUTURE 4-0 MONOCRYL PLUS PS-2 - 27 INCH (36/BX)

## (undated) DEVICE — NEEDLE NON SAFETY 25 GA X 1 1/2 IN HYPO (100EA/BX)

## (undated) DEVICE — GLOVE BIOGEL SZ 6.5 SURGICAL PF LTX (50PR/BX 4BX/CA)